# Patient Record
Sex: MALE | Race: NATIVE HAWAIIAN OR OTHER PACIFIC ISLANDER | HISPANIC OR LATINO | URBAN - METROPOLITAN AREA
[De-identification: names, ages, dates, MRNs, and addresses within clinical notes are randomized per-mention and may not be internally consistent; named-entity substitution may affect disease eponyms.]

---

## 2017-07-13 ENCOUNTER — INPATIENT (INPATIENT)
Facility: HOSPITAL | Age: 67
LOS: 6 days | Discharge: HOME | DRG: 242 | End: 2017-07-20
Attending: HOSPITALIST | Admitting: STUDENT IN AN ORGANIZED HEALTH CARE EDUCATION/TRAINING PROGRAM
Payer: MEDICARE

## 2017-07-13 VITALS — SYSTOLIC BLOOD PRESSURE: 106 MMHG | RESPIRATION RATE: 16 BRPM | HEART RATE: 40 BPM | DIASTOLIC BLOOD PRESSURE: 68 MMHG

## 2017-07-13 DIAGNOSIS — N28.9 DISORDER OF KIDNEY AND URETER, UNSPECIFIED: ICD-10-CM

## 2017-07-13 DIAGNOSIS — I44.2 ATRIOVENTRICULAR BLOCK, COMPLETE: ICD-10-CM

## 2017-07-13 DIAGNOSIS — I10 ESSENTIAL (PRIMARY) HYPERTENSION: ICD-10-CM

## 2017-07-13 LAB
APTT BLD: 28.2 SEC — SIGNIFICANT CHANGE UP (ref 27.5–37.4)
BASOPHILS # BLD AUTO: 0 K/UL — SIGNIFICANT CHANGE UP (ref 0–0.2)
BASOPHILS NFR BLD AUTO: 0 % — SIGNIFICANT CHANGE UP (ref 0–2)
CK MB BLD-MCNC: 2.3 % — SIGNIFICANT CHANGE UP (ref 0–3.5)
CK MB CFR SERPL CALC: 2.3 NG/ML — SIGNIFICANT CHANGE UP (ref 0–6.7)
CK SERPL-CCNC: 102 U/L — SIGNIFICANT CHANGE UP (ref 30–200)
DIGOXIN SERPL-MCNC: <0.2 NG/ML — LOW (ref 0.8–2)
EOSINOPHIL # BLD AUTO: 0 K/UL — SIGNIFICANT CHANGE UP (ref 0–0.5)
EOSINOPHIL NFR BLD AUTO: 0.1 % — SIGNIFICANT CHANGE UP (ref 0–6)
GAS PNL BLDV: SIGNIFICANT CHANGE UP
HCT VFR BLD CALC: 35.1 % — LOW (ref 39–50)
HGB BLD-MCNC: 11.5 G/DL — LOW (ref 13–17)
INR BLD: 1.09 RATIO — SIGNIFICANT CHANGE UP (ref 0.88–1.16)
LYMPHOCYTES # BLD AUTO: 0.7 K/UL — LOW (ref 1–3.3)
LYMPHOCYTES # BLD AUTO: 8.3 % — LOW (ref 13–44)
MCHC RBC-ENTMCNC: 32.7 GM/DL — SIGNIFICANT CHANGE UP (ref 32–36)
MCHC RBC-ENTMCNC: 34.6 PG — HIGH (ref 27–34)
MCV RBC AUTO: 106 FL — HIGH (ref 80–100)
MONOCYTES # BLD AUTO: 0.6 K/UL — SIGNIFICANT CHANGE UP (ref 0–0.9)
MONOCYTES NFR BLD AUTO: 7.2 % — SIGNIFICANT CHANGE UP (ref 2–14)
NEUTROPHILS # BLD AUTO: 7.4 K/UL — SIGNIFICANT CHANGE UP (ref 1.8–7.4)
NEUTROPHILS NFR BLD AUTO: 84.4 % — HIGH (ref 43–77)
NT-PROBNP SERPL-SCNC: HIGH PG/ML (ref 0–300)
PLATELET # BLD AUTO: 175 K/UL — SIGNIFICANT CHANGE UP (ref 150–400)
PROTHROM AB SERPL-ACNC: 11.9 SEC — SIGNIFICANT CHANGE UP (ref 9.8–12.7)
RBC # BLD: 3.32 M/UL — LOW (ref 4.2–5.8)
RBC # FLD: 14.4 % — SIGNIFICANT CHANGE UP (ref 10.3–14.5)
TROPONIN T SERPL-MCNC: 0.01 NG/ML — SIGNIFICANT CHANGE UP (ref 0–0.06)
WBC # BLD: 8.8 K/UL — SIGNIFICANT CHANGE UP (ref 3.8–10.5)
WBC # FLD AUTO: 8.8 K/UL — SIGNIFICANT CHANGE UP (ref 3.8–10.5)

## 2017-07-13 PROCEDURE — 33210 INSERT ELECTRD/PM CATH SNGL: CPT | Mod: GC

## 2017-07-13 PROCEDURE — 99291 CRITICAL CARE FIRST HOUR: CPT | Mod: 25,GC

## 2017-07-13 PROCEDURE — 93010 ELECTROCARDIOGRAM REPORT: CPT | Mod: 59

## 2017-07-13 PROCEDURE — 93010 ELECTROCARDIOGRAM REPORT: CPT

## 2017-07-13 PROCEDURE — 71010: CPT | Mod: 26

## 2017-07-13 PROCEDURE — 76937 US GUIDE VASCULAR ACCESS: CPT | Mod: 26

## 2017-07-13 PROCEDURE — 71010: CPT | Mod: 26,77

## 2017-07-13 PROCEDURE — 99223 1ST HOSP IP/OBS HIGH 75: CPT | Mod: GC

## 2017-07-13 RX ORDER — EPINEPHRINE 0.3 MG/.3ML
0.08 INJECTION INTRAMUSCULAR; SUBCUTANEOUS
Qty: 4 | Refills: 0 | Status: DISCONTINUED | OUTPATIENT
Start: 2017-07-13 | End: 2017-07-13

## 2017-07-13 RX ORDER — HYDRALAZINE HCL 50 MG
5 TABLET ORAL ONCE
Qty: 0 | Refills: 0 | Status: COMPLETED | OUTPATIENT
Start: 2017-07-13 | End: 2017-07-13

## 2017-07-13 RX ORDER — SODIUM CHLORIDE 9 MG/ML
3 INJECTION INTRAMUSCULAR; INTRAVENOUS; SUBCUTANEOUS ONCE
Qty: 0 | Refills: 0 | Status: COMPLETED | OUTPATIENT
Start: 2017-07-13 | End: 2017-07-13

## 2017-07-13 RX ORDER — ATROPINE SULFATE 0.1 MG/ML
0.5 SYRINGE (ML) INJECTION ONCE
Qty: 0 | Refills: 0 | Status: DISCONTINUED | OUTPATIENT
Start: 2017-07-13 | End: 2017-07-14

## 2017-07-13 RX ORDER — CALCIUM GLUCONATE 100 MG/ML
1 VIAL (ML) INTRAVENOUS ONCE
Qty: 1 | Refills: 0 | Status: DISCONTINUED | OUTPATIENT
Start: 2017-07-13 | End: 2017-07-14

## 2017-07-13 RX ADMIN — Medication 5 MILLIGRAM(S): at 22:47

## 2017-07-13 RX ADMIN — SODIUM CHLORIDE 3 MILLILITER(S): 9 INJECTION INTRAMUSCULAR; INTRAVENOUS; SUBCUTANEOUS at 18:36

## 2017-07-13 NOTE — H&P ADULT - NSHPREVIEWOFSYSTEMS_GEN_ALL_CORE
CONSTITUTIONAL:  No weight loss, fever, chills  HEENT:  Eyes:  No visual loss, blurred vision, double vision or yellow sclerae. Ears, Nose, Throat:  No hearing loss, sneezing, congestion, runny nose or sore throat.  SKIN:  No rash or itching.  CARDIOVASCULAR:  +chest discomfort. No palpitations or edema.  RESPIRATORY:  No shortness of breath, cough or sputum.  GASTROINTESTINAL:  No anorexia, nausea, vomiting or diarrhea. No abdominal pain or blood.  GENITOURINARY:  Denies hematuria, dysuria.   NEUROLOGICAL:  No headache, dizziness, syncope, paralysis, ataxia, numbness or tingling in the extremities. No change in bowel or bladder control.  MUSCULOSKELETAL:  No muscle, back pain, joint pain or stiffness.  HEMATOLOGIC:  No anemia, bleeding or bruising.  PSYCHIATRIC:  No history of depression or anxiety.  ENDOCRINOLOGIC:  No reports of sweating, cold or heat intolerance.

## 2017-07-13 NOTE — ED PROCEDURE NOTE - CPROC ED TRANSVE PACE DETAIL1
The vein was accessed using an introducer. A pacing catheter was advanced.  The positive and negative electrodes were connected and demand pacing was initiated.  The rate and milliamp output were set./5 Fr pacer (8.5 Fr introducer)

## 2017-07-13 NOTE — ED ADULT NURSE NOTE - CHPI ED SYMPTOMS NEG
no back pain/no vomiting/no shortness of breath/no fever/no dizziness/no nausea/no diaphoresis/no cough/no chills

## 2017-07-13 NOTE — CONSULT NOTE ADULT - SUBJECTIVE AND OBJECTIVE BOX
Reason for consultation: ESRD and Hyperkalemia    HPI:  67 Y/O M via EMS from [facility] presenting with bradycardia, near syncope, achiness, not feeling well and sternal chest pain that started yesterday as per pt. Pt states he was not feeling well and went to his doctors. There at the office his MD noticed that he was bradycardic and was in complete heart block. Upon arrival his heartrate was 22 bpm. Pt placed on pacer pads and began pacing at 30mA and at 60 bpm. Pt also given 0.5mg of atropine as well as per MD Dominguez. Pt states he did not receive his dialysis today. Pt has a left arm fistual with + bruit and + thirll upon assessment. Pt states he takes aspirin. Pt denies chest pain at this moment. At 1845: MD Dominguez and MD Adams with cardiology at bedside. started transvenous pacing.    Nephrology consulted for administration of HD. Patient is on HD on TTS, last HD was on Tuesday, he has a functioning LUE AVF. HD is for 3 hours, missed HD today because he wasn't feeling well. His symptoms had an insidious onset with gradual progressive course, no alleviating or aggravating factors identified. Denied any fever, chills, abdominal pain.      PAST MEDICAL & SURGICAL HISTORY:  Renal insufficiency  HTN (hypertension)  No significant past surgical history      MEDICATIONS  (STANDING):  atropine Injectable 0.5 milliGRAM(s) IV Push Once  calcium gluconate IVPB 1 Gram(s) IV Intermittent Once  EPINEPHrine     Infusion 0.08 MICROgram(s)/kG/Min (20.003 mL/Hr) IV Continuous <Continuous>      Allergies    No Known Allergies    Intolerances        SOCIAL HISTORY:  Denies ETOh,Smoking,     FAMILY HISTORY:      REVIEW OF SYSTEMS:      As per HPI otherwise 10 review of systems is negative unless indicated above.    VITAL:  T(C): , Max: 36.9 (07-13-17 @ 20:28)  T(F): , Max: 98.4 (07-13-17 @ 20:28)  HR: 70 (07-13-17 @ 20:28)  BP: 187/91 (07-13-17 @ 20:28)  BP(mean): --  RR: 20 (07-13-17 @ 20:28)  SpO2: 95% (07-13-17 @ 20:28)  Wt(kg): --    I and O's:      Weight (kg): 66.678 (07-13 @ 20:14)    PHYSICAL EXAM:    Constitutional: NAD  HEENT: PERRLA, EOMI,  MMM  Neck: No LAD, No JVD  Respiratory: CTAB  Cardiovascular: S1 and S2  Gastrointestinal: BS+, soft, NT/ND  Extremities: No peripheral edema  Neurological: A/O x 3, no focal deficits  Psychiatric: Normal mood, normal affect  : No Block  Skin: No rashes  Access: LUE AVF + thrill + bruit    LABS:                        11.5   8.8   )-----------( 175      ( 13 Jul 2017 18:43 )             35.1     07-13    134<L>  |  95<L>  |  85<H>  ----------------------------<  131<H>  6.9<HH>   |  17<L>  |  8.80<H>    Ca    8.5      13 Jul 2017 18:42    TPro  7.9  /  Alb  4.5  /  TBili  0.3  /  DBili  x   /  AST  33  /  ALT  37  /  AlkPhos  123<H>  07-13              ASSESSMENT:    67 Y/O M via EMS from [facility] presenting with bradycardia, near syncope, achiness, not feeling well and sternal chest pain found to have hyperkalemia    - ESRD on HD TTS through a RUE AVF last HD  - Hypertension  - Volume status- euvolemic  - Hyperkalemia treated with 2 amps NaHCO3 and calcium gluconate IV  - Bradycardia on transvenous pacing- will go to CCU      PLAN:   - STAT HD3 hours  2 K bath, last hour 1 K bath UF 1.5-2 kg as able,     - Repeat BMP STAT prior to starting HD  - Iron studies  - Phosphorus  - Restart home medications- hold beta blockers  - Renal diet  - Renal dosing of meds to creatinine clearance of < 10 ml/min    D/W ED atending phmissyian

## 2017-07-13 NOTE — H&P ADULT - PROBLEM SELECTOR PLAN 1
-Likely 2/2 to electrolyte abnormalities in the setting of underlying tri-fascicular block. Patient currently converted back to NSR. Will keep TVP in place tonight. Recheck electrolytes after HD. May benefit from PPM, although pt will need risks/benefit assessment with EP in the AM as he is at increased risk for infection due to ESRD.    -Hold AVN blocking agents.

## 2017-07-13 NOTE — H&P ADULT - ASSESSMENT
66 M with HTN and ESRD on HD TTS a/w complete heart block in the setting of hyperkalemia and underlying conduction disease. Pt converted back to sinus rhythm.

## 2017-07-13 NOTE — H&P ADULT - HISTORY OF PRESENT ILLNESS
66 M with HTN and ESRD on HD TTS who presented from PCP office after he was found bradycardic with HR in the 20s. Pt reports that since yesterday, he has had decreased exercise tolerance becoming dizzy after walking 2-3 steps. He also endorsed substernal chest discomfort. Pain was non-radiating with no associated SOB, nausea, or vomiting. Currently pain free. In the ED, pt was found to be in complete heart block with ventricular rhythm in the 20s. He was temporized initially with pacer pads and atropine and now has RIJ TVP in place.     Vital Signs Last 24 Hrs  T(C): 36.8 (13 Jul 2017 21:30), Max: 36.9 (13 Jul 2017 20:28)  T(F): 98.2 (13 Jul 2017 21:30), Max: 98.4 (13 Jul 2017 20:28)  HR: 70 (13 Jul 2017 21:30) (23 - 70)  BP: 190/86 (13 Jul 2017 21:30) (106/68 - 190/86)  BP(mean): 91 (13 Jul 2017 21:15) (91 - 119)  RR: 16 (13 Jul 2017 21:30) (16 - 20)  SpO2: 100% (13 Jul 2017 21:30) (88% - 100%)

## 2017-07-13 NOTE — ED PROVIDER NOTE - ATTENDING CONTRIBUTION TO CARE
I have seen and evaluated this patient with the resident.   I agree with the findings  unless other wise stated.  I have made appropriate changes in documentations where needed, After my face to face bedside evaluation, I am further  noting:  Complete heart block, obtained labs for electrolyte abnormality / cardiac enzymes, EKG, CXR, started on epi drip, Transcutaneous and transvenous pacing, admit to CCU for PPM planned.

## 2017-07-13 NOTE — ED PROCEDURE NOTE - ATTENDING CONTRIBUTION TO CARE
***Regan Serrano MD*** Attending physician was available for the key components of the procedure, the patient tolerated well. There were no complications with the procedure.

## 2017-07-13 NOTE — ED PROVIDER NOTE - PHYSICAL EXAMINATION
PE: CONSTITUTIONAL: Well appearing, well nourished, in no apparent distress. ENMT: Airway patent, nasal mucosa clear, mouth with normal mucosa. HEAD: NCAT EYES: PERRL, EOMI bilaterally CARDIAC: bradycardia, no m/r/g, no pedal edema, intact 2+ pulses on all four extremities RESPIRATORY: CTA bilaterally, no adventitious sounds GI: Abdomen non-distended, non-tender MSK: Spine appears normal, range of motion is not limited, no muscle/joint tenderness NEURO: CNII-XII grossly intact, 5/5 strength, full sensation all extremities, gait intact SKIN: Skin tone normal in color, warm and dry. No evidence of rash. LUE AVF with palpable thrill.

## 2017-07-13 NOTE — ED PROVIDER NOTE - NS ED ROS FT
ROS: GENERAL: no fevers, no chills HEENT: no epistaxis, no eye pain, no ear pain, no throat pain CARDIAC: + chest pain, + shortness of breath PULM: no cough, + shortness of breath GI: no nausea, no vomiting, no diarrhea, no abdominal pain, no hematemesis, no bright red blood per rectum : no dysuria, no hematuria EXTREMITIES: no arm pain, no leg pain, no back pain SKIN: no purpura, no petechiae NEURO: +near syncope, no headache, no neck pain, no loss of strength/sensation HEME: no easy bruising, no easy bleeding

## 2017-07-13 NOTE — ED PROCEDURE NOTE - CPROC ED TIME OUT STATEMENT1
“Patient's name, , procedure and correct site were confirmed during the [place] Timeout.”
“Patient's name, , procedure and correct site were confirmed during the [place] Timeout.”

## 2017-07-13 NOTE — H&P ADULT - NSHPLABSRESULTS_GEN_ALL_CORE
Labs reviewed. Potassium of 6.9, Cr of 8.8, BUN of 85. Cardiac enzymes negative.    TVP in place in RV on CXR, no evidence of PTX.    EKG with evidence of complete heart block and then conversion back to sinus with RBBB and L anterior fascicular block.

## 2017-07-13 NOTE — H&P ADULT - NSHPPHYSICALEXAM_GEN_ALL_CORE
GENERAL APPEARANCE: Well developed, well nourished, alert and cooperative. NAD.   HEENT:  PERRL, EOMI. External auditory canals normal, hearing grossly intact.  NECK: Neck supple, RIJ TVP in place  CARDIAC: Normal S1 and S2. No S3, S4 or murmurs. Rhythm is regular.  LUNGS: Clear to auscultation and percussion without rales, rhonchi, wheezing or diminished breath sounds.  ABDOMEN: Soft, nondistended, nontender. No guarding or rebound.   MUSKULOSKELETAL: ROM intact spine and extremities. No joint erythema or tenderness.   EXTREMITIES: No significant deformity or joint abnormality. No edema. Peripheral pulses intact. LUE AVF  NEUROLOGICAL: CN II-XII intact. Strength and sensation symmetric and intact throughout.   SKIN: Skin normal color, texture and turgor with no lesions or eruptions.  PSYCHIATRIC: AOx3.Normal affect and behavior.

## 2017-07-13 NOTE — ED ADULT NURSE NOTE - OBJECTIVE STATEMENT
65 Y/O M via EMS from [facility] presenting with bradycardia, near syncope, achiness, not feeling well and sternal chest pain that started yesterday as per pt. Pt states he was not feeling well and went to his doctors. There at the office his MD noticed that he was bradycardic and was in complete heart block. Upon arrival his heartrate was 22 bpm. Pt placed on pacer pads and began pacing at 30mA and at 60 bpm. Pt also given 0.5mg of atropine as well as per MD Dominguez. Pt states he did not receive his dialysis today. Pt has a left arm fistual with + bruit and + thirll upon assessment. Pt states he takes aspirin. Pt denies chest pain at this moment. At 1845: MD Dominguez and MD Adams with cardiology at bedside. started transvenous pacing. Pt is aaox4. Gross neuro intact. Lungs clear throughout bilat. S1 and S2 heard.  Unable to obtain BP due to heartrate. CM: bradycardic. Abd soft, nontender, nondistended. + bs in all 4 quadrants. Denies urinary s&s. Skin w.d. Unable to assess skin integrity due to pt;s acuity. Safety and comfort measures maintained. 65 Y/O M via EMS from [facility] presenting with bradycardia, near syncope, achiness, not feeling well and sternal chest pain that started yesterday as per pt. Pt states he was not feeling well and went to his doctors. There at the office his MD noticed that he was bradycardic and was in complete heart block. Upon arrival his heartrate was 22 bpm. Pt placed on pacer pads and began pacing at 30mA and at 60 bpm. Pt also given 0.5mg of atropine as well as per MD Estrada. Pt states he did not receive his dialysis today. Pt has a left arm fistual with + bruit and + thirll upon assessment. Pt states he takes aspirin. Pt denies chest pain at this moment. At 1845: MD Estrada and MD Adams with cardiology at bedside. started transvenous pacing. Pt is aaox4. Gross neuro intact. Lungs clear throughout bilat. S1 and S2 heard.  Unable to obtain BP due to heartrate. CM: bradycardic.0.08mg of Epi drip started as per md estrada. Abd soft, nontender, nondistended. + bs in all 4 quadrants. Denies urinary s&s. Skin w.d. Unable to assess skin integrity due to pt;s acuity. Safety and comfort measures maintained.

## 2017-07-13 NOTE — ED PROVIDER NOTE - CRITICAL CARE PROVIDED
documentation/consult w/ pt's family directly relating to pts condition/telephone consultation with the patient's family/direct patient care (not related to procedure)/interpretation of diagnostic studies/consultation with other physicians/conducted a detailed discussion of DNR status/additional history taking

## 2017-07-13 NOTE — ED PROCEDURE NOTE - CPROC ED INFORMED CONSENT1
Benefits, risks, and possible complications of procedure explained to patient/caregiver who verbalized understanding and gave verbal consent.
This was an emergent procedure and consent was implied.

## 2017-07-13 NOTE — ED PROVIDER NOTE - OBJECTIVE STATEMENT
66y Male PMH HTN, renal insuffiency complaining of 66y Male PMH HTN, ESRD on HD TTS who presented from PCP office after he was found bradycardic with HR in the 30s, sent in for complete heart block. Reported that for the past few days, was having substernal chest discomfort with dyspnea on exertion. [place] has if he was having near syncope episodes as well, especially with exertion. No chest pain now at rest. Denies fevers, chills, nausea, vomiting, diarrhea, constipation.

## 2017-07-14 LAB
ANION GAP SERPL CALC-SCNC: 18 MMOL/L — HIGH (ref 5–17)
BLD GP AB SCN SERPL QL: NEGATIVE — SIGNIFICANT CHANGE UP
BUN SERPL-MCNC: 33 MG/DL — HIGH (ref 7–23)
CALCIUM SERPL-MCNC: 8.8 MG/DL — SIGNIFICANT CHANGE UP (ref 8.4–10.5)
CHLORIDE SERPL-SCNC: 97 MMOL/L — SIGNIFICANT CHANGE UP (ref 96–108)
CHOLEST SERPL-MCNC: 172 MG/DL — SIGNIFICANT CHANGE UP (ref 10–199)
CO2 SERPL-SCNC: 24 MMOL/L — SIGNIFICANT CHANGE UP (ref 22–31)
CREAT SERPL-MCNC: 4.66 MG/DL — HIGH (ref 0.5–1.3)
GLUCOSE SERPL-MCNC: 85 MG/DL — SIGNIFICANT CHANGE UP (ref 70–99)
HAV IGM SER-ACNC: SIGNIFICANT CHANGE UP
HBA1C BLD-MCNC: 4.6 % — SIGNIFICANT CHANGE UP (ref 4–5.6)
HBV CORE IGM SER-ACNC: SIGNIFICANT CHANGE UP
HBV SURFACE AB SER-ACNC: 864.4 MIU/ML — SIGNIFICANT CHANGE UP
HBV SURFACE AG SER-ACNC: SIGNIFICANT CHANGE UP
HCT VFR BLD CALC: 35 % — LOW (ref 39–50)
HCV AB S/CO SERPL IA: 0.06 S/CO — SIGNIFICANT CHANGE UP
HCV AB SERPL-IMP: SIGNIFICANT CHANGE UP
HDLC SERPL-MCNC: 45 MG/DL — SIGNIFICANT CHANGE UP (ref 40–125)
HGB BLD-MCNC: 11.3 G/DL — LOW (ref 13–17)
LIPID PNL WITH DIRECT LDL SERPL: 106 MG/DL — SIGNIFICANT CHANGE UP
MAGNESIUM SERPL-MCNC: 2.1 MG/DL — SIGNIFICANT CHANGE UP (ref 1.6–2.6)
MCHC RBC-ENTMCNC: 32.2 GM/DL — SIGNIFICANT CHANGE UP (ref 32–36)
MCHC RBC-ENTMCNC: 33.7 PG — SIGNIFICANT CHANGE UP (ref 27–34)
MCV RBC AUTO: 105 FL — HIGH (ref 80–100)
PHOSPHATE SERPL-MCNC: 4.5 MG/DL — SIGNIFICANT CHANGE UP (ref 2.5–4.5)
PLATELET # BLD AUTO: 138 K/UL — LOW (ref 150–400)
POTASSIUM SERPL-MCNC: 4 MMOL/L — SIGNIFICANT CHANGE UP (ref 3.5–5.3)
POTASSIUM SERPL-SCNC: 4 MMOL/L — SIGNIFICANT CHANGE UP (ref 3.5–5.3)
RBC # BLD: 3.34 M/UL — LOW (ref 4.2–5.8)
RBC # FLD: 14.6 % — HIGH (ref 10.3–14.5)
RH IG SCN BLD-IMP: POSITIVE — SIGNIFICANT CHANGE UP
SODIUM SERPL-SCNC: 139 MMOL/L — SIGNIFICANT CHANGE UP (ref 135–145)
TOTAL CHOLESTEROL/HDL RATIO MEASUREMENT: 3.8 RATIO — SIGNIFICANT CHANGE UP (ref 3.4–9.6)
TRIGL SERPL-MCNC: 106 MG/DL — SIGNIFICANT CHANGE UP (ref 10–149)
TSH SERPL-MCNC: 1.02 UIU/ML — SIGNIFICANT CHANGE UP (ref 0.27–4.2)
WBC # BLD: 10.3 K/UL — SIGNIFICANT CHANGE UP (ref 3.8–10.5)
WBC # FLD AUTO: 10.3 K/UL — SIGNIFICANT CHANGE UP (ref 3.8–10.5)

## 2017-07-14 PROCEDURE — 99233 SBSQ HOSP IP/OBS HIGH 50: CPT | Mod: GC

## 2017-07-14 PROCEDURE — 93306 TTE W/DOPPLER COMPLETE: CPT | Mod: 26

## 2017-07-14 PROCEDURE — 71010: CPT | Mod: 26

## 2017-07-14 RX ORDER — SODIUM CHLORIDE 9 MG/ML
1000 INJECTION INTRAMUSCULAR; INTRAVENOUS; SUBCUTANEOUS
Qty: 0 | Refills: 0 | Status: DISCONTINUED | OUTPATIENT
Start: 2017-07-14 | End: 2017-07-14

## 2017-07-14 RX ORDER — HYDRALAZINE HCL 50 MG
50 TABLET ORAL EVERY 8 HOURS
Qty: 0 | Refills: 0 | Status: DISCONTINUED | OUTPATIENT
Start: 2017-07-14 | End: 2017-07-15

## 2017-07-14 RX ORDER — HYDRALAZINE HCL 50 MG
25 TABLET ORAL THREE TIMES A DAY
Qty: 0 | Refills: 0 | Status: DISCONTINUED | OUTPATIENT
Start: 2017-07-14 | End: 2017-07-14

## 2017-07-14 RX ORDER — HEPARIN SODIUM 5000 [USP'U]/ML
5000 INJECTION INTRAVENOUS; SUBCUTANEOUS EVERY 8 HOURS
Qty: 0 | Refills: 0 | Status: DISCONTINUED | OUTPATIENT
Start: 2017-07-14 | End: 2017-07-19

## 2017-07-14 RX ORDER — ACETAMINOPHEN 500 MG
650 TABLET ORAL EVERY 6 HOURS
Qty: 0 | Refills: 0 | Status: DISCONTINUED | OUTPATIENT
Start: 2017-07-14 | End: 2017-07-20

## 2017-07-14 RX ADMIN — Medication 25 MILLIGRAM(S): at 05:40

## 2017-07-14 RX ADMIN — Medication 50 MILLIGRAM(S): at 21:08

## 2017-07-14 RX ADMIN — Medication 650 MILLIGRAM(S): at 04:05

## 2017-07-14 RX ADMIN — HEPARIN SODIUM 5000 UNIT(S): 5000 INJECTION INTRAVENOUS; SUBCUTANEOUS at 21:08

## 2017-07-14 RX ADMIN — SODIUM CHLORIDE 10 MILLILITER(S): 9 INJECTION INTRAMUSCULAR; INTRAVENOUS; SUBCUTANEOUS at 03:32

## 2017-07-14 RX ADMIN — Medication 25 MILLIGRAM(S): at 15:51

## 2017-07-14 RX ADMIN — Medication 650 MILLIGRAM(S): at 03:32

## 2017-07-14 NOTE — PROGRESS NOTE ADULT - SUBJECTIVE AND OBJECTIVE BOX
NEPHROLOGY-[facility] [phone]        Patient seen and examined         MEDICATIONS  (STANDING):  hydrALAZINE 25 milliGRAM(s) Oral three times a day  sodium chloride 0.9%. 1000 milliLiter(s) (10 mL/Hr) IV Continuous <Continuous>      VITAL:  T(C): , Max: 37.1 (07-14-17 @ 04:00)  T(F): , Max: 98.8 (07-14-17 @ 04:00)  HR: 62 (07-14-17 @ 08:00)  BP: 152/57 (07-14-17 @ 08:00)  BP(mean): 88 (07-14-17 @ 08:00)  RR: 20 (07-14-17 @ 08:00)  SpO2: 97% (07-14-17 @ 08:00)  Wt(kg): --    I and O's:    07-13 @ 07:01  -  07-14 @ 07:00  --------------------------------------------------------  IN: 330 mL / OUT: 2000 mL / NET: -1670 mL      Height (cm): 165.1 (07-13 @ 20:45)  Weight (kg): 65.9 (07-13 @ 20:45)  BMI (kg/m2): 24.2 (07-13 @ 20:45)  BSA (m2): 1.73 (07-13 @ 20:45)    PHYSICAL EXAM:    Constitutional: NAD  HEENT: PERRLA    Neck:  No JVD  Respiratory: CTAB/L  Cardiovascular: S1 and S2  Gastrointestinal: BS+, soft, NT/ND  Extremities: No peripheral edema  Neurological: A/O x 3, no focal deficits  Psychiatric: Normal mood, normal affect  : No Block  Skin: No rashes  Access: Not applicable    LABS:                        11.3   10.3  )-----------( 138      ( 14 Jul 2017 04:04 )             35.0     07-14    139  |  97  |  33<H>  ----------------------------<  85  4.0   |  24  |  4.66<H>    Ca    8.8      14 Jul 2017 04:04  Phos  4.5     07-14  Mg     2.1     07-14    TPro  7.9  /  Alb  4.5  /  TBili  0.3  /  DBili  x   /  AST  33  /  ALT  37  /  AlkPhos  123<H>  07-13          Urine Studies:          RADIOLOGY & ADDITIONAL STUDIES:

## 2017-07-14 NOTE — CONSULT NOTE ADULT - SUBJECTIVE AND OBJECTIVE BOX
Patient is a 66y old  Male who presents with a chief complaint of Chest pain, dizziness (2017 22:06)      HPI:  66 M with HTN and ESRD on HD who presented to PCP - Dr. Wilson office yesterday complaining of chest pain and lightheadedness. He was found to be bradycardic with HR in the 20s. EKG with CHB.  Pt reports that since Wednesday, he has had decreased exercise tolerance becoming dizzy after walking 2-3 steps. He also complained of mild substernal chest discomfort. Pain was non-radiating with no associated SOB, nausea, or vomiting. Currently pain free. In the ED, pt remained in complete heart block with ventricular rhythm in the 20s. He was temporized initially with pacer pads and atropine and now has RIJ TVP in place.   Now he feels well in RSR in the 60s.  He was on labatelol for HTN.  He admits to recently eating some avocado.  Pt missed last HD treatment.  Found to be markedly hyperkalemic with Potassium of 6.9, Cr of 8.8, BUN of 85 on ER labs which was appropriately treated and normalized    Vital Signs Last 24 Hrs  T(C): 36.8 (2017 21:30), Max: 36.9 (2017 20:28)  T(F): 98.2 (2017 21:30), Max: 98.4 (2017 20:28)  HR: 70 (2017 21:30) (23 - 70)  BP: 190/86 (2017 21:30) (106/68 - 190/86)  BP(mean): 91 (2017 21:15) (91 - 119)  RR: 16 (2017 21:30) (16 - 20)  SpO2: 100% (2017 21:30) (88% - 100%) (2017 22:06)      PAST MEDICAL & SURGICAL HISTORY:  Renal insufficiency  HTN (hypertension)  No significant past surgical history      PREVIOUS CARDIAC WORKUP:      Echo 10-10-16: Mild MR and Diastolic dysfunction, Moderate LVH.  Mild AS - VIRIDIANA 1.4cm2.  Normal Rv and LV systolic function  Myoview perfusion Stress Test 10-24-16: No ischemia.  LV is moderately dilated and hypertrophic with EF of 50%    ALLERGIES:    No Known Allergies       MEDICATIONS  (STANDING):  hydrALAZINE 25 milliGRAM(s) Oral three times a day  sodium chloride 0.9%. 1000 milliLiter(s) (10 mL/Hr) IV Continuous <Continuous>    MEDICATIONS  (PRN):  acetaminophen   Tablet. 650 milliGRAM(s) Oral every 6 hours PRN Moderate Pain (4 - 6)      FAMILY HISTORY:  No pertinent family history in first degree relatives      SOCIAL HISTORY:      ROS:     A comprehensive review of systems was performed and pertinent items are noted in the history above. A detailed ROS is as follows:    Constitutional	     No appetite changes, chills, fevers, malaise, sweats and weight gain / loss.  Eyes: 	                         No  icterus, redness and visual disturbance.  ENT, mouth and face:	     No  ear discharge, hearing loss, epistaxis, nasal congestion, oral sores, dental/gum infection, sore throat, hoarseness.  Neck:	                         No thyroid enlargement, neck pain, swollen glands and difficulty in swallowing  Respiratory:                       No cough, emphysema, hemoptysis, dyspnea pleuritic chest pain, phlegm, stridor and wheezing  Cardiovascular:                  Mild chest pain and dyspnea on exertion.  No palpitations, Has dizziness and lighheadedness, No syncope, orthopnea, or PND  Gastrointestinal:	      No abdominal pain, nausea, vomiting, change in bowel habits, GERD, dyspepsia, dysphagia, GI bleed.  Genitourinary:	      No genital lesions, discharge, bleeding, dysuria, frequency, hematuria and urinary incontinence.  Skin / Breast: 	      No breast lump, breast tenderness. No skin rash, redness, pruritis, swelling dryness, fissures.  Hematologic/lymphatic:   No bleeding disorder, clotting disorder, petechial rash, easy bruising and lymphadenopathy.  Musculoskeletal:	      No arthralgias, back pain, bone pain, muscle weakness, myalgias, neck pain and stiff joints  Vascular:	                          No leg pain, cramps, discoloration or edema. No varicose veins  Neurological:	      No gait problems, headaches, memory issues, paresthesia, seizures, speech issues, tremors, vertigo,  weakness  Behavioral/Psych: 	      No mood change, depression, anxiety, suicidal attempts.  Endocrine:	                          No blurry vision, polydipsia, polyphagia, polyuria, skin dryness and temperature intolerance.  Allergic/Immunologic:	      Negative for anaphylaxis, angioedema and urticaria.        Vital Signs Last 24 Hrs  T(C): 37.1 (2017 08:00), Max: 37.1 (2017 04:00)  T(F): 98.8 (2017 08:00), Max: 98.8 (2017 04:00)  HR: 58 (2017 10:00) (23 - 70)  BP: 136/54 (2017 10:00) (106/68 - 238/86)  BP(mean): 84 (2017 10:00) (82 - 126)  RR: 16 (2017 10:00) (13 - 36)  SpO2: 97% (2017 10:00) (88% - 100%)    I&O's Summary    2017 07:01  -  2017 07:00  --------------------------------------------------------  IN: 330 mL / OUT: 2000 mL / NET: -1670 mL        PHYSICAL EXAM:  Daily Height in cm: 165.1 (2017 20:45)    Daily Weight in k.4 (2017 03:00)  Drug Dosing Weight  Height (cm): 165.1 (2017 20:45)  Weight (kg): 65.9 (2017 20:45)  BMI (kg/m2): 24.2 (2017 20:45)  BSA (m2): 1.73 (2017 20:45)  General Appearance:    Comfortable, AAO X 3, in no distress.   HEENT:                       Atraumatic, EOMI, conjunctiva clear.   Neck:                          Supple, no adenopathy, no thyromegaly, no JVD, no carotid bruit  Back:                          Symmetric, no CV angle fullness or tenderness, no soft tissue tenderness.  Chest:                         Clear to auscultation, no wheezes, rales or rhonchi, symmetric air entry, no tachypnea, retractions or cyanosis  Heart:                          S1, S2 normal, no gallop, has murmur - 2/6 MYRON over the RUSB without radiation.  Abdomen:                    Soft, non-tender, bowel sounds active. No palpable masses.   Extremities:                 no cyanosis, no edema, no joint swelling. Peripheral pulses normal  Skin:                           Skin color, texture normal. No rashes   Neurologic:                  Alert and oriented X3, cranial nerves intact, sensory and motor normal.      TELEMETRY: RSR 50s - 60s    ECG in ER    Ventricular Rate 25 BPM    QRS Duration 172 ms     ms    QTc 479 ms    P Axis 64 degrees    R Axis -55 degrees    T Axis 144 degrees    Diagnosis Line CHB  Ventricular escape of 25      LABS:  CARDIAC MARKERS ( 2017 18:42 )  x     / 0.01 ng/mL / 102 U/L / x     / 2.3 ng/mL                            11.3   10.3  )-----------( 138      ( 2017 04:04 )             35.0     07-14    139  |  97  |  33<H>  ----------------------------<  85  4.0   |  24  |  4.66<H>    Ca    8.8      2017 04:04  Phos  4.5       Mg     2.1         TPro  7.9  /  Alb  4.5  /  TBili  0.3  /  DBili  x   /  AST  33  /  ALT  37  /  AlkPhos  123<H>      Lipid Panel  Chl 172  HDL 45    Trg 106       @ 18:42  Trop-I  --  CK      102  CK-MB   --    Pro BNP  >[id number]  @ 18:42  D Dimer  --  @ 18:42    PT/INR - ( 2017 18:43 )   PT: 11.9 sec;   INR: 1.09 ratio         PTT - ( 2017 18:43 )  PTT:28.2 sec          RADIOLOGY & ADDITIONAL STUDIES:    HEALTH ISSUES - PROBLEM Dx:  Essential hypertension: Essential hypertension  Renal insufficiency: Renal insufficiency  Severe hyperkalemia  Complete heart block: Complete heart block

## 2017-07-14 NOTE — PROGRESS NOTE ADULT - ASSESSMENT
65 Y/O M via EMS from [facility] office presenting with bradycardia, near syncope, achiness, not feeling well and sternal chest pain found to have hyperkalemia    - ESRD on HD TTS through a RUE AVF   - Volume status- euvolemic        PLAN:   - Next HD in am   - Restart home medications- hold beta blockers  - Renal diet  - Renal dosing of meds to creatinine clearance of < 10 ml/min  -EPS to determine the need for PPM(again was in the setting of hyperkalemia)

## 2017-07-14 NOTE — CHART NOTE - NSCHARTNOTEFT_GEN_A_CORE
66 M with HTN and ESRD on HD TTS a/w complete heart block in the setting of hyperkalemia and underlying conduction disease. Pt converted back to sinus rhythm overnight after HD and resolution of hyperkalemia. He has underlying trifascicular block. Interim, TVP was D/C'd.  Patient received from CCU for continued critical care monitoring.       07-14                   11.3   10.3  )-----------( 138      ( 14 Jul 2017 04:04 )             35.0     139  |  97  |  33<H>  ----------------------------<  85  4.0   |  24  |  4.66<H>  07-13    134<L>  |  95<L>  |  85<H>  ----------------------------<  131<H>  6.9<HH>   |  17<L>  |  8.80<H>    Ca    8.8      14 Jul 2017 04:04  Phos  4.5     07-14  Mg     2.1     07-14    TPro  7.9  /  Alb  4.5  /  TBili  0.3  /  DBili  x   /  AST  33  /  ALT  37  /  AlkPhos  123<H>  07-13        PHYSICAL EXAM:    General: WDWN ; NAD  HEENT: NC/AT; clear conjunctiva  Neck: supple, RIJ TVP in place  Respiratory: CTA B/L; no W/R/R  Cardiovascular: +S1/S2; RRR; no M/R/G      PLAN:  EP evaluation to consider ppm although pt will need risk/benefit assessment with EP given his ESRD on HD. Continue to hold AVN blocking agents. Started on Hydralazine 25mg TID for HTN management, uptitrate as needed.    Pardeep Rosario, AG-ACNP   [id number] CCU 2  ACCEPT NOTE    66 M with HTN and ESRD on HD TTS a/w complete heart block in the setting of hyperkalemia and underlying conduction disease. Pt converted back to sinus rhythm overnight after HD and resolution of hyperkalemia. He has underlying trifascicular block. Interim, TVP was D/C'd.  Patient received from CCU for continued critical care monitoring.       07-14                   11.3   10.3  )-----------( 138      ( 14 Jul 2017 04:04 )             35.0     139  |  97  |  33<H>  ----------------------------<  85  4.0   |  24  |  4.66<H>  07-13    134<L>  |  95<L>  |  85<H>  ----------------------------<  131<H>  6.9<HH>   |  17<L>  |  8.80<H>    Ca    8.8      14 Jul 2017 04:04  Phos  4.5     07-14  Mg     2.1     07-14    TPro  7.9  /  Alb  4.5  /  TBili  0.3  /  DBili  x   /  AST  33  /  ALT  37  /  AlkPhos  123<H>  07-13        PHYSICAL EXAM:    General: WDWN ; NAD  HEENT: PEERL  Neck: supple, RIJ 2x2 dressing, C/D/I  Respiratory: CTA B/L  Cardiovascular: +S1/S2; RRR; no M/R/G  VASCULAR: left AVF site with +bruit, + thrill      PLAN:  EP evaluation to consider ppm although pt will need risk/benefit assessment with EP given his ESRD on HD. Continue to hold AVN blocking agents. Started on Hydralazine 25mg TID for HTN management, uptitrate as needed.    Pardeep Rosario, AG-ACNP   [id number]

## 2017-07-14 NOTE — PROGRESS NOTE ADULT - ASSESSMENT
66 M with HTN and ESRD on HD TTS a/w complete heart block in the setting of hyperkalemia and underlying conduction disease. Pt converted back to sinus rhythm overnight.    #Neuro - No sedation, at baseline mental status    #Pulm - No increased O2 requirements    #Cardio - Converted to sinus rhythm overnight. Underlying trifascicular block, consider ppm although pt will need risk/benefit assessment with EP given his ESRD on HD. Continue to hold AVN blocking agents. Started on Hydralazine 25mg TID for HTN management, uptitrate as needed.    #GI - PO diet, NPO after midnight for potential ppm    #Renal - Hyperkalemia resolved with HD. HD per renal    #Heme - H/H stable. Start HSQ if no plans for ppm.    #ID - afebrile, no leukocytosis.

## 2017-07-14 NOTE — PROGRESS NOTE ADULT - SUBJECTIVE AND OBJECTIVE BOX
INTERVAL HPI/OVERNIGHT EVENTS:    SUBJECTIVE: Patient seen and examined at bedside. Denies any chest pain or SOB.     CONSTITUTIONAL:  No fever or chills  HEENT:  No headache  SKIN:  No rash or itching.  CARDIOVASCULAR:  No chest pain, chest pressure or chest discomfort. No palpitations or edema.  RESPIRATORY:  No shortness of breath, cough or sputum.  GASTROINTESTINAL:  No nausea or vomiting or abd pain  GENITOURINARY:  Denies hematuria, dysuria.   NEUROLOGICAL:  No headache, dizziness, syncope, paralysis, ataxia, numbness or tingling in the extremities.   MUSCULOSKELETAL:  No muscle, back pain, joint pain or stiffness.    OBJECTIVE:    VITAL SIGNS:  ICU Vital Signs Last 24 Hrs  T(C): 36.8 (13 Jul 2017 21:30), Max: 36.9 (13 Jul 2017 20:28)  T(F): 98.2 (13 Jul 2017 21:30), Max: 98.4 (13 Jul 2017 20:28)  HR: 62 (14 Jul 2017 05:00) (23 - 70)  BP: 159/67 (14 Jul 2017 05:00) (106/68 - 238/86)  BP(mean): 102 (14 Jul 2017 05:00) (86 - 126)  RR: 36 (14 Jul 2017 05:00) (13 - 36)  SpO2: 98% (14 Jul 2017 05:00) (88% - 100%)        07-13 @ 07:01  -  07-14 @ 05:24  --------------------------------------------------------  IN: 180 mL / OUT: 2000 mL / NET: -1820 mL      CAPILLARY BLOOD GLUCOSE    PHYSICAL EXAM:    General: WDWN ; NAD  HEENT: NC/AT; clear conjunctiva  Neck: supple, RIJ TVP in place  Respiratory: CTA B/L; no W/R/R  Cardiovascular: +S1/S2; RRR; no M/R/G  Gastrointestinal: soft, NT/ND;  Extremities:  no LE edema  Skin: normal color and turgor; no rash  Neurological: no focal deficits    MEDICATIONS:  MEDICATIONS  (STANDING):  hydrALAZINE 25 milliGRAM(s) Oral three times a day  sodium chloride 0.9%. 1000 milliLiter(s) (10 mL/Hr) IV Continuous <Continuous>    MEDICATIONS  (PRN):  acetaminophen   Tablet. 650 milliGRAM(s) Oral every 6 hours PRN Moderate Pain (4 - 6)      ALLERGIES:  Allergies    No Known Allergies    Intolerances        LABS:                        11.3   10.3  )-----------( 138      ( 14 Jul 2017 04:04 )             35.0     07-14    139  |  97  |  33<H>  ----------------------------<  85  4.0   |  24  |  4.66<H>    Ca    8.8      14 Jul 2017 04:04  Phos  4.5     07-14  Mg     2.1     07-14    TPro  7.9  /  Alb  4.5  /  TBili  0.3  /  DBili  x   /  AST  33  /  ALT  37  /  AlkPhos  123<H>  07-13    LIVER FUNCTIONS - ( 13 Jul 2017 18:42 )  Alb: 4.5 g/dL / Pro: 7.9 g/dL / ALK PHOS: 123 U/L / ALT: 37 U/L RC / AST: 33 U/L / GGT: x           PT/INR - ( 13 Jul 2017 18:43 )   PT: 11.9 sec;   INR: 1.09 ratio         PTT - ( 13 Jul 2017 18:43 )  PTT:28.2 sec    CARDIAC MARKERS ( 13 Jul 2017 18:42 )  x     / 0.01 ng/mL / 102 U/L / x     / 2.3 ng/mL        RADIOLOGY & ADDITIONAL TESTS: Reviewed.

## 2017-07-14 NOTE — CONSULT NOTE ADULT - ASSESSMENT
· Assessment		  66 M with HTN and ESRD on HD with complete heart block in the setting of hyperkalemia and underlying conduction disease. Pt converted back to sinus rhythm with appropriate treatment.  Recent eating of avocado and missed recent dialysis.  Moderately Hypertrophic and dilated LV with preserved EF without HF symptoms.  Mild AS and MR.  No pulm htn  HTN  ESRD on HD    Plan:  Continue to monitor heart rhythm.  No current need for PPM  Low Potassium diet  HD on regular basis per nephrology.

## 2017-07-14 NOTE — CONSULT NOTE ADULT - SUBJECTIVE AND OBJECTIVE BOX
Date of Admission: 7/13/2017    CHIEF COMPLAINT: Complete Heart Block    HISTORY OF PRESENT ILLNESS: Pt is a 65 y/o man with h/o ESRD on HD (T/Th/Sat), HTN who presented to the ED from his Cardiologist's office after he was found to be in CHB. Pt reports that for the past day or so, he had been experiencing dizziness and malaise. He missed his schedule HD session on the day of presentation due to these symptoms. In the ED, the patient was noted to still be in CHB with HR ~ 20 - 30, with stable BP. TVP was placed in ED. The patient was admitted to CCU, and after undergoing HD, CHB resolved.       Allergies    No Known Allergies    Intolerances    	    MEDICATIONS:  hydrALAZINE 25 milliGRAM(s) Oral three times a day                  PAST MEDICAL & SURGICAL HISTORY:  Renal insufficiency  HTN (hypertension)  No significant past surgical history      FAMILY HISTORY:  No pertinent family history in first degree relatives      SOCIAL HISTORY:  non-smoker        REVIEW OF SYSTEMS:  General: +fatigue/malaise  Skin: no rashes.  Ophthalmologic: no blurred vision, no loss of vision. 	  ENT: no sore throat, rhinorrhea, sinus congestion.  Respiratory: no SOB, cough or wheeze.  Gastrointestinal:  no N/V/D, no melena/hematemesis/hematochezia.  Genitourinary: no dysuria/hesitancy or hematuria.  Musculoskeletal: no myalgias or arthralgias.  Neurological: no changes in vision or hearing  Psychiatric: no unusual stress/anxiety.   Hematology/Lymphatics: no unusual bleeding, bruising and no lymphadenopathy.  Endocrine: no unusual thirst.   All others negative except as stated above and in HPI.    PHYSICAL EXAM:  T(C): 36.8 (07-13-17 @ 21:30), Max: 36.9 (07-13-17 @ 20:28)  HR: 58 (07-14-17 @ 02:00) (23 - 70)  BP: 164/56 (07-14-17 @ 02:00) (106/68 - 238/86)  RR: 33 (07-14-17 @ 02:00) (13 - 35)  SpO2: 100% (07-14-17 @ 02:00) (88% - 100%)  Wt(kg): --  I&O's Summary    13 Jul 2017 07:01 - 14 Jul 2017 02:22  --------------------------------------------------------  IN: 0 mL / OUT: 2000 mL / NET: -2000 mL        Appearance: Normal	  HEENT:   Normal oral mucosa, PERRL, EOMI	  Lymphatic: No lymphadenopathy  Cardiovascular: Normal S1 S2, No JVD, No murmurs, No edema  Respiratory: Lungs clear to auscultation	  Psychiatry: A & O x 3, Mood & affect appropriate  Gastrointestinal:  Soft, Non-tender, + BS	  Skin: No rashes, No ecchymoses, No cyanosis	  Neurologic: Non-focal  Extremities: Normal range of motion, No clubbing, cyanosis or edema  Vascular: Peripheral pulses palpable 2+ bilaterally        LABS:	 	    CBC Full  -  ( 13 Jul 2017 18:43 )  WBC Count : 8.8 K/uL  Hemoglobin : 11.5 g/dL  Hematocrit : 35.1 %  Platelet Count - Automated : 175 K/uL  Mean Cell Volume : 106.0 fl  Mean Cell Hemoglobin : 34.6 pg  Mean Cell Hemoglobin Concentration : 32.7 gm/dL  Auto Neutrophil # : 7.4 K/uL  Auto Lymphocyte # : 0.7 K/uL  Auto Monocyte # : 0.6 K/uL  Auto Eosinophil # : 0.0 K/uL  Auto Basophil # : 0.0 K/uL  Auto Neutrophil % : 84.4 %  Auto Lymphocyte % : 8.3 %  Auto Monocyte % : 7.2 %  Auto Eosinophil % : 0.1 %  Auto Basophil % : 0.0 %      07-13    134<L>  |  95<L>  |  85<H>  ----------------------------<  131<H>  6.9<HH>   |  17<L>  |  8.80<H>    Ca    8.5      13 Jul 2017 18:42    TPro  7.9  /  Alb  4.5  /  TBili  0.3  /  DBili  x   /  AST  33  /  ALT  37  /  AlkPhos  123<H>  07-13      proBNP: Serum Pro-Brain Natriuretic Peptide: >30223 pg/mL (07-13 @ 18:42)      TELEMETRY: 	    ECG:  	    PREVIOUS DIAGNOSTIC TESTING:      Echocardiogram:    < from: Transthoracic Echocardiogram (07.24.15 @ 07:32) >  Conclusions:  1. Mitral annular calcification. Mild-moderate mitral  regurgitation.  2. Calcified trileaflet aortic valve with decreased  opening. Peak transaortic valve gradient equals 21.2 mm Hg,  consistent with mild aortic stenosis.  3. Normal aortic root.  4. Mild left atrial enlargement.  5. Mild concentric left ventricular hypertrophy.  6. Mild global left ventricular dysfunction.  7. Grade II diastolic dysfunction  8. Normal right atrium.  9. Normal right ventricular size and function.  10. There is moderate tricuspid regurgitation.  11. Normal pericardium with no pericardial effusion.  	  ASSESSMENT/PLAN:  65 y/o man with h/o ESRD on HD (T/Th/Sat), HTN who presented to the ED from his Cardiologist's office after he was found to be in CHB, now resolved    1) Complete Heart Block - resolved, likely occurred in the setting of electrolyte disturbances with possible acidemia related to ESRD, particularly in the setting of missed HD, given that it resolved after undergoing emergent HD in the CCU  - will keep TVP in place for now  - would consider PPM placement given underlying conduction disease at baseline, though given ESRD on HD, risks and benefits of this strategy will need to be carefully considered Date of Admission: 7/13/2017    CHIEF COMPLAINT: Complete Heart Block    HISTORY OF PRESENT ILLNESS: Pt is a 67 y/o man with h/o ESRD on HD (T/Th/Sat), HTN who presented to the ED from his Cardiologist's office after he was found to be in CHB. Pt reports that for the past day or so, he had been experiencing dizziness and malaise. He missed his schedule HD session on the day of presentation due to these symptoms. In the ED, the patient was noted to still be in CHB with HR ~ 20 - 30, with stable BP. TVP was placed in ED. The patient was admitted to CCU, and after undergoing HD, CHB resolved.       Allergies    No Known Allergies    Intolerances    	    MEDICATIONS:  hydrALAZINE 25 milliGRAM(s) Oral three times a day                  PAST MEDICAL & SURGICAL HISTORY:  Renal insufficiency  HTN (hypertension)  No significant past surgical history      FAMILY HISTORY:  No pertinent family history in first degree relatives      SOCIAL HISTORY:  non-smoker        REVIEW OF SYSTEMS:  General: +fatigue/malaise  Skin: no rashes.  Ophthalmologic: no blurred vision, no loss of vision. 	  ENT: no sore throat, rhinorrhea, sinus congestion.  Respiratory: no SOB, cough or wheeze.  Gastrointestinal:  no N/V/D, no melena/hematemesis/hematochezia.  Genitourinary: no dysuria/hesitancy or hematuria.  Musculoskeletal: no myalgias or arthralgias.  Neurological: no changes in vision or hearing  Psychiatric: no unusual stress/anxiety.   Hematology/Lymphatics: no unusual bleeding, bruising and no lymphadenopathy.  Endocrine: no unusual thirst.   All others negative except as stated above and in HPI.    PHYSICAL EXAM:  T(C): 36.8 (07-13-17 @ 21:30), Max: 36.9 (07-13-17 @ 20:28)  HR: 58 (07-14-17 @ 02:00) (23 - 70)  BP: 164/56 (07-14-17 @ 02:00) (106/68 - 238/86)  RR: 33 (07-14-17 @ 02:00) (13 - 35)  SpO2: 100% (07-14-17 @ 02:00) (88% - 100%)  Wt(kg): --  I&O's Summary    13 Jul 2017 07:01 - 14 Jul 2017 02:22  --------------------------------------------------------  IN: 0 mL / OUT: 2000 mL / NET: -2000 mL        Appearance: Normal	  HEENT:   Normal oral mucosa, PERRL, EOMI	  Lymphatic: No lymphadenopathy  Cardiovascular: Normal S1 S2, No JVD, No murmurs, No edema  Respiratory: Lungs clear to auscultation	  Psychiatry: A & O x 3, Mood & affect appropriate  Gastrointestinal:  Soft, Non-tender, + BS	  Skin: No rashes, No ecchymoses, No cyanosis	  Neurologic: Non-focal  Extremities: Normal range of motion, No clubbing, cyanosis or edema  Vascular: Peripheral pulses palpable 2+ bilaterally        LABS:	 	    CBC Full  -  ( 13 Jul 2017 18:43 )  WBC Count : 8.8 K/uL  Hemoglobin : 11.5 g/dL  Hematocrit : 35.1 %  Platelet Count - Automated : 175 K/uL  Mean Cell Volume : 106.0 fl  Mean Cell Hemoglobin : 34.6 pg  Mean Cell Hemoglobin Concentration : 32.7 gm/dL  Auto Neutrophil # : 7.4 K/uL  Auto Lymphocyte # : 0.7 K/uL  Auto Monocyte # : 0.6 K/uL  Auto Eosinophil # : 0.0 K/uL  Auto Basophil # : 0.0 K/uL  Auto Neutrophil % : 84.4 %  Auto Lymphocyte % : 8.3 %  Auto Monocyte % : 7.2 %  Auto Eosinophil % : 0.1 %  Auto Basophil % : 0.0 %      07-13    134<L>  |  95<L>  |  85<H>  ----------------------------<  131<H>  6.9<HH>   |  17<L>  |  8.80<H>    Ca    8.5      13 Jul 2017 18:42    TPro  7.9  /  Alb  4.5  /  TBili  0.3  /  DBili  x   /  AST  33  /  ALT  37  /  AlkPhos  123<H>  07-13      proBNP: Serum Pro-Brain Natriuretic Peptide: >09800 pg/mL (07-13 @ 18:42)      ECG:     Sinus Rhythm with CHB -> Ventricularly Paced -> Sinus Rhythm with 1st Degree AVB and RBBB  	    PREVIOUS DIAGNOSTIC TESTING:      Echocardiogram:    < from: Transthoracic Echocardiogram (07.24.15 @ 07:32) >  Conclusions:  1. Mitral annular calcification. Mild-moderate mitral  regurgitation.  2. Calcified trileaflet aortic valve with decreased  opening. Peak transaortic valve gradient equals 21.2 mm Hg,  consistent with mild aortic stenosis.  3. Normal aortic root.  4. Mild left atrial enlargement.  5. Mild concentric left ventricular hypertrophy.  6. Mild global left ventricular dysfunction.  7. Grade II diastolic dysfunction  8. Normal right atrium.  9. Normal right ventricular size and function.  10. There is moderate tricuspid regurgitation.  11. Normal pericardium with no pericardial effusion.  	  ASSESSMENT/PLAN:  67 y/o man with h/o ESRD on HD (T/Th/Sat), HTN who presented to the ED from his Cardiologist's office after he was found to be in CHB, now resolved    1) Complete Heart Block - resolved, likely occurred in the setting of electrolyte disturbances with possible acidemia related to ESRD, particularly in the setting of missed HD, given that it resolved after undergoing emergent HD in the CCU  - will keep TVP in place for now  - would consider PPM placement given underlying conduction disease at baseline, though given ESRD on HD, risks and benefits of this strategy will need to be carefully considered

## 2017-07-15 DIAGNOSIS — I10 ESSENTIAL (PRIMARY) HYPERTENSION: ICD-10-CM

## 2017-07-15 DIAGNOSIS — Z29.9 ENCOUNTER FOR PROPHYLACTIC MEASURES, UNSPECIFIED: ICD-10-CM

## 2017-07-15 LAB
ANION GAP SERPL CALC-SCNC: 18 MMOL/L — HIGH (ref 5–17)
BUN SERPL-MCNC: 59 MG/DL — HIGH (ref 7–23)
CALCIUM SERPL-MCNC: 7.5 MG/DL — LOW (ref 8.4–10.5)
CHLORIDE SERPL-SCNC: 96 MMOL/L — SIGNIFICANT CHANGE UP (ref 96–108)
CO2 SERPL-SCNC: 23 MMOL/L — SIGNIFICANT CHANGE UP (ref 22–31)
CREAT SERPL-MCNC: 7.55 MG/DL — HIGH (ref 0.5–1.3)
GLUCOSE SERPL-MCNC: 104 MG/DL — HIGH (ref 70–99)
HCT VFR BLD CALC: 35.3 % — LOW (ref 39–50)
HGB BLD-MCNC: 11.8 G/DL — LOW (ref 13–17)
MAGNESIUM SERPL-MCNC: 2.2 MG/DL — SIGNIFICANT CHANGE UP (ref 1.6–2.6)
MCHC RBC-ENTMCNC: 33.3 GM/DL — SIGNIFICANT CHANGE UP (ref 32–36)
MCHC RBC-ENTMCNC: 35 PG — HIGH (ref 27–34)
MCV RBC AUTO: 105 FL — HIGH (ref 80–100)
PHOSPHATE SERPL-MCNC: 3.8 MG/DL — SIGNIFICANT CHANGE UP (ref 2.5–4.5)
PLATELET # BLD AUTO: 169 K/UL — SIGNIFICANT CHANGE UP (ref 150–400)
POTASSIUM SERPL-MCNC: 4.2 MMOL/L — SIGNIFICANT CHANGE UP (ref 3.5–5.3)
POTASSIUM SERPL-SCNC: 4.2 MMOL/L — SIGNIFICANT CHANGE UP (ref 3.5–5.3)
RBC # BLD: 3.36 M/UL — LOW (ref 4.2–5.8)
RBC # FLD: 14.5 % — SIGNIFICANT CHANGE UP (ref 10.3–14.5)
SODIUM SERPL-SCNC: 137 MMOL/L — SIGNIFICANT CHANGE UP (ref 135–145)
WBC # BLD: 8.1 K/UL — SIGNIFICANT CHANGE UP (ref 3.8–10.5)
WBC # FLD AUTO: 8.1 K/UL — SIGNIFICANT CHANGE UP (ref 3.8–10.5)

## 2017-07-15 PROCEDURE — 93010 ELECTROCARDIOGRAM REPORT: CPT

## 2017-07-15 PROCEDURE — 99233 SBSQ HOSP IP/OBS HIGH 50: CPT

## 2017-07-15 RX ORDER — HYDRALAZINE HCL 50 MG
100 TABLET ORAL EVERY 8 HOURS
Qty: 0 | Refills: 0 | Status: DISCONTINUED | OUTPATIENT
Start: 2017-07-15 | End: 2017-07-20

## 2017-07-15 RX ORDER — HYDRALAZINE HCL 50 MG
10 TABLET ORAL ONCE
Qty: 0 | Refills: 0 | Status: COMPLETED | OUTPATIENT
Start: 2017-07-15 | End: 2017-07-15

## 2017-07-15 RX ADMIN — HEPARIN SODIUM 5000 UNIT(S): 5000 INJECTION INTRAVENOUS; SUBCUTANEOUS at 22:30

## 2017-07-15 RX ADMIN — Medication 100 MILLIGRAM(S): at 14:30

## 2017-07-15 RX ADMIN — HEPARIN SODIUM 5000 UNIT(S): 5000 INJECTION INTRAVENOUS; SUBCUTANEOUS at 14:30

## 2017-07-15 RX ADMIN — Medication 100 MILLIGRAM(S): at 22:30

## 2017-07-15 RX ADMIN — Medication 100 MILLIGRAM(S): at 05:15

## 2017-07-15 RX ADMIN — Medication 10 MILLIGRAM(S): at 03:18

## 2017-07-15 RX ADMIN — HEPARIN SODIUM 5000 UNIT(S): 5000 INJECTION INTRAVENOUS; SUBCUTANEOUS at 05:15

## 2017-07-15 NOTE — PROGRESS NOTE ADULT - SUBJECTIVE AND OBJECTIVE BOX
Patient seen and examined in bed with no new complaints.  NAD noted.      REVIEW OF SYSTEMS:  As per HPI, otherwise 8 full 10 ROS were unremarkable    MEDICATIONS  (STANDING):  heparin  Injectable 5000 Unit(s) SubCutaneous every 8 hours  hydrALAZINE 100 milliGRAM(s) Oral every 8 hours      VITAL:  T(C): , Max: 37.1 (07-14-17 @ 19:00)  T(F): , Max: 98.7 (07-14-17 @ 19:00)  HR: 83 (07-15-17 @ 15:00)  BP: 141/62 (07-15-17 @ 15:00)  BP(mean): 86 (07-15-17 @ 15:00)  RR: 19 (07-15-17 @ 15:00)  SpO2: 98% (07-15-17 @ 10:00)  Wt(kg): --    I and O's:    07-14 @ 07:01  -  07-15 @ 07:00  --------------------------------------------------------  IN: 240 mL / OUT: 100 mL / NET: 140 mL    07-15 @ 07:01  -  07-15 @ 15:34  --------------------------------------------------------  IN: 0 mL / OUT: 100 mL / NET: -100 mL          PHYSICAL EXAM:    Constitutional: NAD  HEENT: PERRLA, EOMI,  MMM  Neck: No LAD, No JVD  Respiratory: CTAB  Cardiovascular: S1 and S2  Gastrointestinal: BS+, soft, NT/ND  Extremities: No peripheral edema  Neurological: A/O x 3, no focal deficits  Psychiatric: Normal mood, normal affect  : No Block  Skin: No rashes  Access: RUE AVF + thrill    LABS:                        11.8   8.1   )-----------( 169      ( 15 Jul 2017 05:39 )             35.3     07-15    137  |  96  |  59<H>  ----------------------------<  104<H>  4.2   |  23  |  7.55<H>    Ca    7.5<L>      15 Jul 2017 05:39  Phos  3.8     07-15  Mg     2.2     07-15    TPro  7.9  /  Alb  4.5  /  TBili  0.3  /  DBili  x   /  AST  33  /  ALT  37  /  AlkPhos  123<H>  07-13      Assessment and Plan:   · Assessment	  67 Y/O M via EMS from [facility] office presenting with bradycardia, near syncope, achiness, not feeling well and sternal chest pain found to have hyperkalemia    - ESRD on HD TTS  - Volume status- euvolemic  - HTN:  transient HTN noted; hydralazine increase    PLAN:   - Plan for HD today as ordered  - A/w Hydralazine 100mg PO q 8 hours  -EPS on board; likely in need of PPM next week  - Renal dosing of meds to creatinine clearance of < 10 ml/min

## 2017-07-15 NOTE — PROGRESS NOTE ADULT - ASSESSMENT
67 y/o man with h/o ESRD on HD (T/Th/Sat), HTN who presented to the ED from his Cardiologist's office after he was found to be in CHB, now resolved after undergoing emergent HD

## 2017-07-15 NOTE — CHART NOTE - NSCHARTNOTEFT_GEN_A_CORE
====================  NEW EVENTS:  ====================  Pt had no events, no ectopy on tele.    ====================  SUMMARY:  ====================  66 M with HTN and ESRD on HD with complete heart block in the setting of hyperkalemia and underlying conduction disease, converted back to sinus rhythm     ====================  VITALS:  ====================    ICU Vital Signs Last 24 Hrs  T(C): 37.1 (14 Jul 2017 19:00), Max: 37.1 (14 Jul 2017 04:00)  T(F): 98.7 (14 Jul 2017 19:00), Max: 98.8 (14 Jul 2017 04:00)  HR: 70 (15 Jul 2017 01:00) (54 - 72)  BP: 157/72 (15 Jul 2017 01:00) (130/57 - 178/68)  BP(mean): 96 (15 Jul 2017 01:00) (76 - 104)  ABP: --  ABP(mean): --  RR: 14 (15 Jul 2017 01:00) (13 - 36)  SpO2: 95% (15 Jul 2017 01:00) (95% - 100%)      I&O's Summary    13 Jul 2017 07:01  -  14 Jul 2017 07:00  --------------------------------------------------------  IN: 330 mL / OUT: 2000 mL / NET: -1670 mL    14 Jul 2017 07:01  -  15 Jul 2017 01:29  --------------------------------------------------------  IN: 0 mL / OUT: 800 mL / NET: -800 mL        Adult Advanced Hemodynamics Last 24 Hrs  CVP(mm Hg): --  CVP(cm H2O): --  CO: --  CI: --  PA: --  PA(mean): --  PCWP: --  SVR: --  SVRI: --  PVR: --  PVRI: --        ====================  LABS:  ====================                          11.3   10.3  )-----------( 138      ( 14 Jul 2017 04:04 )             35.0     07-14    139  |  97  |  33<H>  ----------------------------<  85  4.0   |  24  |  4.66<H>    Ca    8.8      14 Jul 2017 04:04  Phos  4.5     07-14  Mg     2.1     07-14    TPro  7.9  /  Alb  4.5  /  TBili  0.3  /  DBili  x   /  AST  33  /  ALT  37  /  AlkPhos  123<H>  07-13    PT/INR - ( 13 Jul 2017 18:43 )   PT: 11.9 sec;   INR: 1.09 ratio         PTT - ( 13 Jul 2017 18:43 )  PTT:28.2 sec        ====================  PLAN:  ====================  - Underlying trifacular block- EP to eval risk vs benefits of PPM  - ESRD on HD TTS  - Hydralazine 50 TID for HTN. Will uptitrate as needed

## 2017-07-15 NOTE — PROGRESS NOTE ADULT - SUBJECTIVE AND OBJECTIVE BOX
Admission date:  CHIEF COMPLAINT:  HPI:  66 M with HTN and ESRD on HD TTS who presented from PCP office after he was found bradycardic with HR in the 20s. Pt reports that since yesterday, he has had decreased exercise tolerance becoming dizzy after walking 2-3 steps. He also endorsed substernal chest discomfort. Pain was non-radiating with no associated SOB, nausea, or vomiting. Currently pain free. In the ED, pt was found to be in complete heart block with ventricular rhythm in the 20s. He was temporized initially with pacer pads and atropine and now has RIJ TVP in place.     Vital Signs Last 24 Hrs  T(C): 36.8 (2017 21:30), Max: 36.9 (2017 20:28)  T(F): 98.2 (2017 21:30), Max: 98.4 (2017 20:28)  HR: 70 (2017 21:30) (23 - 70)  BP: 190/86 (2017 21:30) (106/68 - 190/86)  BP(mean): 91 (2017 21:15) (91 - 119)  RR: 16 (2017 21:30) (16 - 20)  SpO2: 100% (2017 21:30) (88% - 100%) (2017 22:06)    INTERVAL HISTORY:    REVIEW OF SYSTEMS: Denies xxxxxx; all others negative    MEDICATIONS  (STANDING):  heparin  Injectable 5000 Unit(s) SubCutaneous every 8 hours  hydrALAZINE 100 milliGRAM(s) Oral every 8 hours    MEDICATIONS  (PRN):  acetaminophen   Tablet. 650 milliGRAM(s) Oral every 6 hours PRN Moderate Pain (4 - 6)      Objective:  ICU Vital Signs Last 24 Hrs  T(C): 36.5 (15 Jul 2017 05:00), Max: 37.1 (2017 08:00)  T(F): 97.7 (15 Jul 2017 05:00), Max: 98.8 (2017 08:00)  HR: 80 (15 Jul 2017 06:00) (54 - 80)  BP: 129/61 (15 Jul 2017 06:00) (129/61 - 170/72)  BP(mean): 79 (15 Jul 2017 06:00) (76 - 100)  RR: 21 (15 Jul 2017 06:00) (13 - 29)  SpO2: 97% (15 Jul 2017 06:00) (95% - 100%)      07-14 @ 07:01  -  15 @ 07:00  --------------------------------------------------------  IN: 240 mL / OUT: 100 mL / NET: 140 mL      Daily     Daily Weight in k.9 (15 Jul 2017 05:00)    PHYSICAL EXAM:      Constitutional:    HEENT:    Respiratory:    Cardiovascular:    Gastrointestinal:    Genitourinary:    Extremities:    Vascular:    Neurological:    Skin:    Lymph Nodes:    Musculoskeletal:    Psychiatric:          TELEMETRY:     EKG:     IMAGIN.8   8.1   )-----------( 169      ( 15 Jul 2017 05:39 )             35.3     07-15    137  |  96  |  59<H>  ----------------------------<  104<H>  4.2   |  23  |  7.55<H>    Ca    7.5<L>      15 Jul 2017 05:39  Phos  3.8     07-15  Mg     2.2     07-15    TPro  7.9  /  Alb  4.5  /  TBili  0.3  /  DBili  x   /  AST  33  /  ALT  37  /  AlkPhos  123<H>  07-13    LIVER FUNCTIONS - ( 2017 18:42 )  Alb: 4.5 g/dL / Pro: 7.9 g/dL / ALK PHOS: 123 U/L / ALT: 37 U/L RC / AST: 33 U/L / GGT: x           PT/INR - ( 2017 18:43 )   PT: 11.9 sec;   INR: 1.09 ratio         PTT - ( 2017 18:43 )  PTT:28.2 sec        HEALTH ISSUES - PROBLEM Dx:  Essential hypertension: Essential hypertension  Renal insufficiency: Renal insufficiency  Complete heart block: Complete heart block Admission date:  Hosp day #3  CHIEF COMPLAINT:  HPI:  66 M with HTN and ESRD on HD TTS who presented from PCP office after he was found bradycardic with HR in the 20s. Pt reports that since yesterday, he has had decreased exercise tolerance becoming dizzy after walking 2-3 steps. He also endorsed substernal chest discomfort. Pain was non-radiating with no associated SOB, nausea, or vomiting. Currently pain free. In the ED, pt was found to be in complete heart block with ventricular rhythm in the 20s. He was temporized initially with pacer pads and atropine and now has RIJ TVP in place.     Vital Signs Last 24 Hrs  T(C): 36.8 (2017 21:30), Max: 36.9 (2017 20:28)  T(F): 98.2 (2017 21:30), Max: 98.4 (2017 20:28)  HR: 70 (2017 21:30) (23 - 70)  BP: 190/86 (2017 21:30) (106/68 - 190/86)  BP(mean): 91 (2017 21:15) (91 - 119)  RR: 16 (2017 21:30) (16 - 20)  SpO2: 100% (2017 21:30) (88% - 100%) (2017 22:06)    INTERVAL HISTORY: no events overnight  Sitting up in chair - I feel good today    REVIEW OF SYSTEMS: Denies chest pain, palpitations, dizziness, SOB, NV; all others negative    MEDICATIONS  (STANDING):  heparin  Injectable 5000 Unit(s) SubCutaneous every 8 hours  hydrALAZINE 100 milliGRAM(s) Oral every 8 hours    MEDICATIONS  (PRN):  acetaminophen   Tablet. 650 milliGRAM(s) Oral every 6 hours PRN Moderate Pain (4 - 6)      Objective:  ICU Vital Signs Last 24 Hrs  T(C): 36.5 (15 Jul 2017 05:00), Max: 37.1 (2017 08:00)  T(F): 97.7 (15 Jul 2017 05:00), Max: 98.8 (2017 08:00)  HR: 80 (15 Jul 2017 06:00) (54 - 80)  BP: 129/61 (15 Jul 2017 06:00) (129/61 - 170/72)  BP(mean): 79 (15 Jul 2017 06:00) (76 - 100)  RR: 21 (15 Jul 2017 06:00) (13 - 29)  SpO2: 97% (15 Jul 2017 06:00) (95% - 100%)      07-14 @ 07:01  -  07-15 @ 07:00  --------------------------------------------------------  IN: 240 mL / OUT: 100 mL / NET: 140 mL      Daily     Daily Weight in k.9 (15 Jul 2017 05:00)      PHYSICAL EXAM:    Constitutional: well developed well nourished, NAD  HEENT: NC/AT, oral mucosa pink moist  Respiratory: regular unlabored, CTA bilat  Cardiovascular:   Gastrointestinal:    Genitourinary:    Extremities:    Vascular:    Neurological:    Skin:            TELEMETRY:     EKG:     IMAGIN.8   8.1   )-----------( 169      ( 15 Jul 2017 05:39 )             35.3     07-15    137  |  96  |  59<H>  ----------------------------<  104<H>  4.2   |  23  |  7.55<H>    Ca    7.5<L>      15 Jul 2017 05:39  Phos  3.8     07-15  Mg     2.2     -15    TPro  7.9  /  Alb  4.5  /  TBili  0.3  /  DBili  x   /  AST  33  /  ALT  37  /  AlkPhos  123<H>  0713    LIVER FUNCTIONS - ( 2017 18:42 )  Alb: 4.5 g/dL / Pro: 7.9 g/dL / ALK PHOS: 123 U/L / ALT: 37 U/L RC / AST: 33 U/L / GGT: x           PT/INR - ( 2017 18:43 )   PT: 11.9 sec;   INR: 1.09 ratio         PTT - ( 2017 18:43 )  PTT:28.2 sec        HEALTH ISSUES - PROBLEM Dx:  Essential hypertension: Essential hypertension  Renal insufficiency: Renal insufficiency  Complete heart block: Complete heart block Admission date:  Hosp day #3  CHIEF COMPLAINT:  HPI:  66 M with HTN and ESRD on HD TTS who presented from PCP office after he was found bradycardic with HR in the 20s. Pt reports that since yesterday, he has had decreased exercise tolerance becoming dizzy after walking 2-3 steps. He also endorsed substernal chest discomfort. Pain was non-radiating with no associated SOB, nausea, or vomiting. Currently pain free. In the ED, pt was found to be in complete heart block with ventricular rhythm in the 20s. He was temporized initially with pacer pads and atropine and now has RIJ TVP in place.     Vital Signs Last 24 Hrs  T(C): 36.8 (2017 21:30), Max: 36.9 (2017 20:28)  T(F): 98.2 (2017 21:30), Max: 98.4 (2017 20:28)  HR: 70 (2017 21:30) (23 - 70)  BP: 190/86 (2017 21:30) (106/68 - 190/86)  BP(mean): 91 (2017 21:15) (91 - 119)  RR: 16 (2017 21:30) (16 - 20)  SpO2: 100% (2017 21:30) (88% - 100%) (2017 22:06)    INTERVAL HISTORY: no events overnight  Sitting up in chair - I feel good today    REVIEW OF SYSTEMS: Denies chest pain, palpitations, dizziness, SOB, NV; all others negative    MEDICATIONS  (STANDING):  heparin  Injectable 5000 Unit(s) SubCutaneous every 8 hours  hydrALAZINE 100 milliGRAM(s) Oral every 8 hours    MEDICATIONS  (PRN):  acetaminophen   Tablet. 650 milliGRAM(s) Oral every 6 hours PRN Moderate Pain (4 - 6)      Objective:  ICU Vital Signs Last 24 Hrs  T(C): 36.5 (15 Jul 2017 05:00), Max: 37.1 (2017 08:00)  T(F): 97.7 (15 Jul 2017 05:00), Max: 98.8 (2017 08:00)  HR: 80 (15 Jul 2017 06:00) (54 - 80)  BP: 129/61 (15 Jul 2017 06:00) (129/61 - 170/72)  BP(mean): 79 (15 Jul 2017 06:00) (76 - 100)  RR: 21 (15 Jul 2017 06:00) (13 - 29)  SpO2: 97% (15 Jul 2017 06:00) (95% - 100%)      14 @ 07:01  -  0715 @ 07:00  --------------------------------------------------------  IN: 240 mL / OUT: 100 mL / NET: 140 mL      Daily     Daily Weight in k.9 (15 Jul 2017 05:00)      PHYSICAL EXAM:    Constitutional: well developed well nourished, NAD  HEENT: NC/AT, oral mucosa pink moist  Respiratory: regular unlabored, CTA bilat  Cardiovascular: S1 S2 +sys murmur, no edema  Gastrointestinal: soft ND/NT; + bowel sounds  Genitourinary:   Extremities: VOGEL equal strength  Vascular: warm peripherally  Neurological: A & O x3  Skin: warm dry, No rash, ecchymosis or cyanosis        TELEMETRY:  sinus rhythm with PAC & PVCs    EKG:     IMAGIN.8   8.1   )-----------( 169      ( 15 Jul 2017 05:39 )             35.3     07-15    137  |  96  |  59<H>  ----------------------------<  104<H>  4.2   |  23  |  7.55<H>    Ca    7.5<L>      15 Jul 2017 05:39  Phos  3.8     07-15  Mg     2.2     -15    TPro  7.9  /  Alb  4.5  /  TBili  0.3  /  DBili  x   /  AST  33  /  ALT  37  /  AlkPhos  123<H>  0713    LIVER FUNCTIONS - ( 2017 18:42 )  Alb: 4.5 g/dL / Pro: 7.9 g/dL / ALK PHOS: 123 U/L / ALT: 37 U/L RC / AST: 33 U/L / GGT: x           PT/INR - ( 2017 18:43 )   PT: 11.9 sec;   INR: 1.09 ratio         PTT - ( 2017 18:43 )  PTT:28.2 sec        HEALTH ISSUES - PROBLEM Dx:  Essential hypertension: Essential hypertension  Renal insufficiency: Renal insufficiency  Complete heart block: Complete heart block Admission date:  Hosp day #3  CHIEF COMPLAINT:  HPI:  66 M with HTN and ESRD on HD TTS who presented from PCP office after he was found bradycardic with HR in the 20s. Pt reports that since yesterday, he has had decreased exercise tolerance becoming dizzy after walking 2-3 steps. He also endorsed substernal chest discomfort. Pain was non-radiating with no associated SOB, nausea, or vomiting. Currently pain free. In the ED, pt was found to be in complete heart block with ventricular rhythm in the 20s. He was temporized initially with pacer pads and atropine and now has RIJ TVP in place.     Vital Signs Last 24 Hrs  T(C): 36.8 (2017 21:30), Max: 36.9 (2017 20:28)  T(F): 98.2 (2017 21:30), Max: 98.4 (2017 20:28)  HR: 70 (2017 21:30) (23 - 70)  BP: 190/86 (2017 21:30) (106/68 - 190/86)  BP(mean): 91 (2017 21:15) (91 - 119)  RR: 16 (2017 21:30) (16 - 20)  SpO2: 100% (2017 21:30) (88% - 100%) (2017 22:06)    INTERVAL HISTORY: no events overnight  Sitting up in chair - I feel good today    REVIEW OF SYSTEMS: Denies chest pain, palpitations, dizziness, SOB, NV; all others negative    MEDICATIONS  (STANDING):  heparin  Injectable 5000 Unit(s) SubCutaneous every 8 hours  hydrALAZINE 100 milliGRAM(s) Oral every 8 hours    MEDICATIONS  (PRN):  acetaminophen   Tablet. 650 milliGRAM(s) Oral every 6 hours PRN Moderate Pain (4 - 6)      Objective:  ICU Vital Signs Last 24 Hrs  T(C): 36.5 (15 Jul 2017 05:00), Max: 37.1 (2017 08:00)  T(F): 97.7 (15 Jul 2017 05:00), Max: 98.8 (2017 08:00)  HR: 80 (15 Jul 2017 06:00) (54 - 80)  BP: 129/61 (15 Jul 2017 06:00) (129/61 - 170/72)  BP(mean): 79 (15 Jul 2017 06:00) (76 - 100)  RR: 21 (15 Jul 2017 06:00) (13 - 29)  SpO2: 97% (15 Jul 2017 06:00) (95% - 100%)      07-14 @ 07:01  -  0715 @ 07:00  --------------------------------------------------------  IN: 240 mL / OUT: 100 mL / NET: 140 mL      Daily     Daily Weight in k.9 (15 Jul 2017 05:00)      PHYSICAL EXAM:    Constitutional: well developed well nourished, NAD  HEENT: NC/AT, oral mucosa pink moist  Respiratory: regular unlabored, CTA bilat  Cardiovascular: S1 S2 +sys murmur, no edema  Gastrointestinal: soft ND/NT; + bowel sounds  Genitourinary: voids on own & scheduled for HD today  Extremities: VOGEL equal strength  Vascular: warm peripherally  Neurological: A & O x3  Skin: warm dry, No rash, ecchymosis or cyanosis        TELEMETRY:  sinus rhythm with PAC & PVCs    EKG:     IMAGIN.8   8.1   )-----------( 169      ( 15 Jul 2017 05:39 )             35.3     07-15    137  |  96  |  59<H>  ----------------------------<  104<H>  4.2   |  23  |  7.55<H>    Ca    7.5<L>      15 Jul 2017 05:39  Phos  3.8     07-15  Mg     2.2     07-15    TPro  7.9  /  Alb  4.5  /  TBili  0.3  /  DBili  x   /  AST  33  /  ALT  37  /  AlkPhos  123<H>  0713    LIVER FUNCTIONS - ( 2017 18:42 )  Alb: 4.5 g/dL / Pro: 7.9 g/dL / ALK PHOS: 123 U/L / ALT: 37 U/L RC / AST: 33 U/L / GGT: x           PT/INR - ( 2017 18:43 )   PT: 11.9 sec;   INR: 1.09 ratio         PTT - ( 2017 18:43 )  PTT:28.2 sec        HEALTH ISSUES - PROBLEM Dx:  Essential hypertension: Essential hypertension  Renal insufficiency: Renal insufficiency  Complete heart block: Complete heart block

## 2017-07-16 ENCOUNTER — TRANSCRIPTION ENCOUNTER (OUTPATIENT)
Age: 67
End: 2017-07-16

## 2017-07-16 LAB
ANION GAP SERPL CALC-SCNC: 16 MMOL/L — SIGNIFICANT CHANGE UP (ref 5–17)
BUN SERPL-MCNC: 31 MG/DL — HIGH (ref 7–23)
CALCIUM SERPL-MCNC: 8.2 MG/DL — LOW (ref 8.4–10.5)
CHLORIDE SERPL-SCNC: 95 MMOL/L — LOW (ref 96–108)
CK MB CFR SERPL CALC: 1.7 NG/ML — SIGNIFICANT CHANGE UP (ref 0–6.7)
CK SERPL-CCNC: 92 U/L — SIGNIFICANT CHANGE UP (ref 30–200)
CO2 SERPL-SCNC: 27 MMOL/L — SIGNIFICANT CHANGE UP (ref 22–31)
CREAT SERPL-MCNC: 5.44 MG/DL — HIGH (ref 0.5–1.3)
GLUCOSE SERPL-MCNC: 106 MG/DL — HIGH (ref 70–99)
HCT VFR BLD CALC: 38.8 % — LOW (ref 39–50)
HGB BLD-MCNC: 12.8 G/DL — LOW (ref 13–17)
MAGNESIUM SERPL-MCNC: 2.2 MG/DL — SIGNIFICANT CHANGE UP (ref 1.6–2.6)
MCHC RBC-ENTMCNC: 32.9 GM/DL — SIGNIFICANT CHANGE UP (ref 32–36)
MCHC RBC-ENTMCNC: 34.8 PG — HIGH (ref 27–34)
MCV RBC AUTO: 106 FL — HIGH (ref 80–100)
PHOSPHATE SERPL-MCNC: 4.3 MG/DL — SIGNIFICANT CHANGE UP (ref 2.5–4.5)
PLATELET # BLD AUTO: 192 K/UL — SIGNIFICANT CHANGE UP (ref 150–400)
POTASSIUM SERPL-MCNC: 3.9 MMOL/L — SIGNIFICANT CHANGE UP (ref 3.5–5.3)
POTASSIUM SERPL-SCNC: 3.9 MMOL/L — SIGNIFICANT CHANGE UP (ref 3.5–5.3)
RBC # BLD: 3.67 M/UL — LOW (ref 4.2–5.8)
RBC # FLD: 14.3 % — SIGNIFICANT CHANGE UP (ref 10.3–14.5)
SODIUM SERPL-SCNC: 138 MMOL/L — SIGNIFICANT CHANGE UP (ref 135–145)
TROPONIN T SERPL-MCNC: 0.03 NG/ML — SIGNIFICANT CHANGE UP (ref 0–0.06)
WBC # BLD: 7.3 K/UL — SIGNIFICANT CHANGE UP (ref 3.8–10.5)
WBC # FLD AUTO: 7.3 K/UL — SIGNIFICANT CHANGE UP (ref 3.8–10.5)

## 2017-07-16 PROCEDURE — 93010 ELECTROCARDIOGRAM REPORT: CPT

## 2017-07-16 PROCEDURE — 99233 SBSQ HOSP IP/OBS HIGH 50: CPT

## 2017-07-16 RX ADMIN — Medication 100 MILLIGRAM(S): at 21:25

## 2017-07-16 RX ADMIN — Medication 30 MILLILITER(S): at 09:48

## 2017-07-16 RX ADMIN — Medication 650 MILLIGRAM(S): at 09:45

## 2017-07-16 RX ADMIN — Medication 650 MILLIGRAM(S): at 03:00

## 2017-07-16 RX ADMIN — HEPARIN SODIUM 5000 UNIT(S): 5000 INJECTION INTRAVENOUS; SUBCUTANEOUS at 21:25

## 2017-07-16 RX ADMIN — HEPARIN SODIUM 5000 UNIT(S): 5000 INJECTION INTRAVENOUS; SUBCUTANEOUS at 14:24

## 2017-07-16 RX ADMIN — Medication 650 MILLIGRAM(S): at 02:38

## 2017-07-16 RX ADMIN — Medication 100 MILLIGRAM(S): at 14:24

## 2017-07-16 RX ADMIN — Medication 650 MILLIGRAM(S): at 09:13

## 2017-07-16 RX ADMIN — HEPARIN SODIUM 5000 UNIT(S): 5000 INJECTION INTRAVENOUS; SUBCUTANEOUS at 05:11

## 2017-07-16 RX ADMIN — Medication 100 MILLIGRAM(S): at 06:26

## 2017-07-16 NOTE — CHART NOTE - NSCHARTNOTEFT_GEN_A_CORE
====================  CCU MIDNIGHT ROUNDS  ====================    MARYANN REYES  [id number]    ====================  SUMMARY: 68 y/o male with pmhx of HTN, ESRD presented with chest pain and CHB with HR in the 20's. The patient was given atropine and TVP  placed.  ====================        ====================  NEW EVENTS:No further episodes of CHB, his hyperkalemia was resolved after HD. HD had 1.9L taken out.  TVP removed during day. Patient started getting more bradycardic and HD was stopped 10 min earlier.  ====================        ====================  VITALS (Last 12 hrs):  ====================    T(C): 36.8 (07-15-17 @ 21:40), Max: 36.8 (07-15-17 @ 21:40)  HR: 86 (07-16-17 @ 01:00) (63 - 87)  BP: 139/68 (07-16-17 @ 01:00) (131/74 - 170/69)  BP(mean): 88 (07-16-17 @ 01:00) (67 - 97)  ABP: --  ABP(mean): --  RR: 11 (07-16-17 @ 01:00) (10 - 24)  SpO2: 97% (07-16-17 @ 01:00) (96% - 98%)  Wt(kg): --  CVP(mm Hg): --  CO: --  CI: --  PA: --  PA(mean): --  PA(direct): --  PCWP: --  SVR: --    TELEMETRY:        *BLOOD GAS/ARTERIAL/MIXED/VENOUS  *LACTATE    I&O's Summary    14 Jul 2017 07:01  -  15 Jul 2017 07:00  --------------------------------------------------------  IN: 240 mL / OUT: 100 mL / NET: 140 mL    15 Jul 2017 07:01  -  16 Jul 2017 01:41  --------------------------------------------------------  IN: 120 mL / OUT: 2000 mL / NET: -1880 mL        ====================  PLAN:  monitor K+, cr  PPM eval with Dr. Smith    ====================    HEALTH ISSUES - PROBLEM Dx:  Prophylactic measure: Prophylactic measure  HTN (hypertension): HTN (hypertension)  Essential hypertension: Essential hypertension  Renal insufficiency: Renal insufficiency  Complete heart block: Complete heart block        HEALTH ISSUES - R/O PROBLEM Dx:      GERSON Davies [id number]

## 2017-07-16 NOTE — DISCHARGE NOTE ADULT - HOSPITAL COURSE
67 yo M HTN, ESRD on HD TTS p/w CHB in the setting of hyperK in setting of eating an avocado, missing HD and BB use. TVP placed via RIJ in ED and patient transferred to CCU. Patient's BB and clonidine held, patient underwent emergent HD day of admission w/ resolution of hyperkalemia and CHB resolved. TVP removed 7/14. Patient started on hydralazine for BP control and uptitrated for better control.  Patient evaluated by EP 67 yo M HTN, ESRD on HD TTS p/w CHB in the setting of hyperK in setting of eating an avocado, missing HD and BB use. TVP placed via RIJ in ED and patient transferred to CCU. Patient's BB and clonidine held, patient underwent emergent HD day of admission w/ resolution of hyperkalemia and CHB resolved. TVP removed 7/14. Patient started on hydralazine for BP control and uptitrated for better control.  Patient evaluated by EP recommended PPM placement as patient high risk of complete heart block recurring given ESRD  PPM placed without incident on 7/19-pt tolerated well  Was able to be resumed on labetalol 200 bid in addition to hydralazine. Clonidine discontinued  Pt discharge in stable condition with EP follow up

## 2017-07-16 NOTE — DISCHARGE NOTE ADULT - CARE PROVIDER_API CALL
Farzad Wilson), Cardiology; Internal Medicine  [address] [place] [address] [address]  [place] [address]  Phone: [phone]  Fax: [phone] Farzad Wilson), Cardiology; Internal Medicine  [address] [facility] [address]  [place] [address]  Phone: [phone]  Fax: [phone]    Williams Ramirez), Internal Medicine; Nephrology  [address] [facility] [address]  [place] [address]  Phone: [phone]  Fax: [phone]

## 2017-07-16 NOTE — DISCHARGE NOTE ADULT - ADDITIONAL INSTRUCTIONS
f/u with cardiologist  f/u with renal for HD per usual schedule f/u with cardiologist  f/u with renal for HD per usual schedule    PPM wound check in EP clinic as scheduled on 7/31/17 at 3:15pm. follow-up with cardiologist  follow-up with renal for HD per usual schedule    PPM wound check in EP clinic as scheduled on 7/31/17 at 3:15pm. follow-up with EP clinic: you have appointment on 7/31: at 315pm  follow-up with cardiologist  follow-up with renal for HD per usual schedule    PPM wound check in EP clinic as scheduled on 7/31/17 at 3:15pm.

## 2017-07-16 NOTE — PROGRESS NOTE ADULT - SUBJECTIVE AND OBJECTIVE BOX
Patient is a 67y old  Male who presents with a chief complaint of Chest pain, dizziness (13 Jul 2017 22:06)    Event	  HPI:  66 M with HTN and ESRD on HD TTS who presented from PCP office after he was found bradycardic with HR in the 20s. Pt reports that since yesterday, he has had decreased exercise tolerance becoming dizzy after walking 2-3 steps. He also endorsed substernal chest discomfort. Pain was non-radiating with no associated SOB, nausea, or vomiting. Currently pain free. In the ED, pt was found to be in complete heart block with ventricular rhythm in the 20s. He was temporized initially with pacer pads and atropine and now has RIJ TVP in place.     Vital Signs Last 24 Hrs  T(C): 36.8 (13 Jul 2017 21:30), Max: 36.9 (13 Jul 2017 20:28)  T(F): 98.2 (13 Jul 2017 21:30), Max: 98.4 (13 Jul 2017 20:28)  HR: 70 (13 Jul 2017 21:30) (23 - 70)  BP: 190/86 (13 Jul 2017 21:30) (106/68 - 190/86)  BP(mean): 91 (13 Jul 2017 21:15) (91 - 119)  RR: 16 (13 Jul 2017 21:30) (16 - 20)  SpO2: 100% (13 Jul 2017 21:30) (88% - 100%) (13 Jul 2017 22:06)    MEDICATIONS  (STANDING):  heparin  Injectable 5000 Unit(s) SubCutaneous every 8 hours  hydrALAZINE 100 milliGRAM(s) Oral every 8 hours    MEDICATIONS  (PRN):  acetaminophen   Tablet. 650 milliGRAM(s) Oral every 6 hours PRN Moderate Pain (4 - 6)      REVIEW OF SYSTEMS:  Otherwise, 10 point ROS done and otherwise negative.    PHYSICAL EXAM:  Vital Signs Last 24 Hrs  T(C): 36.8 (16 Jul 2017 05:00), Max: 36.8 (15 Jul 2017 21:40)  T(F): 98.2 (16 Jul 2017 05:00), Max: 98.3 (15 Jul 2017 21:40)  HR: 82 (16 Jul 2017 06:00) (63 - 95)  BP: 159/75 (16 Jul 2017 06:00) (112/57 - 170/69)  BP(mean): 100 (16 Jul 2017 06:00) (67 - 100)  RR: 21 (16 Jul 2017 06:00) (10 - 34)  SpO2: 98% (16 Jul 2017 06:00) (95% - 99%)  I&O's Summary    15 Jul 2017 07:01  -  16 Jul 2017 07:00  --------------------------------------------------------  IN: 240 mL / OUT: 2000 mL / NET: -1760 mL        Appearance: Normal	  HEENT:   Normal oral mucosa, PERRL, EOMI	  Lymphatic: No lymphadenopathy  Cardiovascular: Normal S1 S2, No JVD, No murmurs, No edema  Respiratory: Lungs clear to auscultation	  Psychiatry: A & O x 3, Mood & affect appropriate  Gastrointestinal:  Soft, Non-tender, + BS	  Skin: No rashes, No ecchymoses, No cyanosis	  Neurologic: Non-focal  Extremities: Normal range of motion, No clubbing, cyanosis or edema  Vascular: Peripheral pulses palpable 2+ bilaterally    LABS:	 	                        12.8   7.3   )-----------( 192      ( 16 Jul 2017 05:42 )             38.8     Auto Eosinophil # x     / Auto Eosinophil % x     / Auto Neutrophil # x     / Auto Neutrophil % x     / BANDS % x                            11.8   8.1   )-----------( 169      ( 15 Jul 2017 05:39 )             35.3     Auto Eosinophil # x     / Auto Eosinophil % x     / Auto Neutrophil # x     / Auto Neutrophil % x     / BANDS % x        INR: 1.09 ratio (07-13 @ 18:43)    07-16    138  |  95<L>  |  31<H>  ----------------------------<  106<H>  3.9   |  27  |  5.44<H>  07-15    137  |  96  |  59<H>  ----------------------------<  104<H>  4.2   |  23  |  7.55<H>    Ca    8.2<L>      16 Jul 2017 05:42  Mg     2.2     07-16  Phos  4.3     07-16        proBNP: Serum Pro-Brain Natriuretic Peptide: >82717 pg/mL (07-13 @ 18:42)    Lipid Profile: 07-14 Chol 172  HDL 45 Trig 106  HgA1c: 4.6 % (07-14 @ 07:13)    TSH: Thyroid Stimulating Hormone, Serum: 1.02 uIU/mL (07-14 @ 07:13)      CARDIAC MARKERS:   13 Jul 2017 18:42 Troponin 0.01 ng/mL / Creatine Kinase 102 U/L /  CKMB 2.3 ng/mL / CPK Mass Assay % 2.3 %        TELEMETRY: 	    ECG:  	  RADIOLOGY:  OTHER: 	    PREVIOUS DIAGNOSTIC TESTING:    [ ] Echocardiogram:  [ ]  Catheterization:  [ ] Stress Test: Patient is a 67y old  Male who presents with a chief complaint of Chest pain, dizziness (13 Jul 2017 22:06)    Event	  HPI:  66 M with HTN and ESRD on HD TTS who presented from PCP office after he was found bradycardic with HR in the 20s. Pt reports that since yesterday, he has had decreased exercise tolerance becoming dizzy after walking 2-3 steps. He also endorsed substernal chest discomfort. Pain was non-radiating with no associated SOB, nausea, or vomiting. Currently pain free. In the ED, pt was found to be in complete heart block with ventricular rhythm in the 20s. He was temporized initially with pacer pads and atropine and now has RIJ TVP in place.     Vital Signs Last 24 Hrs  T(C): 36.8 (13 Jul 2017 21:30), Max: 36.9 (13 Jul 2017 20:28)  T(F): 98.2 (13 Jul 2017 21:30), Max: 98.4 (13 Jul 2017 20:28)  HR: 70 (13 Jul 2017 21:30) (23 - 70)  BP: 190/86 (13 Jul 2017 21:30) (106/68 - 190/86)  BP(mean): 91 (13 Jul 2017 21:15) (91 - 119)  RR: 16 (13 Jul 2017 21:30) (16 - 20)  SpO2: 100% (13 Jul 2017 21:30) (88% - 100%) (13 Jul 2017 22:06)    MEDICATIONS  (STANDING):  heparin  Injectable 5000 Unit(s) SubCutaneous every 8 hours  hydrALAZINE 100 milliGRAM(s) Oral every 8 hours    MEDICATIONS  (PRN):  acetaminophen   Tablet. 650 milliGRAM(s) Oral every 6 hours PRN Moderate Pain (4 - 6)      REVIEW OF SYSTEMS:  Otherwise, 10 point ROS done and otherwise negative.    PHYSICAL EXAM:  Vital Signs Last 24 Hrs  T(C): 36.8 (16 Jul 2017 05:00), Max: 36.8 (15 Jul 2017 21:40)  T(F): 98.2 (16 Jul 2017 05:00), Max: 98.3 (15 Jul 2017 21:40)  HR: 82 (16 Jul 2017 06:00) (63 - 95)  BP: 159/75 (16 Jul 2017 06:00) (112/57 - 170/69)  BP(mean): 100 (16 Jul 2017 06:00) (67 - 100)  RR: 21 (16 Jul 2017 06:00) (10 - 34)  SpO2: 98% (16 Jul 2017 06:00) (95% - 99%)  I&O's Summary    15 Jul 2017 07:01  -  16 Jul 2017 07:00  --------------------------------------------------------  IN: 240 mL / OUT: 2000 mL / NET: -1760 mL        Appearance: Normal	  HEENT:   Normal oral mucosa, PERRL, EOMI	  Lymphatic: No lymphadenopathy  Cardiovascular: Normal S1 S2, No JVD, No murmurs, No edema  Respiratory: Lungs clear to auscultation	  Psychiatry: A & O x 3, Mood & affect appropriate  Gastrointestinal:  Soft, Non-tender, + BS	  Skin: No rashes, No ecchymoses, No cyanosis	  Neurologic: Non-focal  Extremities: Normal range of motion, No clubbing, cyanosis or edema  Vascular: Peripheral pulses palpable 2+ bilaterally    LABS:	 	                        12.8   7.3   )-----------( 192      ( 16 Jul 2017 05:42 )             38.8     Auto Eosinophil # x     / Auto Eosinophil % x     / Auto Neutrophil # x     / Auto Neutrophil % x     / BANDS % x                            11.8   8.1   )-----------( 169      ( 15 Jul 2017 05:39 )             35.3     Auto Eosinophil # x     / Auto Eosinophil % x     / Auto Neutrophil # x     / Auto Neutrophil % x     / BANDS % x        INR: 1.09 ratio (07-13 @ 18:43)    07-16    138  |  95<L>  |  31<H>  ----------------------------<  106<H>  3.9   |  27  |  5.44<H>  07-15    137  |  96  |  59<H>  ----------------------------<  104<H>  4.2   |  23  |  7.55<H>    Ca    8.2<L>      16 Jul 2017 05:42  Mg     2.2     07-16  Phos  4.3     07-16        proBNP: Serum Pro-Brain Natriuretic Peptide: >75489 pg/mL (07-13 @ 18:42)    Lipid Profile: 07-14 Chol 172  HDL 45 Trig 106  HgA1c: 4.6 % (07-14 @ 07:13)    TSH: Thyroid Stimulating Hormone, Serum: 1.02 uIU/mL (07-14 @ 07:13)      CARDIAC MARKERS:   13 Jul 2017 18:42 Troponin 0.01 ng/mL / Creatine Kinase 102 U/L /  CKMB 2.3 ng/mL / CPK Mass Assay % 2.3 %        TELEMETRY: 	    ECG:  	  RADIOLOGY:  OTHER: 	    PREVIOUS DIAGNOSTIC TESTING:    [ ] Echocardiogram: 7/14: EF 70% moderate aortic stenosis. Minimal aortic regurgitation. Severe concentric LVH Normal LVSF No segmental wall motion abnormalities.Mild diastolic dysfunction (Stage I).Normal RVSF  [ ]  Catheterization:  [ ] Stress Test: Patient is a 67y old  Male who presents with a chief complaint of Chest pain, dizziness (13 Jul 2017 22:06)    Event	  HPI:  66 M with HTN and ESRD on HD TTS who presented from PCP office after he was found bradycardic with HR in the 20s. Pt reports that since yesterday, he has had decreased exercise tolerance becoming dizzy after walking 2-3 steps. He also endorsed substernal chest discomfort. Pain was non-radiating with no associated SOB, nausea, or vomiting. Currently pain free. In the ED, pt was found to be in complete heart block with ventricular rhythm in the 20s. He was temporized initially with pacer pads and atropine and now has RIJ TVP in place.     Vital Signs Last 24 Hrs  T(C): 36.8 (13 Jul 2017 21:30), Max: 36.9 (13 Jul 2017 20:28)  T(F): 98.2 (13 Jul 2017 21:30), Max: 98.4 (13 Jul 2017 20:28)  HR: 70 (13 Jul 2017 21:30) (23 - 70)  BP: 190/86 (13 Jul 2017 21:30) (106/68 - 190/86)  BP(mean): 91 (13 Jul 2017 21:15) (91 - 119)  RR: 16 (13 Jul 2017 21:30) (16 - 20)  SpO2: 100% (13 Jul 2017 21:30) (88% - 100%) (13 Jul 2017 22:06)    MEDICATIONS  (STANDING):  heparin  Injectable 5000 Unit(s) SubCutaneous every 8 hours  hydrALAZINE 100 milliGRAM(s) Oral every 8 hours    MEDICATIONS  (PRN):  acetaminophen   Tablet. 650 milliGRAM(s) Oral every 6 hours PRN Moderate Pain (4 - 6)      REVIEW OF SYSTEMS:  Otherwise, 10 point ROS done and otherwise negative.    PHYSICAL EXAM:  Vital Signs Last 24 Hrs  T(C): 36.8 (16 Jul 2017 05:00), Max: 36.8 (15 Jul 2017 21:40)  T(F): 98.2 (16 Jul 2017 05:00), Max: 98.3 (15 Jul 2017 21:40)  HR: 82 (16 Jul 2017 06:00) (63 - 95)  BP: 159/75 (16 Jul 2017 06:00) (112/57 - 170/69)  BP(mean): 100 (16 Jul 2017 06:00) (67 - 100)  RR: 21 (16 Jul 2017 06:00) (10 - 34)  SpO2: 98% (16 Jul 2017 06:00) (95% - 99%)  I&O's Summary    15 Jul 2017 07:01  -  16 Jul 2017 07:00  --------------------------------------------------------  IN: 240 mL / OUT: 2000 mL / NET: -1760 mL    Constitutional: well developed well nourished, NAD  HEENT: NC/AT, oral mucosa pink moist  Respiratory: regular unlabored, CTA bilat  Cardiovascular: S1,  S2,  +sys murmur, no edema  Gastrointestinal: soft ND/NT; + bowel sounds  Genitourinary: voids on own & scheduled for HD today  Extremities: VOGEL equal strength  Vascular: warm peripherally  Neurological: A & O x3  Skin: warm dry, No rash, ecchymosis or cyanosis      LABS:	 	                        12.8   7.3   )-----------( 192      ( 16 Jul 2017 05:42 )             38.8     Auto Eosinophil # x     / Auto Eosinophil % x     / Auto Neutrophil # x     / Auto Neutrophil % x     / BANDS % x                            11.8   8.1   )-----------( 169      ( 15 Jul 2017 05:39 )             35.3     Auto Eosinophil # x     / Auto Eosinophil % x     / Auto Neutrophil # x     / Auto Neutrophil % x     / BANDS % x        INR: 1.09 ratio (07-13 @ 18:43)    07-16    138  |  95<L>  |  31<H>  ----------------------------<  106<H>  3.9   |  27  |  5.44<H>  07-15    137  |  96  |  59<H>  ----------------------------<  104<H>  4.2   |  23  |  7.55<H>    Ca    8.2<L>      16 Jul 2017 05:42  Mg     2.2     07-16  Phos  4.3     07-16        proBNP: Serum Pro-Brain Natriuretic Peptide: >96895 pg/mL (07-13 @ 18:42)    Lipid Profile: 07-14 Chol 172  HDL 45 Trig 106  HgA1c: 4.6 % (07-14 @ 07:13)    TSH: Thyroid Stimulating Hormone, Serum: 1.02 uIU/mL (07-14 @ 07:13)      CARDIAC MARKERS:   13 Jul 2017 18:42 Troponin 0.01 ng/mL / Creatine Kinase 102 U/L /  CKMB 2.3 ng/mL / CPK Mass Assay % 2.3 %        TELEMETRY: 	    ECG:  	  RADIOLOGY:  OTHER: 	    PREVIOUS DIAGNOSTIC TESTING:    [ ] Echocardiogram: 7/14: EF 70% moderate aortic stenosis. Minimal aortic regurgitation. Severe concentric LVH Normal LVSF No segmental wall motion abnormalities.Mild diastolic dysfunction (Stage I).Normal RVSF  [ ]  Catheterization:  [ ] Stress Test:

## 2017-07-16 NOTE — CHART NOTE - NSCHARTNOTEFT_GEN_A_CORE
====================  chest pain and headache  ====================    MARYANN REYES  [id number]    ====================  SUMMARY: 68 y/o male with pmhx of HTN, ESRD presented with chest pain found to be in CHB with HR in the 20's. The patient was given atropine and TVP  placed, now discontinued.  ====================        ====================  NEW EVENTS: @ 0905 Patient c/o chest tightness  5/10 and headache 2/10; ECG repeated with mild TW abnormalities in V4 - V5, RBBB, NSR with PAC/abberant conduction; CE obtained and tylenol administered. Will f/u Jessica    ====================        ====================  ICU Vital Signs Last 24 Hrs  ====================  T(C): 36.8 (16 Jul 2017 05:00), Max: 36.8 (15 Jul 2017 21:40)  T(F): 98.2 (16 Jul 2017 05:00), Max: 98.3 (15 Jul 2017 21:40)  HR: 86 (16 Jul 2017 08:00) (63 - 95)  BP: 139/62 (16 Jul 2017 08:00) (112/57 - 170/69)  BP(mean): 84 (16 Jul 2017 08:00) (67 - 100)  ABP: --  ABP(mean): --  RR: 14 (16 Jul 2017 08:00) (10 - 29)  SpO2: 95% (16 Jul 2017 08:00) (95% - 98%)        TELEMETRY: mild TW abnormalities in V4 - V5, RBBB, NSR with PAC/abberant conduction;        *BLOOD GAS/ARTERIAL/MIXED/VENOUS  *LACTATE    I&O's Summary    14 Jul 2017 07:01  -  15 Jul 2017 07:00  --------------------------------------------------------  IN: 240 mL / OUT: 100 mL / NET: 140 mL    15 Jul 2017 07:01  -  16 Jul 2017 01:41  --------------------------------------------------------  IN: 120 mL / OUT: 2000 mL / NET: -1880 mL        ====================  PLAN:  -ECG repeated  -CE obtained and will f/u results  -Tylenol administered  ====================    HEALTH ISSUES - PROBLEM Dx:  Prophylactic measure: Prophylactic measure  HTN (hypertension): HTN (hypertension)  Essential hypertension: Essential hypertension  Renal insufficiency: Renal insufficiency  Complete heart block: Complete heart block

## 2017-07-16 NOTE — PROGRESS NOTE ADULT - ASSESSMENT
65 y/o man with h/o ESRD on HD (T/Th/Sat), HTN who presented to the ED from his Cardiologist's office after he was found to be in CHB, now resolved after undergoing emergent HD

## 2017-07-16 NOTE — DISCHARGE NOTE ADULT - PATIENT PORTAL LINK FT
“You can access the [facility] Patient Portal, offered by [facility], by registering with the following website: [email or web address]”

## 2017-07-16 NOTE — DISCHARGE NOTE ADULT - PLAN OF CARE
remain in normal rhythm Low salt diet  Activity as tolerated.  Take all medication as prescribed.  Follow up with your medical doctor for routine blood pressure monitoring at your next visit.  Notify your doctor if you have any of the following symptoms:   Dizziness, Lightheadedness, Blurry vision, Headache, Chest pain, Shortness of breath  Your medications changed for blood pressure - you were started on hydralazine, c/w hydralazine. avoid foods high in potassium Followup with your Kidney doctor Followup with your doctor for repeat cbc next week Low salt diet. Activity as tolerated. Take all medication as prescribed.  Follow up with your medical doctor for routine blood pressure monitoring at your next visit.  Notify your doctor if you have any of the following symptoms:   Dizziness, Lightheadedness, Blurry vision, Headache, Chest pain, Shortness of breath  Your medications changed for blood pressure - you were started on hydralazine, c/w hydralazine. Followup with your Kidney doctor  Last dialysis session was on 7/20. Your dialysis days will be on Tues/Thur/Sat avoid foods high in potassium  Had pacemaker placed on 7/19

## 2017-07-16 NOTE — CHART NOTE - NSCHARTNOTEFT_GEN_A_CORE
====================  CCU MIDNIGHT ROUNDS  ====================    MARYANN REYES  [id number]    ====================  SUMMARY: 66 y/o male with pmhx of HTN, ESRD presented with chest pain found to be in CHB with HR in the 20's. The patient was given atropine and TVP  placed.    ====================        ====================  NEW EVENTS: @ 0905 Patient c/o chest tightness  5/10 and headache 2/10; ECG repeated with mild TW abnormalities in V4 - V5, RBBB, NSR with PAC/abberant conduction; CE obtained and tylenol administered. Will f/u Jessica    ====================        ====================  ICU Vital Signs Last 24 Hrs  ====================  T(C): 36.8 (16 Jul 2017 05:00), Max: 36.8 (15 Jul 2017 21:40)  T(F): 98.2 (16 Jul 2017 05:00), Max: 98.3 (15 Jul 2017 21:40)  HR: 86 (16 Jul 2017 08:00) (63 - 95)  BP: 139/62 (16 Jul 2017 08:00) (112/57 - 170/69)  BP(mean): 84 (16 Jul 2017 08:00) (67 - 100)  ABP: --  ABP(mean): --  RR: 14 (16 Jul 2017 08:00) (10 - 29)  SpO2: 95% (16 Jul 2017 08:00) (95% - 98%)        TELEMETRY: mild TW abnormalities in V4 - V5, RBBB, NSR with PAC/abberant conduction;        *BLOOD GAS/ARTERIAL/MIXED/VENOUS  *LACTATE    I&O's Summary    14 Jul 2017 07:01  -  15 Jul 2017 07:00  --------------------------------------------------------  IN: 240 mL / OUT: 100 mL / NET: 140 mL    15 Jul 2017 07:01  -  16 Jul 2017 01:41  --------------------------------------------------------  IN: 120 mL / OUT: 2000 mL / NET: -1880 mL        ====================  PLAN:  -ECG repeated  -CE obtained and will f/u results  -Tylenol administered  ====================    HEALTH ISSUES - PROBLEM Dx:  Prophylactic measure: Prophylactic measure  HTN (hypertension): HTN (hypertension)  Essential hypertension: Essential hypertension  Renal insufficiency: Renal insufficiency  Complete heart block: Complete heart block

## 2017-07-16 NOTE — DISCHARGE NOTE ADULT - MEDICATION SUMMARY - MEDICATIONS TO TAKE
I will START or STAY ON the medications listed below when I get home from the hospital:    labetalol 200 mg oral tablet  -- 1 tab(s) by mouth 2 times a day  -- Indication: For blood pressure    hydrALAZINE 100 mg oral tablet  -- 1 tab(s) by mouth every 8 hours  -- Indication: For blood pressure

## 2017-07-16 NOTE — DISCHARGE NOTE ADULT - MEDICATION SUMMARY - MEDICATIONS TO STOP TAKING
I will STOP taking the medications listed below when I get home from the hospital:    cloNIDine 0.1 mg oral tablet  -- 1 tab(s) by mouth once a day (at bedtime)

## 2017-07-17 DIAGNOSIS — R63.8 OTHER SYMPTOMS AND SIGNS CONCERNING FOOD AND FLUID INTAKE: ICD-10-CM

## 2017-07-17 DIAGNOSIS — N18.6 END STAGE RENAL DISEASE: ICD-10-CM

## 2017-07-17 LAB
ANION GAP SERPL CALC-SCNC: 18 MMOL/L — HIGH (ref 5–17)
APTT BLD: 28.4 SEC — SIGNIFICANT CHANGE UP (ref 27.5–37.4)
BUN SERPL-MCNC: 47 MG/DL — HIGH (ref 7–23)
CALCIUM SERPL-MCNC: 8.2 MG/DL — LOW (ref 8.4–10.5)
CHLORIDE SERPL-SCNC: 95 MMOL/L — LOW (ref 96–108)
CO2 SERPL-SCNC: 24 MMOL/L — SIGNIFICANT CHANGE UP (ref 22–31)
CREAT SERPL-MCNC: 8.02 MG/DL — HIGH (ref 0.5–1.3)
GLUCOSE SERPL-MCNC: 112 MG/DL — HIGH (ref 70–99)
HCT VFR BLD CALC: 39.8 % — SIGNIFICANT CHANGE UP (ref 39–50)
HGB BLD-MCNC: 12.4 G/DL — LOW (ref 13–17)
INR BLD: 1.1 RATIO — SIGNIFICANT CHANGE UP (ref 0.88–1.16)
MAGNESIUM SERPL-MCNC: 2.4 MG/DL — SIGNIFICANT CHANGE UP (ref 1.6–2.6)
MCHC RBC-ENTMCNC: 31.1 GM/DL — LOW (ref 32–36)
MCHC RBC-ENTMCNC: 33.3 PG — SIGNIFICANT CHANGE UP (ref 27–34)
MCV RBC AUTO: 107 FL — HIGH (ref 80–100)
PHOSPHATE SERPL-MCNC: 4.9 MG/DL — HIGH (ref 2.5–4.5)
PLATELET # BLD AUTO: 206 K/UL — SIGNIFICANT CHANGE UP (ref 150–400)
POTASSIUM SERPL-MCNC: 4.2 MMOL/L — SIGNIFICANT CHANGE UP (ref 3.5–5.3)
POTASSIUM SERPL-SCNC: 4.2 MMOL/L — SIGNIFICANT CHANGE UP (ref 3.5–5.3)
PROTHROM AB SERPL-ACNC: 12 SEC — SIGNIFICANT CHANGE UP (ref 9.8–12.7)
RBC # BLD: 3.72 M/UL — LOW (ref 4.2–5.8)
RBC # FLD: 14.8 % — HIGH (ref 10.3–14.5)
SODIUM SERPL-SCNC: 137 MMOL/L — SIGNIFICANT CHANGE UP (ref 135–145)
WBC # BLD: 8.4 K/UL — SIGNIFICANT CHANGE UP (ref 3.8–10.5)
WBC # FLD AUTO: 8.4 K/UL — SIGNIFICANT CHANGE UP (ref 3.8–10.5)

## 2017-07-17 PROCEDURE — 93010 ELECTROCARDIOGRAM REPORT: CPT

## 2017-07-17 PROCEDURE — 99223 1ST HOSP IP/OBS HIGH 75: CPT

## 2017-07-17 RX ADMIN — Medication 650 MILLIGRAM(S): at 23:33

## 2017-07-17 RX ADMIN — Medication 100 MILLIGRAM(S): at 21:22

## 2017-07-17 RX ADMIN — HEPARIN SODIUM 5000 UNIT(S): 5000 INJECTION INTRAVENOUS; SUBCUTANEOUS at 05:30

## 2017-07-17 RX ADMIN — HEPARIN SODIUM 5000 UNIT(S): 5000 INJECTION INTRAVENOUS; SUBCUTANEOUS at 13:09

## 2017-07-17 RX ADMIN — HEPARIN SODIUM 5000 UNIT(S): 5000 INJECTION INTRAVENOUS; SUBCUTANEOUS at 21:23

## 2017-07-17 RX ADMIN — Medication 100 MILLIGRAM(S): at 05:30

## 2017-07-17 RX ADMIN — Medication 100 MILLIGRAM(S): at 13:09

## 2017-07-17 NOTE — PROGRESS NOTE ADULT - SUBJECTIVE AND OBJECTIVE BOX
Subjective: Pt. feels well today. Back in RSR after dialysis and hyperkalemia resolved.  Denies CP or SOB today. EP f/u regarding placement of PPM    MEDICATIONS  (STANDING):  heparin  Injectable 5000 Unit(s) SubCutaneous every 8 hours  hydrALAZINE 100 milliGRAM(s) Oral every 8 hours    MEDICATIONS  (PRN):  acetaminophen   Tablet. 650 milliGRAM(s) Oral every 6 hours PRN Moderate Pain (4 - 6)  aluminum hydroxide/magnesium hydroxide/simethicone Suspension 30 milliLiter(s) Oral every 6 hours PRN Dyspepsia      Vital Signs Last 24 Hrs  T(C): 36.8 (17 Jul 2017 08:00), Max: 36.8 (16 Jul 2017 11:00)  T(F): 98.2 (17 Jul 2017 08:00), Max: 98.3 (16 Jul 2017 11:00)  HR: 80 (17 Jul 2017 09:00) (79 - 97)  BP: 159/65 (17 Jul 2017 09:00) (127/62 - 167/85)  BP(mean): 90 (17 Jul 2017 09:00) (76 - 108)  RR: 26 (17 Jul 2017 09:00) (11 - 29)  SpO2: 98% (17 Jul 2017 09:00) (96% - 100%)    PHYSICAL EXAM:    Constitutional: WD,WN    Neck: no JVD noted    Respiratory: clear lungs    Cardiovascular: RRR, 2/6 systolic murmur at apex and base    Gastrointestinal: NT, ND, BS present, no masses    Extremities: no edema noted, L arm AV fistula noted    Neurological: grossly nonfocal    TELEMETRY: RSR, no further CHB seen    ECG: < from: 12 Lead ECG (07.16.17 @ 09:02) >  Ventricular Rate 80 BPM    Atrial Rate 80 BPM    P-R Interval 200 ms    QRS Duration 162 ms     ms    QTc 618 ms    P Axis 49 degrees    R Axis -97 degrees    T Axis 69 degrees    Diagnosis Line SINUS RHYTHM WITH PREMATURE ATRIAL COMPLEXES WITH ABERRANT CONDUCTION  RIGHT BUNDLE BRANCH BLOCK  T WAVE ABNORMALITY, CONSIDER LATERAL ISCHEMIA  ABNORMAL ECG    < end of copied text >        LABS:                        12.4   8.4   )-----------( 206      ( 17 Jul 2017 05:27 )             39.8     07-17    137  |  95<L>  |  47<H>  ----------------------------<  112<H>  4.2   |  24  |  8.02<H>    Ca    8.2<L>      17 Jul 2017 05:27  Phos  4.9     07-17  Mg     2.4     07-17      CARDIAC MARKERS ( 16 Jul 2017 09:43 )  x     / 0.03 ng/mL / 92 U/L / x     / 1.7 ng/mL      PT/INR - ( 17 Jul 2017 05:27 )   PT: 12.0 sec;   INR: 1.10 ratio         PTT - ( 17 Jul 2017 05:27 )  PTT:28.4 sec    I&O's Summary    16 Jul 2017 07:01  -  17 Jul 2017 07:00  --------------------------------------------------------  IN: 320 mL / OUT: 0 mL / NET: 320 mL      BNP  RADIOLOGY & ADDITIONAL STUDIES:    IMPRESSION:  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

## 2017-07-17 NOTE — PROGRESS NOTE ADULT - SUBJECTIVE AND OBJECTIVE BOX
Patient is a 67y old  Male who presents with a chief complaint of Chest pain, dizziness (16 Jul 2017 19:58)    Event	No acute events overnight  HPI:  66 M with HTN and ESRD on HD TTS who presented from PCP office after he was found bradycardic with HR in the 20s. Pt reports that since yesterday, he has had decreased exercise tolerance becoming dizzy after walking 2-3 steps. He also endorsed substernal chest discomfort. Pain was non-radiating with no associated SOB, nausea, or vomiting. Currently pain free. In the ED, pt was found to be in complete heart block with ventricular rhythm in the 20s. He was temporized initially with pacer pads and atropine and now has RIJ TVP in place.     Vital Signs Last 24 Hrs  T(C): 36.8 (13 Jul 2017 21:30), Max: 36.9 (13 Jul 2017 20:28)  T(F): 98.2 (13 Jul 2017 21:30), Max: 98.4 (13 Jul 2017 20:28)  HR: 70 (13 Jul 2017 21:30) (23 - 70)  BP: 190/86 (13 Jul 2017 21:30) (106/68 - 190/86)  BP(mean): 91 (13 Jul 2017 21:15) (91 - 119)  RR: 16 (13 Jul 2017 21:30) (16 - 20)  SpO2: 100% (13 Jul 2017 21:30) (88% - 100%) (13 Jul 2017 22:06)    MEDICATIONS  (STANDING):  heparin  Injectable 5000 Unit(s) SubCutaneous every 8 hours  hydrALAZINE 100 milliGRAM(s) Oral every 8 hours    MEDICATIONS  (PRN):  acetaminophen   Tablet. 650 milliGRAM(s) Oral every 6 hours PRN Moderate Pain (4 - 6)  aluminum hydroxide/magnesium hydroxide/simethicone Suspension 30 milliLiter(s) Oral every 6 hours PRN Dyspepsia      REVIEW OF SYSTEMS:  Otherwise, 10 point ROS done and otherwise negative.    PHYSICAL EXAM:  Vital Signs Last 24 Hrs  T(C): 36.6 (17 Jul 2017 01:00), Max: 36.8 (16 Jul 2017 11:00)  T(F): 97.9 (17 Jul 2017 01:00), Max: 98.3 (16 Jul 2017 11:00)  HR: 85 (17 Jul 2017 06:00) (79 - 97)  BP: 154/65 (17 Jul 2017 06:00) (130/83 - 167/85)  BP(mean): 90 (17 Jul 2017 06:00) (76 - 108)  RR: 13 (17 Jul 2017 06:00) (11 - 29)  SpO2: 99% (17 Jul 2017 06:00) (95% - 100%)  I&O's Summary    16 Jul 2017 07:01  -  17 Jul 2017 07:00  --------------------------------------------------------  IN: 320 mL / OUT: 0 mL / NET: 320 mL        Appearance: Normal	  HEENT:   Normal oral mucosa, PERRL, EOMI	  Lymphatic: No lymphadenopathy  Cardiovascular: Normal S1 S2, No JVD, No murmurs, No edema  Respiratory: Lungs clear to auscultation	  Psychiatry: A & O x 3, Mood & affect appropriate  Gastrointestinal:  Soft, Non-tender, + BS	  Skin: No rashes, No ecchymoses, No cyanosis	  Neurologic: Non-focal  Extremities: Normal range of motion, No clubbing, cyanosis or edema  Vascular: Peripheral pulses palpable 2+ bilaterally    LABS:	 	                        12.4   8.4   )-----------( 206      ( 17 Jul 2017 05:27 )             39.8     Auto Eosinophil # x     / Auto Eosinophil % x     / Auto Neutrophil # x     / Auto Neutrophil % x     / BANDS % x                            12.8   7.3   )-----------( 192      ( 16 Jul 2017 05:42 )             38.8     Auto Eosinophil # x     / Auto Eosinophil % x     / Auto Neutrophil # x     / Auto Neutrophil % x     / BANDS % x        INR: 1.10 ratio (07-17 @ 05:27)  INR: 1.09 ratio (07-13 @ 18:43)    07-17    137  |  95<L>  |  47<H>  ----------------------------<  112<H>  4.2   |  24  |  8.02<H>  07-16    138  |  95<L>  |  31<H>  ----------------------------<  106<H>  3.9   |  27  |  5.44<H>    Ca    8.2<L>      17 Jul 2017 05:27  Mg     2.4     07-17  Phos  4.9     07-17        proBNP: Serum Pro-Brain Natriuretic Peptide: >45322 pg/mL (07-13 @ 18:42)    Lipid Profile: 07-14 Chol 172  HDL 45 Trig 106  HgA1c: 4.6 % (07-14 @ 07:13)    TSH: Thyroid Stimulating Hormone, Serum: 1.02 uIU/mL (07-14 @ 07:13)      CARDIAC MARKERS:   16 Jul 2017 09:43 Troponin 0.03 ng/mL / Creatine Kinase 92 U/L /  CKMB 1.7 ng/mL / CPK Mass Assay % x       13 Jul 2017 18:42 Troponin 0.01 ng/mL / Creatine Kinase 102 U/L /  CKMB 2.3 ng/mL / CPK Mass Assay % 2.3 %        TELEMETRY: 	    ECG:  	  RADIOLOGY:  OTHER: 	    PREVIOUS DIAGNOSTIC TESTING:    [ ] Echocardiogram:  [ ]  Catheterization:  [ ] Stress Test:

## 2017-07-17 NOTE — PROGRESS NOTE ADULT - SUBJECTIVE AND OBJECTIVE BOX
NEPHROLOGY-[facility] [phone]        Patient seen and examined in bed.  He felt good.  No NV noted        MEDICATIONS  (STANDING):  heparin  Injectable 5000 Unit(s) SubCutaneous every 8 hours  hydrALAZINE 100 milliGRAM(s) Oral every 8 hours      VITAL:  T(C): , Max: 36.8 (07-16-17 @ 11:00)  T(F): , Max: 98.3 (07-16-17 @ 11:00)  HR: 83 (07-17-17 @ 08:00)  BP: 166/69 (07-17-17 @ 08:00)  BP(mean): 96 (07-17-17 @ 08:00)  RR: 16 (07-17-17 @ 08:00)  SpO2: 98% (07-17-17 @ 08:00)  Wt(kg): --    I and O's:    07-16 @ 07:01  -  07-17 @ 07:00  --------------------------------------------------------  IN: 320 mL / OUT: 0 mL / NET: 320 mL          PHYSICAL EXAM:    Constitutional: NAD  HEENT: PERRLA    Neck:  No JVD  Respiratory: CTAB/L  Cardiovascular: S1 and S2  Gastrointestinal: BS+, soft, NT/ND  Extremities: No peripheral edema  Neurological: A/O x 3, no focal deficits  Psychiatric: Normal mood, normal affect  : No Block  Skin: No rashes  Access: AVF   LABS:                        12.4   8.4   )-----------( 206      ( 17 Jul 2017 05:27 )             39.8     07-17    137  |  95<L>  |  47<H>  ----------------------------<  112<H>  4.2   |  24  |  8.02<H>    Ca    8.2<L>      17 Jul 2017 05:27  Phos  4.9     07-17  Mg     2.4     07-17            Urine Studies:          RADIOLOGY & ADDITIONAL STUDIES:

## 2017-07-17 NOTE — PROGRESS NOTE ADULT - PROBLEM SELECTOR PLAN 1
monitor rhythm, currently SR  hold all AV  ángel blockers  f/u EP recs  NPO for possible EP intervention

## 2017-07-17 NOTE — PROGRESS NOTE ADULT - ASSESSMENT
68 yo male with hx of ESRD, HTN presented with symptomatic bradycardia likely 2/2 complete heart block which may have been induced by hyperkalemia

## 2017-07-17 NOTE — PROGRESS NOTE ADULT - PROBLEM SELECTOR PLAN 1
- Currently in NSR, continue with telemetry  - Avoid AV ángel blockers  - NPO tonight for possible PPM tomorrow pending EP recs

## 2017-07-17 NOTE — CHART NOTE - NSCHARTNOTEFT_GEN_A_CORE
Admission date:    Transfer to:  ( x ) Medicine    (  ) Tele     (  ) MICU     (  ) Palliative     (  ) RCU     (  ) Neuro     (  ) _________    Accepting  Physician:    Signout given to:    HPI:  66 M with HTN and ESRD on HD TTS who presented from PCP office after he was found bradycardic with HR in the 20s. Pt reports that since yesterday, he has had decreased exercise tolerance becoming dizzy after walking 2-3 steps. He also endorsed substernal chest discomfort. Pain was non-radiating with no associated SOB, nausea, or vomiting. Currently pain free. In the ED, pt was found to be in complete heart block with ventricular rhythm in the 20s. He was temporized initially with pacer pads and atropine and now has RIJ TVP in place.     Vital Signs Last 24 Hrs  T(C): 36.8 (13 Jul 2017 21:30), Max: 36.9 (13 Jul 2017 20:28)  T(F): 98.2 (13 Jul 2017 21:30), Max: 98.4 (13 Jul 2017 20:28)  HR: 70 (13 Jul 2017 21:30) (23 - 70)  BP: 190/86 (13 Jul 2017 21:30) (106/68 - 190/86)  BP(mean): 91 (13 Jul 2017 21:15) (91 - 119)  RR: 16 (13 Jul 2017 21:30) (16 - 20)  SpO2: 100% (13 Jul 2017 21:30) (88% - 100%) (13 Jul 2017 22:06)        PICU Course:       Vital Signs Last 24 Hrs  T(C): 36.8 (17 Jul 2017 08:00), Max: 36.8 (17 Jul 2017 08:00)  T(F): 98.2 (17 Jul 2017 08:00), Max: 98.2 (17 Jul 2017 08:00)  HR: 85 (17 Jul 2017 12:00) (80 - 97)  BP: 169/72 (17 Jul 2017 12:00) (127/62 - 169/72)  BP(mean): 98 (17 Jul 2017 12:00) (76 - 108)  RR: 14 (17 Jul 2017 12:00) (11 - 29)  SpO2: 100% (17 Jul 2017 12:00) (98% - 100%)  I&O's Summary    16 Jul 2017 07:01  -  17 Jul 2017 07:00  --------------------------------------------------------  IN: 320 mL / OUT: 0 mL / NET: 320 mL      Blood test results  CARDIAC MARKERS ( 16 Jul 2017 09:43 )  x     / 0.03 ng/mL / 92 U/L / x     / 1.7 ng/mL                            12.4   8.4   )-----------( 206      ( 17 Jul 2017 05:27 )             39.8     07-17    137  |  95<L>  |  47<H>  ----------------------------<  112<H>  4.2   |  24  |  8.02<H>    Ca    8.2<L>      17 Jul 2017 05:27  Phos  4.9     07-17  Mg     2.4     07-17      PT/INR - ( 17 Jul 2017 05:27 )   PT: 12.0 sec;   INR: 1.10 ratio         PTT - ( 17 Jul 2017 05:27 )  PTT:28.4 sec    Allergies    No Known Allergies    Intolerances      MEDICATIONS  (STANDING):  heparin  Injectable 5000 Unit(s) SubCutaneous every 8 hours  hydrALAZINE 100 milliGRAM(s) Oral every 8 hours    MEDICATIONS  (PRN):  acetaminophen   Tablet. 650 milliGRAM(s) Oral every 6 hours PRN Moderate Pain (4 - 6)  aluminum hydroxide/magnesium hydroxide/simethicone Suspension 30 milliLiter(s) Oral every 6 hours PRN Dyspepsia    TELEMETRY: SR with intermittent missed beats    Assessment and Plan:   · Assessment	  67 y/o man with h/o ESRD on HD (T/Th/Sat), HTN who presented to the ED from his Cardiologist's office after he was found to be in CHB, now resolved after undergoing emergent HD    Problem/Plan - 1:  ·  Problem: Complete heart block.  Plan: monitor rhythm, currently SR  hold all AV  ángel blockers  f/u EP recs  EP to monitor on telemetry and decide if need further EPS vs PPM    Problem/Plan - 2:  ·  Problem: Renal insufficiency.  Plan: For HD today  monitor I & O  check BMP.     Problem/Plan - 3:  ·  Problem: HTN (hypertension).  Plan: monitor VS  c/w hydralazine increased to 100mg q 8.     Problem/Plan - 4:  ·  Problem: Prophylactic measure.  Plan: DVT ppx - c/w HSQ.         FOLLOW UP: EP Consult Admission date:    Transfer to:  ( x ) Medicine    (  x) Tele        Accepting  Physician: Getachew Hagan MD    Signout given to: Getachew Hagan MD    HPI:  66 M with HTN and ESRD on HD TTS who presented from PCP office after he was found bradycardic with HR in the 20s. Pt reports that since yesterday, he has had decreased exercise tolerance becoming dizzy after walking 2-3 steps. He also endorsed substernal chest discomfort. Pain was non-radiating with no associated SOB, nausea, or vomiting. Currently pain free. In the ED, pt was found to be in complete heart block with ventricular rhythm in the 20s. He was temporized initially with pacer pads and atropine and now has RI TVP in place.     Vital Signs Last 24 Hrs  T(C): 36.8 (13 Jul 2017 21:30), Max: 36.9 (13 Jul 2017 20:28)  T(F): 98.2 (13 Jul 2017 21:30), Max: 98.4 (13 Jul 2017 20:28)  HR: 70 (13 Jul 2017 21:30) (23 - 70)  BP: 190/86 (13 Jul 2017 21:30) (106/68 - 190/86)  BP(mean): 91 (13 Jul 2017 21:15) (91 - 119)  RR: 16 (13 Jul 2017 21:30) (16 - 20)  SpO2: 100% (13 Jul 2017 21:30) (88% - 100%) (13 Jul 2017 22:06)        PICU Course:       Vital Signs Last 24 Hrs  T(C): 36.8 (17 Jul 2017 08:00), Max: 36.8 (17 Jul 2017 08:00)  T(F): 98.2 (17 Jul 2017 08:00), Max: 98.2 (17 Jul 2017 08:00)  HR: 85 (17 Jul 2017 12:00) (80 - 97)  BP: 169/72 (17 Jul 2017 12:00) (127/62 - 169/72)  BP(mean): 98 (17 Jul 2017 12:00) (76 - 108)  RR: 14 (17 Jul 2017 12:00) (11 - 29)  SpO2: 100% (17 Jul 2017 12:00) (98% - 100%)  I&O's Summary    16 Jul 2017 07:01  -  17 Jul 2017 07:00  --------------------------------------------------------  IN: 320 mL / OUT: 0 mL / NET: 320 mL      Blood test results  CARDIAC MARKERS ( 16 Jul 2017 09:43 )  x     / 0.03 ng/mL / 92 U/L / x     / 1.7 ng/mL                            12.4   8.4   )-----------( 206      ( 17 Jul 2017 05:27 )             39.8     07-17    137  |  95<L>  |  47<H>  ----------------------------<  112<H>  4.2   |  24  |  8.02<H>    Ca    8.2<L>      17 Jul 2017 05:27  Phos  4.9     07-17  Mg     2.4     07-17      PT/INR - ( 17 Jul 2017 05:27 )   PT: 12.0 sec;   INR: 1.10 ratio         PTT - ( 17 Jul 2017 05:27 )  PTT:28.4 sec    Allergies    No Known Allergies    Intolerances      MEDICATIONS  (STANDING):  heparin  Injectable 5000 Unit(s) SubCutaneous every 8 hours  hydrALAZINE 100 milliGRAM(s) Oral every 8 hours    MEDICATIONS  (PRN):  acetaminophen   Tablet. 650 milliGRAM(s) Oral every 6 hours PRN Moderate Pain (4 - 6)  aluminum hydroxide/magnesium hydroxide/simethicone Suspension 30 milliLiter(s) Oral every 6 hours PRN Dyspepsia    TELEMETRY: SR with intermittent missed beats    Assessment and Plan:   · Assessment	  67 y/o man with h/o ESRD on HD (T/Th/Sat), HTN who presented to the ED from his Cardiologist's office after he was found to be in CHB, now resolved after undergoing emergent HD    Problem/Plan - 1:  ·  Problem: Complete heart block.  Plan: monitor rhythm, currently SR  hold all AV  ángel blockers  f/u EP recs  EP to monitor on telemetry and decide if need further EPS vs PPM    Problem/Plan - 2:  ·  Problem: Renal insufficiency.  Plan: For HD today  monitor I & O  check BMP.     Problem/Plan - 3:  ·  Problem: HTN (hypertension).  Plan: monitor VS  c/w hydralazine increased to 100mg q 8.     Problem/Plan - 4:  ·  Problem: Prophylactic measure.  Plan: DVT ppx - c/w HSQ.         FOLLOW UP: EP Consult

## 2017-07-17 NOTE — PROGRESS NOTE ADULT - ASSESSMENT
65 Y/O M via EMS from [facility] office presenting with bradycardia, near syncope, achiness, not feeling well and sternal chest pain found to have hyperkalemia    - ESRD on HD TTS  - Volume status- euvolemic  - HTN:  transient HTN noted; hydralazine increase    PLAN:   - Plan for HD for Tuesday  - A/w Hydralazine 100mg PO q 8 hours  -EPS on board;  Study today to assess need for PPM  - Renal dosing of meds to creatinine clearance of < 10 ml/min

## 2017-07-17 NOTE — PROGRESS NOTE ADULT - SUBJECTIVE AND OBJECTIVE BOX
Date of Admission:7/13/2017    24H hour events: No acute events overnight.     MEDICATIONS:  heparin  Injectable 5000 Unit(s) SubCutaneous every 8 hours  hydrALAZINE 100 milliGRAM(s) Oral every 8 hours        acetaminophen   Tablet. 650 milliGRAM(s) Oral every 6 hours PRN    aluminum hydroxide/magnesium hydroxide/simethicone Suspension 30 milliLiter(s) Oral every 6 hours PRN          REVIEW OF SYSTEMS:  General: no fatigue/malaise, weight loss/gain.  Skin: no rashes.  Ophthalmologic: no blurred vision, no loss of vision. 	  ENT: no sore throat, rhinorrhea, sinus congestion.  Respiratory: no SOB, cough or wheeze.  Gastrointestinal:  no N/V/D, no melena/hematemesis/hematochezia.  Genitourinary: no dysuria/hesitancy or hematuria.  Musculoskeletal: no myalgias or arthralgias.  Neurological: no changes in vision or hearing, no lightheadedness/dizziness, no syncope/near syncope	  Psychiatric: no unusual stress/anxiety.   Hematology/Lymphatics: no unusual bleeding, bruising and no lymphadenopathy.  Endocrine: no unusual thirst.   All others negative except as stated above and in HPI.    PHYSICAL EXAM:  T(C): 36.8 (07-17-17 @ 08:00), Max: 36.8 (07-17-17 @ 08:00)  HR: 85 (07-17-17 @ 12:00) (80 - 97)  BP: 169/72 (07-17-17 @ 12:00) (127/62 - 169/72)  RR: 14 (07-17-17 @ 12:00) (11 - 29)  SpO2: 100% (07-17-17 @ 12:00) (96% - 100%)  Wt(kg): --  I&O's Summary    16 Jul 2017 07:01  -  17 Jul 2017 07:00  --------------------------------------------------------  IN: 320 mL / OUT: 0 mL / NET: 320 mL        Appearance: Normal	  HEENT:   Normal oral mucosa, PERRL, EOMI	  Lymphatic: No lymphadenopathy  Cardiovascular: RRR  Respiratory: Lungs clear to auscultation	  Psychiatry: A & O x 3, Mood & affect appropriate  Gastrointestinal:  Soft, Non-tender, + BS	  Skin: No rashes, No ecchymoses, No cyanosis	  Neurologic: Non-focal  Extremities: Normal range of motion, No clubbing, cyanosis or edema  Vascular: Peripheral pulses palpable 2+ bilaterally        LABS:	 	    CBC Full  -  ( 17 Jul 2017 05:27 )  WBC Count : 8.4 K/uL  Hemoglobin : 12.4 g/dL  Hematocrit : 39.8 %  Platelet Count - Automated : 206 K/uL  Mean Cell Volume : 107.0 fl  Mean Cell Hemoglobin : 33.3 pg  Mean Cell Hemoglobin Concentration : 31.1 gm/dL  Auto Neutrophil # : x  Auto Lymphocyte # : x  Auto Monocyte # : x  Auto Eosinophil # : x  Auto Basophil # : x  Auto Neutrophil % : x  Auto Lymphocyte % : x  Auto Monocyte % : x  Auto Eosinophil % : x  Auto Basophil % : x    07-17    137  |  95<L>  |  47<H>  ----------------------------<  112<H>  4.2   |  24  |  8.02<H>  07-16    138  |  95<L>  |  31<H>  ----------------------------<  106<H>  3.9   |  27  |  5.44<H>    Ca    8.2<L>      17 Jul 2017 05:27  Ca    8.2<L>      16 Jul 2017 05:42  Phos  4.9     07-17  Phos  4.3     07-16  Mg     2.4     07-17  Mg     2.2     07-16        proBNP: Serum Pro-Brain Natriuretic Peptide: >03330 pg/mL (07-13-17 @ 18:42)    TELEMETRY: 	    	  ASSESSMENT/PLAN: 	    1) Complete Heart Block - resolved, likely due to electrolyte disturbances related to ESRD, as it resolved after undergoing HD  - will discuss PPM placement given risk of recurrence with EP attending Date of Admission:7/13/2017    24H hour events: No acute events overnight.     MEDICATIONS:  heparin  Injectable 5000 Unit(s) SubCutaneous every 8 hours  hydrALAZINE 100 milliGRAM(s) Oral every 8 hours        acetaminophen   Tablet. 650 milliGRAM(s) Oral every 6 hours PRN    aluminum hydroxide/magnesium hydroxide/simethicone Suspension 30 milliLiter(s) Oral every 6 hours PRN          REVIEW OF SYSTEMS:  General: no fatigue/malaise, weight loss/gain.  Skin: no rashes.  Ophthalmologic: no blurred vision, no loss of vision. 	  ENT: no sore throat, rhinorrhea, sinus congestion.  Respiratory: no SOB, cough or wheeze.  Gastrointestinal:  no N/V/D, no melena/hematemesis/hematochezia.  Genitourinary: no dysuria/hesitancy or hematuria.  Musculoskeletal: no myalgias or arthralgias.  Neurological: no changes in vision or hearing, no lightheadedness/dizziness, no syncope/near syncope	  Psychiatric: no unusual stress/anxiety.   Hematology/Lymphatics: no unusual bleeding, bruising and no lymphadenopathy.  Endocrine: no unusual thirst.   All others negative except as stated above and in HPI.    PHYSICAL EXAM:  T(C): 36.8 (07-17-17 @ 08:00), Max: 36.8 (07-17-17 @ 08:00)  HR: 85 (07-17-17 @ 12:00) (80 - 97)  BP: 169/72 (07-17-17 @ 12:00) (127/62 - 169/72)  RR: 14 (07-17-17 @ 12:00) (11 - 29)  SpO2: 100% (07-17-17 @ 12:00) (96% - 100%)  Wt(kg): --  I&O's Summary    16 Jul 2017 07:01  -  17 Jul 2017 07:00  --------------------------------------------------------  IN: 320 mL / OUT: 0 mL / NET: 320 mL        Appearance: Normal	  HEENT:   Normal oral mucosa, PERRL, EOMI	  Lymphatic: No lymphadenopathy  Cardiovascular: RRR  Respiratory: Lungs clear to auscultation	  Psychiatry: A & O x 3, Mood & affect appropriate  Gastrointestinal:  Soft, Non-tender, + BS	  Skin: No rashes, No ecchymoses, No cyanosis	  Neurologic: Non-focal  Extremities: Normal range of motion, No clubbing, cyanosis or edema  Vascular: Peripheral pulses palpable 2+ bilaterally        LABS:	 	    CBC Full  -  ( 17 Jul 2017 05:27 )  WBC Count : 8.4 K/uL  Hemoglobin : 12.4 g/dL  Hematocrit : 39.8 %  Platelet Count - Automated : 206 K/uL  Mean Cell Volume : 107.0 fl  Mean Cell Hemoglobin : 33.3 pg  Mean Cell Hemoglobin Concentration : 31.1 gm/dL  Auto Neutrophil # : x  Auto Lymphocyte # : x  Auto Monocyte # : x  Auto Eosinophil # : x  Auto Basophil # : x  Auto Neutrophil % : x  Auto Lymphocyte % : x  Auto Monocyte % : x  Auto Eosinophil % : x  Auto Basophil % : x    07-17    137  |  95<L>  |  47<H>  ----------------------------<  112<H>  4.2   |  24  |  8.02<H>  07-16    138  |  95<L>  |  31<H>  ----------------------------<  106<H>  3.9   |  27  |  5.44<H>    Ca    8.2<L>      17 Jul 2017 05:27  Ca    8.2<L>      16 Jul 2017 05:42  Phos  4.9     07-17  Phos  4.3     07-16  Mg     2.4     07-17  Mg     2.2     07-16        proBNP: Serum Pro-Brain Natriuretic Peptide: >88159 pg/mL (07-13-17 @ 18:42)    TELEMETRY: 	    	  ASSESSMENT/PLAN: 	    1) Complete Heart Block - resolved, likely due to electrolyte disturbances related to ESRD, as it resolved after undergoing HD  - will discuss PPM placement given risk of recurrence with EP attending, please make NPO at 12MN in case PPM is placed tomorrow

## 2017-07-17 NOTE — PROGRESS NOTE ADULT - SUBJECTIVE AND OBJECTIVE BOX
Patient is a 67y old  Male who presents with a chief complaint of Chest pain, dizziness (16 Jul 2017 19:58)      HPI: This is a 68 yo male with pmhx of ESRD on HD, HTN who presented initially with dizziness to his PMDs office.  He was found to be bradycardic to the 20s and sent to the ED for further eval.  Upon presentation, pt was found to be in complete heart block, and hyperkalemic.  A TVP was placed at the time, and the pt underwent emergent HD for his hyperkalemia since he had missed HD that day.  Pt was monitored in the CCU, and is now currently in NSR.  EP to decide whether PPM is warranted, as this may have been a reversible cause from his hyperkalemia that led to these symptoms.    Currently he reports feeling well, without any CP, SOB, dizziness, or palpitations    12 point review of systems otherwise negative    PAST MEDICAL & SURGICAL HISTORY:  Renal insufficiency  HTN (hypertension)  No significant past surgical history      FAMILY HISTORY:  No pertinent family history in first degree relatives    SOCIAL HISTORY:  Pt denies any history of smoking.  Denies any alcohol or drug use    MEDICATIONS  (STANDING):  heparin  Injectable 5000 Unit(s) SubCutaneous every 8 hours  hydrALAZINE 100 milliGRAM(s) Oral every 8 hours    MEDICATIONS  (PRN):  acetaminophen   Tablet. 650 milliGRAM(s) Oral every 6 hours PRN Moderate Pain (4 - 6)  aluminum hydroxide/magnesium hydroxide/simethicone Suspension 30 milliLiter(s) Oral every 6 hours PRN Dyspepsia      Vital Signs Last 24 Hrs  T(C): 36.9 (07-17-17 @ 12:00), Max: 36.9 (07-17-17 @ 12:00)  HR: 98 (07-17-17 @ 16:00) (78 - 98)  BP: 141/57 (07-17-17 @ 16:00) (127/62 - 172/83)  RR: 19 (07-17-17 @ 16:00) (11 - 29)  SpO2: 100% (07-17-17 @ 12:00) (98% - 100%)  CAPILLARY BLOOD GLUCOSE        I&O's Summary    16 Jul 2017 07:01  -  17 Jul 2017 07:00  --------------------------------------------------------  IN: 320 mL / OUT: 0 mL / NET: 320 mL    17 Jul 2017 07:01  -  17 Jul 2017 17:37  --------------------------------------------------------  IN: 300 mL / OUT: 0 mL / NET: 300 mL        PHYSICAL EXAM:  GENERAL: NAD, well-developed  HEAD:  Atraumatic, Normocephalic  EYES: EOMI, PERRL, conjunctiva and sclera clear  NECK: Supple, No JVD  CHEST/LUNG: Clear to auscultation bilaterally; No wheeze  HEART: Regular rate and rhythm; No murmurs, rubs, or gallops  ABDOMEN: Soft, Nontender, Nondistended; Bowel sounds present  EXTREMITIES:  2+ Peripheral Pulses, No clubbing, cyanosis, or edema.  LUE fistula in place with thrill  PSYCH: AAOx3  NEUROLOGY: non-focal  SKIN: No rashes or lesions    LABS:                        12.4   8.4   )-----------( 206      ( 17 Jul 2017 05:27 )             39.8     07-17    137  |  95<L>  |  47<H>  ----------------------------<  112<H>  4.2   |  24  |  8.02<H>    Ca    8.2<L>      17 Jul 2017 05:27  Phos  4.9     07-17  Mg     2.4     07-17      PT/INR - ( 17 Jul 2017 05:27 )   PT: 12.0 sec;   INR: 1.10 ratio         PTT - ( 17 Jul 2017 05:27 )  PTT:28.4 sec  CARDIAC MARKERS ( 16 Jul 2017 09:43 )  x     / 0.03 ng/mL / 92 U/L / x     / 1.7 ng/mL          RADIOLOGY & ADDITIONAL TESTS:    Imaging Personally Reviewed: EKG currently in NSR    Consultant(s) Notes Reviewed:  TRAY

## 2017-07-18 LAB
ANION GAP SERPL CALC-SCNC: 22 MMOL/L — HIGH (ref 5–17)
BUN SERPL-MCNC: 64 MG/DL — HIGH (ref 7–23)
CALCIUM SERPL-MCNC: 8.3 MG/DL — LOW (ref 8.4–10.5)
CHLORIDE SERPL-SCNC: 95 MMOL/L — LOW (ref 96–108)
CO2 SERPL-SCNC: 22 MMOL/L — SIGNIFICANT CHANGE UP (ref 22–31)
CREAT SERPL-MCNC: 10.04 MG/DL — HIGH (ref 0.5–1.3)
GLUCOSE SERPL-MCNC: 101 MG/DL — HIGH (ref 70–99)
HCT VFR BLD CALC: 34 % — LOW (ref 39–50)
HGB BLD-MCNC: 11.8 G/DL — LOW (ref 13–17)
MCHC RBC-ENTMCNC: 34.8 GM/DL — SIGNIFICANT CHANGE UP (ref 32–36)
MCHC RBC-ENTMCNC: 37 PG — HIGH (ref 27–34)
MCV RBC AUTO: 106 FL — HIGH (ref 80–100)
PLATELET # BLD AUTO: 161 K/UL — SIGNIFICANT CHANGE UP (ref 150–400)
POTASSIUM SERPL-MCNC: 4.6 MMOL/L — SIGNIFICANT CHANGE UP (ref 3.5–5.3)
POTASSIUM SERPL-SCNC: 4.6 MMOL/L — SIGNIFICANT CHANGE UP (ref 3.5–5.3)
RBC # BLD: 3.2 M/UL — LOW (ref 4.2–5.8)
RBC # FLD: 14.6 % — HIGH (ref 10.3–14.5)
SODIUM SERPL-SCNC: 139 MMOL/L — SIGNIFICANT CHANGE UP (ref 135–145)
WBC # BLD: 8.6 K/UL — SIGNIFICANT CHANGE UP (ref 3.8–10.5)
WBC # FLD AUTO: 8.6 K/UL — SIGNIFICANT CHANGE UP (ref 3.8–10.5)

## 2017-07-18 RX ORDER — AMLODIPINE BESYLATE 2.5 MG/1
5 TABLET ORAL DAILY
Qty: 0 | Refills: 0 | Status: DISCONTINUED | OUTPATIENT
Start: 2017-07-18 | End: 2017-07-20

## 2017-07-18 RX ADMIN — Medication 100 MILLIGRAM(S): at 13:49

## 2017-07-18 RX ADMIN — HEPARIN SODIUM 5000 UNIT(S): 5000 INJECTION INTRAVENOUS; SUBCUTANEOUS at 22:10

## 2017-07-18 RX ADMIN — Medication 650 MILLIGRAM(S): at 00:15

## 2017-07-18 RX ADMIN — Medication 100 MILLIGRAM(S): at 05:37

## 2017-07-18 RX ADMIN — HEPARIN SODIUM 5000 UNIT(S): 5000 INJECTION INTRAVENOUS; SUBCUTANEOUS at 13:49

## 2017-07-18 RX ADMIN — AMLODIPINE BESYLATE 5 MILLIGRAM(S): 2.5 TABLET ORAL at 17:57

## 2017-07-18 RX ADMIN — Medication 100 MILLIGRAM(S): at 22:10

## 2017-07-18 NOTE — PROGRESS NOTE ADULT - ASSESSMENT
66 yo male with hx of ESRD, HTN presented with symptomatic bradycardia likely 2/2 complete heart block which may have been induced by hyperkalemia

## 2017-07-18 NOTE — PROGRESS NOTE ADULT - SUBJECTIVE AND OBJECTIVE BOX
Date of Admission:7/13/2017    24H hour events: No acute events overnight.     MEDICATIONS:  heparin  Injectable 5000 Unit(s) SubCutaneous every 8 hours  hydrALAZINE 100 milliGRAM(s) Oral every 8 hours        acetaminophen   Tablet. 650 milliGRAM(s) Oral every 6 hours PRN    aluminum hydroxide/magnesium hydroxide/simethicone Suspension 30 milliLiter(s) Oral every 6 hours PRN          REVIEW OF SYSTEMS:  General: no fatigue/malaise, weight loss/gain.  Skin: no rashes.  Ophthalmologic: no blurred vision, no loss of vision. 	  ENT: no sore throat, rhinorrhea, sinus congestion.  Respiratory: no SOB, cough or wheeze.  Gastrointestinal:  no N/V/D, no melena/hematemesis/hematochezia.  Genitourinary: no dysuria/hesitancy or hematuria.  Musculoskeletal: no myalgias or arthralgias.  Neurological: no changes in vision or hearing, no lightheadedness/dizziness, no syncope/near syncope	  Psychiatric: no unusual stress/anxiety.   Hematology/Lymphatics: no unusual bleeding, bruising and no lymphadenopathy.  Endocrine: no unusual thirst.   All others negative except as stated above and in HPI.    PHYSICAL EXAM:  T(C): 36.8 (07-17-17 @ 08:00), Max: 36.8 (07-17-17 @ 08:00)  HR: 85 (07-17-17 @ 12:00) (80 - 97)  BP: 169/72 (07-17-17 @ 12:00) (127/62 - 169/72)  RR: 14 (07-17-17 @ 12:00) (11 - 29)  SpO2: 100% (07-17-17 @ 12:00) (96% - 100%)  Wt(kg): --  I&O's Summary    16 Jul 2017 07:01  -  17 Jul 2017 07:00  --------------------------------------------------------  IN: 320 mL / OUT: 0 mL / NET: 320 mL        Appearance: Normal	  HEENT:   Normal oral mucosa, PERRL, EOMI	  Lymphatic: No lymphadenopathy  Cardiovascular: RRR  Respiratory: Lungs clear to auscultation	  Psychiatry: A & O x 3, Mood & affect appropriate  Gastrointestinal:  Soft, Non-tender, + BS	  Skin: No rashes, No ecchymoses, No cyanosis	  Neurologic: Non-focal  Extremities: Normal range of motion, No clubbing, cyanosis or edema  Vascular: Peripheral pulses palpable 2+ bilaterally        LABS:	 	    CBC Full  -  ( 17 Jul 2017 05:27 )  WBC Count : 8.4 K/uL  Hemoglobin : 12.4 g/dL  Hematocrit : 39.8 %  Platelet Count - Automated : 206 K/uL  Mean Cell Volume : 107.0 fl  Mean Cell Hemoglobin : 33.3 pg  Mean Cell Hemoglobin Concentration : 31.1 gm/dL  Auto Neutrophil # : x  Auto Lymphocyte # : x  Auto Monocyte # : x  Auto Eosinophil # : x  Auto Basophil # : x  Auto Neutrophil % : x  Auto Lymphocyte % : x  Auto Monocyte % : x  Auto Eosinophil % : x  Auto Basophil % : x    07-17    137  |  95<L>  |  47<H>  ----------------------------<  112<H>  4.2   |  24  |  8.02<H>  07-16    138  |  95<L>  |  31<H>  ----------------------------<  106<H>  3.9   |  27  |  5.44<H>    Ca    8.2<L>      17 Jul 2017 05:27  Ca    8.2<L>      16 Jul 2017 05:42  Phos  4.9     07-17  Phos  4.3     07-16  Mg     2.4     07-17  Mg     2.2     07-16        proBNP: Serum Pro-Brain Natriuretic Peptide: >49209 pg/mL (07-13-17 @ 18:42)    TELEMETRY: 	    	  ASSESSMENT/PLAN: 	    1) Complete Heart Block - resolved, likely due to electrolyte disturbances related to ESRD, as it resolved after undergoing HD  - will discuss PPM placement given risk of recurrence with EP attending, please make NPO at 12MN in case PPM is placed tomorrow Date of Admission:7/13/2017    24H hour events: No acute events overnight.     MEDICATIONS:  heparin  Injectable 5000 Unit(s) SubCutaneous every 8 hours  hydrALAZINE 100 milliGRAM(s) Oral every 8 hours        acetaminophen   Tablet. 650 milliGRAM(s) Oral every 6 hours PRN    aluminum hydroxide/magnesium hydroxide/simethicone Suspension 30 milliLiter(s) Oral every 6 hours PRN          REVIEW OF SYSTEMS:  General: no fatigue/malaise, weight loss/gain.  Skin: no rashes.  Ophthalmologic: no blurred vision, no loss of vision. 	  ENT: no sore throat, rhinorrhea, sinus congestion.  Respiratory: no SOB, cough or wheeze.  Gastrointestinal:  no N/V/D, no melena/hematemesis/hematochezia.  Genitourinary: no dysuria/hesitancy or hematuria.  Musculoskeletal: no myalgias or arthralgias.  Neurological: no changes in vision or hearing, no lightheadedness/dizziness, no syncope/near syncope	  Psychiatric: no unusual stress/anxiety.   Hematology/Lymphatics: no unusual bleeding, bruising and no lymphadenopathy.  Endocrine: no unusual thirst.   All others negative except as stated above and in HPI.    PHYSICAL EXAM:  T(C): 36.8 (07-17-17 @ 08:00), Max: 36.8 (07-17-17 @ 08:00)  HR: 85 (07-17-17 @ 12:00) (80 - 97)  BP: 169/72 (07-17-17 @ 12:00) (127/62 - 169/72)  RR: 14 (07-17-17 @ 12:00) (11 - 29)  SpO2: 100% (07-17-17 @ 12:00) (96% - 100%)  Wt(kg): --  I&O's Summary    16 Jul 2017 07:01  -  17 Jul 2017 07:00  --------------------------------------------------------  IN: 320 mL / OUT: 0 mL / NET: 320 mL        Appearance: Normal	  HEENT:   Normal oral mucosa, PERRL, EOMI	  Lymphatic: No lymphadenopathy  Cardiovascular: RRR  Respiratory: Lungs clear to auscultation	  Psychiatry: A & O x 3, Mood & affect appropriate  Gastrointestinal:  Soft, Non-tender, + BS	  Skin: No rashes, No ecchymoses, No cyanosis	  Neurologic: Non-focal  Extremities: Normal range of motion, No clubbing, cyanosis or edema  Vascular: Peripheral pulses palpable 2+ bilaterally        LABS:	 	    CBC Full  -  ( 17 Jul 2017 05:27 )  WBC Count : 8.4 K/uL  Hemoglobin : 12.4 g/dL  Hematocrit : 39.8 %  Platelet Count - Automated : 206 K/uL  Mean Cell Volume : 107.0 fl  Mean Cell Hemoglobin : 33.3 pg  Mean Cell Hemoglobin Concentration : 31.1 gm/dL  Auto Neutrophil # : x  Auto Lymphocyte # : x  Auto Monocyte # : x  Auto Eosinophil # : x  Auto Basophil # : x  Auto Neutrophil % : x  Auto Lymphocyte % : x  Auto Monocyte % : x  Auto Eosinophil % : x  Auto Basophil % : x    07-17    137  |  95<L>  |  47<H>  ----------------------------<  112<H>  4.2   |  24  |  8.02<H>  07-16    138  |  95<L>  |  31<H>  ----------------------------<  106<H>  3.9   |  27  |  5.44<H>    Ca    8.2<L>      17 Jul 2017 05:27  Ca    8.2<L>      16 Jul 2017 05:42  Phos  4.9     07-17  Phos  4.3     07-16  Mg     2.4     07-17  Mg     2.2     07-16        proBNP: Serum Pro-Brain Natriuretic Peptide: >57945 pg/mL (07-13-17 @ 18:42)    	  ASSESSMENT/PLAN: 	    1) Complete Heart Block - resolved, likely due to electrolyte disturbances related to ESRD, as it resolved after undergoing HD  - given risk of reoccurrence, will plan for PPM implantation tomorrow  - NPO at MN  - hold all Heparin products

## 2017-07-18 NOTE — PROGRESS NOTE ADULT - PROBLEM SELECTOR PLAN 1
- Currently in NSR, continue with telemetry  - Avoid AV ángel blockers  - NPO tonight for possible PPM tomorrow pending EP recs (unable to go today)

## 2017-07-18 NOTE — PROGRESS NOTE ADULT - ASSESSMENT
65 Y/O M via EMS from [facility] presenting with bradycardia, near syncope, achiness, not feeling well and sternal chest pain found to have hyperkalemia    - ESRD on HD TTS  - Volume status- euvolemic  - HTN:  transient HTN noted; hydralazine increase    PLAN:   - Plan for HD for Today but after EPS procedure  - A/w Hydralazine 100mg PO q 8 hours;  Lopressor p the PPM placement  - Renal dosing of meds to creatinine clearance of < 10 ml/min

## 2017-07-18 NOTE — PROGRESS NOTE ADULT - SUBJECTIVE AND OBJECTIVE BOX
HPI:  66 yo male HTN, ESRD on HD, presented to office lightheaded found to be in  complete heart block. Patient hyperkalemic underwent urgent HD and CHB resolved.  Pt NPO for possible PPM today.  PAST MEDICAL & SURGICAL HISTORY:  Renal insufficiency  HTN (hypertension)  No significant past surgical history      Allergies    No Known Allergies          MEDICATIONS  (STANDING):  heparin  Injectable 5000 Unit(s) SubCutaneous every 8 hours  hydrALAZINE 100 milliGRAM(s) Oral every 8 hours    MEDICATIONS  (PRN):  acetaminophen   Tablet. 650 milliGRAM(s) Oral every 6 hours PRN Moderate Pain (4 - 6)  aluminum hydroxide/magnesium hydroxide/simethicone Suspension 30 milliLiter(s) Oral every 6 hours PRN Dyspepsia            Vital Signs Last 24 Hrs  T(C): 36.9 (18 Jul 2017 05:31), Max: 36.9 (17 Jul 2017 12:00)  T(F): 98.4 (18 Jul 2017 05:31), Max: 98.5 (17 Jul 2017 12:00)  HR: 70 (18 Jul 2017 05:36) (66 - 98)  BP: 176/78 (18 Jul 2017 05:36) (141/57 - 180/77)  BP(mean): 95 (17 Jul 2017 17:00) (81 - 104)  RR: 18 (18 Jul 2017 05:31) (14 - 26)  SpO2: 96% (18 Jul 2017 05:31) (96% - 100%)  Daily     Daily   I&O's Detail    17 Jul 2017 07:01  -  18 Jul 2017 07:00  --------------------------------------------------------  IN:    Oral Fluid: 540 mL  Total IN: 540 mL    OUT:  Total OUT: 0 mL    Total NET: 540 mL          Physical Exam  NAD    Neck without JVD  Lungs clear  Cor s1s2 2/6 jasmin rusb  Abd soft  Ext without edema  Pulses +2 DP  Neuro without focality    LABS  PT/INR - ( 17 Jul 2017 05:27 )   PT: 12.0 sec;   INR: 1.10 ratio         PTT - ( 17 Jul 2017 05:27 )  PTT:28.4 sec    CARDIAC MARKERS ( 16 Jul 2017 09:43 )  x     / 0.03 ng/mL / 92 U/L / x     / 1.7 ng/mL      CBC Full  -  ( 18 Jul 2017 06:21 )  WBC Count : 8.6 K/uL  Hemoglobin : 11.8 g/dL  Hematocrit : 34.0 %  Platelet Count - Automated : 161 K/uL  Mean Cell Volume : 106.0 fl  Mean Cell Hemoglobin : 37.0 pg  Mean Cell Hemoglobin Concentration : 34.8 gm/dL  Auto Neutrophil # : x  Auto Lymphocyte # : x  Auto Monocyte # : x  Auto Eosinophil # : x  Auto Basophil # : x  Auto Neutrophil % : x  Auto Lymphocyte % : x  Auto Monocyte % : x  Auto Eosinophil % : x  Auto Basophil % : x    07-18    139  |  95<L>  |  64<H>  ----------------------------<  101<H>  4.6   |  22  |  10.04<H>    Ca    8.3<L>      18 Jul 2017 06:21  Phos  4.9     07-17  Mg     2.4     07-17      CM RSR    Assessment and Plan:  66 yo male HTN, ESRD on HD, presented to office lightheaded found to be in  complete heart block. Patient hyperkalemic underwent urgent HD and CHB resolved.  Agree with current rx.  Pt is NPO for possible PPM today.  As patient with trifascicular block and may have hyperkalemia in future would opt for PPM at this time

## 2017-07-18 NOTE — PROGRESS NOTE ADULT - SUBJECTIVE AND OBJECTIVE BOX
Patient is a 67y old  Male who presents with a chief complaint of Chest pain, dizziness (16 Jul 2017 19:58)      SUBJECTIVE / OVERNIGHT EVENTS:    no overnight events. denies cp, sob. frustrated he is not going for PPM today.     MEDICATIONS  (STANDING):  heparin  Injectable 5000 Unit(s) SubCutaneous every 8 hours  hydrALAZINE 100 milliGRAM(s) Oral every 8 hours    MEDICATIONS  (PRN):  acetaminophen   Tablet. 650 milliGRAM(s) Oral every 6 hours PRN Moderate Pain (4 - 6)  aluminum hydroxide/magnesium hydroxide/simethicone Suspension 30 milliLiter(s) Oral every 6 hours PRN Dyspepsia      Vital Signs Last 24 Hrs  T(C): 36.8 (07-18-17 @ 13:58), Max: 36.9 (07-18-17 @ 05:31)  HR: 84 (07-18-17 @ 13:58) (66 - 98)  BP: 172/70 (07-18-17 @ 13:58) (141/57 - 180/77)  RR: 18 (07-18-17 @ 13:58) (18 - 23)  SpO2: 99% (07-18-17 @ 13:58) (96% - 99%)  CAPILLARY BLOOD GLUCOSE        I&O's Summary    17 Jul 2017 07:01  -  18 Jul 2017 07:00  --------------------------------------------------------  IN: 540 mL / OUT: 0 mL / NET: 540 mL    18 Jul 2017 07:01  -  18 Jul 2017 15:55  --------------------------------------------------------  IN: 240 mL / OUT: 200 mL / NET: 40 mL        PHYSICAL EXAM:  GENERAL: NAD, well-developed  HEAD:  Atraumatic, Normocephalic  EYES: EOMI, PERRL, conjunctiva and sclera clear  NECK: Supple, No JVD  CHEST/LUNG: Clear to auscultation bilaterally; No wheeze  HEART: Regular rate and rhythm; No murmurs, rubs, or gallops  ABDOMEN: Soft, Nontender, Nondistended; Bowel sounds present  EXTREMITIES:  2+ Peripheral Pulses, No clubbing, cyanosis, or edema.  LUE fistula in place with thrill  PSYCH: AAOx3  NEUROLOGY: non-focal  SKIN: No rashes or lesions    LABS:                        11.8   8.6   )-----------( 161      ( 18 Jul 2017 06:21 )             34.0     07-18    139  |  95<L>  |  64<H>  ----------------------------<  101<H>  4.6   |  22  |  10.04<H>    Ca    8.3<L>      18 Jul 2017 06:21  Phos  4.9     07-17  Mg     2.4     07-17      PT/INR - ( 17 Jul 2017 05:27 )   PT: 12.0 sec;   INR: 1.10 ratio         PTT - ( 17 Jul 2017 05:27 )  PTT:28.4 sec          RADIOLOGY & ADDITIONAL TESTS:    Imaging Personally Reviewed:    Consultant(s) Notes Reviewed:      Care Discussed with Consultants/Other Providers:

## 2017-07-18 NOTE — PROGRESS NOTE ADULT - SUBJECTIVE AND OBJECTIVE BOX
[facility] [phone]        Patient seen and examined in bed.  No EPS study was done yesterday        MEDICATIONS  (STANDING):  heparin  Injectable 5000 Unit(s) SubCutaneous every 8 hours  hydrALAZINE 100 milliGRAM(s) Oral every 8 hours      VITAL:  T(C): , Max: 36.9 (07-17-17 @ 12:00)  T(F): , Max: 98.5 (07-17-17 @ 12:00)  HR: 70 (07-18-17 @ 05:36)  BP: 176/78 (07-18-17 @ 05:36)  BP(mean): 95 (07-17-17 @ 17:00)  RR: 18 (07-18-17 @ 05:31)  SpO2: 96% (07-18-17 @ 05:31)  Wt(kg): --    I and O's:    07-17 @ 07:01  -  07-18 @ 07:00  --------------------------------------------------------  IN: 540 mL / OUT: 0 mL / NET: 540 mL          PHYSICAL EXAM:    Constitutional: NAD  HEENT: PERRLA    Neck:  No JVD  Respiratory: CTAB/L  Cardiovascular: S1 and S2  Gastrointestinal: BS+, soft, NT/ND  Extremities: No peripheral edema  Neurological: A/O x 3, no focal deficits  Psychiatric: Normal mood, normal affect  : No Block  Skin: No rashes  Access: Not applicable    LABS:                        11.8   8.6   )-----------( 161      ( 18 Jul 2017 06:21 )             34.0     07-18    139  |  95<L>  |  64<H>  ----------------------------<  101<H>  4.6   |  22  |  10.04<H>    Ca    8.3<L>      18 Jul 2017 06:21  Phos  4.9     07-17  Mg     2.4     07-17

## 2017-07-19 LAB
ANION GAP SERPL CALC-SCNC: 18 MMOL/L — HIGH (ref 5–17)
APTT BLD: 27.3 SEC — LOW (ref 27.5–37.4)
BUN SERPL-MCNC: 39 MG/DL — HIGH (ref 7–23)
CALCIUM SERPL-MCNC: 8.4 MG/DL — SIGNIFICANT CHANGE UP (ref 8.4–10.5)
CHLORIDE SERPL-SCNC: 94 MMOL/L — LOW (ref 96–108)
CO2 SERPL-SCNC: 26 MMOL/L — SIGNIFICANT CHANGE UP (ref 22–31)
CREAT SERPL-MCNC: 7.11 MG/DL — HIGH (ref 0.5–1.3)
GLUCOSE SERPL-MCNC: 95 MG/DL — SIGNIFICANT CHANGE UP (ref 70–99)
HCT VFR BLD CALC: 38.1 % — LOW (ref 39–50)
HGB BLD-MCNC: 12.5 G/DL — LOW (ref 13–17)
INR BLD: 1.13 RATIO — SIGNIFICANT CHANGE UP (ref 0.88–1.16)
MAGNESIUM SERPL-MCNC: 2.4 MG/DL — SIGNIFICANT CHANGE UP (ref 1.6–2.6)
MCHC RBC-ENTMCNC: 32.9 GM/DL — SIGNIFICANT CHANGE UP (ref 32–36)
MCHC RBC-ENTMCNC: 35.1 PG — HIGH (ref 27–34)
MCV RBC AUTO: 107 FL — HIGH (ref 80–100)
PHOSPHATE SERPL-MCNC: 5.3 MG/DL — HIGH (ref 2.5–4.5)
PLATELET # BLD AUTO: 206 K/UL — SIGNIFICANT CHANGE UP (ref 150–400)
POTASSIUM SERPL-MCNC: 4.3 MMOL/L — SIGNIFICANT CHANGE UP (ref 3.5–5.3)
POTASSIUM SERPL-SCNC: 4.3 MMOL/L — SIGNIFICANT CHANGE UP (ref 3.5–5.3)
PROTHROM AB SERPL-ACNC: 12.4 SEC — SIGNIFICANT CHANGE UP (ref 9.8–12.7)
RBC # BLD: 3.57 M/UL — LOW (ref 4.2–5.8)
RBC # FLD: 14.3 % — SIGNIFICANT CHANGE UP (ref 10.3–14.5)
SODIUM SERPL-SCNC: 138 MMOL/L — SIGNIFICANT CHANGE UP (ref 135–145)
WBC # BLD: 7.4 K/UL — SIGNIFICANT CHANGE UP (ref 3.8–10.5)
WBC # FLD AUTO: 7.4 K/UL — SIGNIFICANT CHANGE UP (ref 3.8–10.5)

## 2017-07-19 PROCEDURE — 33208 INSRT HEART PM ATRIAL & VENT: CPT | Mod: 58,KX

## 2017-07-19 PROCEDURE — 71010: CPT | Mod: 26

## 2017-07-19 RX ORDER — LABETALOL HCL 100 MG
200 TABLET ORAL
Qty: 0 | Refills: 0 | Status: DISCONTINUED | OUTPATIENT
Start: 2017-07-19 | End: 2017-07-20

## 2017-07-19 RX ORDER — CEFAZOLIN SODIUM 1 G
1000 VIAL (EA) INJECTION EVERY 8 HOURS
Qty: 0 | Refills: 0 | Status: DISCONTINUED | OUTPATIENT
Start: 2017-07-19 | End: 2017-07-19

## 2017-07-19 RX ORDER — CEFAZOLIN SODIUM 1 G
1000 VIAL (EA) INJECTION EVERY 24 HOURS
Qty: 0 | Refills: 0 | Status: COMPLETED | OUTPATIENT
Start: 2017-07-20 | End: 2017-07-20

## 2017-07-19 RX ADMIN — Medication 650 MILLIGRAM(S): at 02:57

## 2017-07-19 RX ADMIN — AMLODIPINE BESYLATE 5 MILLIGRAM(S): 2.5 TABLET ORAL at 06:18

## 2017-07-19 RX ADMIN — Medication 100 MILLIGRAM(S): at 06:18

## 2017-07-19 RX ADMIN — Medication 650 MILLIGRAM(S): at 03:30

## 2017-07-19 RX ADMIN — HEPARIN SODIUM 5000 UNIT(S): 5000 INJECTION INTRAVENOUS; SUBCUTANEOUS at 06:18

## 2017-07-19 RX ADMIN — Medication 100 MILLIGRAM(S): at 14:35

## 2017-07-19 RX ADMIN — Medication 100 MILLIGRAM(S): at 21:12

## 2017-07-19 RX ADMIN — Medication 200 MILLIGRAM(S): at 18:20

## 2017-07-19 NOTE — PROGRESS NOTE ADULT - ASSESSMENT
67 Y/O M via EMS from [facility] presenting with bradycardia, near syncope, achiness, not feeling well and sternal chest pain found to have hyperkalemia    - ESRD on HD TTS  - Volume status- euvolemic  - HTN:  transient HTN but improved today    PLAN:   - Plan for HD in AM  - A/w Hydralazine 100mg PO q 8 hours;    - Lopressor p the PPM placement  - Renal dosing of meds to creatinine clearance of < 10 ml/min

## 2017-07-19 NOTE — PROVIDER CONTACT NOTE (OTHER) - ACTION/TREATMENT ORDERED:
will continue to monitor patient
NP Camille aware. Administer 1400 meds as ordered. Redraw BP in one hour. Continue to monitor.

## 2017-07-19 NOTE — PROGRESS NOTE ADULT - SUBJECTIVE AND OBJECTIVE BOX
Patient is a 67y old  Male who presents with a chief complaint of Chest pain, dizziness (16 Jul 2017 19:58)      SUBJECTIVE / OVERNIGHT EVENTS:    no overnight events. no complaints. for PPM today.    MEDICATIONS  (STANDING):  hydrALAZINE 100 milliGRAM(s) Oral every 8 hours  amLODIPine   Tablet 5 milliGRAM(s) Oral daily  ceFAZolin   IVPB 1000 milliGRAM(s) IV Intermittent every 8 hours    MEDICATIONS  (PRN):  acetaminophen   Tablet. 650 milliGRAM(s) Oral every 6 hours PRN Moderate Pain (4 - 6)  aluminum hydroxide/magnesium hydroxide/simethicone Suspension 30 milliLiter(s) Oral every 6 hours PRN Dyspepsia      Vital Signs Last 24 Hrs  T(C): 37 (07-19-17 @ 05:35), Max: 37.1 (07-18-17 @ 17:10)  HR: 85 (07-19-17 @ 05:35) (70 - 85)  BP: 142/67 (07-19-17 @ 05:35) (142/67 - 188/74)  RR: 17 (07-19-17 @ 05:35) (17 - 20)  SpO2: 98% (07-19-17 @ 05:35) (98% - 99%)  CAPILLARY BLOOD GLUCOSE        I&O's Summary    18 Jul 2017 07:01  -  19 Jul 2017 07:00  --------------------------------------------------------  IN: 240 mL / OUT: 2200 mL / NET: -1960 mL        PHYSICAL EXAM:  GENERAL: NAD, well-developed  HEAD:  Atraumatic, Normocephalic  EYES: EOMI, PERRL, conjunctiva and sclera clear  NECK: Supple, No JVD  CHEST/LUNG: Clear to auscultation bilaterally; No wheeze  HEART: Regular rate and rhythm; No murmurs, rubs, or gallops  ABDOMEN: Soft, Nontender, Nondistended; Bowel sounds present  EXTREMITIES:  2+ Peripheral Pulses, No clubbing, cyanosis, or edema.  LUE fistula in place with thrill  PSYCH: AAOx3  NEUROLOGY: non-focal  SKIN: No rashes or lesions    LABS:                        12.5   7.4   )-----------( 206      ( 19 Jul 2017 07:02 )             38.1     07-19    138  |  94<L>  |  39<H>  ----------------------------<  95  4.3   |  26  |  7.11<H>    Ca    8.4      19 Jul 2017 06:15  Phos  5.3     07-19  Mg     2.4     07-19      PT/INR - ( 19 Jul 2017 06:15 )   PT: 12.4 sec;   INR: 1.13 ratio         PTT - ( 19 Jul 2017 06:15 )  PTT:27.3 sec          RADIOLOGY & ADDITIONAL TESTS:    Imaging Personally Reviewed:    Consultant(s) Notes Reviewed:  cards, renal     Care Discussed with Consultants/Other Providers:

## 2017-07-19 NOTE — PROVIDER CONTACT NOTE (OTHER) - ASSESSMENT
asymptomatic bp 156/66 HR 90..
Pt A&Ox3, HR 79, /76, 97.8 temp. Pt denies pain, sob, discomfort. PPM site is CDI w/ dermabond intact, no dressing.

## 2017-07-19 NOTE — PROGRESS NOTE ADULT - SUBJECTIVE AND OBJECTIVE BOX
[facility] [phone]        Patient seen and examined in bed.          MEDICATIONS  (STANDING):  heparin  Injectable 5000 Unit(s) SubCutaneous every 8 hours  hydrALAZINE 100 milliGRAM(s) Oral every 8 hours  amLODIPine   Tablet 5 milliGRAM(s) Oral daily      VITAL:  T(C): , Max: 37.1 (07-18-17 @ 17:10)  T(F): , Max: 98.7 (07-18-17 @ 17:10)  HR: 85 (07-19-17 @ 05:35)  BP: 142/67 (07-19-17 @ 05:35)  BP(mean): 96 (07-18-17 @ 20:30)  RR: 17 (07-19-17 @ 05:35)  SpO2: 98% (07-19-17 @ 05:35)  Wt(kg): --    I and O's:    07-18 @ 07:01  -  07-19 @ 07:00  --------------------------------------------------------  IN: 240 mL / OUT: 2200 mL / NET: -1960 mL          PHYSICAL EXAM:    Constitutional: NAD  HEENT: PERRLA    Neck:  No JVD  Respiratory: CTAB/L  Cardiovascular: S1 and S2  Gastrointestinal: BS+, soft, NT/ND  Extremities: No peripheral edema  Neurological: A/O x 3, no focal deficits  Psychiatric: Normal mood, normal affect  : No Block  Skin: No rashes  Access: AVF    LABS:                        12.5   7.4   )-----------( 206      ( 19 Jul 2017 07:02 )             38.1     07-19    138  |  94<L>  |  39<H>  ----------------------------<  95  4.3   |  26  |  7.11<H>    Ca    8.4      19 Jul 2017 06:15  Phos  5.3     07-19  Mg     2.4     07-19            Urine Studies:          RADIOLOGY & ADDITIONAL STUDIES:

## 2017-07-19 NOTE — PROGRESS NOTE ADULT - SUBJECTIVE AND OBJECTIVE BOX
Subjective: Pt feels well today. For PPM today    MEDICATIONS  (STANDING):  hydrALAZINE 100 milliGRAM(s) Oral every 8 hours  amLODIPine   Tablet 5 milliGRAM(s) Oral daily    MEDICATIONS  (PRN):  acetaminophen   Tablet. 650 milliGRAM(s) Oral every 6 hours PRN Moderate Pain (4 - 6)  aluminum hydroxide/magnesium hydroxide/simethicone Suspension 30 milliLiter(s) Oral every 6 hours PRN Dyspepsia      Vital Signs Last 24 Hrs  T(C): 37 (19 Jul 2017 05:35), Max: 37.1 (18 Jul 2017 17:10)  T(F): 98.6 (19 Jul 2017 05:35), Max: 98.7 (18 Jul 2017 17:10)  HR: 85 (19 Jul 2017 05:35) (70 - 85)  BP: 142/67 (19 Jul 2017 05:35) (142/67 - 188/74)  BP(mean): 96 (18 Jul 2017 20:30) (96 - 105)  RR: 17 (19 Jul 2017 05:35) (17 - 20)  SpO2: 98% (19 Jul 2017 05:35) (98% - 99%)    PHYSICAL EXAM:    Constitutional: WD, WN    Neck: No JVD noted    Respiratory: clear lungs    Cardiovascular: RRR, 1/6 MYRON    Gastrointestinal: BS present, NT, ND    Extremities: no edema noted    Neurological: nonfocal    TELEMETRY: RSR, no further HB seen    ECG: none      LABS:                        12.5   7.4   )-----------( 206      ( 19 Jul 2017 07:02 )             38.1     07-19    138  |  94<L>  |  39<H>  ----------------------------<  95  4.3   |  26  |  7.11<H>    Ca    8.4      19 Jul 2017 06:15  Phos  5.3     07-19  Mg     2.4     07-19          PT/INR - ( 19 Jul 2017 06:15 )   PT: 12.4 sec;   INR: 1.13 ratio         PTT - ( 19 Jul 2017 06:15 )  PTT:27.3 sec    I&O's Summary    18 Jul 2017 07:01  -  19 Jul 2017 07:00  --------------------------------------------------------  IN: 240 mL / OUT: 2200 mL / NET: -1960 mL      BNP  RADIOLOGY & ADDITIONAL STUDIES:    IMPRESSION:  CHB in pt. with trifascicular block and presenting with hyperkalemia  HTN  LVH  ESRD on dialysis    RECOMMENDATIONS:  PPM today  Dialysis per renal  Continue current meds- can restart Labetalol after PPM placed

## 2017-07-19 NOTE — PROVIDER CONTACT NOTE (OTHER) - RECOMMENDATIONS
no new orders made at this time
Notify NP/MD. Administer 1400 Hydralazine 100mg PO. Continue to monitor.

## 2017-07-20 VITALS — TEMPERATURE: 98 F

## 2017-07-20 DIAGNOSIS — D72.829 ELEVATED WHITE BLOOD CELL COUNT, UNSPECIFIED: ICD-10-CM

## 2017-07-20 LAB
ANION GAP SERPL CALC-SCNC: 20 MMOL/L — HIGH (ref 5–17)
APTT BLD: 29 SEC — SIGNIFICANT CHANGE UP (ref 27.5–37.4)
BASOPHILS # BLD AUTO: 0 K/UL — SIGNIFICANT CHANGE UP (ref 0–0.2)
BASOPHILS # BLD AUTO: 0.3 K/UL — HIGH (ref 0–0.2)
BASOPHILS NFR BLD AUTO: 0 % — SIGNIFICANT CHANGE UP (ref 0–2)
BASOPHILS NFR BLD AUTO: 1.6 % — SIGNIFICANT CHANGE UP (ref 0–2)
BUN SERPL-MCNC: 67 MG/DL — HIGH (ref 7–23)
CALCIUM SERPL-MCNC: 8.2 MG/DL — LOW (ref 8.4–10.5)
CHLORIDE SERPL-SCNC: 91 MMOL/L — LOW (ref 96–108)
CO2 SERPL-SCNC: 24 MMOL/L — SIGNIFICANT CHANGE UP (ref 22–31)
CREAT SERPL-MCNC: 9.41 MG/DL — HIGH (ref 0.5–1.3)
EOSINOPHIL # BLD AUTO: 0 K/UL — SIGNIFICANT CHANGE UP (ref 0–0.5)
EOSINOPHIL # BLD AUTO: 0 K/UL — SIGNIFICANT CHANGE UP (ref 0–0.5)
EOSINOPHIL NFR BLD AUTO: 0 % — SIGNIFICANT CHANGE UP (ref 0–6)
EOSINOPHIL NFR BLD AUTO: 0.1 % — SIGNIFICANT CHANGE UP (ref 0–6)
GLUCOSE SERPL-MCNC: 107 MG/DL — HIGH (ref 70–99)
HCT VFR BLD CALC: 34 % — LOW (ref 39–50)
HCT VFR BLD CALC: 35.2 % — LOW (ref 39–50)
HGB BLD-MCNC: 11.4 G/DL — LOW (ref 13–17)
HGB BLD-MCNC: 11.8 G/DL — LOW (ref 13–17)
INR BLD: 1.11 RATIO — SIGNIFICANT CHANGE UP (ref 0.88–1.16)
LYMPHOCYTES # BLD AUTO: 0.4 K/UL — LOW (ref 1–3.3)
LYMPHOCYTES # BLD AUTO: 0.8 K/UL — LOW (ref 1–3.3)
LYMPHOCYTES # BLD AUTO: 2.1 % — LOW (ref 13–44)
LYMPHOCYTES # BLD AUTO: 3.9 % — LOW (ref 13–44)
MAGNESIUM SERPL-MCNC: 2.6 MG/DL — SIGNIFICANT CHANGE UP (ref 1.6–2.6)
MCHC RBC-ENTMCNC: 33.5 GM/DL — SIGNIFICANT CHANGE UP (ref 32–36)
MCHC RBC-ENTMCNC: 33.6 GM/DL — SIGNIFICANT CHANGE UP (ref 32–36)
MCHC RBC-ENTMCNC: 35.7 PG — HIGH (ref 27–34)
MCHC RBC-ENTMCNC: 35.7 PG — HIGH (ref 27–34)
MCV RBC AUTO: 106 FL — HIGH (ref 80–100)
MCV RBC AUTO: 107 FL — HIGH (ref 80–100)
MONOCYTES # BLD AUTO: 1 K/UL — HIGH (ref 0–0.9)
MONOCYTES # BLD AUTO: 1.2 K/UL — HIGH (ref 0–0.9)
MONOCYTES NFR BLD AUTO: 4.9 % — SIGNIFICANT CHANGE UP (ref 2–14)
MONOCYTES NFR BLD AUTO: 6.7 % — SIGNIFICANT CHANGE UP (ref 2–14)
NEUTROPHILS # BLD AUTO: 15.9 K/UL — HIGH (ref 1.8–7.4)
NEUTROPHILS # BLD AUTO: 19 K/UL — HIGH (ref 1.8–7.4)
NEUTROPHILS NFR BLD AUTO: 89.6 % — HIGH (ref 43–77)
NEUTROPHILS NFR BLD AUTO: 91 % — HIGH (ref 43–77)
PHOSPHATE SERPL-MCNC: 5.8 MG/DL — HIGH (ref 2.5–4.5)
PLATELET # BLD AUTO: 154 K/UL — SIGNIFICANT CHANGE UP (ref 150–400)
PLATELET # BLD AUTO: 182 K/UL — SIGNIFICANT CHANGE UP (ref 150–400)
POTASSIUM SERPL-MCNC: 5 MMOL/L — SIGNIFICANT CHANGE UP (ref 3.5–5.3)
POTASSIUM SERPL-SCNC: 5 MMOL/L — SIGNIFICANT CHANGE UP (ref 3.5–5.3)
PROTHROM AB SERPL-ACNC: 12 SEC — SIGNIFICANT CHANGE UP (ref 9.8–12.7)
RBC # BLD: 3.2 M/UL — LOW (ref 4.2–5.8)
RBC # BLD: 3.3 M/UL — LOW (ref 4.2–5.8)
RBC # FLD: 14.1 % — SIGNIFICANT CHANGE UP (ref 10.3–14.5)
RBC # FLD: 14.1 % — SIGNIFICANT CHANGE UP (ref 10.3–14.5)
SODIUM SERPL-SCNC: 135 MMOL/L — SIGNIFICANT CHANGE UP (ref 135–145)
WBC # BLD: 17.7 K/UL — HIGH (ref 3.8–10.5)
WBC # BLD: 20.9 K/UL — HIGH (ref 3.8–10.5)
WBC # FLD AUTO: 17.7 K/UL — HIGH (ref 3.8–10.5)
WBC # FLD AUTO: 20.9 K/UL — HIGH (ref 3.8–10.5)

## 2017-07-20 PROCEDURE — 82330 ASSAY OF CALCIUM: CPT

## 2017-07-20 PROCEDURE — 93005 ELECTROCARDIOGRAM TRACING: CPT | Mod: XU

## 2017-07-20 PROCEDURE — 99291 CRITICAL CARE FIRST HOUR: CPT | Mod: 25

## 2017-07-20 PROCEDURE — 33210 INSERT ELECTRD/PM CATH SNGL: CPT

## 2017-07-20 PROCEDURE — 86900 BLOOD TYPING SEROLOGIC ABO: CPT

## 2017-07-20 PROCEDURE — 93306 TTE W/DOPPLER COMPLETE: CPT

## 2017-07-20 PROCEDURE — 80074 ACUTE HEPATITIS PANEL: CPT

## 2017-07-20 PROCEDURE — 85730 THROMBOPLASTIN TIME PARTIAL: CPT

## 2017-07-20 PROCEDURE — 84132 ASSAY OF SERUM POTASSIUM: CPT

## 2017-07-20 PROCEDURE — 85610 PROTHROMBIN TIME: CPT

## 2017-07-20 PROCEDURE — 86706 HEP B SURFACE ANTIBODY: CPT

## 2017-07-20 PROCEDURE — 80061 LIPID PANEL: CPT

## 2017-07-20 PROCEDURE — 84100 ASSAY OF PHOSPHORUS: CPT

## 2017-07-20 PROCEDURE — 83880 ASSAY OF NATRIURETIC PEPTIDE: CPT

## 2017-07-20 PROCEDURE — 83036 HEMOGLOBIN GLYCOSYLATED A1C: CPT

## 2017-07-20 PROCEDURE — 82553 CREATINE MB FRACTION: CPT

## 2017-07-20 PROCEDURE — 82435 ASSAY OF BLOOD CHLORIDE: CPT

## 2017-07-20 PROCEDURE — 86901 BLOOD TYPING SEROLOGIC RH(D): CPT

## 2017-07-20 PROCEDURE — 76937 US GUIDE VASCULAR ACCESS: CPT

## 2017-07-20 PROCEDURE — 85027 COMPLETE CBC AUTOMATED: CPT

## 2017-07-20 PROCEDURE — C1894: CPT

## 2017-07-20 PROCEDURE — 84295 ASSAY OF SERUM SODIUM: CPT

## 2017-07-20 PROCEDURE — C1785: CPT

## 2017-07-20 PROCEDURE — 80048 BASIC METABOLIC PNL TOTAL CA: CPT

## 2017-07-20 PROCEDURE — 82565 ASSAY OF CREATININE: CPT

## 2017-07-20 PROCEDURE — 80053 COMPREHEN METABOLIC PANEL: CPT

## 2017-07-20 PROCEDURE — 83605 ASSAY OF LACTIC ACID: CPT

## 2017-07-20 PROCEDURE — 82550 ASSAY OF CK (CPK): CPT

## 2017-07-20 PROCEDURE — 71045 X-RAY EXAM CHEST 1 VIEW: CPT

## 2017-07-20 PROCEDURE — 82947 ASSAY GLUCOSE BLOOD QUANT: CPT

## 2017-07-20 PROCEDURE — 84443 ASSAY THYROID STIM HORMONE: CPT

## 2017-07-20 PROCEDURE — 99261: CPT

## 2017-07-20 PROCEDURE — 84484 ASSAY OF TROPONIN QUANT: CPT

## 2017-07-20 PROCEDURE — 85014 HEMATOCRIT: CPT

## 2017-07-20 PROCEDURE — C1898: CPT

## 2017-07-20 PROCEDURE — 86850 RBC ANTIBODY SCREEN: CPT

## 2017-07-20 PROCEDURE — 82803 BLOOD GASES ANY COMBINATION: CPT

## 2017-07-20 PROCEDURE — 80162 ASSAY OF DIGOXIN TOTAL: CPT

## 2017-07-20 PROCEDURE — C1892: CPT

## 2017-07-20 PROCEDURE — 83735 ASSAY OF MAGNESIUM: CPT

## 2017-07-20 PROCEDURE — 33208 INSRT HEART PM ATRIAL & VENT: CPT | Mod: 58,KX

## 2017-07-20 RX ORDER — HYDRALAZINE HCL 50 MG
1 TABLET ORAL
Qty: 0 | Refills: 0 | COMMUNITY
Start: 2017-07-20

## 2017-07-20 RX ORDER — HYDRALAZINE HCL 50 MG
1 TABLET ORAL
Qty: 42 | Refills: 0
Start: 2017-07-20 | End: 2017-08-03

## 2017-07-20 RX ORDER — LABETALOL HCL 100 MG
1 TABLET ORAL
Qty: 0 | Refills: 0 | DISCHARGE
Start: 2017-07-20

## 2017-07-20 RX ADMIN — Medication 100 MILLIGRAM(S): at 05:43

## 2017-07-20 RX ADMIN — Medication 200 MILLIGRAM(S): at 05:43

## 2017-07-20 RX ADMIN — Medication 100 MILLIGRAM(S): at 14:48

## 2017-07-20 RX ADMIN — AMLODIPINE BESYLATE 5 MILLIGRAM(S): 2.5 TABLET ORAL at 05:43

## 2017-07-20 NOTE — PROGRESS NOTE ADULT - PROVIDER SPECIALTY LIST ADULT
CCU
Cardiology
Electrophysiology
Hospitalist
Nephrology
Cardiology

## 2017-07-20 NOTE — PROGRESS NOTE ADULT - SUBJECTIVE AND OBJECTIVE BOX
Patient is a 67y old  Male who presents with a chief complaint of Chest pain, dizziness (16 Jul 2017 19:58)      SUBJECTIVE / OVERNIGHT EVENTS:    s/p PPM yesterday. Feeling well with no complaints.   denies fever,chills, cough, cp, sob, diarrhea, dysuria, abdominal pain.    MEDICATIONS  (STANDING):  hydrALAZINE 100 milliGRAM(s) Oral every 8 hours  amLODIPine   Tablet 5 milliGRAM(s) Oral daily  ceFAZolin   IVPB 1000 milliGRAM(s) IV Intermittent every 24 hours  labetalol 200 milliGRAM(s) Oral two times a day    MEDICATIONS  (PRN):  acetaminophen   Tablet. 650 milliGRAM(s) Oral every 6 hours PRN Moderate Pain (4 - 6)  aluminum hydroxide/magnesium hydroxide/simethicone Suspension 30 milliLiter(s) Oral every 6 hours PRN Dyspepsia      Vital Signs Last 24 Hrs  T(C): 36.9 (07-20-17 @ 05:34), Max: 36.9 (07-20-17 @ 05:34)  HR: 66 (07-20-17 @ 09:05) (66 - 110)  BP: 117/63 (07-20-17 @ 09:05) (117/63 - 173/76)  RR: 18 (07-20-17 @ 05:34) (17 - 18)  SpO2: 96% (07-20-17 @ 05:34) (96% - 100%)  CAPILLARY BLOOD GLUCOSE        I&O's Summary      PHYSICAL EXAM:  GENERAL: NAD, well-developed  HEAD:  Atraumatic, Normocephalic  EYES: EOMI, PERRLA, conjunctiva and sclera clear  NECK: Supple, No JVD  CHEST/LUNG: Clear to auscultation bilaterally; No wheeze, PPM L. chest  HEART: Regular rate and rhythm; No murmurs, rubs, or gallops  ABDOMEN: Soft, Nontender, Nondistended; Bowel sounds present  EXTREMITIES:  2+ Peripheral Pulses, No clubbing, cyanosis, or edema  PSYCH: AAOx3  NEUROLOGY: non-focal  SKIN: No rashes or lesions    LABS:                        11.8   20.9  )-----------( 182      ( 20 Jul 2017 06:16 )             35.2     07-20    135  |  91<L>  |  67<H>  ----------------------------<  107<H>  5.0   |  24  |  9.41<H>    Ca    8.2<L>      20 Jul 2017 06:16  Phos  5.8     07-20  Mg     2.6     07-20      PT/INR - ( 20 Jul 2017 06:16 )   PT: 12.0 sec;   INR: 1.11 ratio         PTT - ( 20 Jul 2017 06:16 )  PTT:29.0 sec          RADIOLOGY & ADDITIONAL TESTS:    Imaging Personally Reviewed:    Consultant(s) Notes Reviewed:  TRAY cards    Care Discussed with Consultants/Other Providers: TRAY campos

## 2017-07-20 NOTE — PROGRESS NOTE ADULT - PROBLEM SELECTOR PLAN 1
-s/p dual chamber PPM ([facility]) on 7/19/17.   -f/u PPM wound check in EP clinic as scheduled on 7/31/17 at 3:15pm.  -Post PPM teaching enforced w/ pt, booklet given to pt.  -WBC 20.9, pt afebrile, denies fever/chills, will repeat CBC STAT.  -Clear from EP perspective for discharge planning ONLY IF REPEAT CBC WNL.  -Plan d/w medicine [place].      Charity Valdez, ANP  [phone] -s/p dual chamber PPM ([facility]) on 7/19/17.   -f/u PPM wound check in EP clinic as scheduled on 7/31/17 at 3:15pm.  -Post PPM teaching enforced w/ pt, booklet given to pt.  -WBC 20.9, pt afebrile, denies fever/chills, will repeat CBC STAT.  -Clear from EP perspective for discharge planning ONLY IF repeat CBC trends down.  -Plan d/w medicine [place].      Charity Valdez, ANP  [phone]

## 2017-07-20 NOTE — PROGRESS NOTE ADULT - PROBLEM SELECTOR PLAN 2
- Plan with HD as per renal
- Plan with HD as per renal  - Pt appears euvolemic, BMP without electrolyte at this time, no urgent need for HD at this time
- Plan with HD as per renal  - Pt appears euvolemic, BMP without electrolyte at this time, no urgent need for HD at this time- HD as scheduled by renal
For HD today  monitor I & O  check BMP
For HD today  monitor I & O  check BMP
s/p PPM   cleared from EP perspective pending repeat CBC to eval leukocytosis
For HD today  monitor I & O  check BMP

## 2017-07-20 NOTE — PROGRESS NOTE ADULT - SUBJECTIVE AND OBJECTIVE BOX
HPI:  66 yo male HTN, ESRD on HD, presented to office lightheaded found to be in  complete heart block. Patient hyperkalemic underwent urgent HD and CHB resolved.  Pt is s/p PPM insertion yesterday.  PAST MEDICAL & SURGICAL HISTORY:  Renal insufficiency  HTN (hypertension)  No significant past surgical history      Allergies    No Known Allergies          MEDICATIONS  (STANDING):  hydrALAZINE 100 milliGRAM(s) Oral every 8 hours  amLODIPine   Tablet 5 milliGRAM(s) Oral daily  ceFAZolin   IVPB 1000 milliGRAM(s) IV Intermittent every 24 hours  labetalol 200 milliGRAM(s) Oral two times a day    MEDICATIONS  (PRN):  acetaminophen   Tablet. 650 milliGRAM(s) Oral every 6 hours PRN Moderate Pain (4 - 6)  aluminum hydroxide/magnesium hydroxide/simethicone Suspension 30 milliLiter(s) Oral every 6 hours PRN Dyspepsia            Vital Signs Last 24 Hrs  T(C): 36.9 (20 Jul 2017 05:34), Max: 36.9 (20 Jul 2017 05:34)  T(F): 98.4 (20 Jul 2017 05:34), Max: 98.4 (20 Jul 2017 05:34)  HR: 77 (20 Jul 2017 05:34) (68 - 110)  BP: 136/66 (20 Jul 2017 05:34) (126/71 - 173/76)  BP(mean): --  RR: 18 (20 Jul 2017 05:34) (17 - 18)  SpO2: 96% (20 Jul 2017 05:34) (96% - 100%)  Daily     Daily   I&O's Detail      Physical Exam  Pat without complaint  NAD    Neck without JVD  Lungs clear  Chest wound clean  Cor s1s2 2/6 jasmin rusb  Abd soft  Ext without edema  Pulses +2 DP  Neuro without focal deficit    LABS  PT/INR - ( 20 Jul 2017 06:16 )   PT: 12.0 sec;   INR: 1.11 ratio         PTT - ( 20 Jul 2017 06:16 )  PTT:29.0 sec        CBC Full  -  ( 20 Jul 2017 06:16 )  WBC Count : 20.9 K/uL  Hemoglobin : 11.8 g/dL  Hematocrit : 35.2 %  Platelet Count - Automated : 182 K/uL  Mean Cell Volume : 107.0 fl  Mean Cell Hemoglobin : 35.7 pg  Mean Cell Hemoglobin Concentration : 33.5 gm/dL  Auto Neutrophil # : x  Auto Lymphocyte # : x  Auto Monocyte # : x  Auto Eosinophil # : x  Auto Basophil # : x  Auto Neutrophil % : x  Auto Lymphocyte % : x  Auto Monocyte % : x  Auto Eosinophil % : x  Auto Basophil % : x    07-20    135  |  91<L>  |  67<H>  ----------------------------<  107<H>  5.0   |  24  |  9.41<H>    Ca    8.2<L>      20 Jul 2017 06:16  Phos  5.8     07-20  Mg     2.6     07-20      cm sr intermittent pacing    CXR:  < from: Xray Chest 1 View AP- PORTABLE-Urgent (07.19.17 @ 14:48) >  PROCEDURE DATE:  07/19/2017            INTERPRETATION:  A single chest x-ray was obtained on July 19, 2017.    Indication: Status post pacemaker placement.    Impression:    The heart is enlarged. The lungs are clear. The pacer was placed and the   electrodes appear to be in good position. No pneumothorax.          Assessment and Plan:  66 yo male HTN, ESRD on HD, presented to office lightheaded found to be in  complete heart block. Patient hyperkalemic underwent urgent HD and CHB resolved.  Pt is s/p PPM insertion yesterday.  Agree with current rx

## 2017-07-20 NOTE — PROGRESS NOTE ADULT - SUBJECTIVE AND OBJECTIVE BOX
[facility] [phone]        Patient seen and examined in bed.  He had his PPM.  Uneventful night        MEDICATIONS  (STANDING):  hydrALAZINE 100 milliGRAM(s) Oral every 8 hours  amLODIPine   Tablet 5 milliGRAM(s) Oral daily  ceFAZolin   IVPB 1000 milliGRAM(s) IV Intermittent every 24 hours  labetalol 200 milliGRAM(s) Oral two times a day      VITAL:  T(C): , Max: 36.9 (07-20-17 @ 05:34)  T(F): , Max: 98.4 (07-20-17 @ 05:34)  HR: 66 (07-20-17 @ 09:05)  BP: 117/63 (07-20-17 @ 09:05)  BP(mean): --  RR: 18 (07-20-17 @ 05:34)  SpO2: 96% (07-20-17 @ 05:34)  Wt(kg): --    I and O's:        PHYSICAL EXAM:    Constitutional: NAD  HEENT: PERRLA    Neck:  No JVD  Respiratory: CTAB/L  Cardiovascular: S1 and S2  Gastrointestinal: BS+, soft, NT/ND  Extremities: No peripheral edema  Neurological: A/O x 3, no focal deficits  Psychiatric: Normal mood, normal affect  : No Block  Skin: No rashes  Access: Not applicable    LABS:                        11.8   20.9  )-----------( 182      ( 20 Jul 2017 06:16 )             35.2     07-20    135  |  91<L>  |  67<H>  ----------------------------<  107<H>  5.0   |  24  |  9.41<H>    Ca    8.2<L>      20 Jul 2017 06:16  Phos  5.8     07-20  Mg     2.6     07-20 [facility] [phone]        Patient seen and examined in bed.  He had his PPM.  Uneventful night  Pt was seen in bed on HD      MEDICATIONS  (STANDING):  hydrALAZINE 100 milliGRAM(s) Oral every 8 hours  amLODIPine   Tablet 5 milliGRAM(s) Oral daily  ceFAZolin   IVPB 1000 milliGRAM(s) IV Intermittent every 24 hours  labetalol 200 milliGRAM(s) Oral two times a day      VITAL:  T(C): , Max: 36.9 (07-20-17 @ 05:34)  T(F): , Max: 98.4 (07-20-17 @ 05:34)  HR: 66 (07-20-17 @ 09:05)  BP: 117/63 (07-20-17 @ 09:05)  BP(mean): --  RR: 18 (07-20-17 @ 05:34)  SpO2: 96% (07-20-17 @ 05:34)  Wt(kg): --    I and O's:        PHYSICAL EXAM:    Constitutional: NAD  HEENT: PERRLA    Neck:  No JVD  Respiratory: CTAB/L  Cardiovascular: S1 and S2  Gastrointestinal: BS+, soft, NT/ND  Extremities: No peripheral edema  Neurological: A/O x 3, no focal deficits  Psychiatric: Normal mood, normal affect  : No Block  Skin: No rashes  Access: Not applicable    LABS:                        11.8   20.9  )-----------( 182      ( 20 Jul 2017 06:16 )             35.2     07-20    135  |  91<L>  |  67<H>  ----------------------------<  107<H>  5.0   |  24  |  9.41<H>    Ca    8.2<L>      20 Jul 2017 06:16  Phos  5.8     07-20  Mg     2.6     07-20

## 2017-07-20 NOTE — PROGRESS NOTE ADULT - PROBLEM SELECTOR PLAN 1
unclear etiology  per EP too early for device infection  site appears to be healing well-- pt with no infectious symptoms  will reppeat CBC stat. OK to be discharged if downtrending wbc count

## 2017-07-20 NOTE — PROGRESS NOTE ADULT - PROBLEM SELECTOR PLAN 4
DVT ppx - c/w HSQ
- C/w Hydralazine 100mg q8  -cont norvasc 5mg   -labetalol 200 q12h
- HSQ
- HSQ
DVT ppx - c/w HSQ
- HSQ
DVT ppx - c/w HSQ

## 2017-07-20 NOTE — PROGRESS NOTE ADULT - ASSESSMENT
65 Y/O M via EMS from [facility] presenting with bradycardia, near syncope, achiness, not feeling well and sternal chest pain found to have hyperkalemia    - ESRD on HD TTS  - Volume status- euvolemic  - HTN:  transient HTN but improved today    PLAN:   - Plan for HD today  - A/w Hydralazine 100mg PO q 8 hours;    - Labetolol started now that he is sp PPM placement  - Renal dosing of meds to creatinine clearance of < 10 ml/min

## 2017-07-20 NOTE — PROGRESS NOTE ADULT - PROBLEM SELECTOR PROBLEM 4
HTN (hypertension)
Prophylactic measure

## 2017-07-20 NOTE — PROGRESS NOTE ADULT - ASSESSMENT
68 yo male HTN, ESRD on HD, presented to office lightheaded found to be in  complete heart block, s/p TVP. Patient hyperkalemic underwent urgent HD and CHB resolved.  s/p dual chamber PPM ([facility]) on 7/19/17.

## 2017-07-20 NOTE — PROGRESS NOTE ADULT - PROBLEM SELECTOR PROBLEM 2
Complete heart block
ESRD (end stage renal disease) on dialysis
Renal insufficiency
Renal insufficiency
Essential hypertension
Renal insufficiency

## 2017-07-20 NOTE — PROGRESS NOTE ADULT - PROBLEM SELECTOR PROBLEM 1
Complete heart block
Leukocytosis
Complete heart block

## 2017-07-20 NOTE — PROGRESS NOTE ADULT - SUBJECTIVE AND OBJECTIVE BOX
INTERVAL HPI/OVERNIGHT EVENTS: no acute events overnight    MEDICATIONS  (STANDING):  hydrALAZINE 100 milliGRAM(s) Oral every 8 hours  amLODIPine   Tablet 5 milliGRAM(s) Oral daily  ceFAZolin   IVPB 1000 milliGRAM(s) IV Intermittent every 24 hours  labetalol 200 milliGRAM(s) Oral two times a day    MEDICATIONS  (PRN):  acetaminophen   Tablet. 650 milliGRAM(s) Oral every 6 hours PRN Moderate Pain (4 - 6)  aluminum hydroxide/magnesium hydroxide/simethicone Suspension 30 milliLiter(s) Oral every 6 hours PRN Dyspepsia      Allergies    No Known Allergies      ROS:  General: Pt denies recent weight loss/fever/chills    Neurological: denies numbness or  sensation loss    HEENT: denies visual changes, no hearing loss, denies sore throat    Cardiovascular: denies chest pain/palpitations/leg edema    Respiratory and Thorax: denies SOB/cough/wheezing    Gastrointestinal: denies abdominal pain/diarrhea/constipation/bloody stool    Genitourinary: denies urinary frequency/urgency/ dysuria    Musculoskeletal: denies joint pain or swelling, denies restricted motion    Skin: denies rashes/sores      Vital Signs Last 24 Hrs  T(C): 36.9 (20 Jul 2017 05:34), Max: 36.9 (20 Jul 2017 05:34)  T(F): 98.4 (20 Jul 2017 05:34), Max: 98.4 (20 Jul 2017 05:34)  HR: 66 (20 Jul 2017 09:05) (66 - 110)  BP: 117/63 (20 Jul 2017 09:05) (117/63 - 173/76)  RR: 18 (20 Jul 2017 05:34) (17 - 18)  SpO2: 96% (20 Jul 2017 05:34) (96% - 100%)    Physical Exam:    Constitutional: well developed, well nourished, no deformities and no acute distress    Neurological: Alert & Oriented x 3    HEENT: Neck supple, no JVD    Respiratory: CTA B/L, No wheezing/crackles/rhonchi    Cardiovascular: (+) S1 & S2, RRR, No m/r/g    Gastrointestinal: soft, NT, nondistended, (+) BS    Genitourinary: non distended bladder, voiding freely    Extremities: No pedal edema, No clubbing, No cyanosis, (+) LUE AV fistula     Skin: Right infraclavicular surgical incision site clean, dry, and dermadbond dressing intact, no hematoma.      LABS:                        11.8   20.9  )-----------( 182      ( 20 Jul 2017 06:16 )             35.2     07-20    135  |  91<L>  |  67<H>  ----------------------------<  107<H>  5.0   |  24  |  9.41<H>    Ca    8.2<L>      20 Jul 2017 06:16  Phos  5.8     07-20  Mg     2.6     07-20      PT/INR - ( 20 Jul 2017 06:16 )   PT: 12.0 sec;   INR: 1.11 ratio         PTT - ( 20 Jul 2017 06:16 )  PTT:29.0 sec      CXR: no pneumothorax, good lead placement    TELE: NSR 1st degree AVB, rate 70-80s.

## 2017-07-20 NOTE — PROGRESS NOTE ADULT - PROBLEM SELECTOR PROBLEM 5
Nutrition, metabolism, and development symptoms
Nutrition, metabolism, and development symptoms
Prophylactic measure
Nutrition, metabolism, and development symptoms

## 2017-07-20 NOTE — PROGRESS NOTE ADULT - ATTENDING COMMENTS
dc p hd
Patient is seen and examined with fellow, NP and the CCU house-staff. I agree with the history, physical and the assessment and plan.  awaiting EP for PPM decision  HD today  no AV ángel agents at this time
65 yo M with HTN and ESRD with complete heart block in setting of hyperkalemia now resolved. Patient complained of atypical chest discomfort today relieved with maalox and ginger ale. No EKG changes with negative cardiac enzymes. Continue telemetry monitoring, will need to follow-up final EP recommendations.
discharge pending PPM
dispo pending PPM placement  suspect discharge tomorrow.
pt medically stable for discharge pending improving leukocytosis  discharge time 45 mins
Patient is seen and examined with fellow, NP and the CCU house-staff. I agree with the history, physical and the assessment and plan.  CHB in setting of hyperkalemia requiring urgent HD  TVP has been removed after HD
66 yo M with complete heart block in setting of hyperkalemia with heart block resolved following correction of electrolyte abnormality. For hemodialysis today. Will discuss with EP regarding need for PPM. Continue telemetry monitoring.

## 2017-07-20 NOTE — PROGRESS NOTE ADULT - PROBLEM SELECTOR PROBLEM 3
ESRD (end stage renal disease) on dialysis
HTN (hypertension)
Renal insufficiency

## 2017-07-20 NOTE — PROGRESS NOTE ADULT - ASSESSMENT
66 yo male with hx of ESRD, HTN presented with symptomatic bradycardia likely 2/2 complete heart block which may have been induced by hyperkalemia, s/p PPM

## 2017-07-23 NOTE — ED PROVIDER NOTE - MEDICAL DECISION MAKING DETAILS
Complete heart block, obtained labs for electrolyte abnormality / cardiac enzymes, EKG, CXR, started on epi drip, Transcutaneous and transvenous pacing, admit to CCU for PPM planned. family

## 2017-07-31 ENCOUNTER — NON-APPOINTMENT (OUTPATIENT)
Age: 67
End: 2017-07-31

## 2017-07-31 ENCOUNTER — APPOINTMENT (OUTPATIENT)
Dept: ELECTROPHYSIOLOGY | Facility: CLINIC | Age: 67
End: 2017-07-31
Payer: MEDICARE

## 2017-07-31 VITALS
HEART RATE: 65 BPM | OXYGEN SATURATION: 96 % | WEIGHT: 147 LBS | SYSTOLIC BLOOD PRESSURE: 181 MMHG | BODY MASS INDEX: 25.1 KG/M2 | HEIGHT: 64 IN | DIASTOLIC BLOOD PRESSURE: 68 MMHG

## 2017-07-31 PROCEDURE — 99024 POSTOP FOLLOW-UP VISIT: CPT

## 2017-07-31 RX ORDER — HYDRALAZINE HYDROCHLORIDE 50 MG/1
TABLET ORAL
Refills: 0 | Status: ACTIVE | COMMUNITY

## 2017-11-06 ENCOUNTER — NON-APPOINTMENT (OUTPATIENT)
Age: 67
End: 2017-11-06

## 2017-11-06 ENCOUNTER — APPOINTMENT (OUTPATIENT)
Dept: ELECTROPHYSIOLOGY | Facility: CLINIC | Age: 67
End: 2017-11-06
Payer: MEDICARE

## 2017-11-06 VITALS
WEIGHT: 141.09 LBS | HEART RATE: 60 BPM | DIASTOLIC BLOOD PRESSURE: 81 MMHG | HEIGHT: 64 IN | SYSTOLIC BLOOD PRESSURE: 194 MMHG | OXYGEN SATURATION: 96 % | BODY MASS INDEX: 24.09 KG/M2

## 2017-11-06 DIAGNOSIS — Z86.79 PERSONAL HISTORY OF OTHER DISEASES OF THE CIRCULATORY SYSTEM: ICD-10-CM

## 2017-11-06 DIAGNOSIS — Z95.0 PRESENCE OF CARDIAC PACEMAKER: ICD-10-CM

## 2017-11-06 DIAGNOSIS — Z99.2 END STAGE RENAL DISEASE: ICD-10-CM

## 2017-11-06 DIAGNOSIS — Z99.2 DEPENDENCE ON RENAL DIALYSIS: ICD-10-CM

## 2017-11-06 DIAGNOSIS — N18.6 END STAGE RENAL DISEASE: ICD-10-CM

## 2017-11-06 PROCEDURE — 93280 PM DEVICE PROGR EVAL DUAL: CPT

## 2018-02-09 ENCOUNTER — APPOINTMENT (OUTPATIENT)
Dept: SURGERY | Facility: CLINIC | Age: 68
End: 2018-02-09
Payer: MEDICARE

## 2018-02-09 VITALS
SYSTOLIC BLOOD PRESSURE: 118 MMHG | WEIGHT: 135 LBS | TEMPERATURE: 98.4 F | BODY MASS INDEX: 22.49 KG/M2 | HEART RATE: 82 BPM | OXYGEN SATURATION: 98 % | RESPIRATION RATE: 16 BRPM | HEIGHT: 65 IN | DIASTOLIC BLOOD PRESSURE: 67 MMHG

## 2018-02-09 DIAGNOSIS — I34.0 NONRHEUMATIC MITRAL (VALVE) INSUFFICIENCY: ICD-10-CM

## 2018-02-09 DIAGNOSIS — I10 ESSENTIAL (PRIMARY) HYPERTENSION: ICD-10-CM

## 2018-02-09 DIAGNOSIS — Z83.3 FAMILY HISTORY OF DIABETES MELLITUS: ICD-10-CM

## 2018-02-09 DIAGNOSIS — Z85.46 PERSONAL HISTORY OF MALIGNANT NEOPLASM OF PROSTATE: ICD-10-CM

## 2018-02-09 DIAGNOSIS — D63.8 ANEMIA IN OTHER CHRONIC DISEASES CLASSIFIED ELSEWHERE: ICD-10-CM

## 2018-02-09 DIAGNOSIS — R19.8 OTHER SPECIFIED SYMPTOMS AND SIGNS INVOLVING THE DIGESTIVE SYSTEM AND ABDOMEN: ICD-10-CM

## 2018-02-09 DIAGNOSIS — K62.89 OTHER SPECIFIED DISEASES OF ANUS AND RECTUM: ICD-10-CM

## 2018-02-09 DIAGNOSIS — Z86.010 PERSONAL HISTORY OF COLONIC POLYPS: ICD-10-CM

## 2018-02-09 PROCEDURE — 46320 REMOVAL OF HEMORRHOID CLOT: CPT

## 2018-02-09 PROCEDURE — 99204 OFFICE O/P NEW MOD 45 MIN: CPT

## 2018-02-09 RX ORDER — VALGANCICLOVIR HYDROCHLORIDE 450 MG/1
450 TABLET ORAL
Qty: 30 | Refills: 0 | Status: ACTIVE | COMMUNITY
Start: 2017-11-27

## 2018-02-09 RX ORDER — VALSARTAN 80 MG/1
80 TABLET, COATED ORAL
Qty: 30 | Refills: 0 | Status: DISCONTINUED | COMMUNITY
Start: 2017-10-06

## 2018-02-09 RX ORDER — MYCOPHENOLATE MOFETIL 500 MG/1
500 TABLET ORAL
Refills: 0 | Status: ACTIVE | COMMUNITY

## 2018-02-09 RX ORDER — TACROLIMUS 1 MG/1
1 CAPSULE ORAL
Refills: 0 | Status: ACTIVE | COMMUNITY

## 2018-02-09 RX ORDER — NIFEDIPINE 30 MG/1
30 TABLET, FILM COATED, EXTENDED RELEASE ORAL
Qty: 30 | Refills: 0 | Status: DISCONTINUED | COMMUNITY
Start: 2017-11-27

## 2018-02-09 RX ORDER — NIFEDIPINE 60 MG/1
60 TABLET, FILM COATED, EXTENDED RELEASE ORAL
Qty: 180 | Refills: 0 | Status: ACTIVE | COMMUNITY
Start: 2017-12-14

## 2018-02-09 RX ORDER — FUROSEMIDE 40 MG/1
40 TABLET ORAL
Qty: 60 | Refills: 0 | Status: DISCONTINUED | COMMUNITY
Start: 2017-11-30 | End: 2018-02-09

## 2018-02-09 RX ORDER — SULFAMETHOXAZOLE AND TRIMETHOPRIM 800; 160 MG/1; MG/1
800-160 TABLET ORAL
Qty: 4 | Refills: 0 | Status: DISCONTINUED | COMMUNITY
Start: 2017-11-29

## 2018-02-09 RX ORDER — CLOTRIMAZOLE 10 MG/1
10 LOZENGE ORAL
Qty: 60 | Refills: 0 | Status: DISCONTINUED | COMMUNITY
Start: 2017-11-27 | End: 2018-02-09

## 2018-02-09 RX ORDER — LIDOCAINE AND PRILOCAINE 25; 25 MG/G; MG/G
2.5-2.5 CREAM TOPICAL
Qty: 30 | Refills: 0 | Status: DISCONTINUED | COMMUNITY
Start: 2017-02-28

## 2018-02-22 ENCOUNTER — APPOINTMENT (OUTPATIENT)
Dept: SURGERY | Facility: CLINIC | Age: 68
End: 2018-02-22
Payer: MEDICARE

## 2018-02-22 VITALS
SYSTOLIC BLOOD PRESSURE: 152 MMHG | HEART RATE: 70 BPM | RESPIRATION RATE: 15 BRPM | TEMPERATURE: 98.5 F | OXYGEN SATURATION: 100 % | DIASTOLIC BLOOD PRESSURE: 68 MMHG

## 2018-02-22 DIAGNOSIS — L29.0 PRURITUS ANI: ICD-10-CM

## 2018-02-22 PROCEDURE — 99213 OFFICE O/P EST LOW 20 MIN: CPT

## 2018-02-22 RX ORDER — SULFAMETHOXAZOLE AND TRIMETHOPRIM 400; 80 MG/1; MG/1
400-80 TABLET ORAL
Qty: 30 | Refills: 0 | Status: DISCONTINUED | COMMUNITY
Start: 2017-11-27 | End: 2018-02-22

## 2018-03-22 ENCOUNTER — APPOINTMENT (OUTPATIENT)
Dept: SURGERY | Facility: CLINIC | Age: 68
End: 2018-03-22
Payer: MEDICARE

## 2018-03-22 ENCOUNTER — APPOINTMENT (OUTPATIENT)
Dept: ELECTROPHYSIOLOGY | Facility: CLINIC | Age: 68
End: 2018-03-22

## 2018-03-22 VITALS
HEART RATE: 70 BPM | TEMPERATURE: 99 F | SYSTOLIC BLOOD PRESSURE: 155 MMHG | OXYGEN SATURATION: 100 % | RESPIRATION RATE: 15 BRPM | DIASTOLIC BLOOD PRESSURE: 69 MMHG

## 2018-03-22 DIAGNOSIS — K64.5 PERIANAL VENOUS THROMBOSIS: ICD-10-CM

## 2018-03-22 PROCEDURE — 46600 DIAGNOSTIC ANOSCOPY SPX: CPT

## 2018-03-22 PROCEDURE — 99213 OFFICE O/P EST LOW 20 MIN: CPT | Mod: 25

## 2018-03-26 ENCOUNTER — APPOINTMENT (OUTPATIENT)
Dept: ELECTROPHYSIOLOGY | Facility: CLINIC | Age: 68
End: 2018-03-26
Payer: MEDICARE

## 2018-03-26 VITALS
DIASTOLIC BLOOD PRESSURE: 65 MMHG | HEART RATE: 60 BPM | SYSTOLIC BLOOD PRESSURE: 125 MMHG | BODY MASS INDEX: 23.32 KG/M2 | OXYGEN SATURATION: 99 % | HEIGHT: 65 IN | WEIGHT: 140 LBS

## 2018-03-26 PROCEDURE — 93280 PM DEVICE PROGR EVAL DUAL: CPT

## 2018-07-27 ENCOUNTER — APPOINTMENT (OUTPATIENT)
Dept: ELECTROPHYSIOLOGY | Facility: CLINIC | Age: 68
End: 2018-07-27
Payer: MEDICARE

## 2018-07-27 VITALS
DIASTOLIC BLOOD PRESSURE: 53 MMHG | HEART RATE: 66 BPM | HEIGHT: 65 IN | SYSTOLIC BLOOD PRESSURE: 106 MMHG | BODY MASS INDEX: 23.82 KG/M2 | WEIGHT: 143 LBS | OXYGEN SATURATION: 99 %

## 2018-07-27 DIAGNOSIS — I44.2 ATRIOVENTRICULAR BLOCK, COMPLETE: ICD-10-CM

## 2018-07-27 PROCEDURE — 93280 PM DEVICE PROGR EVAL DUAL: CPT

## 2018-11-16 ENCOUNTER — APPOINTMENT (OUTPATIENT)
Dept: ELECTROPHYSIOLOGY | Facility: CLINIC | Age: 68
End: 2018-11-16
Payer: MEDICARE

## 2018-11-16 VITALS
OXYGEN SATURATION: 99 % | DIASTOLIC BLOOD PRESSURE: 72 MMHG | HEART RATE: 75 BPM | WEIGHT: 140 LBS | SYSTOLIC BLOOD PRESSURE: 162 MMHG | BODY MASS INDEX: 23.3 KG/M2

## 2018-11-16 PROCEDURE — 93280 PM DEVICE PROGR EVAL DUAL: CPT

## 2019-01-01 NOTE — PROGRESS NOTE ADULT - PROBLEM SELECTOR PLAN 3
- C/w Hydralazine 100mg q8  - If BP elevated can consider adding Amlodipine
- C/w Hydralazine 100mg q8  -add norvasc 5mg today
- C/w Hydralazine 100mg q8  -cont norvasc 5mg   -can resume labetalol post HD
- Plan with HD as per renal
monitor VS  c/w hydralazine increased to 100 q 8 today
monitor VS  c/w hydralazine increased to 100mg q 8
monitor VS  c/w hydralazine increased to 100 q 8 today
51

## 2019-02-15 ENCOUNTER — APPOINTMENT (OUTPATIENT)
Dept: ELECTROPHYSIOLOGY | Facility: CLINIC | Age: 69
End: 2019-02-15

## 2019-02-15 ENCOUNTER — APPOINTMENT (OUTPATIENT)
Dept: ELECTROPHYSIOLOGY | Facility: CLINIC | Age: 69
End: 2019-02-15
Payer: MEDICARE

## 2019-02-15 VITALS
BODY MASS INDEX: 23.32 KG/M2 | SYSTOLIC BLOOD PRESSURE: 134 MMHG | OXYGEN SATURATION: 94 % | DIASTOLIC BLOOD PRESSURE: 64 MMHG | HEART RATE: 78 BPM | WEIGHT: 140 LBS | HEIGHT: 65 IN

## 2019-02-15 PROCEDURE — 93280 PM DEVICE PROGR EVAL DUAL: CPT

## 2019-06-17 ENCOUNTER — APPOINTMENT (OUTPATIENT)
Dept: ELECTROPHYSIOLOGY | Facility: CLINIC | Age: 69
End: 2019-06-17
Payer: MEDICARE

## 2019-06-17 VITALS
DIASTOLIC BLOOD PRESSURE: 70 MMHG | BODY MASS INDEX: 23.32 KG/M2 | SYSTOLIC BLOOD PRESSURE: 137 MMHG | WEIGHT: 140 LBS | OXYGEN SATURATION: 100 % | HEIGHT: 65 IN | HEART RATE: 74 BPM

## 2019-06-17 PROCEDURE — 93280 PM DEVICE PROGR EVAL DUAL: CPT

## 2019-06-17 RX ORDER — LABETALOL HYDROCHLORIDE 200 MG/1
200 TABLET, FILM COATED ORAL
Refills: 0 | Status: DISCONTINUED | COMMUNITY
End: 2019-06-17

## 2019-06-21 RX ORDER — SILVER SULFADIAZINE 10 MG/G
1 CREAM TOPICAL
Qty: 1 | Refills: 3 | Status: ACTIVE | COMMUNITY
Start: 2018-02-09 | End: 1900-01-01

## 2019-06-22 ENCOUNTER — RX RENEWAL (OUTPATIENT)
Age: 69
End: 2019-06-22

## 2019-06-22 RX ORDER — SILVER SULFADIAZINE 10 G/1000G
1 CREAM TOPICAL
Qty: 400 | Refills: 0 | Status: ACTIVE | COMMUNITY
Start: 2019-06-22 | End: 1900-01-01

## 2019-09-16 ENCOUNTER — APPOINTMENT (OUTPATIENT)
Dept: ELECTROPHYSIOLOGY | Facility: CLINIC | Age: 69
End: 2019-09-16

## 2019-10-09 ENCOUNTER — APPOINTMENT (OUTPATIENT)
Dept: ELECTROPHYSIOLOGY | Facility: CLINIC | Age: 69
End: 2019-10-09
Payer: MEDICARE

## 2019-10-09 PROCEDURE — 93296 REM INTERROG EVL PM/IDS: CPT

## 2019-10-09 PROCEDURE — 93294 REM INTERROG EVL PM/LDLS PM: CPT

## 2019-12-19 ENCOUNTER — APPOINTMENT (OUTPATIENT)
Dept: ELECTROPHYSIOLOGY | Facility: CLINIC | Age: 69
End: 2019-12-19
Payer: MEDICARE

## 2019-12-19 VITALS
DIASTOLIC BLOOD PRESSURE: 83 MMHG | HEIGHT: 65 IN | HEART RATE: 80 BPM | SYSTOLIC BLOOD PRESSURE: 127 MMHG | OXYGEN SATURATION: 99 %

## 2019-12-19 PROCEDURE — 93280 PM DEVICE PROGR EVAL DUAL: CPT

## 2020-03-19 ENCOUNTER — APPOINTMENT (OUTPATIENT)
Dept: ELECTROPHYSIOLOGY | Facility: CLINIC | Age: 70
End: 2020-03-19
Payer: MEDICARE

## 2020-03-19 PROCEDURE — 93294 REM INTERROG EVL PM/LDLS PM: CPT

## 2020-03-19 PROCEDURE — 93296 REM INTERROG EVL PM/IDS: CPT

## 2020-03-20 RX ORDER — METOPROLOL SUCCINATE 25 MG/1
25 TABLET, EXTENDED RELEASE ORAL
Refills: 0 | Status: DISCONTINUED | COMMUNITY
End: 2020-03-20

## 2020-03-20 RX ORDER — CARVEDILOL 25 MG/1
25 TABLET, FILM COATED ORAL TWICE DAILY
Qty: 60 | Refills: 2 | Status: ACTIVE | COMMUNITY
Start: 2020-03-20 | End: 1900-01-01

## 2020-06-19 ENCOUNTER — APPOINTMENT (OUTPATIENT)
Dept: ELECTROPHYSIOLOGY | Facility: CLINIC | Age: 70
End: 2020-06-19

## 2020-06-20 ENCOUNTER — INPATIENT (INPATIENT)
Facility: HOSPITAL | Age: 70
LOS: 2 days | Discharge: HOME | End: 2020-06-23
Attending: INTERNAL MEDICINE | Admitting: INTERNAL MEDICINE
Payer: MEDICARE

## 2020-06-20 VITALS
HEART RATE: 87 BPM | RESPIRATION RATE: 16 BRPM | DIASTOLIC BLOOD PRESSURE: 72 MMHG | OXYGEN SATURATION: 99 % | TEMPERATURE: 101 F | SYSTOLIC BLOOD PRESSURE: 148 MMHG

## 2020-06-20 RX ORDER — CEFTRIAXONE 500 MG/1
1000 INJECTION, POWDER, FOR SOLUTION INTRAMUSCULAR; INTRAVENOUS ONCE
Refills: 0 | Status: DISCONTINUED | OUTPATIENT
Start: 2020-06-20 | End: 2020-06-21

## 2020-06-20 RX ORDER — ACETAMINOPHEN 500 MG
975 TABLET ORAL ONCE
Refills: 0 | Status: COMPLETED | OUTPATIENT
Start: 2020-06-20 | End: 2020-06-20

## 2020-06-20 NOTE — ED ADULT TRIAGE NOTE - CHIEF COMPLAINT QUOTE
pt primarily Rw[place]an speaking but able to understand english,  [id number]. pt c/o difficulty urinating  and fever since yesterday. pt states he has been dribbling with some dysuria. PMH htn, prostate CA 4 years ago, kidney transplant 2 years ago and left not used AV fistula. pt noted to febrile to 101 and hasn't taking medication for it. No complaints of chest pain, headache, nausea, dizziness, vomiting  SOB verbalized. Pt denies any abd pain or pressure.

## 2020-06-21 DIAGNOSIS — A41.9 SEPSIS, UNSPECIFIED ORGANISM: ICD-10-CM

## 2020-06-21 DIAGNOSIS — R94.31 ABNORMAL ELECTROCARDIOGRAM [ECG] [EKG]: ICD-10-CM

## 2020-06-21 DIAGNOSIS — E87.1 HYPO-OSMOLALITY AND HYPONATREMIA: ICD-10-CM

## 2020-06-21 DIAGNOSIS — Z94.0 KIDNEY TRANSPLANT STATUS: ICD-10-CM

## 2020-06-21 DIAGNOSIS — Z79.899 OTHER LONG TERM (CURRENT) DRUG THERAPY: ICD-10-CM

## 2020-06-21 DIAGNOSIS — Z94.0 KIDNEY TRANSPLANT STATUS: Chronic | ICD-10-CM

## 2020-06-21 DIAGNOSIS — Z29.9 ENCOUNTER FOR PROPHYLACTIC MEASURES, UNSPECIFIED: ICD-10-CM

## 2020-06-21 DIAGNOSIS — I10 ESSENTIAL (PRIMARY) HYPERTENSION: ICD-10-CM

## 2020-06-21 DIAGNOSIS — N30.00 ACUTE CYSTITIS WITHOUT HEMATURIA: ICD-10-CM

## 2020-06-21 LAB
ALBUMIN SERPL ELPH-MCNC: 4 G/DL — SIGNIFICANT CHANGE UP (ref 3.3–5)
ALP SERPL-CCNC: 72 U/L — SIGNIFICANT CHANGE UP (ref 40–120)
ALT FLD-CCNC: 9 U/L — SIGNIFICANT CHANGE UP (ref 4–41)
ANION GAP SERPL CALC-SCNC: 13 MMO/L — SIGNIFICANT CHANGE UP (ref 7–14)
ANION GAP SERPL CALC-SCNC: 13 MMO/L — SIGNIFICANT CHANGE UP (ref 7–14)
ANISOCYTOSIS BLD QL: SLIGHT — SIGNIFICANT CHANGE UP
APPEARANCE UR: SIGNIFICANT CHANGE UP
APTT BLD: 31.7 SEC — SIGNIFICANT CHANGE UP (ref 27.5–36.3)
AST SERPL-CCNC: 15 U/L — SIGNIFICANT CHANGE UP (ref 4–40)
BACTERIA # UR AUTO: HIGH
BASE EXCESS BLDV CALC-SCNC: -2 MMOL/L — SIGNIFICANT CHANGE UP
BASOPHILS # BLD AUTO: 0.04 K/UL — SIGNIFICANT CHANGE UP (ref 0–0.2)
BASOPHILS NFR BLD AUTO: 0.2 % — SIGNIFICANT CHANGE UP (ref 0–2)
BASOPHILS NFR SPEC: 0 % — SIGNIFICANT CHANGE UP (ref 0–2)
BILIRUB SERPL-MCNC: 1.1 MG/DL — SIGNIFICANT CHANGE UP (ref 0.2–1.2)
BILIRUB UR-MCNC: NEGATIVE — SIGNIFICANT CHANGE UP
BLASTS # FLD: 0 % — SIGNIFICANT CHANGE UP (ref 0–0)
BLOOD GAS VENOUS - CREATININE: 1.19 MG/DL — SIGNIFICANT CHANGE UP (ref 0.5–1.3)
BLOOD UR QL VISUAL: HIGH
BUN SERPL-MCNC: 19 MG/DL — SIGNIFICANT CHANGE UP (ref 7–23)
BUN SERPL-MCNC: 25 MG/DL — HIGH (ref 7–23)
CALCIUM SERPL-MCNC: 8.7 MG/DL — SIGNIFICANT CHANGE UP (ref 8.4–10.5)
CALCIUM SERPL-MCNC: 9 MG/DL — SIGNIFICANT CHANGE UP (ref 8.4–10.5)
CHLORIDE BLDV-SCNC: 99 MMOL/L — SIGNIFICANT CHANGE UP (ref 96–108)
CHLORIDE SERPL-SCNC: 94 MMOL/L — LOW (ref 98–107)
CHLORIDE SERPL-SCNC: 95 MMOL/L — LOW (ref 98–107)
CO2 SERPL-SCNC: 21 MMOL/L — LOW (ref 22–31)
CO2 SERPL-SCNC: 21 MMOL/L — LOW (ref 22–31)
COLOR SPEC: SIGNIFICANT CHANGE UP
CREAT SERPL-MCNC: 1.01 MG/DL — SIGNIFICANT CHANGE UP (ref 0.5–1.3)
CREAT SERPL-MCNC: 1.19 MG/DL — SIGNIFICANT CHANGE UP (ref 0.5–1.3)
EOSINOPHIL # BLD AUTO: 0.11 K/UL — SIGNIFICANT CHANGE UP (ref 0–0.5)
EOSINOPHIL NFR BLD AUTO: 0.5 % — SIGNIFICANT CHANGE UP (ref 0–6)
EOSINOPHIL NFR FLD: 0 % — SIGNIFICANT CHANGE UP (ref 0–6)
GAS PNL BLDV: 127 MMOL/L — LOW (ref 136–146)
GLUCOSE BLDV-MCNC: 196 MG/DL — HIGH (ref 70–99)
GLUCOSE SERPL-MCNC: 126 MG/DL — HIGH (ref 70–99)
GLUCOSE SERPL-MCNC: 132 MG/DL — HIGH (ref 70–99)
GLUCOSE UR-MCNC: NEGATIVE — SIGNIFICANT CHANGE UP
HCO3 BLDV-SCNC: 22 MMOL/L — SIGNIFICANT CHANGE UP (ref 20–27)
HCT VFR BLD CALC: 41.9 % — SIGNIFICANT CHANGE UP (ref 39–50)
HCT VFR BLD CALC: 44.2 % — SIGNIFICANT CHANGE UP (ref 39–50)
HCT VFR BLDV CALC: 42.9 % — SIGNIFICANT CHANGE UP (ref 39–51)
HGB BLD-MCNC: 13.8 G/DL — SIGNIFICANT CHANGE UP (ref 13–17)
HGB BLD-MCNC: 14.4 G/DL — SIGNIFICANT CHANGE UP (ref 13–17)
HGB BLDV-MCNC: 14 G/DL — SIGNIFICANT CHANGE UP (ref 13–17)
HYALINE CASTS # UR AUTO: HIGH
IMM GRANULOCYTES NFR BLD AUTO: 0.7 % — SIGNIFICANT CHANGE UP (ref 0–1.5)
INR BLD: 1.42 — HIGH (ref 0.88–1.17)
KETONES UR-MCNC: SIGNIFICANT CHANGE UP
LACTATE BLDV-MCNC: 3.8 MMOL/L — HIGH (ref 0.5–2)
LACTATE SERPL-SCNC: 1.4 MMOL/L — SIGNIFICANT CHANGE UP (ref 0.5–2)
LEUKOCYTE ESTERASE UR-ACNC: SIGNIFICANT CHANGE UP
LYMPHOCYTES # BLD AUTO: 1.1 K/UL — SIGNIFICANT CHANGE UP (ref 1–3.3)
LYMPHOCYTES # BLD AUTO: 5.4 % — LOW (ref 13–44)
LYMPHOCYTES NFR SPEC AUTO: 0.9 % — LOW (ref 13–44)
MAGNESIUM SERPL-MCNC: 1.5 MG/DL — LOW (ref 1.6–2.6)
MAGNESIUM SERPL-MCNC: 1.9 MG/DL — SIGNIFICANT CHANGE UP (ref 1.6–2.6)
MCHC RBC-ENTMCNC: 29.8 PG — SIGNIFICANT CHANGE UP (ref 27–34)
MCHC RBC-ENTMCNC: 30.3 PG — SIGNIFICANT CHANGE UP (ref 27–34)
MCHC RBC-ENTMCNC: 32.6 % — SIGNIFICANT CHANGE UP (ref 32–36)
MCHC RBC-ENTMCNC: 32.9 % — SIGNIFICANT CHANGE UP (ref 32–36)
MCV RBC AUTO: 91.5 FL — SIGNIFICANT CHANGE UP (ref 80–100)
MCV RBC AUTO: 91.9 FL — SIGNIFICANT CHANGE UP (ref 80–100)
METAMYELOCYTES # FLD: 0 % — SIGNIFICANT CHANGE UP (ref 0–1)
MONOCYTES # BLD AUTO: 1.6 K/UL — HIGH (ref 0–0.9)
MONOCYTES NFR BLD AUTO: 7.8 % — SIGNIFICANT CHANGE UP (ref 2–14)
MONOCYTES NFR BLD: 6.9 % — SIGNIFICANT CHANGE UP (ref 2–9)
MYELOCYTES NFR BLD: 0 % — SIGNIFICANT CHANGE UP (ref 0–0)
NEUTROPHIL AB SER-ACNC: 90.5 % — HIGH (ref 43–77)
NEUTROPHILS # BLD AUTO: 17.5 K/UL — HIGH (ref 1.8–7.4)
NEUTROPHILS NFR BLD AUTO: 85.4 % — HIGH (ref 43–77)
NEUTS BAND # BLD: 1.7 % — SIGNIFICANT CHANGE UP (ref 0–6)
NITRITE UR-MCNC: NEGATIVE — SIGNIFICANT CHANGE UP
NRBC # FLD: 0 K/UL — SIGNIFICANT CHANGE UP (ref 0–0)
NRBC # FLD: 0 K/UL — SIGNIFICANT CHANGE UP (ref 0–0)
OSMOLALITY SERPL: 280 MOSMO/KG — SIGNIFICANT CHANGE UP (ref 275–295)
OSMOLALITY UR: 380 MOSMO/KG — SIGNIFICANT CHANGE UP (ref 50–1200)
OTHER - HEMATOLOGY %: 0 — SIGNIFICANT CHANGE UP
PCO2 BLDV: 41 MMHG — SIGNIFICANT CHANGE UP (ref 41–51)
PH BLDV: 7.36 PH — SIGNIFICANT CHANGE UP (ref 7.32–7.43)
PH UR: 6.5 — SIGNIFICANT CHANGE UP (ref 5–8)
PHOSPHATE SERPL-MCNC: 2.9 MG/DL — SIGNIFICANT CHANGE UP (ref 2.5–4.5)
PLATELET # BLD AUTO: 135 K/UL — LOW (ref 150–400)
PLATELET # BLD AUTO: 150 K/UL — SIGNIFICANT CHANGE UP (ref 150–400)
PLATELET COUNT - ESTIMATE: NORMAL — SIGNIFICANT CHANGE UP
PMV BLD: 9.7 FL — SIGNIFICANT CHANGE UP (ref 7–13)
PMV BLD: 9.7 FL — SIGNIFICANT CHANGE UP (ref 7–13)
PO2 BLDV: 50 MMHG — HIGH (ref 35–40)
POLYCHROMASIA BLD QL SMEAR: SLIGHT — SIGNIFICANT CHANGE UP
POTASSIUM BLDV-SCNC: 3.7 MMOL/L — SIGNIFICANT CHANGE UP (ref 3.4–4.5)
POTASSIUM SERPL-MCNC: 4.2 MMOL/L — SIGNIFICANT CHANGE UP (ref 3.5–5.3)
POTASSIUM SERPL-MCNC: 4.2 MMOL/L — SIGNIFICANT CHANGE UP (ref 3.5–5.3)
POTASSIUM SERPL-SCNC: 4.2 MMOL/L — SIGNIFICANT CHANGE UP (ref 3.5–5.3)
POTASSIUM SERPL-SCNC: 4.2 MMOL/L — SIGNIFICANT CHANGE UP (ref 3.5–5.3)
PROMYELOCYTES # FLD: 0 % — SIGNIFICANT CHANGE UP (ref 0–0)
PROT SERPL-MCNC: 7.4 G/DL — SIGNIFICANT CHANGE UP (ref 6–8.3)
PROT UR-MCNC: 300 — HIGH
PROTHROM AB SERPL-ACNC: 16.4 SEC — HIGH (ref 9.8–13.1)
RBC # BLD: 4.56 M/UL — SIGNIFICANT CHANGE UP (ref 4.2–5.8)
RBC # BLD: 4.83 M/UL — SIGNIFICANT CHANGE UP (ref 4.2–5.8)
RBC # FLD: 12.8 % — SIGNIFICANT CHANGE UP (ref 10.3–14.5)
RBC # FLD: 12.8 % — SIGNIFICANT CHANGE UP (ref 10.3–14.5)
RBC CASTS # UR COMP ASSIST: HIGH (ref 0–?)
SAO2 % BLDV: 82.6 % — SIGNIFICANT CHANGE UP (ref 60–85)
SARS-COV-2 RNA SPEC QL NAA+PROBE: SIGNIFICANT CHANGE UP
SODIUM SERPL-SCNC: 128 MMOL/L — LOW (ref 135–145)
SODIUM SERPL-SCNC: 129 MMOL/L — LOW (ref 135–145)
SP GR SPEC: 1.02 — SIGNIFICANT CHANGE UP (ref 1–1.04)
SQUAMOUS # UR AUTO: SIGNIFICANT CHANGE UP
TACROLIMUS SERPL-MCNC: 11.1 NG/ML — SIGNIFICANT CHANGE UP
TSH SERPL-MCNC: 0.59 UIU/ML — SIGNIFICANT CHANGE UP (ref 0.27–4.2)
UROBILINOGEN FLD QL: NORMAL — SIGNIFICANT CHANGE UP
VARIANT LYMPHS # BLD: 0 % — SIGNIFICANT CHANGE UP
WBC # BLD: 16.73 K/UL — HIGH (ref 3.8–10.5)
WBC # BLD: 20.5 K/UL — HIGH (ref 3.8–10.5)
WBC # FLD AUTO: 16.73 K/UL — HIGH (ref 3.8–10.5)
WBC # FLD AUTO: 20.5 K/UL — HIGH (ref 3.8–10.5)
WBC UR QL: >50 — HIGH (ref 0–?)

## 2020-06-21 PROCEDURE — 71045 X-RAY EXAM CHEST 1 VIEW: CPT | Mod: 26

## 2020-06-21 PROCEDURE — 99233 SBSQ HOSP IP/OBS HIGH 50: CPT

## 2020-06-21 PROCEDURE — 99285 EMERGENCY DEPT VISIT HI MDM: CPT

## 2020-06-21 PROCEDURE — 99223 1ST HOSP IP/OBS HIGH 75: CPT

## 2020-06-21 RX ORDER — CEFEPIME 1 G/1
2000 INJECTION, POWDER, FOR SOLUTION INTRAMUSCULAR; INTRAVENOUS EVERY 8 HOURS
Refills: 0 | Status: DISCONTINUED | OUTPATIENT
Start: 2020-06-21 | End: 2020-06-21

## 2020-06-21 RX ORDER — CEFEPIME 1 G/1
2000 INJECTION, POWDER, FOR SOLUTION INTRAMUSCULAR; INTRAVENOUS EVERY 12 HOURS
Refills: 0 | Status: DISCONTINUED | OUTPATIENT
Start: 2020-06-21 | End: 2020-06-21

## 2020-06-21 RX ORDER — ENOXAPARIN SODIUM 100 MG/ML
40 INJECTION SUBCUTANEOUS DAILY
Refills: 0 | Status: DISCONTINUED | OUTPATIENT
Start: 2020-06-21 | End: 2020-06-23

## 2020-06-21 RX ORDER — CARVEDILOL PHOSPHATE 80 MG/1
12.5 CAPSULE, EXTENDED RELEASE ORAL EVERY 12 HOURS
Refills: 0 | Status: DISCONTINUED | OUTPATIENT
Start: 2020-06-21 | End: 2020-06-23

## 2020-06-21 RX ORDER — ERGOCALCIFEROL 1.25 MG/1
50000 CAPSULE ORAL
Refills: 0 | Status: DISCONTINUED | OUTPATIENT
Start: 2020-06-21 | End: 2020-06-23

## 2020-06-21 RX ORDER — ERGOCALCIFEROL 1.25 MG/1
50000 CAPSULE ORAL
Refills: 0 | Status: DISCONTINUED | OUTPATIENT
Start: 2020-06-21 | End: 2020-06-21

## 2020-06-21 RX ORDER — AMLODIPINE BESYLATE 2.5 MG/1
2.5 TABLET ORAL DAILY
Refills: 0 | Status: DISCONTINUED | OUTPATIENT
Start: 2020-06-21 | End: 2020-06-23

## 2020-06-21 RX ORDER — ACETAMINOPHEN 500 MG
650 TABLET ORAL EVERY 6 HOURS
Refills: 0 | Status: DISCONTINUED | OUTPATIENT
Start: 2020-06-21 | End: 2020-06-23

## 2020-06-21 RX ORDER — SODIUM CHLORIDE 9 MG/ML
1000 INJECTION INTRAMUSCULAR; INTRAVENOUS; SUBCUTANEOUS
Refills: 0 | Status: DISCONTINUED | OUTPATIENT
Start: 2020-06-21 | End: 2020-06-23

## 2020-06-21 RX ORDER — CEFEPIME 1 G/1
1000 INJECTION, POWDER, FOR SOLUTION INTRAMUSCULAR; INTRAVENOUS EVERY 12 HOURS
Refills: 0 | Status: DISCONTINUED | OUTPATIENT
Start: 2020-06-21 | End: 2020-06-21

## 2020-06-21 RX ORDER — TAMSULOSIN HYDROCHLORIDE 0.4 MG/1
0.4 CAPSULE ORAL AT BEDTIME
Refills: 0 | Status: DISCONTINUED | OUTPATIENT
Start: 2020-06-21 | End: 2020-06-23

## 2020-06-21 RX ORDER — MAGNESIUM SULFATE 500 MG/ML
2 VIAL (ML) INJECTION ONCE
Refills: 0 | Status: COMPLETED | OUTPATIENT
Start: 2020-06-21 | End: 2020-06-21

## 2020-06-21 RX ORDER — VANCOMYCIN HCL 1 G
1000 VIAL (EA) INTRAVENOUS ONCE
Refills: 0 | Status: DISCONTINUED | OUTPATIENT
Start: 2020-06-21 | End: 2020-06-21

## 2020-06-21 RX ORDER — CEFEPIME 1 G/1
2000 INJECTION, POWDER, FOR SOLUTION INTRAMUSCULAR; INTRAVENOUS ONCE
Refills: 0 | Status: COMPLETED | OUTPATIENT
Start: 2020-06-21 | End: 2020-06-21

## 2020-06-21 RX ORDER — TACROLIMUS 5 MG/1
0.5 CAPSULE ORAL
Refills: 0 | Status: DISCONTINUED | OUTPATIENT
Start: 2020-06-21 | End: 2020-06-23

## 2020-06-21 RX ORDER — CEFEPIME 1 G/1
1000 INJECTION, POWDER, FOR SOLUTION INTRAMUSCULAR; INTRAVENOUS EVERY 12 HOURS
Refills: 0 | Status: DISCONTINUED | OUTPATIENT
Start: 2020-06-21 | End: 2020-06-23

## 2020-06-21 RX ORDER — ACETAMINOPHEN 500 MG
650 TABLET ORAL EVERY 6 HOURS
Refills: 0 | Status: DISCONTINUED | OUTPATIENT
Start: 2020-06-21 | End: 2020-06-21

## 2020-06-21 RX ORDER — ONDANSETRON 8 MG/1
4 TABLET, FILM COATED ORAL EVERY 6 HOURS
Refills: 0 | Status: DISCONTINUED | OUTPATIENT
Start: 2020-06-21 | End: 2020-06-21

## 2020-06-21 RX ORDER — MYCOPHENOLATE MOFETIL 250 MG/1
500 CAPSULE ORAL
Refills: 0 | Status: DISCONTINUED | OUTPATIENT
Start: 2020-06-21 | End: 2020-06-23

## 2020-06-21 RX ADMIN — CEFEPIME 100 MILLIGRAM(S): 1 INJECTION, POWDER, FOR SOLUTION INTRAMUSCULAR; INTRAVENOUS at 00:33

## 2020-06-21 RX ADMIN — ENOXAPARIN SODIUM 40 MILLIGRAM(S): 100 INJECTION SUBCUTANEOUS at 13:07

## 2020-06-21 RX ADMIN — MYCOPHENOLATE MOFETIL 500 MILLIGRAM(S): 250 CAPSULE ORAL at 18:17

## 2020-06-21 RX ADMIN — TAMSULOSIN HYDROCHLORIDE 0.4 MILLIGRAM(S): 0.4 CAPSULE ORAL at 22:16

## 2020-06-21 RX ADMIN — CEFEPIME 100 MILLIGRAM(S): 1 INJECTION, POWDER, FOR SOLUTION INTRAMUSCULAR; INTRAVENOUS at 07:47

## 2020-06-21 RX ADMIN — Medication 50 GRAM(S): at 02:24

## 2020-06-21 RX ADMIN — SODIUM CHLORIDE 75 MILLILITER(S): 9 INJECTION INTRAMUSCULAR; INTRAVENOUS; SUBCUTANEOUS at 13:07

## 2020-06-21 RX ADMIN — AMLODIPINE BESYLATE 2.5 MILLIGRAM(S): 2.5 TABLET ORAL at 06:34

## 2020-06-21 RX ADMIN — Medication 650 MILLIGRAM(S): at 07:16

## 2020-06-21 RX ADMIN — CARVEDILOL PHOSPHATE 12.5 MILLIGRAM(S): 80 CAPSULE, EXTENDED RELEASE ORAL at 06:34

## 2020-06-21 RX ADMIN — Medication 975 MILLIGRAM(S): at 00:33

## 2020-06-21 RX ADMIN — TACROLIMUS 0.5 MILLIGRAM(S): 5 CAPSULE ORAL at 18:17

## 2020-06-21 RX ADMIN — CEFEPIME 100 MILLIGRAM(S): 1 INJECTION, POWDER, FOR SOLUTION INTRAMUSCULAR; INTRAVENOUS at 20:00

## 2020-06-21 NOTE — H&P ADULT - PROBLEM SELECTOR PLAN 4
-s/p renal transplant.  Uncertain immunosuppression regimen  -F/u med rec (email sent overnight) -Continue home amlodipine and carvedilol with hold parameters

## 2020-06-21 NOTE — H&P ADULT - PROBLEM SELECTOR PLAN 6
Improve score 1 for age.  Lovenox for ppx. -Uncertain current meds - [facility] confirmed some meds but not immunosuppression regimen.  -Emailed med rec pharmacist.  F/u formal med rec in the daytime. EKG with QTc 521  Avoid QT prolonging meds

## 2020-06-21 NOTE — CONSULT NOTE ADULT - ASSESSMENT
70 yo M with a history of HTN, prior ESRD s/p nephrectomy and transplant, prostate cancer s/p radiation who presents for fevers, dysuria  Fever, leukocytosis  Renal transplant  CXR clear, cardiac device, I reviewed personally  UA positive, UCX pending  BCX pending  Symptomatic with dysuria, fever  Overall,  1) UTI in setting renal transplant  - Cefepime 1g q 12, close monitor renal function  - F/U pending cultures  - DC planning depending on culture results  2) Leukocytosis  - Trend to normal  - Monitor for alternate signs infection  3) Fevers  - F/U pending BCXs    Donny Barone MD  Pager [phone]  After 5pm and on weekends call [phone]

## 2020-06-21 NOTE — H&P ADULT - HISTORY OF PRESENT ILLNESS
Zana Shepard is a 69 year old man with a history of HTN, prior ESRD s/p nephrectomy and transplant, prostate cancer s/p radiation who presents for fevers, dysuria.    He reports he has had UTIs in the past, last around 7-8 months ago.  About 2 days ago, he noticed burning with urination, hesitancy, and urinary frequency.  He endorsed mild subjective fevers and chills as well, but denies any chest pain, nausea, vomiting, diarrhea.  His symptoms remained largely stable without worsening.  Due to these symptoms, he presented to [facility] ED for evaluation.    In the ED, he was febrile to 101 with otherwise normal vital signs.  Diagnostics revealed a WBC of 20, Na 129.  He was given cefe, mag, and apap and admitted for further management.    On evaluation, he gave the above history.  He did not know his home medications but did report filling his 4 prescriptions at [facility].  Called a 24 hr [facility] at [phone] and was informed that his current prescriptions are amlodipine 2.5, ergocalciferol 50k qw, tamsulosin 0.4, and carvedilol 12.5 bid.

## 2020-06-21 NOTE — CONSULT NOTE ADULT - SUBJECTIVE AND OBJECTIVE BOX
HPI:  Zana Shepard is a 69 year old man with a history of HTN, prior ESRD s/p nephrectomy and transplant, prostate cancer s/p radiation who presents for fevers, dysuria.    He reports he has had UTIs in the past, last around 7-8 months ago.  About 2 days ago, he noticed burning with urination, hesitancy, and urinary frequency.  He endorsed mild subjective fevers and chills as well, but denies any chest pain, nausea, vomiting, diarrhea.  His symptoms remained largely stable without worsening.  Due to these symptoms, he presented to [facility] ED for evaluation.    Nephrology consulted for kidney txp management.    The patient has hx of ESRD and was on HD, still has a LUE AVF, now s/p DDKT in 2017 at [facility], He sees Dr. Estelle Childers in [place]      PAST MEDICAL & SURGICAL HISTORY:  Prostate cancer  Renal insufficiency  HTN (hypertension)  Renal transplant, status post      MEDICATIONS  (STANDING):  amLODIPine   Tablet 2.5 milliGRAM(s) Oral daily  carvedilol 12.5 milliGRAM(s) Oral every 12 hours  cefepime   IVPB 2000 milliGRAM(s) IV Intermittent every 8 hours  enoxaparin Injectable 40 milliGRAM(s) SubCutaneous daily  ergocalciferol 45812 Unit(s) Oral <User Schedule>  tamsulosin 0.4 milliGRAM(s) Oral at bedtime      Allergies    No Known Allergies    Intolerances        SOCIAL HISTORY:  Denies ETOh,Smoking,     FAMILY HISTORY:  FH: diabetes mellitus      REVIEW OF SYSTEMS:    as per HPI  All other review of systems is negative unless indicated above.    VITAL:  T(C): , Max: 38.3 (20 @ 23:06)  T(F): , Max: 101 (20 @ 23:06)  HR: 86 (20 @ 06:33)  BP: 144/63 (20 @ 06:33)  BP(mean): --  RR: 16 (20 @ 06:33)  SpO2: 100% (20 @ 06:33)  Wt(kg): --    I and O's:     @ 07:01  -   @ 07:00  --------------------------------------------------------  IN: 0 mL / OUT: 50 mL / NET: -50 mL     @ 07:01  -   @ 12:28  --------------------------------------------------------  IN: 50 mL / OUT: 300 mL / NET: -250 mL      Height (cm): 162.6 ( @ 03:41)  Weight (kg): 65.8 ( @ 03:41)  BMI (kg/m2): 24.9 ( @ 03:41)  BSA (m2): 1.71 ( @ 03:41)    PHYSICAL EXAM:    Constitutional: NAD  HEENT: PERRLA, EOMI,  MMM  Neck: No LAD, No JVD  Respiratory: CTAB  Cardiovascular: S1 and S2  Gastrointestinal: BS+, soft, NT/ND  Extremities: No peripheral edema  Neurological: A/O x 3, no focal deficits  Psychiatric: Normal mood, normal affect  : No Block  Access: DAVIDE AVF + thrill     LABS:                        13.8   16.73 )-----------( 135      ( 2020 08:45 )             41.9         128<L>  |  94<L>  |  25<H>  ----------------------------<  126<H>  4.2   |  21<L>  |  1.19    Ca    8.7      2020 08:45  Phos  2.9       Mg     1.9         TPro  7.4  /  Alb  4.0  /  TBili  1.1  /  DBili  x   /  AST  15  /  ALT  9   /  AlkPhos  72        Urine Studies:  Urinalysis Basic - ( 2020 02:50 )    Color: LIGHT ORANGE / Appearance: TURBID / S.016 / pH: 6.5  Gluc: NEGATIVE / Ketone: TRACE  / Bili: NEGATIVE / Urobili: NORMAL   Blood: MODERATE / Protein: 300 / Nitrite: NEGATIVE   Leuk Esterase: LARGE / RBC: 26-50 / WBC >50   Sq Epi: FEW / Non Sq Epi: x / Bacteria: MODERATE      Osmolality, Random Urine: 380 mosmo/kg ( @ 04:01)        RADIOLOGY & ADDITIONAL STUDIES:        ASSESSMENT:    Zana Shepard is a 69 year old man with a history of HTN, prior ESRD s/p nephrectomy and transplant, prostate cancer s/p radiation who presents with possible Urosepsis      - ESRD s/p DDKT- baseline s.cr ~ 1.0 mg/dl  	The patient does not know the immunosuppressant medications that he takes but he said that he takes his meds at home. His son in law will bring his meds  - Hypertension controlled  - Volume status acceptable  - Hyponatremia     PLAN:   - change Cefepime to 1 g IV BID  - NS at 75 cc/hr x 12 hours  - BMP in AM   - restart immunosuppressant medications once they become available  - Renal dosing of meds to creatinine clearance of 50 ml/min  - Avoid nephrotoxins as able  - Follow Blood and urine C&S  - ? DC in AM     Admit to Dr. Yoel Melgar    Thank you for the courtesy of the referral    Radha Braswell MD  [facility]  Cell: [phone]

## 2020-06-21 NOTE — ED PROVIDER NOTE - OBJECTIVE STATEMENT
[id number]  ID    70 yo M c PMH of HTN, ESRD (s/p R nephrectomy and transplant in 2017 at [facility]) p/w 1 day h/o urinary dribbling, hesitancy, dysuria, and fever. positive hx of sx in past due to UTIs. does not know home meds.

## 2020-06-21 NOTE — CONSULT NOTE ADULT - SUBJECTIVE AND OBJECTIVE BOX
"HPI: Zana Shepard is a 69 year old man with a history of HTN, prior ESRD s/p nephrectomy and transplant, prostate cancer s/p radiation who presents for fevers, dysuria.    He reports he has had UTIs in the past, last around 7-8 months ago.  About 2 days ago, he noticed burning with urination, hesitancy, and urinary frequency.  He endorsed mild subjective fevers and chills as well, but denies any chest pain, nausea, vomiting, diarrhea.  His symptoms remained largely stable without worsening.  Due to these symptoms, he presented to [facility] ED for evaluation.    In the ED, he was febrile to 101 with otherwise normal vital signs.  Diagnostics revealed a WBC of 20, Na 129.  He was given cefe, mag, and apap and admitted for further management.    On evaluation, he gave the above history.  He did not know his home medications but did report filling his 4 prescriptions at [facility].  Called a 24 hr [facility] at [phone] and was informed that his current prescriptions are amlodipine 2.5, ergocalciferol 50k qw, tamsulosin 0.4, and carvedilol 12.5 bid. (2020 02:47)"    Above reviewed. Saw/spoke to patient. Patient with history of ESRD prior HD, s/p transplant 2.5 years ago on immunosuppressives, presenting with fevers and dysuria. Patient states has had pain on urination over past 2-3 days. Last episode was 7-8 months ago. Does not get frequent UTIs. No pain at graft site. No recent change in immunosuppressives. No cough/sob. No other new complaints. ID called for further eval.    PAST MEDICAL & SURGICAL HISTORY:  Prostate cancer  Renal insufficiency  HTN (hypertension)  Renal transplant, status post    Allergies    No Known Allergies    ANTIMICROBIALS:  cefepime   IVPB 1000 every 12 hours    OTHER MEDS:  acetaminophen   Tablet .. 650 milliGRAM(s) Oral every 6 hours PRN  amLODIPine   Tablet 2.5 milliGRAM(s) Oral daily  carvedilol 12.5 milliGRAM(s) Oral every 12 hours  enoxaparin Injectable 40 milliGRAM(s) SubCutaneous daily  ergocalciferol 72454 Unit(s) Oral <User Schedule>  sodium chloride 0.9%. 1000 milliLiter(s) IV Continuous <Continuous>  tamsulosin 0.4 milliGRAM(s) Oral at bedtime    SOCIAL HISTORY: No tobacco, no alcohol, no illicit drugs    FAMILY HISTORY:  FH: diabetes mellitus    Drug Dosing Weight  Height (cm): 162.6 (2020 03:41)  Weight (kg): 65.8 (2020 03:41)  BMI (kg/m2): 24.9 (2020 03:41)  BSA (m2): 1.71 (2020 03:41)    PE:    Vital Signs Last 24 Hrs  T(C): 37.4 (2020 08:29), Max: 38.3 (2020 23:06)  T(F): 99.3 (2020 08:29), Max: 101 (2020 23:06)  HR: 86 (2020 06:33) (62 - 87)  BP: 144/63 (2020 06:33) (125/66 - 148/72)  RR: 16 (2020 06:33) (16 - 17)  SpO2: 100% (2020 06:33) (97% - 100%)    Gen: AOx3, NAD, non-toxic  CV: S1+S2 normal, tachycardic  Resp: Clear bilat, no resp distress, no crackles/wheezes  Abd: Soft, nontender, +BS  Ext: No LE edema, no wounds  : No pain to palpation of graft site  IV/Skin: No thrombophlebitis, no rash  Msk: No low back pain, no arthralgias, no joint swelling  Neuro: No sensory deficits, no motor deficits    LABS:                        13.8   16.73 )-----------( 135      ( 2020 08:45 )             41.9     06-21    128<L>  |  94<L>  |  25<H>  ----------------------------<  126<H>  4.2   |  21<L>  |  1.19    Ca    8.7      2020 08:45  Phos  2.9     06-  Mg     1.9     06-    TPro  7.4  /  Alb  4.0  /  TBili  1.1  /  DBili  x   /  AST  15  /  ALT  9   /  AlkPhos  72  -21    Urinalysis Basic - ( 2020 02:50 )    Color: LIGHT ORANGE / Appearance: TURBID / S.016 / pH: 6.5  Gluc: NEGATIVE / Ketone: TRACE  / Bili: NEGATIVE / Urobili: NORMAL   Blood: MODERATE / Protein: 300 / Nitrite: NEGATIVE   Leuk Esterase: LARGE / RBC: 26-50 / WBC >50   Sq Epi: FEW / Non Sq Epi: x / Bacteria: MODERATE    MICROBIOLOGY:    No new available    RADIOLOGY:     XR:    IMPRESSION: Right sided cardiac device with the leads projecting over the right atrium and right ventricle.    Cardiomegaly.    Lungs are clear. No pneumothorax.

## 2020-06-21 NOTE — ED PROVIDER NOTE - CARDIOVASCULAR [-], MLM
Operative Report


Operative Date


Jun 28, 2017.





Pre-Operative Diagnosis





Right Hip Avascular Necrosis





Post-Operative Diagnosis


right hip same





Procedure(s) Performed


Right hip total hip arthroplasty using Depuy implants





Surgeon


Dr. Mason





Assistant Surgeon(s)


ZINA Strong





Estimated Blood Loss


100 ml





Findings


See Dr. Mason's operative report





Fluids


1800cc





Specimens





A. Right Femoral Head





Drains


none





Anesthesia


spinal





Complication(s)


None





Disposition


Recovery Room / PACU





Description of Procedure


This 51-year-old white male presented the office of complaints of right hip 

pain.  X-ray and MRI were obtained confirming AVN of the right hip.  He elected 

to proceed with surgical intervention in hopes of alleviating his pain.





Operation: Patient was a  a spinal anesthetic and then taken to the 

operating room where he was given sedation.  He was prepped and draped in usual 

sterile fashion.  Please see Dr. Mason's operative report for specifics 

of the procedure.  I was present for the entire case from an initial patient 

positioning through final wound closure.  Assistance was provided in tissue 

retraction, hemostasis, trial implant placement, final implant placement, and 

final wound closure.  Patient was taken to the recovery room in satisfactory 

condition.


I attest to the content of the Intraoperative Record and any orders documented 

therein.  Any exceptions are noted below. no chest pain/no peripheral edema

## 2020-06-21 NOTE — H&P ADULT - PROBLEM SELECTOR PLAN 2
-Febrile to 101 and with WBC 20.5 with suspected urinary source of infection meeting criteria for sepsis  -Clinically appears well without tachycardia or hypotension to suggest hypovolemia  -F/u cultures, empiric cefepime to start  -Obtain CXR

## 2020-06-21 NOTE — H&P ADULT - ASSESSMENT
69 year old man with a history of HTN, prior ESRD s/p nephrectomy and transplant, prostate cancer s/p radiation who presents for fevers, dysuria.

## 2020-06-21 NOTE — ED PROVIDER NOTE - CLINICAL SUMMARY MEDICAL DECISION MAKING FREE TEXT BOX
68 yo M c PMH of HTN, ESRD (s/p R nephrectomy and transplant in 2017 at [facility]) p/w 1 day h/o urinary dribbling, hesitancy, dysuria, and fever. On exam, [id number] VS otherwise wnl, no remarkable exam findings. concern for uti/pyelo/transplant rejection. will obtain labs/ua/ucx/bcs. will give cefepime/vanc/tylenol. nephrology paged awaiting callback. will reassess.

## 2020-06-21 NOTE — H&P ADULT - PROBLEM SELECTOR PLAN 5
-Uncertain current meds - [facility] confirmed some meds but not immunosuppression regimen.  -Emailed med rec pharmacist.  F/u formal med rec in the daytime. -s/p renal transplant.  Uncertain immunosuppression regimen  -F/u med rec (email sent overnight)

## 2020-06-21 NOTE — H&P ADULT - NSHPREVIEWOFSYSTEMS_GEN_ALL_CORE
ROS:  Constitutional:  (+) fevers, chills,(-)  sweats, unintentional weight loss, fatigue, sick contacts, recent travel, trauma  Ears/Nose/Mouth/Throat: (+) none; (-) throat pain, rhinorrhea, dysphagia/odynophagia  CV: (+) none; (-) chest pain, palpitations, lower extremity edema, orthopnea, PND  Resp: (+) none; (-) shortness of breath, cough  GI: (+) none; (-) abdominal pain, nausea, vomiting, diarrhea, constipation, melana, hematochezia  : (+) dysuria, hesitancy, frequency (-)  hematuria  MSK: (+) none; (-) joint pain, joint swelling  Skin: (+) none; (-) rash, new yellowing/darkening of skin  Neuro: (+) none; (-) HA, vision changes, weakness, confusion, lightheadedness/dizziness  Endo: (+) none; (-) heat/cold intolerance, recent skin/hair/nail changes  Heme/Lymph: (+) none; (-) swollen lymph nodes, night sweats

## 2020-06-21 NOTE — ED ADULT NURSE NOTE - OBJECTIVE STATEMENT
Pt presents to RM 17 AO4 primarily Bulgarian speaking only but able to speak and understand english. Pt with PMH of prostate CA as well as Kidney transplant in 2017. Pt presents with urinary dribbling, hesitancy, dysuria, as well as fever. Pt denies any other complaints, denies any dizziness, lightheadedness, headache, chest pain sob or diff breathing. 18g IV placed to R wrist, labs drawn and sent and medicated as per EMR. Pt vitals as documented and medicated as per EMR. Appears in no obv distress at this time, will cont to monitor.

## 2020-06-21 NOTE — H&P ADULT - NSHPLABSRESULTS_GEN_ALL_CORE
Diagnostics reviewed and remarkable for:  WBC 20.50, INR 1.42, PT 16.4, Na 129, Cl 95, Bicarb 21, Mg 1.5 Diagnostics reviewed and remarkable for:  WBC 20.50, INR 1.42, PT 16.4, Na 129, Cl 95, Bicarb 21, Mg 1.5  EKG personally reviewed and v-paced w/ L bundle appearance.  Neg sgarbossa.  BCx, UA/UCx, COVID pending Diagnostics reviewed and remarkable for:  WBC 20.50, INR 1.42, PT 16.4, Na 129, Cl 95, Bicarb 21, Mg 1.5  EKG personally reviewed and v-paced w/ L bundle appearance.  Neg sgarbossa.  QTc 521  BCx, UA/UCx, COVID pending

## 2020-06-21 NOTE — H&P ADULT - PROBLEM SELECTOR PLAN 3
-Continue home amlodipine and carvedilol with hold parameters Hyponatremic to 129 on presentation  -Does not appear volume overloaded on exam  -Recheck with next set of labs and also check urine/serum osm and urine Na Hyponatremic to 129 on presentation  -Does not appear volume overloaded on exam  -Recheck with next set of labs and also check urine/serum osm and urine Na  -Monitor I&Os

## 2020-06-21 NOTE — PHARMACOTHERAPY INTERVENTION NOTE - COMMENTS
Medication history is complete. Medication list updated in Outpatient Medication Record (OMR). Medication history obtained from patient at bedside who had physical prescription vials. Please call spectra [phone] if you have any questions.

## 2020-06-21 NOTE — ED ADULT NURSE NOTE - CHIEF COMPLAINT QUOTE
pt primarily Luxembourger speaking but able to understand english,  [id number]. pt c/o difficulty urinating  and fever since yesterday. pt states he has been dribbling with some dysuria. PMH htn, prostate CA 4 years ago, kidney transplant 2 years ago and left not used AV fistula. pt noted to febrile to 101 and hasn't taking medication for it. No complaints of chest pain, headache, nausea, dizziness, vomiting  SOB verbalized. Pt denies any abd pain or pressure.

## 2020-06-21 NOTE — H&P ADULT - PROBLEM SELECTOR PLAN 7
Improve score 1 for age.  Lovenox for ppx. -Uncertain current meds - [facility] confirmed some meds but not immunosuppression regimen.  -Emailed med rec pharmacist.  F/u formal med rec in the daytime.

## 2020-06-21 NOTE — H&P ADULT - PROBLEM SELECTOR PLAN 1
-Symptoms of dysuria suggestive of urinary tract infection.  No CVA tenderness to suggest pyelo but does have fevers and elevated WBC  -UA/UCx pending  -Will continue cefepime empirically given s/p transplant and immunosuppression   -F/u cultures to narrow antibiotics

## 2020-06-21 NOTE — H&P ADULT - NSHPPHYSICALEXAM_GEN_ALL_CORE
Physical exam:  Vital signs reviewed as below:  T(C): 37.7 (06-21-20 @ 00:38), Max: 38.3 (06-20-20 @ 23:06)  HR: 73 (06-21-20 @ 00:38) (73 - 87)  BP: 147/59 (06-21-20 @ 00:38) (147/59 - 148/72)  RR: 17 (06-21-20 @ 00:38) (16 - 17)  SpO2: 98% (06-21-20 @ 00:38) (98% - 99%)  Constitutional-awake, alert, not in acute distress  Neuro-awake, alert, appropriately interactive, moving all extremities, face symmetric  Eyes-pupils equally round and reactive to light, non icteric, no discharge noted  Ears, nose, mouth, throat-no abnormal discharge, moist mucous membranes, oropharynx clear without noted exudate  CV-regular rate and rhythm, no rubs, murmurs, gallops appreciated, no lower extremity edema  Resp-clear to auscultation bilaterally, normal respiratory effort,   GI-abdomen soft and nontender, no rebound or guarding present  -no CVA tenderness, no suprapubic tenderness  MSK-normal range of motion of bilateral elbows and knees, no obvious deformities   Skin-warm, dry, no rash appreciated  Psych-calm, cooperative, normal affect, not attending to internal stimuli

## 2020-06-22 ENCOUNTER — APPOINTMENT (OUTPATIENT)
Dept: ELECTROPHYSIOLOGY | Facility: CLINIC | Age: 70
End: 2020-06-22

## 2020-06-22 LAB
ANION GAP SERPL CALC-SCNC: 13 MMO/L — SIGNIFICANT CHANGE UP (ref 7–14)
BUN SERPL-MCNC: 28 MG/DL — HIGH (ref 7–23)
CALCIUM SERPL-MCNC: 8.5 MG/DL — SIGNIFICANT CHANGE UP (ref 8.4–10.5)
CHLORIDE SERPL-SCNC: 95 MMOL/L — LOW (ref 98–107)
CO2 SERPL-SCNC: 19 MMOL/L — LOW (ref 22–31)
CREAT SERPL-MCNC: 1.16 MG/DL — SIGNIFICANT CHANGE UP (ref 0.5–1.3)
GLUCOSE SERPL-MCNC: 98 MG/DL — SIGNIFICANT CHANGE UP (ref 70–99)
HCT VFR BLD CALC: 39.8 % — SIGNIFICANT CHANGE UP (ref 39–50)
HCV AB S/CO SERPL IA: 0.1 S/CO — SIGNIFICANT CHANGE UP (ref 0–0.99)
HCV AB SERPL-IMP: SIGNIFICANT CHANGE UP
HGB BLD-MCNC: 12.9 G/DL — LOW (ref 13–17)
LACTATE SERPL-SCNC: 0.9 MMOL/L — SIGNIFICANT CHANGE UP (ref 0.5–2)
MAGNESIUM SERPL-MCNC: 1.9 MG/DL — SIGNIFICANT CHANGE UP (ref 1.6–2.6)
MCHC RBC-ENTMCNC: 30.1 PG — SIGNIFICANT CHANGE UP (ref 27–34)
MCHC RBC-ENTMCNC: 32.4 % — SIGNIFICANT CHANGE UP (ref 32–36)
MCV RBC AUTO: 93 FL — SIGNIFICANT CHANGE UP (ref 80–100)
NRBC # FLD: 0 K/UL — SIGNIFICANT CHANGE UP (ref 0–0)
PHOSPHATE SERPL-MCNC: 2.6 MG/DL — SIGNIFICANT CHANGE UP (ref 2.5–4.5)
PLATELET # BLD AUTO: 147 K/UL — LOW (ref 150–400)
PMV BLD: 10 FL — SIGNIFICANT CHANGE UP (ref 7–13)
POTASSIUM SERPL-MCNC: 4.2 MMOL/L — SIGNIFICANT CHANGE UP (ref 3.5–5.3)
POTASSIUM SERPL-SCNC: 4.2 MMOL/L — SIGNIFICANT CHANGE UP (ref 3.5–5.3)
RBC # BLD: 4.28 M/UL — SIGNIFICANT CHANGE UP (ref 4.2–5.8)
RBC # FLD: 12.7 % — SIGNIFICANT CHANGE UP (ref 10.3–14.5)
SODIUM SERPL-SCNC: 127 MMOL/L — LOW (ref 135–145)
WBC # BLD: 15.72 K/UL — HIGH (ref 3.8–10.5)
WBC # FLD AUTO: 15.72 K/UL — HIGH (ref 3.8–10.5)

## 2020-06-22 PROCEDURE — 99232 SBSQ HOSP IP/OBS MODERATE 35: CPT

## 2020-06-22 PROCEDURE — 93010 ELECTROCARDIOGRAM REPORT: CPT

## 2020-06-22 RX ADMIN — TAMSULOSIN HYDROCHLORIDE 0.4 MILLIGRAM(S): 0.4 CAPSULE ORAL at 21:39

## 2020-06-22 RX ADMIN — MYCOPHENOLATE MOFETIL 500 MILLIGRAM(S): 250 CAPSULE ORAL at 06:17

## 2020-06-22 RX ADMIN — ERGOCALCIFEROL 50000 UNIT(S): 1.25 CAPSULE ORAL at 08:02

## 2020-06-22 RX ADMIN — CEFEPIME 100 MILLIGRAM(S): 1 INJECTION, POWDER, FOR SOLUTION INTRAMUSCULAR; INTRAVENOUS at 08:02

## 2020-06-22 RX ADMIN — TACROLIMUS 0.5 MILLIGRAM(S): 5 CAPSULE ORAL at 17:15

## 2020-06-22 RX ADMIN — CEFEPIME 100 MILLIGRAM(S): 1 INJECTION, POWDER, FOR SOLUTION INTRAMUSCULAR; INTRAVENOUS at 21:39

## 2020-06-22 RX ADMIN — CARVEDILOL PHOSPHATE 12.5 MILLIGRAM(S): 80 CAPSULE, EXTENDED RELEASE ORAL at 17:15

## 2020-06-22 RX ADMIN — CARVEDILOL PHOSPHATE 12.5 MILLIGRAM(S): 80 CAPSULE, EXTENDED RELEASE ORAL at 06:17

## 2020-06-22 RX ADMIN — ENOXAPARIN SODIUM 40 MILLIGRAM(S): 100 INJECTION SUBCUTANEOUS at 12:36

## 2020-06-22 RX ADMIN — MYCOPHENOLATE MOFETIL 500 MILLIGRAM(S): 250 CAPSULE ORAL at 17:15

## 2020-06-22 RX ADMIN — TACROLIMUS 0.5 MILLIGRAM(S): 5 CAPSULE ORAL at 06:17

## 2020-06-22 RX ADMIN — AMLODIPINE BESYLATE 2.5 MILLIGRAM(S): 2.5 TABLET ORAL at 06:17

## 2020-06-22 NOTE — PROGRESS NOTE ADULT - SUBJECTIVE AND OBJECTIVE BOX
CC: F/U for UTI    Saw/spoke to patient. No fevers, no chills. No new complaints. Feels improved.    Allergies  No Known Allergies    ANTIMICROBIALS:  cefepime   IVPB 1000 every 12 hours    PE:    Vital Signs Last 24 Hrs  T(C): 36.9 (2020 14:01), Max: 37.4 (2020 22:18)  T(F): 98.4 (2020 14:01), Max: 99.4 (2020 22:18)  HR: 65 (2020 14:01) (65 - 83)  BP: 113/57 (2020 14:01) (96/40 - 132/60)  BP(mean): 82 (2020 18:31) (82 - 82)  RR: 16 (2020 14:) (16 - 18)  SpO2: 99% (2020 14:) (97% - 99%)    Gen: AOx3, NAD, non-toxic  CV: S1+S2 normal, nontachycardic  Resp: Clear bilat, no resp distress, no crackles/wheezes  Abd: Soft, nontender, +BS  Ext: No LE edema, no wounds    LABS:                        12.9   15.72 )-----------( 147      ( 2020 06:29 )             39.8     06-22    127<L>  |  95<L>  |  28<H>  ----------------------------<  98  4.2   |  19<L>  |  1.16    Ca    8.5      2020 06:20  Phos  2.6     06-22  Mg     1.9     06-22    TPro  7.4  /  Alb  4.0  /  TBili  1.1  /  DBili  x   /  AST  15  /  ALT  9   /  AlkPhos  72  06-21    Urinalysis Basic - ( 2020 02:50 )    Color: LIGHT ORANGE / Appearance: TURBID / S.016 / pH: 6.5  Gluc: NEGATIVE / Ketone: TRACE  / Bili: NEGATIVE / Urobili: NORMAL   Blood: MODERATE / Protein: 300 / Nitrite: NEGATIVE   Leuk Esterase: LARGE / RBC: 26-50 / WBC >50   Sq Epi: FEW / Non Sq Epi: x / Bacteria: MODERATE    MICROBIOLOGY:    .Urine Clean Catch (Midstream)  20   >100,000 CFU/ml Gram Negative Rods    .Blood Blood-Peripheral  20   No growth to date.    .Blood Blood-Venous  20   No growth to date.    (otherwise reviewed)    RADIOLOGY:     XR:    IMPRESSION: Right sided cardiac device with the leads projecting over the right atrium and right ventricle.    Cardiomegaly.    Lungs are clear. No pneumothorax.

## 2020-06-22 NOTE — PROGRESS NOTE ADULT - ASSESSMENT
69 year old man with a history of HTN, prior ESRD s/p nephrectomy and transplant, prostate cancer s/p radiation who presents for fevers, dysuria.     Problem/Plan - 1:  ·  Problem: Acute cystitis without hematuria.  Plan: - Clinically better. Symptoms of dysuria suggestive of urinary tract infection.  No CVA tenderness to suggest pyelo but does have fevers and elevated WBC  -UA/UCx pending  -Will continue cefepime empirically given s/p transplant and immunosuppression   -F/u cultures to narrow antibiotics.      Problem/Plan - 2:  ·  Problem: Sepsis without acute organ dysfunction, due to unspecified organism.  Plan: -Resolving.   -Febrile to 101 and with WBC 20.5 with suspected urinary source of infection meeting criteria for sepsis  -Clinically appears well without tachycardia or hypotension to suggest hypovolemia  -F/u cultures, empiric cefepime to start  -Obtain CXR.      Problem/Plan - 3:  ·  Problem: Hyponatremia.  Plan: Hyponatremic to 129 on presentation  -Does not appear volume overloaded on exam  -Recheck with next set of labs and also check urine/serum osm and urine Na  -Monitor I&Os.      Problem/Plan - 4:  ·  Problem: Essential hypertension.  Plan: -Continue home amlodipine and carvedilol with hold parameters.      Problem/Plan - 5:  ·  Problem: Renal transplant recipient.  Plan: -s/p renal transplant.  Uncertain immunosuppression regimen  -F/u med rec (email sent overnight).      Problem/Plan - 6:  Problem: Prolonged QT interval.Plan: EKG with QTc 521  Avoid QT prolonging meds.     Problem/Plan - 7:  ·  Problem: Medication management. Plan: -Uncertain current meds - [facility] confirmed some meds but not immunosuppression regimen.  -Emailed med rec pharmacist.  F/u formal med rec in the daytime.     Problem/Plan - 8:  ·  Problem: Need for prophylactic measure.  Plan: Improve score 1 for age.  Lovenox for ppx.

## 2020-06-22 NOTE — PROGRESS NOTE ADULT - SUBJECTIVE AND OBJECTIVE BOX
INTERVAL HPI/OVERNIGHT EVENTS: I feel fine.   Vital Signs Last 24 Hrs  T(C): 37.1 (2020 17:13), Max: 37.4 (2020 22:18)  T(F): 98.8 (2020 17:13), Max: 99.4 (2020 22:18)  HR: 66 (2020 17:13) (65 - 83)  BP: 153/64 (2020 17:13) (96/40 - 153/64)  BP(mean): 82 (2020 18:31) (82 - 82)  RR: 16 (2020 17:13) (16 - 18)  SpO2: 100% (2020 17:13) (97% - 100%)  I&O's Summary    2020 07:01  -  2020 07:00  --------------------------------------------------------  IN: 1300 mL / OUT: 500 mL / NET: 800 mL      MEDICATIONS  (STANDING):  amLODIPine   Tablet 2.5 milliGRAM(s) Oral daily  carvedilol 12.5 milliGRAM(s) Oral every 12 hours  cefepime   IVPB 1000 milliGRAM(s) IV Intermittent every 12 hours  enoxaparin Injectable 40 milliGRAM(s) SubCutaneous daily  ergocalciferol 27895 Unit(s) Oral <User Schedule>  mycophenolate mofetil 500 milliGRAM(s) Oral two times a day  sodium chloride 0.9%. 1000 milliLiter(s) (75 mL/Hr) IV Continuous <Continuous>  tacrolimus 0.5 milliGRAM(s) Oral two times a day  tamsulosin 0.4 milliGRAM(s) Oral at bedtime    MEDICATIONS  (PRN):  acetaminophen   Tablet .. 650 milliGRAM(s) Oral every 6 hours PRN Temp greater or equal to 38C (100.4F), Mild Pain (1 - 3), Moderate Pain (4 - 6), Severe Pain (7 - 10)    LABS:                        12.9   15.72 )-----------( 147      ( 2020 06:29 )             39.8     06-22    127<L>  |  95<L>  |  28<H>  ----------------------------<  98  4.2   |  19<L>  |  1.16    Ca    8.5      2020 06:20  Phos  2.6       Mg     1.9         TPro  7.4  /  Alb  4.0  /  TBili  1.1  /  DBili  x   /  AST  15  /  ALT  9   /  AlkPhos  72  06-21    PT/INR - ( 2020 00:26 )   PT: 16.4 SEC;   INR: 1.42          PTT - ( 2020 00:26 )  PTT:31.7 SEC  Urinalysis Basic - ( 2020 02:50 )    Color: LIGHT ORANGE / Appearance: TURBID / S.016 / pH: 6.5  Gluc: NEGATIVE / Ketone: TRACE  / Bili: NEGATIVE / Urobili: NORMAL   Blood: MODERATE / Protein: 300 / Nitrite: NEGATIVE   Leuk Esterase: LARGE / RBC: 26-50 / WBC >50   Sq Epi: FEW / Non Sq Epi: x / Bacteria: MODERATE      CAPILLARY BLOOD GLUCOSE            Urinalysis Basic - ( 2020 02:50 )    Color: LIGHT ORANGE / Appearance: TURBID / S.016 / pH: 6.5  Gluc: NEGATIVE / Ketone: TRACE  / Bili: NEGATIVE / Urobili: NORMAL   Blood: MODERATE / Protein: 300 / Nitrite: NEGATIVE   Leuk Esterase: LARGE / RBC: 26-50 / WBC >50   Sq Epi: FEW / Non Sq Epi: x / Bacteria: MODERATE      REVIEW OF SYSTEMS:  CONSTITUTIONAL: No fever, weight loss, or fatigue  EYES: No eye pain, visual disturbances, or discharge  ENMT:  No difficulty hearing, tinnitus, vertigo; No sinus or throat pain  NECK: No pain or stiffness  RESPIRATORY: No cough, wheezing, chills or hemoptysis; No shortness of breath  CARDIOVASCULAR: No chest pain, palpitations, dizziness, or leg swelling  GASTROINTESTINAL: No abdominal or epigastric pain. No nausea, vomiting, or hematemesis; No diarrhea or constipation. No melena or hematochezia.  GENITOURINARY: No dysuria, frequency, hematuria, or incontinence  NEUROLOGICAL: No headaches, memory loss, loss of strength, numbness, or tremors    RADIOLOGY & ADDITIONAL TESTS:    Consultant(s) Notes Reviewed:  [x ] YES  [ ] NO    PHYSICAL EXAM:  GENERAL: NAD, well-groomed, well-developed,not in any distress ,  HEAD:  Atraumatic, Normocephalic  EYES: EOMI, PERRLA, conjunctiva and sclera clear  ENMT: No tonsillar erythema, exudates, or enlargement; Moist mucous membranes, Good dentition, No lesions  NECK: Supple, No JVD, Normal thyroid  NERVOUS SYSTEM:  Alert & Oriented X3, No focal deficit   CHEST/LUNG: Good air entry bilateral with no  rales, rhonchi, wheezing, or rubs  HEART: Regular rate and rhythm; No murmurs, rubs, or gallops  ABDOMEN: Soft, Nontender, Nondistended; Bowel sounds present  EXTREMITIES:  2+ Peripheral Pulses, No clubbing, cyanosis, or edema  SKIN: No rashes or lesions    Care Discussed with Consultants/Other Providers [ x] YES  [ ] NO

## 2020-06-22 NOTE — PROGRESS NOTE ADULT - ASSESSMENT
Zana Shepard is a 69 year old man with a history of HTN, prior ESRD s/p nephrectomy and transplant, prostate cancer s/p radiation who presents with possible Urosepsis      - ESRD s/p DDKT- baseline s.cr ~ 1.0 mg/dl	   - Hypertension controlled  - Volume status acceptable  - Hyponatremia     PLAN:   - change Cefepime to 1 g IV BID  - NS at 75 cc/hr x 12 hours  - Renal dosing of meds to creatinine clearance of 50 ml/min  - Avoid nephrotoxins as able  - Follow Blood and urine C&S        [facility]  Cell: [phone] Zana Shepard is a 69 year old man with a history of HTN, prior ESRD s/p nephrectomy and transplant, prostate cancer s/p radiation who presents with possible Urosepsis/UTI       - ESRD s/p DDKT- baseline s.cr ~ 1.0 mg/dl	   - Hypertension controlled  - Volume status acceptable  - Hyponatremia     PLAN:   - change Cefepime to 1 g IV BID.  If blood cx remain negative then 7 days of Rx but still awaiting urine cx   - Off ivf now   - Renal dosing of meds to creatinine clearance of 50 ml/min  - Avoid nephrotoxins as able  - Follow  urine C&S        [facility]  Cell: [phone]

## 2020-06-22 NOTE — PROGRESS NOTE ADULT - SUBJECTIVE AND OBJECTIVE BOX
NEPHROLOGY-[facility] [phone]        Patient seen and examined in bed.  He was in good spirits         MEDICATIONS  (STANDING):  amLODIPine   Tablet 2.5 milliGRAM(s) Oral daily  carvedilol 12.5 milliGRAM(s) Oral every 12 hours  cefepime   IVPB 1000 milliGRAM(s) IV Intermittent every 12 hours  enoxaparin Injectable 40 milliGRAM(s) SubCutaneous daily  ergocalciferol 13599 Unit(s) Oral <User Schedule>  mycophenolate mofetil 500 milliGRAM(s) Oral two times a day  sodium chloride 0.9%. 1000 milliLiter(s) (75 mL/Hr) IV Continuous <Continuous>  tacrolimus 0.5 milliGRAM(s) Oral two times a day  tamsulosin 0.4 milliGRAM(s) Oral at bedtime      VITAL:  T(C): , Max: 37.4 (20 @ 08:29)  T(F): , Max: 99.4 (20 @ 22:18)  HR: 71 (20 @ 06:14)  BP: 132/60 (20 @ 06:14)  BP(mean): 82 (20 @ 18:31)  RR: 16 (20 @ 06:14)  SpO2: 97% (20 @ 06:14)  Wt(kg): --    I and O's:     @ 07:01  -   @ 07:00  --------------------------------------------------------  IN: 1300 mL / OUT: 500 mL / NET: 800 mL          PHYSICAL EXAM:    Constitutional: NAD  Neck:  No JVD  Respiratory: CTAB/L  Cardiovascular: S1 and S2  Gastrointestinal: BS+, soft, NT/ND  Extremities: No peripheral edema  Neurological: A/O x 3, no focal deficits  Psychiatric: Normal mood, normal affect  : No Block  Skin: No rashes  Access: Not applicable    LABS:                        12.9   15.72 )-----------( 147      ( 2020 06:29 )             39.8         127<L>  |  95<L>  |  28<H>  ----------------------------<  98  4.2   |  19<L>  |  1.16    Ca    8.5      2020 06:20  Phos  2.6       Mg     1.9         TPro  7.4  /  Alb  4.0  /  TBili  1.1  /  DBili  x   /  AST  15  /  ALT  9   /  AlkPhos  72            Urine Studies:  Urinalysis Basic - ( 2020 02:50 )    Color: LIGHT ORANGE / Appearance: TURBID / S.016 / pH: 6.5  Gluc: NEGATIVE / Ketone: TRACE  / Bili: NEGATIVE / Urobili: NORMAL   Blood: MODERATE / Protein: 300 / Nitrite: NEGATIVE   Leuk Esterase: LARGE / RBC: 26-50 / WBC >50   Sq Epi: FEW / Non Sq Epi: x / Bacteria: MODERATE      Osmolality, Random Urine: 380 mosmo/kg ( @ 04:01)        RADIOLOGY & ADDITIONAL STUDIES:

## 2020-06-22 NOTE — PROGRESS NOTE ADULT - ASSESSMENT
70 yo M with a history of HTN, prior ESRD s/p nephrectomy and transplant, prostate cancer s/p radiation who presents for fevers, dysuria  Fever, leukocytosis  Renal transplant  CXR clear, cardiac device, I reviewed personally  UA positive, UCX pending  BCX pending  Symptomatic with dysuria, fever; improving? But still leukocytosis  Overall,  1) UTI in setting renal transplant  - Cefepime 1g q 12, close monitor renal function  - F/U pending cultures  - DC planning depending on culture results  2) Leukocytosis  - Trend to normal  - Monitor for alternate signs infection  3) Fevers  - F/U pending BCXs    Donny Barone MD  Pager [phone]  After 5pm and on weekends call [phone]

## 2020-06-23 ENCOUNTER — TRANSCRIPTION ENCOUNTER (OUTPATIENT)
Age: 70
End: 2020-06-23

## 2020-06-23 VITALS
DIASTOLIC BLOOD PRESSURE: 71 MMHG | RESPIRATION RATE: 16 BRPM | OXYGEN SATURATION: 100 % | TEMPERATURE: 99 F | HEART RATE: 70 BPM | SYSTOLIC BLOOD PRESSURE: 160 MMHG

## 2020-06-23 LAB
-  AMIKACIN: SIGNIFICANT CHANGE UP
-  AMPICILLIN/SULBACTAM: SIGNIFICANT CHANGE UP
-  AMPICILLIN: SIGNIFICANT CHANGE UP
-  AZTREONAM: SIGNIFICANT CHANGE UP
-  CEFAZOLIN: SIGNIFICANT CHANGE UP
-  CEFEPIME: SIGNIFICANT CHANGE UP
-  CEFOXITIN: SIGNIFICANT CHANGE UP
-  CEFTRIAXONE: SIGNIFICANT CHANGE UP
-  CIPROFLOXACIN: SIGNIFICANT CHANGE UP
-  ERTAPENEM: SIGNIFICANT CHANGE UP
-  GENTAMICIN: SIGNIFICANT CHANGE UP
-  IMIPENEM: SIGNIFICANT CHANGE UP
-  LEVOFLOXACIN: SIGNIFICANT CHANGE UP
-  MEROPENEM: SIGNIFICANT CHANGE UP
-  NITROFURANTOIN: SIGNIFICANT CHANGE UP
-  PIPERACILLIN/TAZOBACTAM: SIGNIFICANT CHANGE UP
-  TIGECYCLINE: SIGNIFICANT CHANGE UP
-  TOBRAMYCIN: SIGNIFICANT CHANGE UP
-  TRIMETHOPRIM/SULFAMETHOXAZOLE: SIGNIFICANT CHANGE UP
ANION GAP SERPL CALC-SCNC: 14 MMO/L — SIGNIFICANT CHANGE UP (ref 7–14)
BUN SERPL-MCNC: 27 MG/DL — HIGH (ref 7–23)
CALCIUM SERPL-MCNC: 8.6 MG/DL — SIGNIFICANT CHANGE UP (ref 8.4–10.5)
CHLORIDE SERPL-SCNC: 96 MMOL/L — LOW (ref 98–107)
CO2 SERPL-SCNC: 20 MMOL/L — LOW (ref 22–31)
CREAT SERPL-MCNC: 0.84 MG/DL — SIGNIFICANT CHANGE UP (ref 0.5–1.3)
CULTURE RESULTS: SIGNIFICANT CHANGE UP
GLUCOSE SERPL-MCNC: 108 MG/DL — HIGH (ref 70–99)
HCT VFR BLD CALC: 37.8 % — LOW (ref 39–50)
HGB BLD-MCNC: 12.6 G/DL — LOW (ref 13–17)
MAGNESIUM SERPL-MCNC: 2 MG/DL — SIGNIFICANT CHANGE UP (ref 1.6–2.6)
MCHC RBC-ENTMCNC: 31 PG — SIGNIFICANT CHANGE UP (ref 27–34)
MCHC RBC-ENTMCNC: 33.3 % — SIGNIFICANT CHANGE UP (ref 32–36)
MCV RBC AUTO: 92.9 FL — SIGNIFICANT CHANGE UP (ref 80–100)
METHOD TYPE: SIGNIFICANT CHANGE UP
ORGANISM # SPEC MICROSCOPIC CNT: SIGNIFICANT CHANGE UP
ORGANISM # SPEC MICROSCOPIC CNT: SIGNIFICANT CHANGE UP
PHOSPHATE SERPL-MCNC: 2.6 MG/DL — SIGNIFICANT CHANGE UP (ref 2.5–4.5)
PLATELET # BLD AUTO: 139 K/UL — LOW (ref 150–400)
PMV BLD: 9.9 FL — SIGNIFICANT CHANGE UP (ref 7–13)
POTASSIUM SERPL-MCNC: 3.8 MMOL/L — SIGNIFICANT CHANGE UP (ref 3.5–5.3)
POTASSIUM SERPL-SCNC: 3.8 MMOL/L — SIGNIFICANT CHANGE UP (ref 3.5–5.3)
RBC # BLD: 4.07 M/UL — LOW (ref 4.2–5.8)
RBC # FLD: 12.7 % — SIGNIFICANT CHANGE UP (ref 10.3–14.5)
SODIUM SERPL-SCNC: 130 MMOL/L — LOW (ref 135–145)
SPECIMEN SOURCE: SIGNIFICANT CHANGE UP
WBC # BLD: 8.58 K/UL — SIGNIFICANT CHANGE UP (ref 3.8–10.5)
WBC # FLD AUTO: 8.58 K/UL — SIGNIFICANT CHANGE UP (ref 3.8–10.5)

## 2020-06-23 PROCEDURE — 76770 US EXAM ABDO BACK WALL COMP: CPT | Mod: 26

## 2020-06-23 PROCEDURE — 99232 SBSQ HOSP IP/OBS MODERATE 35: CPT

## 2020-06-23 RX ORDER — CIPROFLOXACIN LACTATE 400MG/40ML
500 VIAL (ML) INTRAVENOUS EVERY 12 HOURS
Refills: 0 | Status: DISCONTINUED | OUTPATIENT
Start: 2020-06-23 | End: 2020-06-23

## 2020-06-23 RX ORDER — CIPROFLOXACIN LACTATE 400MG/40ML
1 VIAL (ML) INTRAVENOUS
Qty: 14 | Refills: 0
Start: 2020-06-23 | End: 2020-06-29

## 2020-06-23 RX ADMIN — MYCOPHENOLATE MOFETIL 500 MILLIGRAM(S): 250 CAPSULE ORAL at 05:26

## 2020-06-23 RX ADMIN — MYCOPHENOLATE MOFETIL 500 MILLIGRAM(S): 250 CAPSULE ORAL at 17:25

## 2020-06-23 RX ADMIN — CEFEPIME 100 MILLIGRAM(S): 1 INJECTION, POWDER, FOR SOLUTION INTRAMUSCULAR; INTRAVENOUS at 08:09

## 2020-06-23 RX ADMIN — TACROLIMUS 0.5 MILLIGRAM(S): 5 CAPSULE ORAL at 17:26

## 2020-06-23 RX ADMIN — CARVEDILOL PHOSPHATE 12.5 MILLIGRAM(S): 80 CAPSULE, EXTENDED RELEASE ORAL at 17:25

## 2020-06-23 RX ADMIN — ENOXAPARIN SODIUM 40 MILLIGRAM(S): 100 INJECTION SUBCUTANEOUS at 12:17

## 2020-06-23 RX ADMIN — TACROLIMUS 0.5 MILLIGRAM(S): 5 CAPSULE ORAL at 05:26

## 2020-06-23 RX ADMIN — CARVEDILOL PHOSPHATE 12.5 MILLIGRAM(S): 80 CAPSULE, EXTENDED RELEASE ORAL at 05:26

## 2020-06-23 RX ADMIN — AMLODIPINE BESYLATE 2.5 MILLIGRAM(S): 2.5 TABLET ORAL at 05:26

## 2020-06-23 RX ADMIN — Medication 500 MILLIGRAM(S): at 17:25

## 2020-06-23 NOTE — DISCHARGE NOTE PROVIDER - HOSPITAL COURSE
69 year old man with a history of HTN, prior ESRD s/p nephrectomy and transplant, prostate cancer s/p radiation who presents for fevers, dysuria.         Problem/Plan - 1:    ·  Problem: Acute cystitis without hematuria.  Plan: - Clinically better. Symptoms of dysuria suggestive of urinary tract infection.  No CVA tenderness to suggest pyelo but does have fevers and elevated WBC    -UA/UCx pending    -Will continue cefepime empirically given s/p transplant and immunosuppression     -F/u cultures to narrow antibiotics.          Problem/Plan - 2:    ·  Problem: Sepsis without acute organ dysfunction, due to unspecified organism.  Plan: -Resolving.     -Febrile to 101 and with WBC 20.5 with suspected urinary source of infection meeting criteria for sepsis    -Clinically appears well without tachycardia or hypotension to suggest hypovolemia    -F/u cultures, empiric cefepime to start    -Obtain CXR.          Problem/Plan - 3:    ·  Problem: Hyponatremia.  Plan: Hyponatremic to 129 on presentation    -Does not appear volume overloaded on exam    -Recheck with next set of labs and also check urine/serum osm and urine Na    -Monitor I&Os.          Problem/Plan - 4:    ·  Problem: Essential hypertension.  Plan: -Continue home amlodipine and carvedilol with hold parameters.          Problem/Plan - 5:    ·  Problem: Renal transplant recipient.  Plan: -s/p renal transplant.  Uncertain immunosuppression regimen    -F/u med rec (email sent overnight).          Problem/Plan - 6:    Problem: Prolonged QT interval.Plan: EKG with QTc 521    Avoid QT prolonging meds.         Problem/Plan - 7:    ·  Problem: Medication management. Plan: -Uncertain current meds - [facility] confirmed some meds but not immunosuppression regimen.    -Emailed med rec pharmacist.  F/u formal med rec in the daytime. 69 year old man with a history of HTN, prior ESRD s/p nephrectomy and transplant, prostate cancer s/p radiation who presents for fevers, dysuria.        # Acute cystitis without hematuria    -Secondary to E coli ESBL    -Clinically improved.     -Symptoms of dysuria suggestive of urinary tract infection. No CVA tenderness to suggest pyelo but does have fevers and elevated WBC    -Will continue cefepime empirically given s/p transplant and immunosuppression     -UTI in setting renal transplant    -Cipro 500mg q 12 for 7 day course (range for transplant pyelo is typically long course, but would give short course considering rapid improvement)        # Sepsis without acute organ dysfunction, due to unspecified organism.     -Resolved. Secondary to E Coli ESBL.     -Febrile to 101 and with WBC 20.5 with suspected urinary source of infection meeting criteria for sepsis    -Clinically appears well without tachycardia or hypotension to suggest hypovolemia        # Hyponatremia    -Does not appear volume overloaded on exam    -Improved         # Essential hypertension    -Continue home amlodipine and carvedilol         # Renal transplant recipient    -Right Renal Transplant     -Uncertain immunosuppression regimen        # Prolonged QT interval.Plan: EKG with QTc 521    -Avoid QT prolonging medication         # Hydronephrosis    -No intervention        6/23: Case discussed with Dr. Melgar. Patient medically stable and optimized for discharge. Medications reviewed and sent to agreed upon pharmacy.

## 2020-06-23 NOTE — PROGRESS NOTE ADULT - ASSESSMENT
Zana Shepard is a 69 year old man with a history of HTN, prior ESRD s/p nephrectomy and transplant, prostate cancer s/p radiation who presents with  UTI (>[id number] GNR)      - ESRD s/p DDKT- baseline s.cr ~ 1.0 mg/dl	   - Hypertension controlled  - Volume status acceptable  - Hyponatremia       PLAN:   -  Cefepime to 1 g IV BID and awaiting urine sensitivities to switch to PO.    - Off ivf now   - Renal dosing of meds to creatinine clearance of 50 ml/min  - Avoid nephrotoxins as able  - Follow  urine C&S  -Renal sono ordered to [facility]  Cell: [phone]

## 2020-06-23 NOTE — DISCHARGE NOTE PROVIDER - NSDCCPCAREPLAN_GEN_ALL_CORE_FT
PRINCIPAL DISCHARGE DIAGNOSIS  Diagnosis: Fever  Assessment and Plan of Treatment: Secondary to E coli ESBL. Clinically improved. Cipro 500mg q 12 for 7 day course. Follow up with Primary Care Provider within 1-2 weeks      SECONDARY DISCHARGE DIAGNOSES  Diagnosis: Essential hypertension  Assessment and Plan of Treatment: Low sodium and fat diet, continue anti-hypertensive medications, and follow up with primary care physician.

## 2020-06-23 NOTE — DISCHARGE NOTE NURSING/CASE MANAGEMENT/SOCIAL WORK - PATIENT PORTAL LINK FT
You can access the FollowMyHealth Patient Portal offered by [facility] by registering at the following website: [email or web address]. By joining [facility]’s FollowMyHealth portal, you will also be able to view your health information using other applications (apps) compatible with our system.

## 2020-06-23 NOTE — DISCHARGE NOTE PROVIDER - CARE PROVIDER_API CALL
Primary Care Provider,   Follow up Primary Care Provider within 1-2 weeks  Phone: (   )    -  Fax: (   )    -  Follow Up Time:

## 2020-06-23 NOTE — PROGRESS NOTE ADULT - SUBJECTIVE AND OBJECTIVE BOX
INTERVAL HPI/OVERNIGHT EVENTS: NO  new concerns. I feel much better so want to go home.   Vital Signs Last 24 Hrs  T(C): 36.8 (23 Jun 2020 12:15), Max: 37.6 (22 Jun 2020 20:53)  T(F): 98.3 (23 Jun 2020 12:15), Max: 99.6 (22 Jun 2020 20:53)  HR: 67 (23 Jun 2020 12:15) (66 - 68)  BP: 138/70 (23 Jun 2020 12:15) (136/68 - 153/64)  BP(mean): --  RR: 16 (23 Jun 2020 12:15) (16 - 20)  SpO2: 100% (23 Jun 2020 12:15) (100% - 100%)  I&O's Summary    MEDICATIONS  (STANDING):  amLODIPine   Tablet 2.5 milliGRAM(s) Oral daily  carvedilol 12.5 milliGRAM(s) Oral every 12 hours  ciprofloxacin     Tablet 500 milliGRAM(s) Oral every 12 hours  enoxaparin Injectable 40 milliGRAM(s) SubCutaneous daily  ergocalciferol 12653 Unit(s) Oral <User Schedule>  mycophenolate mofetil 500 milliGRAM(s) Oral two times a day  sodium chloride 0.9%. 1000 milliLiter(s) (75 mL/Hr) IV Continuous <Continuous>  tacrolimus 0.5 milliGRAM(s) Oral two times a day  tamsulosin 0.4 milliGRAM(s) Oral at bedtime    MEDICATIONS  (PRN):  acetaminophen   Tablet .. 650 milliGRAM(s) Oral every 6 hours PRN Temp greater or equal to 38C (100.4F), Mild Pain (1 - 3), Moderate Pain (4 - 6), Severe Pain (7 - 10)    LABS:                        12.6   8.58  )-----------( 139      ( 23 Jun 2020 05:57 )             37.8     06-23    130<L>  |  96<L>  |  27<H>  ----------------------------<  108<H>  3.8   |  20<L>  |  0.84    Ca    8.6      23 Jun 2020 05:57  Phos  2.6     06-23  Mg     2.0     06-23          CAPILLARY BLOOD GLUCOSE              REVIEW OF SYSTEMS:  CONSTITUTIONAL: No fever, weight loss, or fatigue  EYES: No eye pain, visual disturbances, or discharge  ENMT:  No difficulty hearing, tinnitus, vertigo; No sinus or throat pain  NECK: No pain or stiffness  RESPIRATORY: No cough, wheezing, chills or hemoptysis; No shortness of breath  CARDIOVASCULAR: No chest pain, palpitations, dizziness, or leg swelling  GASTROINTESTINAL: No abdominal or epigastric pain. No nausea, vomiting, or hematemesis; No diarrhea or constipation. No melena or hematochezia.  GENITOURINARY: No dysuria, frequency, hematuria, or incontinence  NEUROLOGICAL: No headaches, memory loss, loss of strength, numbness, or tremors      Consultant(s) Notes Reviewed:  [x ] YES  [ ] NO    PHYSICAL EXAM:  GENERAL: NAD, well-groomed, well-developed,not in any distress ,  HEAD:  Atraumatic, Normocephalic  EYES: EOMI, PERRLA, conjunctiva and sclera clear  ENMT: No tonsillar erythema, exudates, or enlargement; Moist mucous membranes, Good dentition, No lesions  NECK: Supple, No JVD, Normal thyroid  NERVOUS SYSTEM:  Alert & Oriented X3, No focal deficit   CHEST/LUNG: Good air entry bilateral with no  rales, rhonchi, wheezing, or rubs  HEART: Regular rate and rhythm; No murmurs, rubs, or gallops  ABDOMEN: Soft, Nontender, Nondistended; Bowel sounds present  EXTREMITIES:  2+ Peripheral Pulses, No clubbing, cyanosis, or edema  SKIN: No rashes or lesions    Care Discussed with Consultants/Other Providers [ x] YES  [ ] NO

## 2020-06-23 NOTE — DISCHARGE NOTE PROVIDER - PROVIDER TOKENS
FREE:[LAST:[Primary Care Provider],PHONE:[(   )    -],FAX:[(   )    -],ADDRESS:[Follow up Primary Care Provider within 1-2 weeks]]

## 2020-06-23 NOTE — PROGRESS NOTE ADULT - ASSESSMENT
69 year old man with a history of HTN, prior ESRD s/p nephrectomy and transplant, prostate cancer s/p radiation who presents for fevers, dysuria.     Problem/Plan - 1:  ·  Problem: Acute cystitis without hematuria.  Plan: - Sec to E coli ESBL . Clinically better. Symptoms of dysuria suggestive of urinary tract infection.  No CVA tenderness to suggest pyelo but does have fevers and elevated WBC  -UA/UCx pending  -Will continue cefepime empirically given s/p transplant and immunosuppression   -F/u cultures to narrow antibiotics.      Problem/Plan - 2:  ·  Problem: Sepsis without acute organ dysfunction, due to unspecified organism.  Plan: -Resolved . Sec to E Coli ESBL.   -Febrile to 101 and with WBC 20.5 with suspected urinary source of infection meeting criteria for sepsis  -Clinically appears well without tachycardia or hypotension to suggest hypovolemia  -F/u cultures, empiric cefepime to start  -Obtain CXR.      Problem/Plan - 3:  ·  Problem: Hyponatremia.  Plan: Hyponatremic to 129 on presentation  -Does not appear volume overloaded on exam  -Recheck with next set of labs and also check urine/serum osm and urine Na  -Monitor I&Os.      Problem/Plan - 4:  ·  Problem: Essential hypertension.  Plan: -Continue home amlodipine and carvedilol with hold parameters.      Problem/Plan - 5:  ·  Problem: Renal transplant recipient.  Plan: -s/p renal transplant.  Uncertain immunosuppression regimen  -F/u med rec (email sent overnight).      Problem/Plan - 6:  Problem: Prolonged QT interval.Plan: EKG with QTc 521  Avoid QT prolonging meds.     Problem/Plan - 7:  ·  Problem: Hydronephrosis . Plan: -No intervention . D/W Renal attending.      Problem/Plan - 8:  ·  Problem: Need for prophylactic measure.  Plan: Improve score 1 for age.  Lovenox for ppx.

## 2020-06-23 NOTE — PROGRESS NOTE ADULT - SUBJECTIVE AND OBJECTIVE BOX
CC: F/U for UTI    Saw/spoke to patient. No fevers, no chills. No new complaints. Feels improved.    Allergies  No Known Allergies    ANTIMICROBIALS:  cefepime   IVPB 1000 every 12 hours    PE:    Vital Signs Last 24 Hrs  T(C): 36.8 (23 Jun 2020 12:15), Max: 37.6 (22 Jun 2020 20:53)  T(F): 98.3 (23 Jun 2020 12:15), Max: 99.6 (22 Jun 2020 20:53)  HR: 67 (23 Jun 2020 12:15) (65 - 68)  BP: 138/70 (23 Jun 2020 12:15) (113/57 - 153/64)    RR: 16 (23 Jun 2020 12:15) (16 - 20)  SpO2: 100% (23 Jun 2020 12:15) (99% - 100%)    Gen: AOx3, NAD, non-toxic  CV: S1+S2 normal, nontachycardic  Resp: Clear bilat, no resp distress, no crackles/wheezes  Abd: Soft, nontender, +BS  Ext: No LE edema, no wounds    LABS:                        12.6   8.58  )-----------( 139      ( 23 Jun 2020 05:57 )             37.8     06-23    130<L>  |  96<L>  |  27<H>  ----------------------------<  108<H>  3.8   |  20<L>  |  0.84    Ca    8.6      23 Jun 2020 05:57  Phos  2.6     06-23  Mg     2.0     06-23    MICROBIOLOGY:    .Blood Blood-Peripheral  06-21-20   No growth to date.    .Blood Blood-Venous  06-21-20   No growth to date.    .Urine Clean Catch (Midstream)  06-20-20   >100,000 CFU/ml Escherichia coli ESBL  --  Escherichia coli ESBL    (otherwise reviewed)    RADIOLOGY:    6/23 US:    FINDINGS:    Right lower quadrant renal transplant with mild to moderate hydronephrosis.  Atrophic native kidneys.    Urinary bladder: Within normal limits.    IMPRESSION:     Mild to moderate hydronephrosis right renal transplant

## 2020-06-23 NOTE — DISCHARGE NOTE PROVIDER - NSDCMRMEDTOKEN_GEN_ALL_CORE_FT
amLODIPine 2.5 mg oral tablet: 1 tab(s) orally once a day  carvedilol 12.5 mg oral tablet: 1 tab(s) orally 2 times a day  ergocalciferol 50,000 intl units (1.25 mg) oral capsule: 1 cap(s) orally once a week  mycophenolate mofetil 500 mg oral tablet: 1 tab(s) orally 2 times a day  tacrolimus 0.5 mg oral capsule: 1 cap(s) orally every 12 hours   tamsulosin 0.4 mg oral capsule: 1 cap(s) orally once a day amLODIPine 2.5 mg oral tablet: 1 tab(s) orally once a day  carvedilol 12.5 mg oral tablet: 1 tab(s) orally 2 times a day  ciprofloxacin 500 mg oral tablet: 1 tab(s) orally every 12 hours  ergocalciferol 50,000 intl units (1.25 mg) oral capsule: 1 cap(s) orally once a week  mycophenolate mofetil 500 mg oral tablet: 1 tab(s) orally 2 times a day  tacrolimus 0.5 mg oral capsule: 1 cap(s) orally every 12 hours   tamsulosin 0.4 mg oral capsule: 1 cap(s) orally once a day

## 2020-06-23 NOTE — PROGRESS NOTE ADULT - ASSESSMENT
68 yo M with a history of HTN, prior ESRD s/p nephrectomy and transplant, prostate cancer s/p radiation who presents for fevers, dysuria  Fever, leukocytosis  Renal transplant  CXR clear, cardiac device, I reviewed personally  UA positive, UCX ESBL E coli, R cefepime, S FQ  BCX NGTD  Clinically improved despite cefepime resistance  Overall,  1) UTI in setting renal transplant  - Cipro 500mg q 12 for 7 day course (range for transplant pyelo is typically long course, but would give short course considering rapid improvement)  - DC Cefepime  - F/U pending cultures  - Would note potential for FQ to interact with immunosuppressives  2) Leukocytosis  - Trend to normal  - Monitor for alternate signs infection  3) Fevers  - F/U pending BCXs    My colleagues will be covering this patient on 6/24/20, I will return on 6/25/20. Please call [phone] or on call fellow with any questions or change in status.     Donny Barone MD  Pager [phone]  After 5pm and on weekends call [phone]

## 2020-06-23 NOTE — PROGRESS NOTE ADULT - SUBJECTIVE AND OBJECTIVE BOX
NEPHROLOGY-NSN [phone]        Patient seen and examined in bed.  He was in good spirits         MEDICATIONS  (STANDING):  amLODIPine   Tablet 2.5 milliGRAM(s) Oral daily  carvedilol 12.5 milliGRAM(s) Oral every 12 hours  cefepime   IVPB 1000 milliGRAM(s) IV Intermittent every 12 hours  enoxaparin Injectable 40 milliGRAM(s) SubCutaneous daily  ergocalciferol 85800 Unit(s) Oral <User Schedule>  mycophenolate mofetil 500 milliGRAM(s) Oral two times a day  sodium chloride 0.9%. 1000 milliLiter(s) (75 mL/Hr) IV Continuous <Continuous>  tacrolimus 0.5 milliGRAM(s) Oral two times a day  tamsulosin 0.4 milliGRAM(s) Oral at bedtime      VITAL:  T(C): , Max: 37.6 (06-22-20 @ 20:53)  T(F): , Max: 99.6 (06-22-20 @ 20:53)  HR: 68 (06-23-20 @ 05:24)  BP: 136/68 (06-23-20 @ 05:24)  BP(mean): --  RR: 16 (06-23-20 @ 05:24)  SpO2: 100% (06-23-20 @ 05:24)  Wt(kg): --    I and O's:        PHYSICAL EXAM:    Constitutional: NAD  Neck:  No JVD  Respiratory: CTAB/L  Cardiovascular: S1 and S2  Gastrointestinal: BS+, soft, NT/ND  Extremities: No peripheral edema  Neurological: A/O x 3, no focal deficits  Psychiatric: Normal mood, normal affect  : No Block  Skin: No rashes  Access: Not applicable    LABS:                        12.6   8.58  )-----------( 139      ( 23 Jun 2020 05:57 )             37.8     06-23    130<L>  |  96<L>  |  27<H>  ----------------------------<  108<H>  3.8   |  20<L>  |  0.84    Ca    8.6      23 Jun 2020 05:57  Phos  2.6     06-23  Mg     2.0     06-23            Urine Studies:          RADIOLOGY & ADDITIONAL STUDIES:

## 2020-06-26 LAB
CULTURE RESULTS: SIGNIFICANT CHANGE UP
CULTURE RESULTS: SIGNIFICANT CHANGE UP
SPECIMEN SOURCE: SIGNIFICANT CHANGE UP
SPECIMEN SOURCE: SIGNIFICANT CHANGE UP

## 2020-06-29 ENCOUNTER — APPOINTMENT (OUTPATIENT)
Dept: ELECTROPHYSIOLOGY | Facility: CLINIC | Age: 70
End: 2020-06-29
Payer: MEDICARE

## 2020-06-29 PROCEDURE — 93296 REM INTERROG EVL PM/IDS: CPT

## 2020-06-29 PROCEDURE — 93294 REM INTERROG EVL PM/LDLS PM: CPT

## 2020-09-28 ENCOUNTER — APPOINTMENT (OUTPATIENT)
Dept: ELECTROPHYSIOLOGY | Facility: CLINIC | Age: 70
End: 2020-09-28
Payer: MEDICARE

## 2020-09-28 PROCEDURE — 93294 REM INTERROG EVL PM/LDLS PM: CPT

## 2020-09-28 PROCEDURE — 93296 REM INTERROG EVL PM/IDS: CPT

## 2020-09-30 PROBLEM — C61 MALIGNANT NEOPLASM OF PROSTATE: Chronic | Status: ACTIVE | Noted: 2020-06-21

## 2020-12-15 ENCOUNTER — NON-APPOINTMENT (OUTPATIENT)
Age: 70
End: 2020-12-15

## 2020-12-15 ENCOUNTER — APPOINTMENT (OUTPATIENT)
Dept: ELECTROPHYSIOLOGY | Facility: CLINIC | Age: 70
End: 2020-12-15
Payer: MEDICARE

## 2020-12-15 VITALS
HEIGHT: 65 IN | HEART RATE: 70 BPM | SYSTOLIC BLOOD PRESSURE: 142 MMHG | WEIGHT: 144 LBS | BODY MASS INDEX: 23.99 KG/M2 | DIASTOLIC BLOOD PRESSURE: 71 MMHG | OXYGEN SATURATION: 99 %

## 2020-12-15 PROCEDURE — 93280 PM DEVICE PROGR EVAL DUAL: CPT

## 2021-02-24 NOTE — PROGRESS NOTE ADULT - PROBLEM/PLAN-2
Transition Plan of Care  RUR 21%-High  Covid positive  Resides in assisted living at [facility] living. Left message for KEVIN Vázquez Najjar [phone] to discuss patient needed increased level of care and if they can provide that level. Rehab services recommend SNF level. Spoke with daughter/Jaun Reyes and she is resistant to mom going to a new facility. Waiting return call from KEVIN. Left updated message on joselyn voicemail. Mary Thakur, [employer]  Ext [phone]    Transition of Care Plan to SNF/Rehab    SNF/Rehab Transition:  [facility] [place] will [facility] by [facility] and expected to leave at [address]    Communication to Patient/Family:  Spoke with marilu Reyes and they are agreeable to the transition plan. Communication to SNF/Rehab:  Bedside RN, Leighann Perales has been notified to update the transition plan to the facility and call report (phone number [phone]  Discharge information has been updated on the AVS.     Discharge instructions to be sent with patient and CM will email copy to marilu Reyes. Patient has been identified as part of the  [employer].  For Care Coordination associated with that Bundle Program, please contact   Bundle information has been communication to     Nursing Please include all hard scripts for controlled substances, med rec and dc summary, and AVS in packet. Reviewed and confirmed with facility, DON Mellody Najjar, can manage the patient care needs for the following:     SNF/Rehab Transition:  Patient to follow-up with Home Health:  [facility], other ,none)  PCP/Specialist:    Reviewed and confirmed with facility, [facility] they can manage the patient care needs for the following:     Lynn Foreman with (X) only those applicable:    Medication:  []  Medications will be available at the facility  []  IV Antibiotics []  Controlled Substance - hard copy to be sent with patient   []  Weekly Labs   Documents:  [] Hard RX  [] MAR  [] Kardex  [] AVS  []Transfer Summary  []Discharge   Equipment:  []  CPAP/BiPAP  []  Wound Vacuum  []  [place] or Urinary Device  []  PICC/Central Line  []  Nebulizer  []  Ventilator   Treatment:  []Isolation (for MRSA, VRE, etc.)  []Surgical Drain Management  []Tracheostomy Care  []Dressing Change  []Dialysis with transportation and chair time   []PEG Care  []Oxygen  []Daily Weights for Heart Failure   Dietary:  []Any diet limitations  []Tube Feedings   []Total Parenteral Management (TPN)   Eligible for Medicaid Long Term Services and Supports  Yes:  [] Eligible for medical assistance or will become eligible within 180 days and UAI completed. [] Provider/Patient and/or support system has requested screening. [] UAI copy provided to patient or responsible party,   [] UAI unavailable at discharge will send once processed to SNF provider. [] UAI unavailable at discharged mailed to patient  No:   [] Private pay and is not financially eligible for Medicaid within the next 180 days. [] Reside out-of-state.   [] A residents of a state owned/operated facility that is licensed  by [place] [address] [employer] and [employer] or [facility]  [] Enrollment in [facility] hospice services  []  [address]  [] Patient /Family declines to have screening completed or provide financial information for screening     Financial Resources:  Medicaid    [] Initiated and application pending   [] Full coverage     Advanced Care Plan:  []Surrogate Decision Maker of Care  []POA  []Communicated Code Status , \"Full\")    Other     Financial Resources:  Medicaid    [] Initiated and application pending   [] Full coverage     Advanced Care Plan:  []Surrogate Decision Maker of Care  []POA  []Communicated Code Status  \"Full\")    Nursing to call report to [phone]  Medicare pt has received, reviewed, and signed 2nd IM letter informing them of their right to appeal the discharge. Signed copy has been placed on pt bedside chart. DISPLAY PLAN FREE TEXT

## 2021-03-16 ENCOUNTER — APPOINTMENT (OUTPATIENT)
Dept: ELECTROPHYSIOLOGY | Facility: CLINIC | Age: 71
End: 2021-03-16
Payer: MEDICARE

## 2021-03-16 ENCOUNTER — NON-APPOINTMENT (OUTPATIENT)
Age: 71
End: 2021-03-16

## 2021-03-16 PROCEDURE — 93294 REM INTERROG EVL PM/LDLS PM: CPT

## 2021-03-16 PROCEDURE — 93296 REM INTERROG EVL PM/IDS: CPT

## 2021-06-16 ENCOUNTER — NON-APPOINTMENT (OUTPATIENT)
Age: 71
End: 2021-06-16

## 2021-06-16 ENCOUNTER — APPOINTMENT (OUTPATIENT)
Dept: ELECTROPHYSIOLOGY | Facility: CLINIC | Age: 71
End: 2021-06-16
Payer: MEDICARE

## 2021-06-16 PROCEDURE — 93296 REM INTERROG EVL PM/IDS: CPT

## 2021-06-16 PROCEDURE — 93294 REM INTERROG EVL PM/LDLS PM: CPT

## 2021-09-15 ENCOUNTER — NON-APPOINTMENT (OUTPATIENT)
Age: 71
End: 2021-09-15

## 2021-09-15 ENCOUNTER — APPOINTMENT (OUTPATIENT)
Dept: ELECTROPHYSIOLOGY | Facility: CLINIC | Age: 71
End: 2021-09-15
Payer: MEDICARE

## 2021-09-15 PROCEDURE — 93296 REM INTERROG EVL PM/IDS: CPT

## 2021-09-15 PROCEDURE — 93294 REM INTERROG EVL PM/LDLS PM: CPT

## 2021-11-27 NOTE — ED ADULT NURSE NOTE - CAS EDP DISCH DISPOSITION ADMI
I concur with the Admission Order and I certify that services are provided in accordance with Section 42 CFR § 412.3
CCU

## 2021-12-06 ENCOUNTER — APPOINTMENT (OUTPATIENT)
Dept: ELECTROPHYSIOLOGY | Facility: CLINIC | Age: 71
End: 2021-12-06

## 2021-12-15 ENCOUNTER — APPOINTMENT (OUTPATIENT)
Dept: ELECTROPHYSIOLOGY | Facility: CLINIC | Age: 71
End: 2021-12-15

## 2023-06-14 ENCOUNTER — OUTPATIENT (OUTPATIENT)
Dept: OUTPATIENT SERVICES | Facility: HOSPITAL | Age: 73
LOS: 1 days | End: 2023-06-14
Payer: MEDICARE

## 2023-06-14 ENCOUNTER — APPOINTMENT (OUTPATIENT)
Dept: NUCLEAR MEDICINE | Facility: IMAGING CENTER | Age: 73
End: 2023-06-14
Payer: MEDICARE

## 2023-06-14 DIAGNOSIS — Z94.0 KIDNEY TRANSPLANT STATUS: Chronic | ICD-10-CM

## 2023-06-14 DIAGNOSIS — Z00.8 ENCOUNTER FOR OTHER GENERAL EXAMINATION: ICD-10-CM

## 2023-06-14 PROCEDURE — 78816 PET IMAGE W/CT FULL BODY: CPT | Mod: MH

## 2023-06-14 PROCEDURE — A9595: CPT

## 2023-06-14 PROCEDURE — 78816 PET IMAGE W/CT FULL BODY: CPT | Mod: 26,MH

## 2023-11-29 NOTE — ED ADULT NURSE NOTE - NSFALLRSKASSESSDT_ED_ALL_ED
21-Jun-2020 00:53

## 2023-12-23 ENCOUNTER — INPATIENT (INPATIENT)
Facility: HOSPITAL | Age: 73
LOS: 4 days | Discharge: HOME | DRG: 872 | End: 2023-12-28
Attending: STUDENT IN AN ORGANIZED HEALTH CARE EDUCATION/TRAINING PROGRAM | Admitting: STUDENT IN AN ORGANIZED HEALTH CARE EDUCATION/TRAINING PROGRAM
Payer: MEDICARE

## 2023-12-23 VITALS
DIASTOLIC BLOOD PRESSURE: 46 MMHG | HEART RATE: 73 BPM | SYSTOLIC BLOOD PRESSURE: 127 MMHG | OXYGEN SATURATION: 96 % | HEIGHT: 63 IN | WEIGHT: 143.96 LBS | TEMPERATURE: 98 F | RESPIRATION RATE: 20 BRPM

## 2023-12-23 DIAGNOSIS — Z94.0 KIDNEY TRANSPLANT STATUS: Chronic | ICD-10-CM

## 2023-12-23 LAB
ALBUMIN SERPL ELPH-MCNC: 3.5 G/DL — SIGNIFICANT CHANGE UP (ref 3.3–5)
ALBUMIN SERPL ELPH-MCNC: 3.5 G/DL — SIGNIFICANT CHANGE UP (ref 3.3–5)
ALP SERPL-CCNC: 63 U/L — SIGNIFICANT CHANGE UP (ref 40–120)
ALP SERPL-CCNC: 63 U/L — SIGNIFICANT CHANGE UP (ref 40–120)
ALT FLD-CCNC: 15 U/L — SIGNIFICANT CHANGE UP (ref 10–45)
ALT FLD-CCNC: 15 U/L — SIGNIFICANT CHANGE UP (ref 10–45)
ANION GAP SERPL CALC-SCNC: 10 MMOL/L — SIGNIFICANT CHANGE UP (ref 5–17)
ANION GAP SERPL CALC-SCNC: 10 MMOL/L — SIGNIFICANT CHANGE UP (ref 5–17)
APPEARANCE UR: ABNORMAL
APPEARANCE UR: ABNORMAL
AST SERPL-CCNC: 20 U/L — SIGNIFICANT CHANGE UP (ref 10–40)
AST SERPL-CCNC: 20 U/L — SIGNIFICANT CHANGE UP (ref 10–40)
BACTERIA # UR AUTO: ABNORMAL /HPF
BACTERIA # UR AUTO: ABNORMAL /HPF
BASOPHILS # BLD AUTO: 0.02 K/UL — SIGNIFICANT CHANGE UP (ref 0–0.2)
BASOPHILS # BLD AUTO: 0.02 K/UL — SIGNIFICANT CHANGE UP (ref 0–0.2)
BASOPHILS NFR BLD AUTO: 0.1 % — SIGNIFICANT CHANGE UP (ref 0–2)
BASOPHILS NFR BLD AUTO: 0.1 % — SIGNIFICANT CHANGE UP (ref 0–2)
BILIRUB SERPL-MCNC: 0.5 MG/DL — SIGNIFICANT CHANGE UP (ref 0.2–1.2)
BILIRUB SERPL-MCNC: 0.5 MG/DL — SIGNIFICANT CHANGE UP (ref 0.2–1.2)
BILIRUB UR-MCNC: NEGATIVE — SIGNIFICANT CHANGE UP
BILIRUB UR-MCNC: NEGATIVE — SIGNIFICANT CHANGE UP
BUN SERPL-MCNC: 77 MG/DL — HIGH (ref 7–23)
BUN SERPL-MCNC: 77 MG/DL — HIGH (ref 7–23)
CALCIUM SERPL-MCNC: 8.9 MG/DL — SIGNIFICANT CHANGE UP (ref 8.4–10.5)
CALCIUM SERPL-MCNC: 8.9 MG/DL — SIGNIFICANT CHANGE UP (ref 8.4–10.5)
CAST: 8 /LPF — HIGH (ref 0–4)
CAST: 8 /LPF — HIGH (ref 0–4)
CHLORIDE SERPL-SCNC: 92 MMOL/L — LOW (ref 96–108)
CHLORIDE SERPL-SCNC: 92 MMOL/L — LOW (ref 96–108)
CO2 SERPL-SCNC: 25 MMOL/L — SIGNIFICANT CHANGE UP (ref 22–31)
CO2 SERPL-SCNC: 25 MMOL/L — SIGNIFICANT CHANGE UP (ref 22–31)
COLOR SPEC: YELLOW — SIGNIFICANT CHANGE UP
COLOR SPEC: YELLOW — SIGNIFICANT CHANGE UP
CREAT SERPL-MCNC: 1.39 MG/DL — HIGH (ref 0.5–1.3)
CREAT SERPL-MCNC: 1.39 MG/DL — HIGH (ref 0.5–1.3)
DIFF PNL FLD: ABNORMAL
DIFF PNL FLD: ABNORMAL
EGFR: 53 ML/MIN/1.73M2 — LOW
EGFR: 53 ML/MIN/1.73M2 — LOW
EOSINOPHIL # BLD AUTO: 0 K/UL — SIGNIFICANT CHANGE UP (ref 0–0.5)
EOSINOPHIL # BLD AUTO: 0 K/UL — SIGNIFICANT CHANGE UP (ref 0–0.5)
EOSINOPHIL NFR BLD AUTO: 0 % — SIGNIFICANT CHANGE UP (ref 0–6)
EOSINOPHIL NFR BLD AUTO: 0 % — SIGNIFICANT CHANGE UP (ref 0–6)
GLUCOSE SERPL-MCNC: 129 MG/DL — HIGH (ref 70–99)
GLUCOSE SERPL-MCNC: 129 MG/DL — HIGH (ref 70–99)
GLUCOSE UR QL: NEGATIVE MG/DL — SIGNIFICANT CHANGE UP
GLUCOSE UR QL: NEGATIVE MG/DL — SIGNIFICANT CHANGE UP
HCT VFR BLD CALC: 34.5 % — LOW (ref 39–50)
HCT VFR BLD CALC: 34.5 % — LOW (ref 39–50)
HGB BLD-MCNC: 11.8 G/DL — LOW (ref 13–17)
HGB BLD-MCNC: 11.8 G/DL — LOW (ref 13–17)
IMM GRANULOCYTES NFR BLD AUTO: 0.7 % — SIGNIFICANT CHANGE UP (ref 0–0.9)
IMM GRANULOCYTES NFR BLD AUTO: 0.7 % — SIGNIFICANT CHANGE UP (ref 0–0.9)
KETONES UR-MCNC: NEGATIVE MG/DL — SIGNIFICANT CHANGE UP
KETONES UR-MCNC: NEGATIVE MG/DL — SIGNIFICANT CHANGE UP
LEUKOCYTE ESTERASE UR-ACNC: ABNORMAL
LEUKOCYTE ESTERASE UR-ACNC: ABNORMAL
LIDOCAIN IGE QN: 12 U/L — SIGNIFICANT CHANGE UP (ref 7–60)
LIDOCAIN IGE QN: 12 U/L — SIGNIFICANT CHANGE UP (ref 7–60)
LYMPHOCYTES # BLD AUTO: 0.64 K/UL — LOW (ref 1–3.3)
LYMPHOCYTES # BLD AUTO: 0.64 K/UL — LOW (ref 1–3.3)
LYMPHOCYTES # BLD AUTO: 4.7 % — LOW (ref 13–44)
LYMPHOCYTES # BLD AUTO: 4.7 % — LOW (ref 13–44)
MCHC RBC-ENTMCNC: 33.7 PG — SIGNIFICANT CHANGE UP (ref 27–34)
MCHC RBC-ENTMCNC: 33.7 PG — SIGNIFICANT CHANGE UP (ref 27–34)
MCHC RBC-ENTMCNC: 34.2 GM/DL — SIGNIFICANT CHANGE UP (ref 32–36)
MCHC RBC-ENTMCNC: 34.2 GM/DL — SIGNIFICANT CHANGE UP (ref 32–36)
MCV RBC AUTO: 98.6 FL — SIGNIFICANT CHANGE UP (ref 80–100)
MCV RBC AUTO: 98.6 FL — SIGNIFICANT CHANGE UP (ref 80–100)
MONOCYTES # BLD AUTO: 1.07 K/UL — HIGH (ref 0–0.9)
MONOCYTES # BLD AUTO: 1.07 K/UL — HIGH (ref 0–0.9)
MONOCYTES NFR BLD AUTO: 7.9 % — SIGNIFICANT CHANGE UP (ref 2–14)
MONOCYTES NFR BLD AUTO: 7.9 % — SIGNIFICANT CHANGE UP (ref 2–14)
NEUTROPHILS # BLD AUTO: 11.76 K/UL — HIGH (ref 1.8–7.4)
NEUTROPHILS # BLD AUTO: 11.76 K/UL — HIGH (ref 1.8–7.4)
NEUTROPHILS NFR BLD AUTO: 86.6 % — HIGH (ref 43–77)
NEUTROPHILS NFR BLD AUTO: 86.6 % — HIGH (ref 43–77)
NITRITE UR-MCNC: NEGATIVE — SIGNIFICANT CHANGE UP
NITRITE UR-MCNC: NEGATIVE — SIGNIFICANT CHANGE UP
NRBC # BLD: 0 /100 WBCS — SIGNIFICANT CHANGE UP (ref 0–0)
NRBC # BLD: 0 /100 WBCS — SIGNIFICANT CHANGE UP (ref 0–0)
PH UR: 6 — SIGNIFICANT CHANGE UP (ref 5–8)
PH UR: 6 — SIGNIFICANT CHANGE UP (ref 5–8)
PLATELET # BLD AUTO: 139 K/UL — LOW (ref 150–400)
PLATELET # BLD AUTO: 139 K/UL — LOW (ref 150–400)
POTASSIUM SERPL-MCNC: 4.5 MMOL/L — SIGNIFICANT CHANGE UP (ref 3.5–5.3)
POTASSIUM SERPL-MCNC: 4.5 MMOL/L — SIGNIFICANT CHANGE UP (ref 3.5–5.3)
POTASSIUM SERPL-SCNC: 4.5 MMOL/L — SIGNIFICANT CHANGE UP (ref 3.5–5.3)
POTASSIUM SERPL-SCNC: 4.5 MMOL/L — SIGNIFICANT CHANGE UP (ref 3.5–5.3)
PROT SERPL-MCNC: 7.1 G/DL — SIGNIFICANT CHANGE UP (ref 6–8.3)
PROT SERPL-MCNC: 7.1 G/DL — SIGNIFICANT CHANGE UP (ref 6–8.3)
PROT UR-MCNC: 100 MG/DL
PROT UR-MCNC: 100 MG/DL
RBC # BLD: 3.5 M/UL — LOW (ref 4.2–5.8)
RBC # BLD: 3.5 M/UL — LOW (ref 4.2–5.8)
RBC # FLD: 11.5 % — SIGNIFICANT CHANGE UP (ref 10.3–14.5)
RBC # FLD: 11.5 % — SIGNIFICANT CHANGE UP (ref 10.3–14.5)
RBC CASTS # UR COMP ASSIST: 3 /HPF — SIGNIFICANT CHANGE UP (ref 0–4)
RBC CASTS # UR COMP ASSIST: 3 /HPF — SIGNIFICANT CHANGE UP (ref 0–4)
REVIEW: SIGNIFICANT CHANGE UP
REVIEW: SIGNIFICANT CHANGE UP
SODIUM SERPL-SCNC: 127 MMOL/L — LOW (ref 135–145)
SODIUM SERPL-SCNC: 127 MMOL/L — LOW (ref 135–145)
SP GR SPEC: 1.02 — SIGNIFICANT CHANGE UP (ref 1–1.03)
SP GR SPEC: 1.02 — SIGNIFICANT CHANGE UP (ref 1–1.03)
SQUAMOUS # UR AUTO: 7 /HPF — HIGH (ref 0–5)
SQUAMOUS # UR AUTO: 7 /HPF — HIGH (ref 0–5)
UROBILINOGEN FLD QL: 0.2 MG/DL — SIGNIFICANT CHANGE UP (ref 0.2–1)
UROBILINOGEN FLD QL: 0.2 MG/DL — SIGNIFICANT CHANGE UP (ref 0.2–1)
WBC # BLD: 13.59 K/UL — HIGH (ref 3.8–10.5)
WBC # BLD: 13.59 K/UL — HIGH (ref 3.8–10.5)
WBC # FLD AUTO: 13.59 K/UL — HIGH (ref 3.8–10.5)
WBC # FLD AUTO: 13.59 K/UL — HIGH (ref 3.8–10.5)
WBC UR QL: >998 /HPF — HIGH (ref 0–5)
WBC UR QL: >998 /HPF — HIGH (ref 0–5)

## 2023-12-23 PROCEDURE — 99285 EMERGENCY DEPT VISIT HI MDM: CPT

## 2023-12-23 PROCEDURE — 74177 CT ABD & PELVIS W/CONTRAST: CPT | Mod: 26,MA

## 2023-12-23 RX ORDER — SODIUM CHLORIDE 9 MG/ML
1000 INJECTION INTRAMUSCULAR; INTRAVENOUS; SUBCUTANEOUS ONCE
Refills: 0 | Status: COMPLETED | OUTPATIENT
Start: 2023-12-23 | End: 2023-12-23

## 2023-12-23 RX ORDER — CEFEPIME 1 G/1
1000 INJECTION, POWDER, FOR SOLUTION INTRAMUSCULAR; INTRAVENOUS ONCE
Refills: 0 | Status: COMPLETED | OUTPATIENT
Start: 2023-12-23 | End: 2023-12-23

## 2023-12-23 RX ORDER — ACETAMINOPHEN 500 MG
1000 TABLET ORAL ONCE
Refills: 0 | Status: COMPLETED | OUTPATIENT
Start: 2023-12-23 | End: 2023-12-23

## 2023-12-23 RX ADMIN — Medication 1000 MILLIGRAM(S): at 18:28

## 2023-12-23 RX ADMIN — SODIUM CHLORIDE 1000 MILLILITER(S): 9 INJECTION INTRAMUSCULAR; INTRAVENOUS; SUBCUTANEOUS at 18:10

## 2023-12-23 RX ADMIN — CEFEPIME 100 MILLIGRAM(S): 1 INJECTION, POWDER, FOR SOLUTION INTRAMUSCULAR; INTRAVENOUS at 18:45

## 2023-12-23 RX ADMIN — Medication 400 MILLIGRAM(S): at 18:10

## 2023-12-23 RX ADMIN — SODIUM CHLORIDE 1000 MILLILITER(S): 9 INJECTION INTRAMUSCULAR; INTRAVENOUS; SUBCUTANEOUS at 19:01

## 2023-12-23 RX ADMIN — CEFEPIME 1000 MILLIGRAM(S): 1 INJECTION, POWDER, FOR SOLUTION INTRAMUSCULAR; INTRAVENOUS at 19:01

## 2023-12-23 NOTE — ED ADULT NURSE NOTE - OBJECTIVE STATEMENT
Patient  is  alert and  oriented x4.  Color is  good  and  skin warm to touch.  He  is  c/o  left flank pain and  increased urinary frequency.  He  denies  dysuria or  hematuria.

## 2023-12-23 NOTE — ED PROVIDER NOTE - CLINICAL SUMMARY MEDICAL DECISION MAKING FREE TEXT BOX
72 yo M PMHx of R kidney transplant on tacrolimus and mycophenolate presents with L flank pain and weakness - patient has a fever by rectally, concern for pyelonephritis vs kidney stone. Will eval transplanted kidney as well as physiologic kidneys for hydronephrosis, stranding with CT.

## 2023-12-23 NOTE — ED ADULT NURSE REASSESSMENT NOTE - NS ED NURSE REASSESS COMMENT FT1
report received from Tanya ROLDAN. Pt A&Ox4, MAEx4, speaking in complete sentences. VSS. Pt aware of need for urine sample.

## 2023-12-23 NOTE — ED ADULT NURSE NOTE - NSFALLRISKINTERV_ED_ALL_ED
Assistance OOB with selected safe patient handling equipment if applicable/Assistance with ambulation/Communicate fall risk and risk factors to all staff, patient, and family/Monitor gait and stability/Provide patient with walking aids/Provide visual cue: yellow wristband, yellow gown, etc/Reinforce activity limits and safety measures with patient and family/Toileting schedule using arm’s reach rule for commode and bathroom/Call bell, personal items and telephone in reach/Instruct patient to call for assistance before getting out of bed/chair/stretcher/Non-slip footwear applied when patient is off stretcher/[place] to call system/Physically safe environment - no spills, clutter or unnecessary equipment/Purposeful Proactive Rounding/Room/bathroom lighting operational, light cord in reach Assistance OOB with selected safe patient handling equipment if applicable/Assistance with ambulation/Communicate fall risk and risk factors to all staff, patient, and family/Monitor gait and stability/Provide patient with walking aids/Provide visual cue: yellow wristband, yellow gown, etc/Reinforce activity limits and safety measures with patient and family/Toileting schedule using arm’s reach rule for commode and bathroom/Call bell, personal items and telephone in reach/Instruct patient to call for assistance before getting out of bed/chair/stretcher/Non-slip footwear applied when patient is off stretcher/[place] to call system/Physically safe environment - no spills, clutter or unnecessary equipment/Purposeful Proactive Rounding/Room/bathroom lighting operational, light cord in reach

## 2023-12-23 NOTE — ED ADULT TRIAGE NOTE - CHIEF COMPLAINT QUOTE
discomfort with urinating x 4 days with associating L flank pain, denies any hematuria   hx of R kidney transplant 6 years ago

## 2023-12-23 NOTE — ED PROVIDER NOTE - PHYSICAL EXAMINATION
GENERAL: no acute distress, non-toxic appearing  HEAD: normocephalic, atraumatic  HEENT: normal conjunctiva, oral mucosa moist, neck supple  CARDIAC: regular rate and rhythm, normal S1 and S2,  no appreciable murmurs  PULM: clear to ascultation bilaterally, no crackles, rales, rhonchi, or wheezing  GI: abdomen nondistended, soft, nontender, no guarding or rebound tenderness  : +TTP over RLQ (site of transplant), no CVA tenderness, no suprapubic tenderness  NEURO: alert and oriented x 3, normal speech, moving all extremities   MSK: no visible deformities, no peripheral edema, calf tenderness/redness/swelling  SKIN: no visible rashes, dry, well-perfused  PSYCH: appropriate mood and affect

## 2023-12-23 NOTE — ED PROVIDER NOTE - PROGRESS NOTE DETAILS
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 Attempted consulting transplant nephro who stated they would not see patient as transplant was 1+ years ago and was not done here. Attempted consulting urology for hydro and [place], however they also would not see patient as they would not stent a transplanted kidney and there was no obvious obstruction and interval hydronephrosis was from reflux. Discussed case with hospitalist who has accepted the patient.     Norma Chris MD, PGY3

## 2023-12-23 NOTE — ED PROVIDER NOTE - OBJECTIVE STATEMENT
72 yo M PMHx of renal transplant in 2006 at [place] presents with L flank pain and dysuria, decreased appetite, increased weakness. Denies fevers, chills, nausea, vomiting. On tacrolimus and mycophenolate for transplant. 74 yo M PMHx of renal transplant in 2006 at [place] presents with L flank pain and dysuria, decreased appetite, increased weakness. Denies fevers, chills, nausea, vomiting. On tacrolimus and mycophenolate for transplant. 72 yo M PMHx of renal transplant 6 years ago at [place] presents with L flank pain and dysuria, decreased appetite, increased weakness. Denies fevers, chills, nausea, vomiting. On tacrolimus and mycophenolate for transplant. 72 yo M PMHx of renal transplant 6 years ago at [place] presents with L flank pain and dysuria, decreased appetite, increased weakness. Denies fevers, chills, nausea, vomiting. On tacrolimus and mycophenolate for transplant.

## 2023-12-23 NOTE — ED PROVIDER NOTE - ATTENDING CONTRIBUTION TO CARE
Attending MD RODOLFO Plaza I performed a history and physical exam of the patient and discussed their management with the resident. I reviewed the resident's note and agree with the documented findings and plan of care, except as noted. My medical decision making and observations are as follows:    73 M with PMH HTN, ESRD s/p right renal transplant presenting with left flank pain, dysuria, generalized weakness, decreased appetite.  No fever, chest pain, shortness of breath, cough, vomiting, diarrhea.  On exam, patient in no acute distress, mucous membranes moist.  Heart and lungs clear to auscultation, abdomen soft with tenderness over RLQ (location of right renal transplant) and no CVA tenderness.  No pedal edema.    MDM–73 M s/p renal transplant presenting with flank pain, generalized weakness, decreased appetite, flank pain, dysuria concerning for UTI/pyelo-.  Given tenderness over renal transplant site, will evaluate with CT.

## 2023-12-24 DIAGNOSIS — N40.0 BENIGN PROSTATIC HYPERPLASIA WITHOUT LOWER URINARY TRACT SYMPTOMS: ICD-10-CM

## 2023-12-24 DIAGNOSIS — A41.9 SEPSIS, UNSPECIFIED ORGANISM: ICD-10-CM

## 2023-12-24 DIAGNOSIS — I10 ESSENTIAL (PRIMARY) HYPERTENSION: ICD-10-CM

## 2023-12-24 DIAGNOSIS — Z94.0 KIDNEY TRANSPLANT STATUS: ICD-10-CM

## 2023-12-24 DIAGNOSIS — N12 TUBULO-INTERSTITIAL NEPHRITIS, NOT SPECIFIED AS ACUTE OR CHRONIC: ICD-10-CM

## 2023-12-24 DIAGNOSIS — N17.9 ACUTE KIDNEY FAILURE, UNSPECIFIED: ICD-10-CM

## 2023-12-24 DIAGNOSIS — Z29.9 ENCOUNTER FOR PROPHYLACTIC MEASURES, UNSPECIFIED: ICD-10-CM

## 2023-12-24 LAB
TACROLIMUS SERPL-MCNC: 13.3 NG/ML — SIGNIFICANT CHANGE UP
TACROLIMUS SERPL-MCNC: 13.3 NG/ML — SIGNIFICANT CHANGE UP

## 2023-12-24 PROCEDURE — 76770 US EXAM ABDO BACK WALL COMP: CPT | Mod: 26

## 2023-12-24 PROCEDURE — 99223 1ST HOSP IP/OBS HIGH 75: CPT

## 2023-12-24 RX ORDER — TACROLIMUS 5 MG/1
1 CAPSULE ORAL
Refills: 0 | Status: DISCONTINUED | OUTPATIENT
Start: 2023-12-24 | End: 2023-12-24

## 2023-12-24 RX ORDER — AMLODIPINE BESYLATE 2.5 MG/1
1 TABLET ORAL
Qty: 0 | Refills: 0 | DISCHARGE

## 2023-12-24 RX ORDER — SODIUM CHLORIDE 9 MG/ML
1000 INJECTION INTRAMUSCULAR; INTRAVENOUS; SUBCUTANEOUS
Refills: 0 | Status: DISCONTINUED | OUTPATIENT
Start: 2023-12-24 | End: 2023-12-25

## 2023-12-24 RX ORDER — TACROLIMUS 5 MG/1
1 CAPSULE ORAL
Refills: 0 | Status: DISCONTINUED | OUTPATIENT
Start: 2023-12-24 | End: 2023-12-25

## 2023-12-24 RX ORDER — ONDANSETRON 8 MG/1
4 TABLET, FILM COATED ORAL EVERY 8 HOURS
Refills: 0 | Status: DISCONTINUED | OUTPATIENT
Start: 2023-12-24 | End: 2023-12-28

## 2023-12-24 RX ORDER — ERGOCALCIFEROL 1.25 MG/1
1 CAPSULE ORAL
Qty: 0 | Refills: 0 | DISCHARGE

## 2023-12-24 RX ORDER — TACROLIMUS 5 MG/1
0.5 CAPSULE ORAL AT BEDTIME
Refills: 0 | Status: DISCONTINUED | OUTPATIENT
Start: 2023-12-24 | End: 2023-12-25

## 2023-12-24 RX ORDER — MEROPENEM 1 G/30ML
1000 INJECTION INTRAVENOUS EVERY 8 HOURS
Refills: 0 | Status: DISCONTINUED | OUTPATIENT
Start: 2023-12-24 | End: 2023-12-24

## 2023-12-24 RX ORDER — ACETAMINOPHEN 500 MG
650 TABLET ORAL EVERY 6 HOURS
Refills: 0 | Status: DISCONTINUED | OUTPATIENT
Start: 2023-12-24 | End: 2023-12-28

## 2023-12-24 RX ORDER — MEROPENEM 1 G/30ML
1000 INJECTION INTRAVENOUS EVERY 12 HOURS
Refills: 0 | Status: DISCONTINUED | OUTPATIENT
Start: 2023-12-24 | End: 2023-12-27

## 2023-12-24 RX ORDER — MEROPENEM 1 G/30ML
1000 INJECTION INTRAVENOUS ONCE
Refills: 0 | Status: COMPLETED | OUTPATIENT
Start: 2023-12-24 | End: 2023-12-24

## 2023-12-24 RX ORDER — TAMSULOSIN HYDROCHLORIDE 0.4 MG/1
0.4 CAPSULE ORAL AT BEDTIME
Refills: 0 | Status: DISCONTINUED | OUTPATIENT
Start: 2023-12-24 | End: 2023-12-28

## 2023-12-24 RX ORDER — CARVEDILOL PHOSPHATE 80 MG/1
12.5 CAPSULE, EXTENDED RELEASE ORAL
Refills: 0 | Status: DISCONTINUED | OUTPATIENT
Start: 2023-12-24 | End: 2023-12-28

## 2023-12-24 RX ORDER — LANOLIN ALCOHOL/MO/W.PET/CERES
3 CREAM (GRAM) TOPICAL AT BEDTIME
Refills: 0 | Status: DISCONTINUED | OUTPATIENT
Start: 2023-12-24 | End: 2023-12-28

## 2023-12-24 RX ADMIN — TACROLIMUS 0.5 MILLIGRAM(S): 5 CAPSULE ORAL at 22:38

## 2023-12-24 RX ADMIN — MEROPENEM 100 MILLIGRAM(S): 1 INJECTION INTRAVENOUS at 17:21

## 2023-12-24 RX ADMIN — SODIUM CHLORIDE 60 MILLILITER(S): 9 INJECTION INTRAMUSCULAR; INTRAVENOUS; SUBCUTANEOUS at 12:01

## 2023-12-24 RX ADMIN — TACROLIMUS 1 MILLIGRAM(S): 5 CAPSULE ORAL at 11:49

## 2023-12-24 RX ADMIN — MEROPENEM 100 MILLIGRAM(S): 1 INJECTION INTRAVENOUS at 00:47

## 2023-12-24 RX ADMIN — TAMSULOSIN HYDROCHLORIDE 0.4 MILLIGRAM(S): 0.4 CAPSULE ORAL at 22:38

## 2023-12-24 RX ADMIN — CARVEDILOL PHOSPHATE 12.5 MILLIGRAM(S): 80 CAPSULE, EXTENDED RELEASE ORAL at 17:21

## 2023-12-24 NOTE — CONSULT NOTE ADULT - SUBJECTIVE AND OBJECTIVE BOX
NEPHROLOGY - NSN    Patient seen and examined.    HPI:   74 yo M PMHx of renal transplant 6 years ago at [place] presents with L flank pain and dysuria, decreased appetite, increased weakness. Denies fevers, chills, nausea, vomiting. On tacrolimus and mycophenolate for transplant    PAST MEDICAL & SURGICAL HISTORY:  HTN (hypertension)      Renal insufficiency      Prostate cancer      Renal transplant, status post          MEDICATIONS  (STANDING):  meropenem  IVPB 1000 milliGRAM(s) IV Intermittent every 8 hours      Allergies    No Known Allergies    Intolerances        SOCIAL HISTORY:  Denies alcohol abuse, drug abuse or tobacco usage.     FAMILY HISTORY:  FH: diabetes mellitus        VITALS:  T(C): 36.8 (23 @ 08:38), Max: 38.1 (23 @ 18:27)  HR: 72 (23 @ 08:38) (71 - 89)  BP: 131/63 (23 @ 08:38) (103/62 - 153/70)  RR: 18 (23 @ 08:38) (17 - 20)  SpO2: 97% (23 @ 08:38) (96% - 98%)    REVIEW OF SYSTEMS:  Denies any nausea, vomiting, diarrhea, fever or chills. Denies chest pain, SOB, focal weakness, hematuria or dysuria. Good oral intake and denies fatigue or weakness. All other pertinent systems are reviewed and are negative.    PHYSICAL EXAM:  Constitutional: NAD  HEENT: EOMI  Neck:  No JVD, supple   Respiratory: CTA B/L  Cardiovascular: S1 and S2, RRR  Gastrointestinal: + BS, soft, NT, ND  Extremities: No peripheral edema, + peripheral pulses  Neurological: A/O x 3, CN2-12 intact  Psychiatric: Normal mood, normal affect  : No [place]  Skin: No rashes, C/D/I  Access: Not applicable    I and O's:     @ 07:01  -   @ 10:53  --------------------------------------------------------  IN: 0 mL / OUT: 50 mL / NET: -50 mL      Height (cm): 160 ( @ 15:28)  Weight (kg): 65.3 ( @ 15:28)  BMI (kg/m2): 25.5 ( @ 15:28)  BSA (m2): 1.68 ( @ 15:28)    LABS:                        11.8   13.59 )-----------( 139      ( 23 Dec 2023 17:55 )             34.5         127<L>  |  92<L>  |  77<H>  ----------------------------<  129<H>  4.5   |  25  |  1.39<H>    Ca    8.9      23 Dec 2023 17:55    TPro  7.1  /  Alb  3.5  /  TBili  0.5  /  DBili  x   /  AST  20  /  ALT  15  /  AlkPhos  63        URINE:  Urinalysis Basic - ( 23 Dec 2023 19:52 )    Color: Yellow / Appearance: Turbid / S.017 / pH: x  Gluc: x / Ketone: Negative mg/dL  / Bili: Negative / Urobili: 0.2 mg/dL   Blood: x / Protein: 100 mg/dL / Nitrite: Negative   Leuk Esterase: Large / RBC: 3 /HPF / WBC >998 /HPF   Sq Epi: x / Non Sq Epi: 7 /HPF / Bacteria: Many /HPF        RADIOLOGY & ADDITIONAL STUDIES:    < from: CT Abdomen and Pelvis w/ IV Cont (23 @ 20:45) >    ACC: [id number] EXAM:  CT ABDOMEN AND PELVIS IC   ORDERED BY: RENÉE WILLIS     PROCEDURE DATE:  2023          INTERPRETATION:  CLINICAL INFORMATION: Elevated WBC. Positive urinalysis.    COMPARISON: PET CT 2023    CONTRAST/COMPLICATIONS:  IV Contrast: Omnipaque 350  90 cc administered   10 cc discarded  Oral Contrast: NONE  Complications: None reported at time of study completion    PROCEDURE:  CT of the Abdomen and Pelvis was performed.  Sagittal and coronal reformats were performed.    FINDINGS:  LOWER CHEST: Pacemaker leads noted. Cardiomegaly Coronary artery   calcifications. Bibasilar atelectasis. Mild lower esophageal wall   thickening.    LIVER: Within normal limits.  BILE DUCTS: Normal caliber.  GALLBLADDER: Within normal limits.  SPLEEN: Within normal limits.  PANCREAS: Within normal limits.  ADRENALS: Within normal limits.  KIDNEYS/URETERS: Atrophic native kidneys. Right lower quadrant transplant   kidney with moderate hydronephrosis minimally increased from 2023.   Enhancement of the ureteral wall. There is a heterogeneous enhancement   throughout the renal parenchyma.    BLADDER: Bladder wall thickening with surrounding inflammatory changes..  REPRODUCTIVE ORGANS: Prostate is nonenlarged.    BOWEL: No bowel obstruction. Wall thickening at the cecum with trace   surrounding fat stranding, likely reactive. Appendix is normal.  PERITONEUM: Small free fluid in the pelvis, left paracolic gutter and   perisplenic region.  VESSELS: Atherosclerotic changes.  RETROPERITONEUM/LYMPH NODES: No lymphadenopathy.  ABDOMINAL WALL: Postsurgical changes. Mild fat stranding and edema of the   right lateral abdominal wall musculature, may be reactive.  BONES: Degenerative changes. Mild lumbar levoscoliosis.    IMPRESSION:  Bladder wall thickening with surrounding fat stranding concerning for   cystitis. Moderate hydronephrosis of the transplant kidney again seen   perhaps minimally increased compared with 2023, with areas of   heterogeneous enhancement, ureteral wall enhancement, concerning for   acute pyelonephritis. Adjacent reactive changes as above.            --- End of Report ---           LIVIA RAI MD; Resident Radiologist  This document has been electronically signed.   RADHA SANCHEZ MD; Attending Radiologist  This document has been electronically signed. Dec 23 2023  9:42PM    < end of copied text >   NEPHROLOGY - NSN    Patient seen and examined.    HPI:   74 yo M PMHx of renal transplant 6 years ago at [place] presents with L flank pain and dysuria, decreased appetite, increased weakness. Denies fevers, chills, nausea, vomiting. On tacrolimus and mycophenolate for transplant    PAST MEDICAL & SURGICAL HISTORY:  HTN (hypertension)      Renal insufficiency      Prostate cancer      Renal transplant, status post          MEDICATIONS  (STANDING):  meropenem  IVPB 1000 milliGRAM(s) IV Intermittent every 8 hours      Allergies    No Known Allergies    Intolerances        SOCIAL HISTORY:  Denies alcohol abuse, drug abuse or tobacco usage.     FAMILY HISTORY:  FH: diabetes mellitus        VITALS:  T(C): 36.8 (23 @ 08:38), Max: 38.1 (23 @ 18:27)  HR: 72 (23 @ 08:38) (71 - 89)  BP: 131/63 (23 @ 08:38) (103/62 - 153/70)  RR: 18 (23 @ 08:38) (17 - 20)  SpO2: 97% (23 @ 08:38) (96% - 98%)    REVIEW OF SYSTEMS:  Denies any nausea, vomiting, diarrhea, fever or chills. Denies chest pain, SOB, focal weakness, hematuria or dysuria. Good oral intake and denies fatigue or weakness. All other pertinent systems are reviewed and are negative.    PHYSICAL EXAM:  Constitutional: NAD  HEENT: EOMI  Neck:  No JVD, supple   Respiratory: CTA B/L  Cardiovascular: S1 and S2, RRR  Gastrointestinal: + BS, soft, NT, ND  Extremities: No peripheral edema, + peripheral pulses  Neurological: A/O x 3, CN2-12 intact  Psychiatric: Normal mood, normal affect  : No Block  Skin: No rashes, C/D/I  Access: Not applicable    I and O's:     @ 07:01  -   @ 10:53  --------------------------------------------------------  IN: 0 mL / OUT: 50 mL / NET: -50 mL      Height (cm): 160 ( @ 15:28)  Weight (kg): 65.3 ( @ 15:28)  BMI (kg/m2): 25.5 ( @ 15:28)  BSA (m2): 1.68 ( @ 15:28)    LABS:                        11.8   13.59 )-----------( 139      ( 23 Dec 2023 17:55 )             34.5         127<L>  |  92<L>  |  77<H>  ----------------------------<  129<H>  4.5   |  25  |  1.39<H>    Ca    8.9      23 Dec 2023 17:55    TPro  7.1  /  Alb  3.5  /  TBili  0.5  /  DBili  x   /  AST  20  /  ALT  15  /  AlkPhos  63        URINE:  Urinalysis Basic - ( 23 Dec 2023 19:52 )    Color: Yellow / Appearance: Turbid / S.017 / pH: x  Gluc: x / Ketone: Negative mg/dL  / Bili: Negative / Urobili: 0.2 mg/dL   Blood: x / Protein: 100 mg/dL / Nitrite: Negative   Leuk Esterase: Large / RBC: 3 /HPF / WBC >998 /HPF   Sq Epi: x / Non Sq Epi: 7 /HPF / Bacteria: Many /HPF        RADIOLOGY & ADDITIONAL STUDIES:    < from: CT Abdomen and Pelvis w/ IV Cont (23 @ 20:45) >    ACC: [id number] EXAM:  CT ABDOMEN AND PELVIS IC   ORDERED BY: RENÉE WILLIS     PROCEDURE DATE:  2023          INTERPRETATION:  CLINICAL INFORMATION: Elevated WBC. Positive urinalysis.    COMPARISON: PET CT 2023    CONTRAST/COMPLICATIONS:  IV Contrast: Omnipaque 350  90 cc administered   10 cc discarded  Oral Contrast: NONE  Complications: None reported at time of study completion    PROCEDURE:  CT of the Abdomen and Pelvis was performed.  Sagittal and coronal reformats were performed.    FINDINGS:  LOWER CHEST: Pacemaker leads noted. Cardiomegaly Coronary artery   calcifications. Bibasilar atelectasis. Mild lower esophageal wall   thickening.    LIVER: Within normal limits.  BILE DUCTS: Normal caliber.  GALLBLADDER: Within normal limits.  SPLEEN: Within normal limits.  PANCREAS: Within normal limits.  ADRENALS: Within normal limits.  KIDNEYS/URETERS: Atrophic native kidneys. Right lower quadrant transplant   kidney with moderate hydronephrosis minimally increased from 2023.   Enhancement of the ureteral wall. There is a heterogeneous enhancement   throughout the renal parenchyma.    BLADDER: Bladder wall thickening with surrounding inflammatory changes..  REPRODUCTIVE ORGANS: Prostate is nonenlarged.    BOWEL: No bowel obstruction. Wall thickening at the cecum with trace   surrounding fat stranding, likely reactive. Appendix is normal.  PERITONEUM: Small free fluid in the pelvis, left paracolic gutter and   perisplenic region.  VESSELS: Atherosclerotic changes.  RETROPERITONEUM/LYMPH NODES: No lymphadenopathy.  ABDOMINAL WALL: Postsurgical changes. Mild fat stranding and edema of the   right lateral abdominal wall musculature, may be reactive.  BONES: Degenerative changes. Mild lumbar levoscoliosis.    IMPRESSION:  Bladder wall thickening with surrounding fat stranding concerning for   cystitis. Moderate hydronephrosis of the transplant kidney again seen   perhaps minimally increased compared with 2023, with areas of   heterogeneous enhancement, ureteral wall enhancement, concerning for   acute pyelonephritis. Adjacent reactive changes as above.            --- End of Report ---           LIVIA RAI MD; Resident Radiologist  This document has been electronically signed.   RADHA SANCHEZ MD; Attending Radiologist  This document has been electronically signed. Dec 23 2023  9:42PM    < end of copied text >   NEPHROLOGY - NSN    Patient seen and examined.    HPI:   74 yo M PMHx of renal transplant 6 years ago at [place] presents with L flank pain and dysuria, decreased appetite, increased weakness. Denies fevers, chills, nausea, vomiting. On tacrolimus and mycophenolate for transplant    PAST MEDICAL & SURGICAL HISTORY:  HTN (hypertension)      Renal insufficiency      Prostate cancer      Renal transplant, status post          MEDICATIONS  (STANDING):  meropenem  IVPB 1000 milliGRAM(s) IV Intermittent every 8 hours      Allergies    No Known Allergies    Intolerances        SOCIAL HISTORY:  Denies alcohol abuse, drug abuse or tobacco usage.     FAMILY HISTORY:  FH: diabetes mellitus        VITALS:  T(C): 36.8 (23 @ 08:38), Max: 38.1 (23 @ 18:27)  HR: 72 (23 @ 08:38) (71 - 89)  BP: 131/63 (23 @ 08:38) (103/62 - 153/70)  RR: 18 (23 @ 08:38) (17 - 20)  SpO2: 97% (23 @ 08:38) (96% - 98%)    REVIEW OF SYSTEMS:  +  fever or chills. Denies chest pain, SOB, focal weakness, hematuria or dysuria. Good oral intake and +  fatigue or weakness. All other pertinent systems are reviewed and are negative.    PHYSICAL EXAM:  Constitutional: NAD  HEENT: EOMI  Neck:  No JVD, supple   Respiratory: CTA B/L  Cardiovascular: S1 and S2, RRR  Gastrointestinal: + BS, soft, NT, ND  Extremities: No peripheral edema, + peripheral pulses  Neurological: A/O x 3, CN2-12 intact  Psychiatric: Normal mood, normal affect  : No Block  Skin: No rashes, C/D/I  Access: Not applicable    I and O's:     @ 07:01  -   @ 10:53  --------------------------------------------------------  IN: 0 mL / OUT: 50 mL / NET: -50 mL      Height (cm): 160 ( @ 15:28)  Weight (kg): 65.3 ( @ 15:28)  BMI (kg/m2): 25.5 ( @ 15:28)  BSA (m2): 1.68 ( @ 15:28)    LABS:                        11.8   13.59 )-----------( 139      ( 23 Dec 2023 17:55 )             34.5         127<L>  |  92<L>  |  77<H>  ----------------------------<  129<H>  4.5   |  25  |  1.39<H>    Ca    8.9      23 Dec 2023 17:55    TPro  7.1  /  Alb  3.5  /  TBili  0.5  /  DBili  x   /  AST  20  /  ALT  15  /  AlkPhos  63        URINE:  Urinalysis Basic - ( 23 Dec 2023 19:52 )    Color: Yellow / Appearance: Turbid / S.017 / pH: x  Gluc: x / Ketone: Negative mg/dL  / Bili: Negative / Urobili: 0.2 mg/dL   Blood: x / Protein: 100 mg/dL / Nitrite: Negative   Leuk Esterase: Large / RBC: 3 /HPF / WBC >998 /HPF   Sq Epi: x / Non Sq Epi: 7 /HPF / Bacteria: Many /HPF        RADIOLOGY & ADDITIONAL STUDIES:    < from: CT Abdomen and Pelvis w/ IV Cont (23 @ 20:45) >    ACC: [id number] EXAM:  CT ABDOMEN AND PELVIS IC   ORDERED BY: RENÉE WILLIS     PROCEDURE DATE:  2023          INTERPRETATION:  CLINICAL INFORMATION: Elevated WBC. Positive urinalysis.    COMPARISON: PET CT 2023    CONTRAST/COMPLICATIONS:  IV Contrast: Omnipaque 350  90 cc administered   10 cc discarded  Oral Contrast: NONE  Complications: None reported at time of study completion    PROCEDURE:  CT of the Abdomen and Pelvis was performed.  Sagittal and coronal reformats were performed.    FINDINGS:  LOWER CHEST: Pacemaker leads noted. Cardiomegaly Coronary artery   calcifications. Bibasilar atelectasis. Mild lower esophageal wall   thickening.    LIVER: Within normal limits.  BILE DUCTS: Normal caliber.  GALLBLADDER: Within normal limits.  SPLEEN: Within normal limits.  PANCREAS: Within normal limits.  ADRENALS: Within normal limits.  KIDNEYS/URETERS: Atrophic native kidneys. Right lower quadrant transplant   kidney with moderate hydronephrosis minimally increased from 2023.   Enhancement of the ureteral wall. There is a heterogeneous enhancement   throughout the renal parenchyma.    BLADDER: Bladder wall thickening with surrounding inflammatory changes..  REPRODUCTIVE ORGANS: Prostate is nonenlarged.    BOWEL: No bowel obstruction. Wall thickening at the cecum with trace   surrounding fat stranding, likely reactive. Appendix is normal.  PERITONEUM: Small free fluid in the pelvis, left paracolic gutter and   perisplenic region.  VESSELS: Atherosclerotic changes.  RETROPERITONEUM/LYMPH NODES: No lymphadenopathy.  ABDOMINAL WALL: Postsurgical changes. Mild fat stranding and edema of the   right lateral abdominal wall musculature, may be reactive.  BONES: Degenerative changes. Mild lumbar levoscoliosis.    IMPRESSION:  Bladder wall thickening with surrounding fat stranding concerning for   cystitis. Moderate hydronephrosis of the transplant kidney again seen   perhaps minimally increased compared with 2023, with areas of   heterogeneous enhancement, ureteral wall enhancement, concerning for   acute pyelonephritis. Adjacent reactive changes as above.            --- End of Report ---           LIVIA RAI MD; Resident Radiologist  This document has been electronically signed.   RADHA SANCHEZ MD; Attending Radiologist  This document has been electronically signed. Dec 23 2023  9:42PM    < end of copied text >   NEPHROLOGY - NSN    Patient seen and examined.    HPI:   74 yo M PMHx of renal transplant 6 years ago at [place] presents with L flank pain and dysuria, decreased appetite, increased weakness. Denies fevers, chills, nausea, vomiting. On tacrolimus and mycophenolate for transplant    PAST MEDICAL & SURGICAL HISTORY:  HTN (hypertension)      Renal insufficiency      Prostate cancer      Renal transplant, status post          MEDICATIONS  (STANDING):  meropenem  IVPB 1000 milliGRAM(s) IV Intermittent every 8 hours      Allergies    No Known Allergies    Intolerances        SOCIAL HISTORY:  Denies alcohol abuse, drug abuse or tobacco usage.     FAMILY HISTORY:  FH: diabetes mellitus        VITALS:  T(C): 36.8 (23 @ 08:38), Max: 38.1 (23 @ 18:27)  HR: 72 (23 @ 08:38) (71 - 89)  BP: 131/63 (23 @ 08:38) (103/62 - 153/70)  RR: 18 (23 @ 08:38) (17 - 20)  SpO2: 97% (23 @ 08:38) (96% - 98%)    REVIEW OF SYSTEMS:  +  fever or chills. Denies chest pain, SOB, focal weakness, hematuria or dysuria. Good oral intake and +  fatigue or weakness. All other pertinent systems are reviewed and are negative.    PHYSICAL EXAM:  Constitutional: NAD  HEENT: EOMI  Neck:  No JVD, supple   Respiratory: CTA B/L  Cardiovascular: S1 and S2, RRR  Gastrointestinal: + BS, soft, NT, ND  Extremities: No peripheral edema, + peripheral pulses  Neurological: A/O x 3, CN2-12 intact  Psychiatric: Normal mood, normal affect  : No Block  Skin: No rashes, C/D/I  Access: Not applicable    I and O's:     @ 07:01  -   @ 10:53  --------------------------------------------------------  IN: 0 mL / OUT: 50 mL / NET: -50 mL      Height (cm): 160 ( @ 15:28)  Weight (kg): 65.3 ( @ 15:28)  BMI (kg/m2): 25.5 ( @ 15:28)  BSA (m2): 1.68 ( @ 15:28)    LABS:                        11.8   13.59 )-----------( 139      ( 23 Dec 2023 17:55 )             34.5         127<L>  |  92<L>  |  77<H>  ----------------------------<  129<H>  4.5   |  25  |  1.39<H>    Ca    8.9      23 Dec 2023 17:55    TPro  7.1  /  Alb  3.5  /  TBili  0.5  /  DBili  x   /  AST  20  /  ALT  15  /  AlkPhos  63        URINE:  Urinalysis Basic - ( 23 Dec 2023 19:52 )    Color: Yellow / Appearance: Turbid / S.017 / pH: x  Gluc: x / Ketone: Negative mg/dL  / Bili: Negative / Urobili: 0.2 mg/dL   Blood: x / Protein: 100 mg/dL / Nitrite: Negative   Leuk Esterase: Large / RBC: 3 /HPF / WBC >998 /HPF   Sq Epi: x / Non Sq Epi: 7 /HPF / Bacteria: Many /HPF        RADIOLOGY & ADDITIONAL STUDIES:    < from: CT Abdomen and Pelvis w/ IV Cont (23 @ 20:45) >    ACC: [id number] EXAM:  CT ABDOMEN AND PELVIS IC   ORDERED BY: RENÉE WILLIS     PROCEDURE DATE:  2023          INTERPRETATION:  CLINICAL INFORMATION: Elevated WBC. Positive urinalysis.    COMPARISON: PET CT 2023    CONTRAST/COMPLICATIONS:  IV Contrast: Omnipaque 350  90 cc administered   10 cc discarded  Oral Contrast: NONE  Complications: None reported at time of study completion    PROCEDURE:  CT of the Abdomen and Pelvis was performed.  Sagittal and coronal reformats were performed.    FINDINGS:  LOWER CHEST: Pacemaker leads noted. Cardiomegaly Coronary artery   calcifications. Bibasilar atelectasis. Mild lower esophageal wall   thickening.    LIVER: Within normal limits.  BILE DUCTS: Normal caliber.  GALLBLADDER: Within normal limits.  SPLEEN: Within normal limits.  PANCREAS: Within normal limits.  ADRENALS: Within normal limits.  KIDNEYS/URETERS: Atrophic native kidneys. Right lower quadrant transplant   kidney with moderate hydronephrosis minimally increased from 2023.   Enhancement of the ureteral wall. There is a heterogeneous enhancement   throughout the renal parenchyma.    BLADDER: Bladder wall thickening with surrounding inflammatory changes..  REPRODUCTIVE ORGANS: Prostate is nonenlarged.    BOWEL: No bowel obstruction. Wall thickening at the cecum with trace   surrounding fat stranding, likely reactive. Appendix is normal.  PERITONEUM: Small free fluid in the pelvis, left paracolic gutter and   perisplenic region.  VESSELS: Atherosclerotic changes.  RETROPERITONEUM/LYMPH NODES: No lymphadenopathy.  ABDOMINAL WALL: Postsurgical changes. Mild fat stranding and edema of the   right lateral abdominal wall musculature, may be reactive.  BONES: Degenerative changes. Mild lumbar levoscoliosis.    IMPRESSION:  Bladder wall thickening with surrounding fat stranding concerning for   cystitis. Moderate hydronephrosis of the transplant kidney again seen   perhaps minimally increased compared with 2023, with areas of   heterogeneous enhancement, ureteral wall enhancement, concerning for   acute pyelonephritis. Adjacent reactive changes as above.            --- End of Report ---           LIVIA RAI MD; Resident Radiologist  This document has been electronically signed.   RADHA SANCHEZ MD; Attending Radiologist  This document has been electronically signed. Dec 23 2023  9:42PM    < end of copied text >

## 2023-12-24 NOTE — H&P ADULT - NSHPPHYSICALEXAM_GEN_ALL_CORE
Vital Signs Last 24 Hrs  T(C): 36.8 (24 Dec 2023 08:38), Max: 38.1 (23 Dec 2023 18:27)  T(F): 98.2 (24 Dec 2023 08:38), Max: 100.5 (23 Dec 2023 18:27)  HR: 72 (24 Dec 2023 08:38) (71 - 89)  BP: 131/63 (24 Dec 2023 08:38) (103/62 - 153/70)  BP(mean): --  RR: 18 (24 Dec 2023 08:38) (17 - 20)  SpO2: 97% (24 Dec 2023 08:38) (96% - 98%)    Parameters below as of 24 Dec 2023 08:38  Patient On (Oxygen Delivery Method): room air      CAPILLARY BLOOD GLUCOSE        I&O's Summary    24 Dec 2023 07:01  -  24 Dec 2023 11:19  --------------------------------------------------------  IN: 0 mL / OUT: 50 mL / NET: -50 mL        PHYSICAL EXAM:  GENERAL: NAD, well-developed  HEAD:  Atraumatic, Normocephalic  EYES: EOMI, PERRLA, conjunctiva and sclera clear  MOUTH: no oral thrush  NECK: Supple, No JVD  CHEST/LUNG: Clear to auscultation bilaterally; No wheeze  HEART: Regular rate and rhythm; No murmurs, rubs, or gallops  ABDOMEN: Soft, Nontender, Nondistended; Bowel sounds present  EXTREMITIES:  2+ Peripheral Pulses, No clubbing, cyanosis, or edema  NEUROLOGY: AAOx3, non-focal  SKIN: No rashes or lesions

## 2023-12-24 NOTE — H&P ADULT - PROBLEM SELECTOR PLAN 1
dysuria with flank pain, positive UA and CTAP concerning for pyelonephritis of transplant kidney  - hx of ESBL E.coli in 2020  - start meropenem and fu UCx  - obtain renal US  - fu BCcx, UCx  - holding cellcept

## 2023-12-24 NOTE — H&P ADULT - PROBLEM SELECTOR PLAN 3
SCr 1.3 on admission, baseline 0.7 as per patient  - likely in setting of acute infection  - hold lisinopril for now  - hold cellcept  - check tacro, cellcept level  - appreciate nephrology recs: care discussed with Dr. Ramirez - recommends NS 60cc/hr as maintenance

## 2023-12-24 NOTE — H&P ADULT - HISTORY OF PRESENT ILLNESS
73M with hx of HTN, s/p renal transplant in 2017 at [place], presents with dysuria and L flank pain x 4 days. Pt states that he started noticing burning sensation with urination and now with L flank pain. Has been compliant with his transplant medications. Sees his nephrologist every 3 months and usual baseline SCr is 0.7. Denies fever/chills, chest pain, dyspnea, abdominal pain, nausea/vomiting, diarrhea.  73M with hx of HTN, s/p renal transplant in 2017 at [place], presents with dysuria and L flank pain x 4 days. Pt states that he started noticing burning sensation with urination and now with L flank pain. Has been compliant with his transplant medications. Sees his nephrologist every 3 months and usual baseline SCr is 0.7. Denies fever/chills, chest pain, dyspnea, abdominal pain, nausea/vomiting, diarrhea.

## 2023-12-24 NOTE — H&P ADULT - TIME BILLING
patient encounter, reviewing tests and imaging, independently obtaining a history, performing a physical examination, discussing the plan with the patient, ordering medications/tests, documenting clinical information, and coordinating care. This excludes any time spent on teaching.

## 2023-12-24 NOTE — PATIENT PROFILE ADULT - FALL HARM RISK - HARM RISK INTERVENTIONS
Assistance with ambulation/Assistance OOB with selected safe patient handling equipment/Communicate Risk of Fall with Harm to all staff/Discuss with provider need for PT consult/Monitor gait and stability/Provide patient with walking aids - walker, cane, crutches/Reinforce activity limits and safety measures with patient and family/Tailored Fall Risk Interventions/Visual Cue: Yellow wristband and red socks/Bed in lowest position, wheels locked, appropriate side rails in place/Call bell, personal items and telephone in reach/Instruct patient to call for assistance before getting out of bed or chair/Non-slip footwear when patient is out of bed/[place] to call system/Physically safe environment - no spills, clutter or unnecessary equipment/Purposeful Proactive Rounding/Room/bathroom lighting operational, light cord in reach Assistance with ambulation/Assistance OOB with selected safe patient handling equipment/Communicate Risk of Fall with Harm to all staff/Discuss with provider need for PT consult/Monitor gait and stability/Provide patient with walking aids - walker, cane, crutches/Reinforce activity limits and safety measures with patient and family/Tailored Fall Risk Interventions/Visual Cue: Yellow wristband and red socks/Bed in lowest position, wheels locked, appropriate side rails in place/Call bell, personal items and telephone in reach/Instruct patient to call for assistance before getting out of bed or chair/Non-slip footwear when patient is out of bed/[place] to call system/Physically safe environment - no spills, clutter or unnecessary equipment/Purposeful Proactive Rounding/Room/bathroom lighting operational, light cord in reach

## 2023-12-24 NOTE — H&P ADULT - PROBLEM SELECTOR PLAN 4
s/p renal transplant in 2017  - on cellcept and tacro at home  - holding cellcept while active infection  - resume tacro 1mg BID  - check the levels

## 2023-12-24 NOTE — H&P ADULT - NSICDXPASTMEDICALHX_GEN_ALL_CORE_FT
Hearing Aid Visit:    Name:  Blanche Bundy  :  1972  Age:  44 y o  Date of Evaluation: 2022    Blanche Bundy is being seen for a hearing aid visit     Patient is fit with Tonny Roth 2 Pro hearing aid(s) fit in 2016  Right serial number [id number]  Left serial number [id number]  Warranty date: OOW 19 (Loss/Damage and repair)  (Left aid repair only [place] til  OOW)    Patient reports the right hearing aid does not work  Action:  1  Needed a new wax guard as it was full   2  Changed both domes to 6 mm DB provided 1 pack     Recommendations:   1   RTO  10/20/2022 for 2 year audio and 5 month HA visit     Miranda Ramon , CCC-A, [place]# [id number], Hearing Aid Dispenser, [place]# [id number]  Clinical Audiologist PAST MEDICAL HISTORY:  HTN (hypertension)     Prostate cancer     Renal insufficiency

## 2023-12-24 NOTE — H&P ADULT - ASSESSMENT
73M with hx of HTN, s/p renal transplant in 2017 at [place], presents with dysuria and L flank pain x 4 days found to have sepsis 2/2 pyelonephritis 73M with hx of HTN, s/p renal transplant in 2017 at [place], presents with dysuria and L flank pain x 4 days found to have sepsis 2/2 pyelonephritis

## 2023-12-24 NOTE — CONSULT NOTE ADULT - ASSESSMENT
Zana Shepard is a 73 year old man with a history of HTN, prior ESRD s/p nephrectomy and transplant, prostate cancer s/p radiation who presents with  UTI (>[id number] GNR)      - ESRD s/p DDKT- baseline s.cr ~ 1.0 mg/dl;  Hydro was noted   - Pyelonephritis   - Hyponatremia       PLAN:   -  IV Meropenum started and this has a large salt load as well.    - SP 1 liter on NS in the ER   - Renal dosing of meds to creatinine clearance of 50 ml/min  - Follow  urine C&S and will need a total course of 2 weeks;           Sayed [facility]   [id number]      Zana Shepard is a 73 year old man with a history of HTN, prior ESRD s/p nephrectomy and transplant, prostate cancer s/p radiation who presents with  UTI (>[id number] GNR)      - ESRD s/p DDKT- baseline s.cr ~ 1.0 mg/dl;  Hydro was noted   - Pyelonephritis   - Hyponatremia       PLAN:   -  IV Meropenum started and this has a large salt load as well.    - SP 1 liter on NS in the ER   - Renal dosing of meds to creatinine clearance of 50 ml/min  - Follow  urine C&S and will need a total course of 2 weeks;           Sayed [facility]   [id number]      Zana Shepard is a 73 year old man with a history of HTN, prior ESRD s/p nephrectomy and transplant, prostate cancer s/p radiation who presents with  UTI (>[id number] GNR)      - ESRD s/p DDKT- baseline s.cr ~ 1.0 mg/dl;  Hydro was noted   - Pyelonephritis   - Hyponatremia       PLAN:   -  IV Meropenum started and this has a large salt load as well.    - SP 1 liter on NS in the ER and start maintenance NS at 60cc/hr    - Renal dosing of meds to creatinine clearance of 50 ml/min  - Follow  urine C&S and will need a total course of 2 weeks;           Sayed [facility]   [id number]      Zana Shepard is a 73 year old man with a history of HTN, prior ESRD s/p nephrectomy and transplant, prostate cancer s/p radiation who presents with  UTI (>[id number] GNR)      - ESRD s/p DDKT- baseline s.cr ~ 1.0 mg/dl;  Hydro was noted   - Pyelonephritis   - Hyponatremia       PLAN:   -  IV Meropenum started and this has a large salt load as well.    - SP 1 liter on NS in the ER and start maintenance NS at 60cc/hr    - Renal dosing of meds to creatinine clearance of 50 ml/min  - Follow  urine C&S and will need a total course of 2 weeks;           Sayed [facility]   [id number]      Zana Shepard is a 73 year old man with a history of HTN, prior ESRD s/p nephrectomy and transplant, prostate cancer s/p radiation who presents with  UTI (>296690 GNR)      - ESRD s/p DDKT- baseline s.cr ~ 1.0 mg/dl;  Hydro was noted   - Pyelonephritis   - Hyponatremia       PLAN:   -  IV Meropenum started and this has a large salt load as well.    - SP 1 liter on NS in the ER and start maintenance NS at 60cc/hr    - Renal dosing of meds to creatinine clearance of 50 ml/min  - Follow  urine C&S and will need a total course of 2 weeks;    -Cont prograf dose and hold Cellcept  -Can cont home bp meds including ace          Sayed [facility] [employer]   [id number]      Zana Shepard is a 73 year old man with a history of HTN, prior ESRD s/p nephrectomy and transplant, prostate cancer s/p radiation who presents with  UTI (>[id number] GNR)      - ESRD s/p DDKT- baseline s.cr ~ 1.0 mg/dl;  Hydro was noted   - Pyelonephritis   - Hyponatremia       PLAN:   -  IV Meropenum started and this has a large salt load as well.    - SP 1 liter on NS in the ER and start maintenance NS at 60cc/hr    - Renal dosing of meds to creatinine clearance of 50 ml/min  - Follow  urine C&S and will need a total course of 2 weeks;    -Cont prograf dose and hold Cellcept  -Can cont home bp meds including ace          Sayed [facility] [employer]   [id number]

## 2023-12-24 NOTE — H&P ADULT - NSHPLABSRESULTS_GEN_ALL_CORE
LABS:   personally reviewed                        11.8   13.59 )-----------( 139      ( 23 Dec 2023 17:55 )             34.5     12    127<L>  |  92<L>  |  77<H>  ----------------------------<  129<H>  4.5   |  25  |  1.39<H>    Ca    8.9      23 Dec 2023 17:55    TPro  7.1  /  Alb  3.5  /  TBili  0.5  /  DBili  x   /  AST  20  /  ALT  15  /  AlkPhos  63        Urinalysis Basic - ( 23 Dec 2023 19:52 )    Color: Yellow / Appearance: Turbid / S.017 / pH: x  Gluc: x / Ketone: Negative mg/dL  / Bili: Negative / Urobili: 0.2 mg/dL   Blood: x / Protein: 100 mg/dL / Nitrite: Negative   Leuk Esterase: Large / RBC: 3 /HPF / WBC >998 /HPF   Sq Epi: x / Non Sq Epi: 7 /HPF / Bacteria: Many /HPF    Culture - Urine (20 @ 10:19)   - Amikacin: S <=16  - Ampicillin: R >16 These ampicillin results predict results for amoxicillin  - Ampicillin/Sulbactam: R >16/8 Enterobacter, Citrobacter, and Serratia may develop resistance during prolonged therapy (3-4 days)  - Aztreonam: R >16  - Cefazolin: R >16 (MIC_CL_COM_ENTERIC_CEFAZU) For uncomplicated UTI with K. pneumoniae, E. coli, or P. mirablis: ERIC <=16 is sensitive and ERIC >=32 is resistant. This also predicts results for oral agents cefaclor, cefdinir, cefpodoxime, cefprozil, cefuroxime axetil, cephalexin and locarbef for uncomplicated UTI. Note that some isolates may be susceptible to these agents while testing resistant to cefazolin.  - Cefepime: R >16  - Cefoxitin: S <=8  - Ceftriaxone: R >32 Enterobacter, Citrobacter, and Serratia may develop resistance during prolonged therapy  - Ciprofloxacin: S <=1  - Ertapenem: S <=1  - Gentamicin: S <=4  - Imipenem: S <=1  - Levofloxacin: S <=2  - Meropenem: S <=1  - Nitrofurantoin: S <=32 Should not be used to treat pyelonephritis  - Piperacillin/Tazobactam: R <=16  - Tigecycline: S <=2  - Tobramycin: S <=4  - Trimethoprim/Sulfamethoxazole: R >  Specimen Source: .Urine Clean Catch (Midstream)  Culture Results:   >100,000 CFU/ml Escherichia coli ESBL  Organism Identification: Escherichia coli ESBL  Organism: Escherichia coli ESBL    CXR personally reviewed and interpreted:  < from: CT Abdomen and Pelvis w/ IV Cont (23 @ 20:45) >    Bladder wall thickening with surrounding fat stranding concerning for   cystitis. Moderate hydronephrosis of the transplant kidney again seen   perhaps minimally increased compared with 2023, with areas of   heterogeneous enhancement, ureteral wall enhancement, concerning for   acute pyelonephritis. Adjacent reactive changes as above.    < end of copied text >        ECG personally reviewed and interpreted:

## 2023-12-24 NOTE — H&P ADULT - NSHPREVIEWOFSYSTEMS_GEN_ALL_CORE
Review of Systems:   CONSTITUTIONAL: No fever, weight loss, or fatigue  EYES: No eye pain, visual disturbances, or discharge  ENMT:  No difficulty hearing, tinnitus, vertigo; No sinus or throat pain  NECK: No pain or stiffness  BREASTS: No pain, masses, or nipple discharge  RESPIRATORY: No cough, wheezing, chills or hemoptysis; No shortness of breath  CARDIOVASCULAR: No chest pain, palpitations, dizziness, or leg swelling  GASTROINTESTINAL: No abdominal or epigastric pain. No nausea, vomiting, or hematemesis; No diarrhea or constipation. No melena or hematochezia.  GENITOURINARY: (+) dysuria, flank pain  NEUROLOGICAL: No headaches, memory loss, loss of strength, numbness, or tremors  SKIN: No itching, burning, rashes, or lesions   LYMPH NODES: No enlarged glands  ENDOCRINE: No heat or cold intolerance; No hair loss  MUSCULOSKELETAL: No joint pain or swelling; No muscle, back, or extremity pain  PSYCHIATRIC: No depression, anxiety, mood swings, or difficulty sleeping  HEME/LYMPH: No easy bruising, or bleeding gums  ALLERY AND IMMUNOLOGIC: No hives or eczema

## 2023-12-25 LAB
ALBUMIN SERPL ELPH-MCNC: 2.8 G/DL — LOW (ref 3.3–5)
ALBUMIN SERPL ELPH-MCNC: 2.8 G/DL — LOW (ref 3.3–5)
ALP SERPL-CCNC: 65 U/L — SIGNIFICANT CHANGE UP (ref 40–120)
ALP SERPL-CCNC: 65 U/L — SIGNIFICANT CHANGE UP (ref 40–120)
ALT FLD-CCNC: 16 U/L — SIGNIFICANT CHANGE UP (ref 10–45)
ALT FLD-CCNC: 16 U/L — SIGNIFICANT CHANGE UP (ref 10–45)
ANION GAP SERPL CALC-SCNC: 14 MMOL/L — SIGNIFICANT CHANGE UP (ref 5–17)
ANION GAP SERPL CALC-SCNC: 14 MMOL/L — SIGNIFICANT CHANGE UP (ref 5–17)
AST SERPL-CCNC: 15 U/L — SIGNIFICANT CHANGE UP (ref 10–40)
AST SERPL-CCNC: 15 U/L — SIGNIFICANT CHANGE UP (ref 10–40)
BASOPHILS # BLD AUTO: 0.01 K/UL — SIGNIFICANT CHANGE UP (ref 0–0.2)
BASOPHILS # BLD AUTO: 0.01 K/UL — SIGNIFICANT CHANGE UP (ref 0–0.2)
BASOPHILS NFR BLD AUTO: 0.1 % — SIGNIFICANT CHANGE UP (ref 0–2)
BASOPHILS NFR BLD AUTO: 0.1 % — SIGNIFICANT CHANGE UP (ref 0–2)
BILIRUB SERPL-MCNC: 0.4 MG/DL — SIGNIFICANT CHANGE UP (ref 0.2–1.2)
BILIRUB SERPL-MCNC: 0.4 MG/DL — SIGNIFICANT CHANGE UP (ref 0.2–1.2)
BUN SERPL-MCNC: 67 MG/DL — HIGH (ref 7–23)
BUN SERPL-MCNC: 67 MG/DL — HIGH (ref 7–23)
CALCIUM SERPL-MCNC: 8.5 MG/DL — SIGNIFICANT CHANGE UP (ref 8.4–10.5)
CALCIUM SERPL-MCNC: 8.5 MG/DL — SIGNIFICANT CHANGE UP (ref 8.4–10.5)
CHLORIDE SERPL-SCNC: 100 MMOL/L — SIGNIFICANT CHANGE UP (ref 96–108)
CHLORIDE SERPL-SCNC: 100 MMOL/L — SIGNIFICANT CHANGE UP (ref 96–108)
CO2 SERPL-SCNC: 20 MMOL/L — LOW (ref 22–31)
CO2 SERPL-SCNC: 20 MMOL/L — LOW (ref 22–31)
CREAT SERPL-MCNC: 1.25 MG/DL — SIGNIFICANT CHANGE UP (ref 0.5–1.3)
CREAT SERPL-MCNC: 1.25 MG/DL — SIGNIFICANT CHANGE UP (ref 0.5–1.3)
EGFR: 60 ML/MIN/1.73M2 — SIGNIFICANT CHANGE UP
EGFR: 60 ML/MIN/1.73M2 — SIGNIFICANT CHANGE UP
EOSINOPHIL # BLD AUTO: 0.01 K/UL — SIGNIFICANT CHANGE UP (ref 0–0.5)
EOSINOPHIL # BLD AUTO: 0.01 K/UL — SIGNIFICANT CHANGE UP (ref 0–0.5)
EOSINOPHIL NFR BLD AUTO: 0.1 % — SIGNIFICANT CHANGE UP (ref 0–6)
EOSINOPHIL NFR BLD AUTO: 0.1 % — SIGNIFICANT CHANGE UP (ref 0–6)
GLUCOSE SERPL-MCNC: 103 MG/DL — HIGH (ref 70–99)
GLUCOSE SERPL-MCNC: 103 MG/DL — HIGH (ref 70–99)
HCT VFR BLD CALC: 31.9 % — LOW (ref 39–50)
HCT VFR BLD CALC: 31.9 % — LOW (ref 39–50)
HGB BLD-MCNC: 10.7 G/DL — LOW (ref 13–17)
HGB BLD-MCNC: 10.7 G/DL — LOW (ref 13–17)
IMM GRANULOCYTES NFR BLD AUTO: 0.5 % — SIGNIFICANT CHANGE UP (ref 0–0.9)
IMM GRANULOCYTES NFR BLD AUTO: 0.5 % — SIGNIFICANT CHANGE UP (ref 0–0.9)
LYMPHOCYTES # BLD AUTO: 0.73 K/UL — LOW (ref 1–3.3)
LYMPHOCYTES # BLD AUTO: 0.73 K/UL — LOW (ref 1–3.3)
LYMPHOCYTES # BLD AUTO: 7.7 % — LOW (ref 13–44)
LYMPHOCYTES # BLD AUTO: 7.7 % — LOW (ref 13–44)
MCHC RBC-ENTMCNC: 32.8 PG — SIGNIFICANT CHANGE UP (ref 27–34)
MCHC RBC-ENTMCNC: 32.8 PG — SIGNIFICANT CHANGE UP (ref 27–34)
MCHC RBC-ENTMCNC: 33.5 GM/DL — SIGNIFICANT CHANGE UP (ref 32–36)
MCHC RBC-ENTMCNC: 33.5 GM/DL — SIGNIFICANT CHANGE UP (ref 32–36)
MCV RBC AUTO: 97.9 FL — SIGNIFICANT CHANGE UP (ref 80–100)
MCV RBC AUTO: 97.9 FL — SIGNIFICANT CHANGE UP (ref 80–100)
MONOCYTES # BLD AUTO: 0.98 K/UL — HIGH (ref 0–0.9)
MONOCYTES # BLD AUTO: 0.98 K/UL — HIGH (ref 0–0.9)
MONOCYTES NFR BLD AUTO: 10.4 % — SIGNIFICANT CHANGE UP (ref 2–14)
MONOCYTES NFR BLD AUTO: 10.4 % — SIGNIFICANT CHANGE UP (ref 2–14)
NEUTROPHILS # BLD AUTO: 7.67 K/UL — HIGH (ref 1.8–7.4)
NEUTROPHILS # BLD AUTO: 7.67 K/UL — HIGH (ref 1.8–7.4)
NEUTROPHILS NFR BLD AUTO: 81.2 % — HIGH (ref 43–77)
NEUTROPHILS NFR BLD AUTO: 81.2 % — HIGH (ref 43–77)
NRBC # BLD: 0 /100 WBCS — SIGNIFICANT CHANGE UP (ref 0–0)
NRBC # BLD: 0 /100 WBCS — SIGNIFICANT CHANGE UP (ref 0–0)
PLATELET # BLD AUTO: 125 K/UL — LOW (ref 150–400)
PLATELET # BLD AUTO: 125 K/UL — LOW (ref 150–400)
POTASSIUM SERPL-MCNC: 3.8 MMOL/L — SIGNIFICANT CHANGE UP (ref 3.5–5.3)
POTASSIUM SERPL-MCNC: 3.8 MMOL/L — SIGNIFICANT CHANGE UP (ref 3.5–5.3)
POTASSIUM SERPL-SCNC: 3.8 MMOL/L — SIGNIFICANT CHANGE UP (ref 3.5–5.3)
POTASSIUM SERPL-SCNC: 3.8 MMOL/L — SIGNIFICANT CHANGE UP (ref 3.5–5.3)
PROT SERPL-MCNC: 6.1 G/DL — SIGNIFICANT CHANGE UP (ref 6–8.3)
PROT SERPL-MCNC: 6.1 G/DL — SIGNIFICANT CHANGE UP (ref 6–8.3)
RBC # BLD: 3.26 M/UL — LOW (ref 4.2–5.8)
RBC # BLD: 3.26 M/UL — LOW (ref 4.2–5.8)
RBC # FLD: 11.6 % — SIGNIFICANT CHANGE UP (ref 10.3–14.5)
RBC # FLD: 11.6 % — SIGNIFICANT CHANGE UP (ref 10.3–14.5)
SODIUM SERPL-SCNC: 134 MMOL/L — LOW (ref 135–145)
SODIUM SERPL-SCNC: 134 MMOL/L — LOW (ref 135–145)
TACROLIMUS SERPL-MCNC: 12.1 NG/ML — SIGNIFICANT CHANGE UP
TACROLIMUS SERPL-MCNC: 12.1 NG/ML — SIGNIFICANT CHANGE UP
WBC # BLD: 9.45 K/UL — SIGNIFICANT CHANGE UP (ref 3.8–10.5)
WBC # BLD: 9.45 K/UL — SIGNIFICANT CHANGE UP (ref 3.8–10.5)
WBC # FLD AUTO: 9.45 K/UL — SIGNIFICANT CHANGE UP (ref 3.8–10.5)
WBC # FLD AUTO: 9.45 K/UL — SIGNIFICANT CHANGE UP (ref 3.8–10.5)

## 2023-12-25 PROCEDURE — 99232 SBSQ HOSP IP/OBS MODERATE 35: CPT

## 2023-12-25 RX ORDER — TACROLIMUS 5 MG/1
0.5 CAPSULE ORAL
Refills: 0 | Status: DISCONTINUED | OUTPATIENT
Start: 2023-12-25 | End: 2023-12-28

## 2023-12-25 RX ADMIN — CARVEDILOL PHOSPHATE 12.5 MILLIGRAM(S): 80 CAPSULE, EXTENDED RELEASE ORAL at 17:25

## 2023-12-25 RX ADMIN — CARVEDILOL PHOSPHATE 12.5 MILLIGRAM(S): 80 CAPSULE, EXTENDED RELEASE ORAL at 05:37

## 2023-12-25 RX ADMIN — TACROLIMUS 1 MILLIGRAM(S): 5 CAPSULE ORAL at 07:48

## 2023-12-25 RX ADMIN — TACROLIMUS 0.5 MILLIGRAM(S): 5 CAPSULE ORAL at 17:24

## 2023-12-25 RX ADMIN — MEROPENEM 100 MILLIGRAM(S): 1 INJECTION INTRAVENOUS at 05:38

## 2023-12-25 RX ADMIN — MEROPENEM 100 MILLIGRAM(S): 1 INJECTION INTRAVENOUS at 17:24

## 2023-12-25 RX ADMIN — TAMSULOSIN HYDROCHLORIDE 0.4 MILLIGRAM(S): 0.4 CAPSULE ORAL at 21:15

## 2023-12-25 NOTE — PROGRESS NOTE ADULT - SUBJECTIVE AND OBJECTIVE BOX
NEPHROLOGY-[facility] [phone]        Patient seen and examined no new complaints.         MEDICATIONS  (STANDING):  carvedilol 12.5 milliGRAM(s) Oral two times a day  meropenem  IVPB 1000 milliGRAM(s) IV Intermittent every 12 hours  sodium chloride 0.9%. 1000 milliLiter(s) (60 mL/Hr) IV Continuous <Continuous>  tacrolimus 1 milliGRAM(s) Oral <User Schedule>  tacrolimus 0.5 milliGRAM(s) Oral at bedtime  tamsulosin 0.4 milliGRAM(s) Oral at bedtime      VITAL:  T(C): , Max: 37.1 (12-24-23 @ 16:45)  T(F): , Max: 98.7 (12-24-23 @ 16:45)  HR: 70 (12-25-23 @ 04:25)  BP: 165/71 (12-25-23 @ 04:25)  BP(mean): --  RR: 18 (12-25-23 @ 04:25)  SpO2: 99% (12-25-23 @ 04:25)  Wt(kg): --    I and O's:    12-24 @ 07:01  -  12-25 @ 07:00  --------------------------------------------------------  IN: 1130 mL / OUT: 250 mL / NET: 880 mL          PHYSICAL EXAM:    Constitutional: NAD  Neck:  No JVD  Respiratory: CTAB/L  Cardiovascular: S1 and S2  Gastrointestinal: BS+, soft, NT/ND  Extremities: No peripheral edema  Neurological: A/O x 3, no focal deficits  Psychiatric: Normal mood, normal affect  : No Block  Skin: No rashes  Access: ELY FORREST (+thrill)    LABS:                        10.7   9.45  )-----------( 125      ( 25 Dec 2023 07:13 )             31.9     12-25    134<L>  |  100  |  67<H>  ----------------------------<  103<H>  3.8   |  20<L>  |  1.25    Ca    8.5      25 Dec 2023 07:11    TPro  6.1  /  Alb  2.8<L>  /  TBili  0.4  /  DBili  x   /  AST  15  /  ALT  16  /  AlkPhos  65  12-25    Tacro 12.1      Urine Studies:  Urinalysis Basic - ( 25 Dec 2023 07:11 )    Color: x / Appearance: x / SG: x / pH: x  Gluc: 103 mg/dL / Ketone: x  / Bili: x / Urobili: x   Blood: x / Protein: x / Nitrite: x   Leuk Esterase: x / RBC: x / WBC x   Sq Epi: x / Non Sq Epi: x / Bacteria: x          Assessment and Recommendation:   · Assessment	    Zana Shepard is a 73 year old man with a history of HTN, prior ESRD s/p nephrectomy and transplant, prostate cancer s/p radiation who presents with  UTI (>[id number] GNR)      - ESRD s/p DDKT- baseline s.cr ~ 1.0 mg/dl;  Hydro was noted   - Pyelonephritis   - Hyponatremia - improving       PLAN:   -  IV Meropenum started and this has a large salt load as well.    - Stop IVF    - Renal dosing of meds to creatinine clearance of 50 ml/min  - Follow  urine C&S and will need a total course of 2 weeks;    -Reduce prograf to 0.5 mg bid and hold Cellcept  -Can cont home bp meds including ace      D/w Dr James Hassan [facility] NP  [facility]   [id number]              NEPHROLOGY-[facility] [phone]        Patient seen and examined no new complaints.         MEDICATIONS  (STANDING):  carvedilol 12.5 milliGRAM(s) Oral two times a day  meropenem  IVPB 1000 milliGRAM(s) IV Intermittent every 12 hours  sodium chloride 0.9%. 1000 milliLiter(s) (60 mL/Hr) IV Continuous <Continuous>  tacrolimus 1 milliGRAM(s) Oral <User Schedule>  tacrolimus 0.5 milliGRAM(s) Oral at bedtime  tamsulosin 0.4 milliGRAM(s) Oral at bedtime      VITAL:  T(C): , Max: 37.1 (12-24-23 @ 16:45)  T(F): , Max: 98.7 (12-24-23 @ 16:45)  HR: 70 (12-25-23 @ 04:25)  BP: 165/71 (12-25-23 @ 04:25)  BP(mean): --  RR: 18 (12-25-23 @ 04:25)  SpO2: 99% (12-25-23 @ 04:25)  Wt(kg): --    I and O's:    12-24 @ 07:01  -  12-25 @ 07:00  --------------------------------------------------------  IN: 1130 mL / OUT: 250 mL / NET: 880 mL          PHYSICAL EXAM:    Constitutional: NAD  Neck:  No JVD  Respiratory: CTAB/L  Cardiovascular: S1 and S2  Gastrointestinal: BS+, soft, NT/ND  Extremities: No peripheral edema  Neurological: A/O x 3, no focal deficits  Psychiatric: Normal mood, normal affect  : No Block  Skin: No rashes  Access: ELY FORREST (+thrill)    LABS:                        10.7   9.45  )-----------( 125      ( 25 Dec 2023 07:13 )             31.9     12-25    134<L>  |  100  |  67<H>  ----------------------------<  103<H>  3.8   |  20<L>  |  1.25    Ca    8.5      25 Dec 2023 07:11    TPro  6.1  /  Alb  2.8<L>  /  TBili  0.4  /  DBili  x   /  AST  15  /  ALT  16  /  AlkPhos  65  12-25    Tacro 12.1      Urine Studies:  Urinalysis Basic - ( 25 Dec 2023 07:11 )    Color: x / Appearance: x / SG: x / pH: x  Gluc: 103 mg/dL / Ketone: x  / Bili: x / Urobili: x   Blood: x / Protein: x / Nitrite: x   Leuk Esterase: x / RBC: x / WBC x   Sq Epi: x / Non Sq Epi: x / Bacteria: x          Assessment and Recommendation:   · Assessment	    Zana Shepard is a 73 year old man with a history of HTN, prior ESRD s/p nephrectomy and transplant, prostate cancer s/p radiation who presents with  UTI (>[id number] GNR)      - ESRD s/p DDKT- baseline s.cr ~ 1.0 mg/dl;  Hydro was noted   - Pyelonephritis   - Hyponatremia - improving       PLAN:   -  IV Meropenum started and this has a large salt load as well.    - Stop IVF    - Renal dosing of meds to creatinine clearance of 50 ml/min  - Follow  urine C&S and will need a total course of 2 weeks;    -Reduce prograf to 0.5 mg bid and hold Cellcept  -Can cont home bp meds including ace      D/w Dr James Hassan [facility] NP  [facility]   [id number]

## 2023-12-25 NOTE — PROGRESS NOTE ADULT - SUBJECTIVE AND OBJECTIVE BOX
Mandi Claros MD  Division of Hospital Medicine  Please contact via MS Teams (prefer message first)  Office: [phone]    Patient is a 73y old  Male who presents with a chief complaint of dysuria, flank pain (25 Dec 2023 09:51)      SUBJECTIVE / OVERNIGHT EVENTS:  no acute events overnight, vss, afebrile  pt feels much better today    ROS:  14 point ROS negative in detail except stated as above    MEDICATIONS  (STANDING):  carvedilol 12.5 milliGRAM(s) Oral two times a day  meropenem  IVPB 1000 milliGRAM(s) IV Intermittent every 12 hours  tacrolimus 0.5 milliGRAM(s) Oral two times a day  tamsulosin 0.4 milliGRAM(s) Oral at bedtime    MEDICATIONS  (PRN):  acetaminophen     Tablet .. 650 milliGRAM(s) Oral every 6 hours PRN Temp greater or equal to 38C (100.4F), Mild Pain (1 - 3)  aluminum hydroxide/magnesium hydroxide/simethicone Suspension 30 milliLiter(s) Oral every 4 hours PRN Dyspepsia  melatonin 3 milliGRAM(s) Oral at bedtime PRN Insomnia  ondansetron Injectable 4 milliGRAM(s) IV Push every 8 hours PRN Nausea and/or Vomiting      CAPILLARY BLOOD GLUCOSE        I&O's Summary    24 Dec 2023 07:01  -  25 Dec 2023 07:00  --------------------------------------------------------  IN: 1130 mL / OUT: 250 mL / NET: 880 mL    25 Dec 2023 07:01  -  25 Dec 2023 11:01  --------------------------------------------------------  IN: 250 mL / OUT: 0 mL / NET: 250 mL        PHYSICAL EXAM:  Vital Signs Last 24 Hrs  T(C): 36.7 (25 Dec 2023 10:16), Max: 37.1 (24 Dec 2023 16:45)  T(F): 98 (25 Dec 2023 10:16), Max: 98.7 (24 Dec 2023 16:45)  HR: 66 (25 Dec 2023 10:16) (60 - 77)  BP: 140/61 (25 Dec 2023 10:16) (138/62 - 165/71)  BP(mean): --  RR: 18 (25 Dec 2023 10:16) (18 - 18)  SpO2: 97% (25 Dec 2023 10:16) (97% - 100%)    Parameters below as of 25 Dec 2023 10:16  Patient On (Oxygen Delivery Method): room air      GENERAL: NAD, well-developed  HEAD:  Atraumatic, Normocephalic  EYES: EOMI, PERRLA, conjunctiva and sclera clear  NECK: Supple, No JVD  CHEST/LUNG: Clear to auscultation bilaterally; No wheeze  HEART: Regular rate and rhythm; No murmurs, rubs, or gallops  ABDOMEN: Soft, Nontender, Nondistended; Bowel sounds present  EXTREMITIES:  2+ Peripheral Pulses, No clubbing, cyanosis, or edema  NEUROLOGY: AAOx3; non-focal  SKIN: No rashes or lesions    LABS:                        10.7   9.45  )-----------( 125      ( 25 Dec 2023 07:13 )             31.9     12-25    134<L>  |  100  |  67<H>  ----------------------------<  103<H>  3.8   |  20<L>  |  1.25    Ca    8.5      25 Dec 2023 07:11    TPro  6.1  /  Alb  2.8<L>  /  TBili  0.4  /  DBili  x   /  AST  15  /  ALT  16  /  AlkPhos  65  12-25          Urinalysis Basic - ( 25 Dec 2023 07:11 )    Color: x / Appearance: x / SG: x / pH: x  Gluc: 103 mg/dL / Ketone: x  / Bili: x / Urobili: x   Blood: x / Protein: x / Nitrite: x   Leuk Esterase: x / RBC: x / WBC x   Sq Epi: x / Non Sq Epi: x / Bacteria: x        RADIOLOGY & ADDITIONAL TESTS:    Imaging Personally Reviewed:    Consultant(s) Notes Reviewed:  nephrology    Care Discussed with Consultants/Other Providers: Dr. Ramirez (nephro)   Mandi Claros MD  Division of Hospital Medicine  Please contact via MS Teams (prefer message first)  Office: [phone]    Patient is a 73y old  Male who presents with a chief complaint of dysuria, flank pain (25 Dec 2023 09:51)      SUBJECTIVE / OVERNIGHT EVENTS:  no acute events overnight, vss, afebrile  pt feels much better today    ROS:  14 point ROS negative in detail except stated as above    MEDICATIONS  (STANDING):  carvedilol 12.5 milliGRAM(s) Oral two times a day  meropenem  IVPB 1000 milliGRAM(s) IV Intermittent every 12 hours  tacrolimus 0.5 milliGRAM(s) Oral two times a day  tamsulosin 0.4 milliGRAM(s) Oral at bedtime    MEDICATIONS  (PRN):  acetaminophen     Tablet .. 650 milliGRAM(s) Oral every 6 hours PRN Temp greater or equal to 38C (100.4F), Mild Pain (1 - 3)  aluminum hydroxide/magnesium hydroxide/simethicone Suspension 30 milliLiter(s) Oral every 4 hours PRN Dyspepsia  melatonin 3 milliGRAM(s) Oral at bedtime PRN Insomnia  ondansetron Injectable 4 milliGRAM(s) IV Push every 8 hours PRN Nausea and/or Vomiting      CAPILLARY BLOOD GLUCOSE        I&O's Summary    24 Dec 2023 07:01  -  25 Dec 2023 07:00  --------------------------------------------------------  IN: 1130 mL / OUT: 250 mL / NET: 880 mL    25 Dec 2023 07:01  -  25 Dec 2023 11:01  --------------------------------------------------------  IN: 250 mL / OUT: 0 mL / NET: 250 mL        PHYSICAL EXAM:  Vital Signs Last 24 Hrs  T(C): 36.7 (25 Dec 2023 10:16), Max: 37.1 (24 Dec 2023 16:45)  T(F): 98 (25 Dec 2023 10:16), Max: 98.7 (24 Dec 2023 16:45)  HR: 66 (25 Dec 2023 10:16) (60 - 77)  BP: 140/61 (25 Dec 2023 10:16) (138/62 - 165/71)  BP(mean): --  RR: 18 (25 Dec 2023 10:16) (18 - 18)  SpO2: 97% (25 Dec 2023 10:16) (97% - 100%)    Parameters below as of 25 Dec 2023 10:16  Patient On (Oxygen Delivery Method): room air      GENERAL: NAD, well-developed  HEAD:  Atraumatic, Normocephalic  EYES: EOMI, PERRLA, conjunctiva and sclera clear  NECK: Supple, No JVD  CHEST/LUNG: Clear to auscultation bilaterally; No wheeze  HEART: Regular rate and rhythm; No murmurs, rubs, or gallops  ABDOMEN: Soft, Nontender, Nondistended; Bowel sounds present  EXTREMITIES:  2+ Peripheral Pulses, No clubbing, cyanosis, or edema  NEUROLOGY: AAOx3; non-focal  SKIN: No rashes or lesions    LABS:                        10.7   9.45  )-----------( 125      ( 25 Dec 2023 07:13 )             31.9     12-25    134<L>  |  100  |  67<H>  ----------------------------<  103<H>  3.8   |  20<L>  |  1.25    Ca    8.5      25 Dec 2023 07:11    TPro  6.1  /  Alb  2.8<L>  /  TBili  0.4  /  DBili  x   /  AST  15  /  ALT  16  /  AlkPhos  65  12-25          Urinalysis Basic - ( 25 Dec 2023 07:11 )    Color: x / Appearance: x / SG: x / pH: x  Gluc: 103 mg/dL / Ketone: x  / Bili: x / Urobili: x   Blood: x / Protein: x / Nitrite: x   Leuk Esterase: x / RBC: x / WBC x   Sq Epi: x / Non Sq Epi: x / Bacteria: x        RADIOLOGY & ADDITIONAL TESTS:    Imaging Personally Reviewed:    Consultant(s) Notes Reviewed:  nephrology    Care Discussed with Consultants/Other Providers: Dr. Ramirez (nephro)

## 2023-12-25 NOTE — PROGRESS NOTE ADULT - ASSESSMENT
73M with hx of HTN, s/p renal transplant in 2017 at [place], presents with dysuria and L flank pain x 4 days found to have sepsis 2/2 pyelonephritis 73M with hx of HTN, s/p renal transplant in 2017 at [place], presents with dysuria and L flank pain x 4 days found to have sepsis 2/2 pyelonephritis

## 2023-12-26 LAB
ANION GAP SERPL CALC-SCNC: 11 MMOL/L — SIGNIFICANT CHANGE UP (ref 5–17)
ANION GAP SERPL CALC-SCNC: 11 MMOL/L — SIGNIFICANT CHANGE UP (ref 5–17)
BUN SERPL-MCNC: 49 MG/DL — HIGH (ref 7–23)
BUN SERPL-MCNC: 49 MG/DL — HIGH (ref 7–23)
CALCIUM SERPL-MCNC: 8.5 MG/DL — SIGNIFICANT CHANGE UP (ref 8.4–10.5)
CALCIUM SERPL-MCNC: 8.5 MG/DL — SIGNIFICANT CHANGE UP (ref 8.4–10.5)
CHLORIDE SERPL-SCNC: 104 MMOL/L — SIGNIFICANT CHANGE UP (ref 96–108)
CHLORIDE SERPL-SCNC: 104 MMOL/L — SIGNIFICANT CHANGE UP (ref 96–108)
CO2 SERPL-SCNC: 21 MMOL/L — LOW (ref 22–31)
CO2 SERPL-SCNC: 21 MMOL/L — LOW (ref 22–31)
CREAT SERPL-MCNC: 0.88 MG/DL — SIGNIFICANT CHANGE UP (ref 0.5–1.3)
CREAT SERPL-MCNC: 0.88 MG/DL — SIGNIFICANT CHANGE UP (ref 0.5–1.3)
EGFR: 90 ML/MIN/1.73M2 — SIGNIFICANT CHANGE UP
EGFR: 90 ML/MIN/1.73M2 — SIGNIFICANT CHANGE UP
GLUCOSE SERPL-MCNC: 112 MG/DL — HIGH (ref 70–99)
GLUCOSE SERPL-MCNC: 112 MG/DL — HIGH (ref 70–99)
HCT VFR BLD CALC: 32.9 % — LOW (ref 39–50)
HCT VFR BLD CALC: 32.9 % — LOW (ref 39–50)
HGB BLD-MCNC: 10.6 G/DL — LOW (ref 13–17)
HGB BLD-MCNC: 10.6 G/DL — LOW (ref 13–17)
MCHC RBC-ENTMCNC: 31.8 PG — SIGNIFICANT CHANGE UP (ref 27–34)
MCHC RBC-ENTMCNC: 31.8 PG — SIGNIFICANT CHANGE UP (ref 27–34)
MCHC RBC-ENTMCNC: 32.2 GM/DL — SIGNIFICANT CHANGE UP (ref 32–36)
MCHC RBC-ENTMCNC: 32.2 GM/DL — SIGNIFICANT CHANGE UP (ref 32–36)
MCV RBC AUTO: 98.8 FL — SIGNIFICANT CHANGE UP (ref 80–100)
MCV RBC AUTO: 98.8 FL — SIGNIFICANT CHANGE UP (ref 80–100)
NRBC # BLD: 0 /100 WBCS — SIGNIFICANT CHANGE UP (ref 0–0)
NRBC # BLD: 0 /100 WBCS — SIGNIFICANT CHANGE UP (ref 0–0)
PLATELET # BLD AUTO: 151 K/UL — SIGNIFICANT CHANGE UP (ref 150–400)
PLATELET # BLD AUTO: 151 K/UL — SIGNIFICANT CHANGE UP (ref 150–400)
POTASSIUM SERPL-MCNC: 4 MMOL/L — SIGNIFICANT CHANGE UP (ref 3.5–5.3)
POTASSIUM SERPL-MCNC: 4 MMOL/L — SIGNIFICANT CHANGE UP (ref 3.5–5.3)
POTASSIUM SERPL-SCNC: 4 MMOL/L — SIGNIFICANT CHANGE UP (ref 3.5–5.3)
POTASSIUM SERPL-SCNC: 4 MMOL/L — SIGNIFICANT CHANGE UP (ref 3.5–5.3)
RBC # BLD: 3.33 M/UL — LOW (ref 4.2–5.8)
RBC # BLD: 3.33 M/UL — LOW (ref 4.2–5.8)
RBC # FLD: 11.6 % — SIGNIFICANT CHANGE UP (ref 10.3–14.5)
RBC # FLD: 11.6 % — SIGNIFICANT CHANGE UP (ref 10.3–14.5)
SODIUM SERPL-SCNC: 136 MMOL/L — SIGNIFICANT CHANGE UP (ref 135–145)
SODIUM SERPL-SCNC: 136 MMOL/L — SIGNIFICANT CHANGE UP (ref 135–145)
TACROLIMUS SERPL-MCNC: 16.3 NG/ML — SIGNIFICANT CHANGE UP
TACROLIMUS SERPL-MCNC: 16.3 NG/ML — SIGNIFICANT CHANGE UP
WBC # BLD: 7.09 K/UL — SIGNIFICANT CHANGE UP (ref 3.8–10.5)
WBC # BLD: 7.09 K/UL — SIGNIFICANT CHANGE UP (ref 3.8–10.5)
WBC # FLD AUTO: 7.09 K/UL — SIGNIFICANT CHANGE UP (ref 3.8–10.5)
WBC # FLD AUTO: 7.09 K/UL — SIGNIFICANT CHANGE UP (ref 3.8–10.5)

## 2023-12-26 PROCEDURE — 99233 SBSQ HOSP IP/OBS HIGH 50: CPT

## 2023-12-26 RX ADMIN — TACROLIMUS 0.5 MILLIGRAM(S): 5 CAPSULE ORAL at 05:05

## 2023-12-26 RX ADMIN — MEROPENEM 100 MILLIGRAM(S): 1 INJECTION INTRAVENOUS at 17:34

## 2023-12-26 RX ADMIN — TAMSULOSIN HYDROCHLORIDE 0.4 MILLIGRAM(S): 0.4 CAPSULE ORAL at 21:19

## 2023-12-26 RX ADMIN — CARVEDILOL PHOSPHATE 12.5 MILLIGRAM(S): 80 CAPSULE, EXTENDED RELEASE ORAL at 17:34

## 2023-12-26 RX ADMIN — TACROLIMUS 0.5 MILLIGRAM(S): 5 CAPSULE ORAL at 17:34

## 2023-12-26 RX ADMIN — MEROPENEM 100 MILLIGRAM(S): 1 INJECTION INTRAVENOUS at 05:05

## 2023-12-26 RX ADMIN — CARVEDILOL PHOSPHATE 12.5 MILLIGRAM(S): 80 CAPSULE, EXTENDED RELEASE ORAL at 05:05

## 2023-12-26 NOTE — PROGRESS NOTE ADULT - ASSESSMENT
73M with hx of HTN, s/p renal transplant in 2017 at [place], presents with dysuria and L flank pain x 4 days found to have sepsis 2/2 pyelonephritis 73M with hx of HTN, s/p renal transplant in 2017 at [place], presents with dysuria and L flank pain x 4 days found to have sepsis 2/2 pyelonephritis

## 2023-12-26 NOTE — PROGRESS NOTE ADULT - SUBJECTIVE AND OBJECTIVE BOX
NEPHROLOGY    Patient seen and examined sitting on bed, reports feeling well overall, no new complaints, in no acute distress.     MEDICATIONS  (STANDING):  carvedilol 12.5 milliGRAM(s) Oral two times a day  meropenem  IVPB 1000 milliGRAM(s) IV Intermittent every 12 hours  tacrolimus 0.5 milliGRAM(s) Oral two times a day  tamsulosin 0.4 milliGRAM(s) Oral at bedtime    VITALS:  T(C): , Max: 37.8 (12-25-23 @ 16:04)  T(F): , Max: 100 (12-25-23 @ 16:04)  HR: 66 (12-26-23 @ 08:39)  BP: 160/63 (12-26-23 @ 08:39)  RR: 18 (12-26-23 @ 08:39)  SpO2: 100% (12-26-23 @ 08:39)    I and O's:    12-25 @ 07:01  -  12-26 @ 07:00  --------------------------------------------------------  IN: 600 mL / OUT: 0 mL / NET: 600 mL    12-26 @ 07:01  -  12-26 @ 13:15  --------------------------------------------------------  IN: 0 mL / OUT: 300 mL / NET: -300 mL    PHYSICAL EXAM:  Constitutional: NAD  Neck:  No JVD  Respiratory: CTAB/L  Cardiovascular: S1 and S2  Gastrointestinal: BS+, soft, NT/ND  Extremities: No peripheral edema  Neurological: A/O x 3, no focal deficits  Psychiatric: Normal mood, normal affect  : No Block  Skin: No rashes  Access: ELY AVF (+thrill)    LABS:                        10.6   7.09  )-----------( 151      ( 26 Dec 2023 07:07 )             32.9     12-26    136  |  104  |  49<H>  ----------------------------<  112<H>  4.0   |  21<L>  |  0.88    Ca    8.5      26 Dec 2023 07:07    TPro  6.1  /  Alb  2.8<L>  /  TBili  0.4  /  DBili  x   /  AST  15  /  ALT  16  /  AlkPhos  65  12-25    ASSESSMENT/PLAN:   Zana Shepard is a 73 year old man with a history of HTN, prior ESRD s/p nephrectomy and transplant, prostate cancer s/p radiation who presents with  UTI (>[id number] GNR)    1 Renal - ESRD s/p DDKT- baseline s.cr ~ 1.0 mg/dl; Hydro was noted   on prograf 0.5 mg bid and hold Cellcept  Hyponatremia - improving   2 Pyelonephritis - on IV Meropenum started and this has a large salt load as well.     3 CV - can cont home bp meds including ace       Willow Marcelo, SARA  [facility]  [phone]            NEPHROLOGY    Patient seen and examined sitting on bed, reports feeling well overall, no new complaints, in no acute distress.     MEDICATIONS  (STANDING):  carvedilol 12.5 milliGRAM(s) Oral two times a day  meropenem  IVPB 1000 milliGRAM(s) IV Intermittent every 12 hours  tacrolimus 0.5 milliGRAM(s) Oral two times a day  tamsulosin 0.4 milliGRAM(s) Oral at bedtime    VITALS:  T(C): , Max: 37.8 (12-25-23 @ 16:04)  T(F): , Max: 100 (12-25-23 @ 16:04)  HR: 66 (12-26-23 @ 08:39)  BP: 160/63 (12-26-23 @ 08:39)  RR: 18 (12-26-23 @ 08:39)  SpO2: 100% (12-26-23 @ 08:39)    I and O's:    12-25 @ 07:01  -  12-26 @ 07:00  --------------------------------------------------------  IN: 600 mL / OUT: 0 mL / NET: 600 mL    12-26 @ 07:01  -  12-26 @ 13:15  --------------------------------------------------------  IN: 0 mL / OUT: 300 mL / NET: -300 mL    PHYSICAL EXAM:  Constitutional: NAD  Neck:  No JVD  Respiratory: CTAB/L  Cardiovascular: S1 and S2  Gastrointestinal: BS+, soft, NT/ND  Extremities: No peripheral edema  Neurological: A/O x 3, no focal deficits  Psychiatric: Normal mood, normal affect  : No Block  Skin: No rashes  Access: ELY AVF (+thrill)    LABS:                        10.6   7.09  )-----------( 151      ( 26 Dec 2023 07:07 )             32.9     12-26    136  |  104  |  49<H>  ----------------------------<  112<H>  4.0   |  21<L>  |  0.88    Ca    8.5      26 Dec 2023 07:07    TPro  6.1  /  Alb  2.8<L>  /  TBili  0.4  /  DBili  x   /  AST  15  /  ALT  16  /  AlkPhos  65  12-25    ASSESSMENT/PLAN:   Zana Shepard is a 73 year old man with a history of HTN, prior ESRD s/p nephrectomy and transplant, prostate cancer s/p radiation who presents with  UTI (>[id number] GNR)    1 Renal - ESRD s/p DDKT- baseline s.cr ~ 1.0 mg/dl; Hydro was noted   on prograf 0.5 mg bid and hold Cellcept  Hyponatremia - improving   2 Pyelonephritis - on IV Meropenum started and this has a large salt load as well.     3 CV - can cont home bp meds including ace       Willow Marcelo, SARA  [facility]  [phone]

## 2023-12-26 NOTE — PROGRESS NOTE ADULT - SUBJECTIVE AND OBJECTIVE BOX
[facility] Division of Hospital Medicine  Grace Galvez MD  Available via MS Teams  Pager: [phone]    SUBJECTIVE / OVERNIGHT EVENTS: Patient seen and examined at bedside. No acute overnight events. Pt denies dyspnea, chest pain fevers, chills, cough, HA, dizziness, syncope, chest palpitations, abd pain, n/v/d, urinary or bowel changes, active sites of bleeding or any other symptoms at this time. States he is doing better.     ADDITIONAL REVIEW OF SYSTEMS: n/a     MEDICATIONS  (STANDING):  carvedilol 12.5 milliGRAM(s) Oral two times a day  meropenem  IVPB 1000 milliGRAM(s) IV Intermittent every 12 hours  tacrolimus 0.5 milliGRAM(s) Oral two times a day  tamsulosin 0.4 milliGRAM(s) Oral at bedtime    MEDICATIONS  (PRN):  acetaminophen     Tablet .. 650 milliGRAM(s) Oral every 6 hours PRN Temp greater or equal to 38C (100.4F), Mild Pain (1 - 3)  aluminum hydroxide/magnesium hydroxide/simethicone Suspension 30 milliLiter(s) Oral every 4 hours PRN Dyspepsia  melatonin 3 milliGRAM(s) Oral at bedtime PRN Insomnia  ondansetron Injectable 4 milliGRAM(s) IV Push every 8 hours PRN Nausea and/or Vomiting      I&O's Summary    25 Dec 2023 07:01  -  26 Dec 2023 07:00  --------------------------------------------------------  IN: 600 mL / OUT: 0 mL / NET: 600 mL    26 Dec 2023 07:01  -  26 Dec 2023 16:25  --------------------------------------------------------  IN: 0 mL / OUT: 300 mL / NET: -300 mL        PHYSICAL EXAM:  Vital Signs Last 24 Hrs  T(C): 36.8 (26 Dec 2023 14:08), Max: 37.2 (25 Dec 2023 20:28)  T(F): 98.3 (26 Dec 2023 14:08), Max: 98.9 (25 Dec 2023 20:28)  HR: 73 (26 Dec 2023 14:08) (66 - 74)  BP: 124/60 (26 Dec 2023 14:08) (124/60 - 170/70)  BP(mean): --  RR: 18 (26 Dec 2023 14:08) (18 - 18)  SpO2: 99% (26 Dec 2023 14:08) (94% - 100%)    Parameters below as of 26 Dec 2023 14:08  Patient On (Oxygen Delivery Method): room air      CONSTITUTIONAL: NAD, well-developed, well-groomed  EYES: PERRLA; conjunctiva and sclera clear  ENMT: Moist oral mucosa, no pharyngeal injection or exudates; normal dentition  NECK: Supple, no palpable masses; no thyromegaly  RESPIRATORY: Normal respiratory effort; lungs are clear to auscultation bilaterally  CARDIOVASCULAR: Regular rate and rhythm, normal S1 and S2, no murmur/rub/gallop; No lower extremity edema; Peripheral pulses are 2+ bilaterally  ABDOMEN: Nontender to palpation, normoactive bowel sounds, no rebound/guarding; No hepatosplenomegaly  MUSCULOSKELETAL:   no clubbing or cyanosis of digits; no joint swelling or tenderness to palpation  PSYCH: A+O to person, place, and time; affect appropriate  NEUROLOGY: CN 2-12 are intact and symmetric; no gross sensory deficits   SKIN: No rashes; no palpable lesions  : no CVA tenderness     LABS:                        10.6   7.09  )-----------( 151      ( 26 Dec 2023 07:07 )             32.9     12-26    136  |  104  |  49<H>  ----------------------------<  112<H>  4.0   |  21<L>  |  0.88    Ca    8.5      26 Dec 2023 07:07    TPro  6.1  /  Alb  2.8<L>  /  TBili  0.4  /  DBili  x   /  AST  15  /  ALT  16  /  AlkPhos  65  12-25          Urinalysis Basic - ( 26 Dec 2023 07:07 )    Color: x / Appearance: x / SG: x / pH: x  Gluc: 112 mg/dL / Ketone: x  / Bili: x / Urobili: x   Blood: x / Protein: x / Nitrite: x   Leuk Esterase: x / RBC: x / WBC x   Sq Epi: x / Non Sq Epi: x / Bacteria: x        Culture - Urine (collected 23 Dec 2023 19:52)  Source: Clean Catch Clean Catch (Midstream)  Preliminary Report (25 Dec 2023 17:55):    >100,000 CFU/ml Gram Negative Rods    <10,000 CFU/ml Normal Urogenital guicho present          RADIOLOGY & ADDITIONAL TESTS:  New Results Reviewed Today:   New Imaging Personally Reviewed Today:  New Electrocardiogram Personally Reviewed Today:  Prior or Outpatient Records Reviewed Today:    COMMUNICATION:  Care Discussed with Consultants/Other Providers and Details of Discussion: Spoke to renal transplant team, will f/u with prograft level in am. Pending Uclx sensitivity.   Discussions with Patient/Family:  PCP Communication:     [facility] Division of Hospital Medicine  Grace Galvez MD  Available via MS Teams  Pager: [phone]    SUBJECTIVE / OVERNIGHT EVENTS: Patient seen and examined at bedside. No acute overnight events. Pt denies dyspnea, chest pain fevers, chills, cough, HA, dizziness, syncope, chest palpitations, abd pain, n/v/d, urinary or bowel changes, active sites of bleeding or any other symptoms at this time. States he is doing better.     ADDITIONAL REVIEW OF SYSTEMS: n/a     MEDICATIONS  (STANDING):  carvedilol 12.5 milliGRAM(s) Oral two times a day  meropenem  IVPB 1000 milliGRAM(s) IV Intermittent every 12 hours  tacrolimus 0.5 milliGRAM(s) Oral two times a day  tamsulosin 0.4 milliGRAM(s) Oral at bedtime    MEDICATIONS  (PRN):  acetaminophen     Tablet .. 650 milliGRAM(s) Oral every 6 hours PRN Temp greater or equal to 38C (100.4F), Mild Pain (1 - 3)  aluminum hydroxide/magnesium hydroxide/simethicone Suspension 30 milliLiter(s) Oral every 4 hours PRN Dyspepsia  melatonin 3 milliGRAM(s) Oral at bedtime PRN Insomnia  ondansetron Injectable 4 milliGRAM(s) IV Push every 8 hours PRN Nausea and/or Vomiting      I&O's Summary    25 Dec 2023 07:01  -  26 Dec 2023 07:00  --------------------------------------------------------  IN: 600 mL / OUT: 0 mL / NET: 600 mL    26 Dec 2023 07:01  -  26 Dec 2023 16:25  --------------------------------------------------------  IN: 0 mL / OUT: 300 mL / NET: -300 mL        PHYSICAL EXAM:  Vital Signs Last 24 Hrs  T(C): 36.8 (26 Dec 2023 14:08), Max: 37.2 (25 Dec 2023 20:28)  T(F): 98.3 (26 Dec 2023 14:08), Max: 98.9 (25 Dec 2023 20:28)  HR: 73 (26 Dec 2023 14:08) (66 - 74)  BP: 124/60 (26 Dec 2023 14:08) (124/60 - 170/70)  BP(mean): --  RR: 18 (26 Dec 2023 14:08) (18 - 18)  SpO2: 99% (26 Dec 2023 14:08) (94% - 100%)    Parameters below as of 26 Dec 2023 14:08  Patient On (Oxygen Delivery Method): room air      CONSTITUTIONAL: NAD, well-developed, well-groomed  EYES: PERRLA; conjunctiva and sclera clear  ENMT: Moist oral mucosa, no pharyngeal injection or exudates; normal dentition  NECK: Supple, no palpable masses; no thyromegaly  RESPIRATORY: Normal respiratory effort; lungs are clear to auscultation bilaterally  CARDIOVASCULAR: Regular rate and rhythm, normal S1 and S2, no murmur/rub/gallop; No lower extremity edema; Peripheral pulses are 2+ bilaterally  ABDOMEN: Nontender to palpation, normoactive bowel sounds, no rebound/guarding; No hepatosplenomegaly  MUSCULOSKELETAL:   no clubbing or cyanosis of digits; no joint swelling or tenderness to palpation  PSYCH: A+O to person, place, and time; affect appropriate  NEUROLOGY: CN 2-12 are intact and symmetric; no gross sensory deficits   SKIN: No rashes; no palpable lesions  : no CVA tenderness     LABS:                        10.6   7.09  )-----------( 151      ( 26 Dec 2023 07:07 )             32.9     12-26    136  |  104  |  49<H>  ----------------------------<  112<H>  4.0   |  21<L>  |  0.88    Ca    8.5      26 Dec 2023 07:07    TPro  6.1  /  Alb  2.8<L>  /  TBili  0.4  /  DBili  x   /  AST  15  /  ALT  16  /  AlkPhos  65  12-25          Urinalysis Basic - ( 26 Dec 2023 07:07 )    Color: x / Appearance: x / SG: x / pH: x  Gluc: 112 mg/dL / Ketone: x  / Bili: x / Urobili: x   Blood: x / Protein: x / Nitrite: x   Leuk Esterase: x / RBC: x / WBC x   Sq Epi: x / Non Sq Epi: x / Bacteria: x        Culture - Urine (collected 23 Dec 2023 19:52)  Source: Clean Catch Clean Catch (Midstream)  Preliminary Report (25 Dec 2023 17:55):    >100,000 CFU/ml Gram Negative Rods    <10,000 CFU/ml Normal Urogenital guicho present          RADIOLOGY & ADDITIONAL TESTS:  New Results Reviewed Today:   New Imaging Personally Reviewed Today:  New Electrocardiogram Personally Reviewed Today:  Prior or Outpatient Records Reviewed Today:    COMMUNICATION:  Care Discussed with Consultants/Other Providers and Details of Discussion: Spoke to renal transplant team, will f/u with prograft level in am. Pending Uclx sensitivity.   Discussions with Patient/Family:  PCP Communication:

## 2023-12-27 ENCOUNTER — TRANSCRIPTION ENCOUNTER (OUTPATIENT)
Age: 73
End: 2023-12-27

## 2023-12-27 LAB
-  AMOXICILLIN/CLAVULANIC ACID: SIGNIFICANT CHANGE UP
-  AMOXICILLIN/CLAVULANIC ACID: SIGNIFICANT CHANGE UP
-  AMPICILLIN/SULBACTAM: SIGNIFICANT CHANGE UP
-  AMPICILLIN/SULBACTAM: SIGNIFICANT CHANGE UP
-  AMPICILLIN: SIGNIFICANT CHANGE UP
-  AMPICILLIN: SIGNIFICANT CHANGE UP
-  AZTREONAM: SIGNIFICANT CHANGE UP
-  AZTREONAM: SIGNIFICANT CHANGE UP
-  CEFAZOLIN: SIGNIFICANT CHANGE UP
-  CEFAZOLIN: SIGNIFICANT CHANGE UP
-  CEFEPIME: SIGNIFICANT CHANGE UP
-  CEFEPIME: SIGNIFICANT CHANGE UP
-  CEFOXITIN: SIGNIFICANT CHANGE UP
-  CEFOXITIN: SIGNIFICANT CHANGE UP
-  CEFTRIAXONE: SIGNIFICANT CHANGE UP
-  CEFTRIAXONE: SIGNIFICANT CHANGE UP
-  CEFUROXIME: SIGNIFICANT CHANGE UP
-  CEFUROXIME: SIGNIFICANT CHANGE UP
-  CIPROFLOXACIN: SIGNIFICANT CHANGE UP
-  CIPROFLOXACIN: SIGNIFICANT CHANGE UP
-  ERTAPENEM: SIGNIFICANT CHANGE UP
-  ERTAPENEM: SIGNIFICANT CHANGE UP
-  GENTAMICIN: SIGNIFICANT CHANGE UP
-  GENTAMICIN: SIGNIFICANT CHANGE UP
-  IMIPENEM: SIGNIFICANT CHANGE UP
-  IMIPENEM: SIGNIFICANT CHANGE UP
-  LEVOFLOXACIN: SIGNIFICANT CHANGE UP
-  LEVOFLOXACIN: SIGNIFICANT CHANGE UP
-  MEROPENEM: SIGNIFICANT CHANGE UP
-  MEROPENEM: SIGNIFICANT CHANGE UP
-  NITROFURANTOIN: SIGNIFICANT CHANGE UP
-  NITROFURANTOIN: SIGNIFICANT CHANGE UP
-  PIPERACILLIN/TAZOBACTAM: SIGNIFICANT CHANGE UP
-  PIPERACILLIN/TAZOBACTAM: SIGNIFICANT CHANGE UP
-  TOBRAMYCIN: SIGNIFICANT CHANGE UP
-  TOBRAMYCIN: SIGNIFICANT CHANGE UP
-  TRIMETHOPRIM/SULFAMETHOXAZOLE: SIGNIFICANT CHANGE UP
-  TRIMETHOPRIM/SULFAMETHOXAZOLE: SIGNIFICANT CHANGE UP
ANION GAP SERPL CALC-SCNC: 9 MMOL/L — SIGNIFICANT CHANGE UP (ref 5–17)
ANION GAP SERPL CALC-SCNC: 9 MMOL/L — SIGNIFICANT CHANGE UP (ref 5–17)
BUN SERPL-MCNC: 34 MG/DL — HIGH (ref 7–23)
BUN SERPL-MCNC: 34 MG/DL — HIGH (ref 7–23)
CALCIUM SERPL-MCNC: 8.7 MG/DL — SIGNIFICANT CHANGE UP (ref 8.4–10.5)
CALCIUM SERPL-MCNC: 8.7 MG/DL — SIGNIFICANT CHANGE UP (ref 8.4–10.5)
CHLORIDE SERPL-SCNC: 107 MMOL/L — SIGNIFICANT CHANGE UP (ref 96–108)
CHLORIDE SERPL-SCNC: 107 MMOL/L — SIGNIFICANT CHANGE UP (ref 96–108)
CO2 SERPL-SCNC: 24 MMOL/L — SIGNIFICANT CHANGE UP (ref 22–31)
CO2 SERPL-SCNC: 24 MMOL/L — SIGNIFICANT CHANGE UP (ref 22–31)
CREAT SERPL-MCNC: 0.79 MG/DL — SIGNIFICANT CHANGE UP (ref 0.5–1.3)
CREAT SERPL-MCNC: 0.79 MG/DL — SIGNIFICANT CHANGE UP (ref 0.5–1.3)
CULTURE RESULTS: ABNORMAL
CULTURE RESULTS: ABNORMAL
EGFR: 93 ML/MIN/1.73M2 — SIGNIFICANT CHANGE UP
EGFR: 93 ML/MIN/1.73M2 — SIGNIFICANT CHANGE UP
GLUCOSE SERPL-MCNC: 106 MG/DL — HIGH (ref 70–99)
GLUCOSE SERPL-MCNC: 106 MG/DL — HIGH (ref 70–99)
HCT VFR BLD CALC: 35.1 % — LOW (ref 39–50)
HCT VFR BLD CALC: 35.1 % — LOW (ref 39–50)
HGB BLD-MCNC: 11.3 G/DL — LOW (ref 13–17)
HGB BLD-MCNC: 11.3 G/DL — LOW (ref 13–17)
MCHC RBC-ENTMCNC: 32.2 GM/DL — SIGNIFICANT CHANGE UP (ref 32–36)
MCHC RBC-ENTMCNC: 32.2 GM/DL — SIGNIFICANT CHANGE UP (ref 32–36)
MCHC RBC-ENTMCNC: 32.3 PG — SIGNIFICANT CHANGE UP (ref 27–34)
MCHC RBC-ENTMCNC: 32.3 PG — SIGNIFICANT CHANGE UP (ref 27–34)
MCV RBC AUTO: 100.3 FL — HIGH (ref 80–100)
MCV RBC AUTO: 100.3 FL — HIGH (ref 80–100)
METHOD TYPE: SIGNIFICANT CHANGE UP
METHOD TYPE: SIGNIFICANT CHANGE UP
MYCOPHENOLATE SERPL-MCNC: 0.9 UG/ML — LOW (ref 1–3.5)
MYCOPHENOLATE SERPL-MCNC: 0.9 UG/ML — LOW (ref 1–3.5)
MYCOPHENOLIC ACID GLUCURONIDE: 105 UG/ML — SIGNIFICANT CHANGE UP (ref 15–125)
MYCOPHENOLIC ACID GLUCURONIDE: 105 UG/ML — SIGNIFICANT CHANGE UP (ref 15–125)
NRBC # BLD: 0 /100 WBCS — SIGNIFICANT CHANGE UP (ref 0–0)
NRBC # BLD: 0 /100 WBCS — SIGNIFICANT CHANGE UP (ref 0–0)
ORGANISM # SPEC MICROSCOPIC CNT: ABNORMAL
PLATELET # BLD AUTO: 193 K/UL — SIGNIFICANT CHANGE UP (ref 150–400)
PLATELET # BLD AUTO: 193 K/UL — SIGNIFICANT CHANGE UP (ref 150–400)
POTASSIUM SERPL-MCNC: 4.7 MMOL/L — SIGNIFICANT CHANGE UP (ref 3.5–5.3)
POTASSIUM SERPL-MCNC: 4.7 MMOL/L — SIGNIFICANT CHANGE UP (ref 3.5–5.3)
POTASSIUM SERPL-SCNC: 4.7 MMOL/L — SIGNIFICANT CHANGE UP (ref 3.5–5.3)
POTASSIUM SERPL-SCNC: 4.7 MMOL/L — SIGNIFICANT CHANGE UP (ref 3.5–5.3)
RBC # BLD: 3.5 M/UL — LOW (ref 4.2–5.8)
RBC # BLD: 3.5 M/UL — LOW (ref 4.2–5.8)
RBC # FLD: 11.9 % — SIGNIFICANT CHANGE UP (ref 10.3–14.5)
RBC # FLD: 11.9 % — SIGNIFICANT CHANGE UP (ref 10.3–14.5)
SODIUM SERPL-SCNC: 140 MMOL/L — SIGNIFICANT CHANGE UP (ref 135–145)
SODIUM SERPL-SCNC: 140 MMOL/L — SIGNIFICANT CHANGE UP (ref 135–145)
SPECIMEN SOURCE: SIGNIFICANT CHANGE UP
SPECIMEN SOURCE: SIGNIFICANT CHANGE UP
TACROLIMUS SERPL-MCNC: 10.2 NG/ML — SIGNIFICANT CHANGE UP
TACROLIMUS SERPL-MCNC: 10.2 NG/ML — SIGNIFICANT CHANGE UP
WBC # BLD: 6.35 K/UL — SIGNIFICANT CHANGE UP (ref 3.8–10.5)
WBC # BLD: 6.35 K/UL — SIGNIFICANT CHANGE UP (ref 3.8–10.5)
WBC # FLD AUTO: 6.35 K/UL — SIGNIFICANT CHANGE UP (ref 3.8–10.5)
WBC # FLD AUTO: 6.35 K/UL — SIGNIFICANT CHANGE UP (ref 3.8–10.5)

## 2023-12-27 PROCEDURE — 99222 1ST HOSP IP/OBS MODERATE 55: CPT | Mod: GC

## 2023-12-27 PROCEDURE — 99233 SBSQ HOSP IP/OBS HIGH 50: CPT

## 2023-12-27 PROCEDURE — 93010 ELECTROCARDIOGRAM REPORT: CPT

## 2023-12-27 RX ORDER — CEFTRIAXONE 500 MG/1
1000 INJECTION, POWDER, FOR SOLUTION INTRAMUSCULAR; INTRAVENOUS EVERY 24 HOURS
Refills: 0 | Status: DISCONTINUED | OUTPATIENT
Start: 2023-12-27 | End: 2023-12-28

## 2023-12-27 RX ADMIN — TACROLIMUS 0.5 MILLIGRAM(S): 5 CAPSULE ORAL at 17:48

## 2023-12-27 RX ADMIN — CARVEDILOL PHOSPHATE 12.5 MILLIGRAM(S): 80 CAPSULE, EXTENDED RELEASE ORAL at 17:49

## 2023-12-27 RX ADMIN — MEROPENEM 100 MILLIGRAM(S): 1 INJECTION INTRAVENOUS at 05:17

## 2023-12-27 RX ADMIN — TACROLIMUS 0.5 MILLIGRAM(S): 5 CAPSULE ORAL at 05:17

## 2023-12-27 RX ADMIN — CARVEDILOL PHOSPHATE 12.5 MILLIGRAM(S): 80 CAPSULE, EXTENDED RELEASE ORAL at 05:17

## 2023-12-27 RX ADMIN — TAMSULOSIN HYDROCHLORIDE 0.4 MILLIGRAM(S): 0.4 CAPSULE ORAL at 21:10

## 2023-12-27 RX ADMIN — CEFTRIAXONE 100 MILLIGRAM(S): 500 INJECTION, POWDER, FOR SOLUTION INTRAMUSCULAR; INTRAVENOUS at 16:30

## 2023-12-27 NOTE — CONSULT NOTE ADULT - ASSESSMENT
WORK UP:      ANTIBIOTIC:      IMPRESSION:        SUGGESTIONS:        Above recommendations are Preliminary until Attending's Addendum which includes Final Recommendations      Flaco Lopez DO, PGY-5   ID fellow  [employer] Teams Preferred  After 5pm/weekends call [phone]       WORK UP:      ANTIBIOTIC:      IMPRESSION:        SUGGESTIONS:        Above recommendations are Preliminary until Attending's Addendum which includes Final Recommendations      Flaco Lopez DO, PGY-5   ID fellow  [employer] Teams Preferred  After 5pm/weekends call [phone]   73M with HTN, ESRD s/p renal transplant in 2017 at [place], presents with dysuria and L flank pain x 4 days, found to be febrile 100.5F, WBC 13, UA grossly positive, UCx with Pansensitive Kleb, CTAP with evidence of acute pyelo with mod-severe transplant hydronephrosis, Transplant ID consulted for assistance.    Blood cultures were drawn 12/23 but appears to have never been received by inhouse lab  Called microlab and stated that no blood cultures were received     ANTIBIOTIC:  Cefepime (12/23)  Meropenem (12/24 --->)    IMPRESSION:  #Acute Transplant Pyelo  #Fever, Leukocytosis (resolved)  #Moderate Transplant Hydronephrosis    SUGGESTIONS:  - switch to CTX 1G daily  - monitor temperature curve  - trend WBC  - obtain Blood culture x2, if negative, can discharge on Cefpodoxime 200mg BID to complete 14-day total (including IV antibiotic)      Above recommendations are Preliminary until Attending's Addendum which includes Final Recommendations      Flaco Lopez DO, PGY-5   ID fellow  [employer] Teams Preferred  After 5pm/weekends call [phone]   73M with HTN, ESRD s/p renal transplant in 2017 at [place], presents with dysuria and L flank pain x 4 days, found to be febrile 100.5F, WBC 13, UA grossly positive, UCx with Pansensitive Kleb, CTAP with evidence of acute pyelo with mod-severe transplant hydronephrosis, Transplant ID consulted for assistance.    Blood cultures were drawn 12/23 but appears to have never been received by inhouse lab  Called microlab and stated that no blood cultures were received     ANTIBIOTIC:  Cefepime (12/23)  Meropenem (12/24 --->)    IMPRESSION:  #Acute Transplant Pyelo  #Fever, Leukocytosis (resolved)  #Moderate Transplant Hydronephrosis    SUGGESTIONS:  - switch to CTX 1G daily  - monitor temperature curve  - trend WBC  - obtain Blood culture x2, if negative, can discharge on Cefpodoxime 200mg BID to complete 14-day total (including IV antibiotic)      Above recommendations are Preliminary until Attending's Addendum which includes Final Recommendations      Flaco Lopez DO, PGY-5   ID fellow  [employer] Preferred  After 5pm/weekends call [phone]   73M with HTN, ESRD s/p renal transplant in 2017 at [place], presents with dysuria and L flank pain x 4 days, found to be febrile 100.5F, WBC 13, UA grossly positive, UCx with Pansensitive Kleb, CTAP with evidence of acute pyelo with mod-severe transplant hydronephrosis, Transplant ID consulted for assistance.    Blood cultures were drawn 12/23 but appears to have never been received by inhouse lab  Called microlab and stated that no blood cultures were received     ANTIBIOTIC:  Cefepime (12/23)  Meropenem (12/24 --->)    IMPRESSION:  #Acute Transplant Pyelo  #Fever, Leukocytosis (resolved)  #Moderate Transplant Hydronephrosis    SUGGESTIONS:  - switch to CTX 1G daily  - monitor temperature curve  - trend WBC  - obtain Blood culture x2  - obtain ECG to assess QTC  - Transplant Nephrology evaluation regarding immunosuppression in setting of infection, see if can be decreased more    Case d/w attending and primary team        Flaco Lopez DO, PGY-5   ID fellow  [employer] Teams Preferred  After 5pm/weekends call [phone]   73M with HTN, ESRD s/p renal transplant in 2017 at [place], presents with dysuria and L flank pain x 4 days, found to be febrile 100.5F, WBC 13, UA grossly positive, UCx with Pansensitive Kleb, CTAP with evidence of acute pyelo with mod-severe transplant hydronephrosis, Transplant ID consulted for assistance.    Blood cultures were drawn 12/23 but appears to have never been received by inhouse lab  Called microlab and stated that no blood cultures were received     ANTIBIOTIC:  Cefepime (12/23)  Meropenem (12/24 --->)    IMPRESSION:  #Acute Transplant Pyelo  #Fever, Leukocytosis (resolved)  #Moderate Transplant Hydronephrosis    SUGGESTIONS:  - switch to CTX 1G daily  - monitor temperature curve  - trend WBC  - obtain Blood culture x2  - obtain ECG to assess QTC  - Transplant Nephrology evaluation regarding immunosuppression in setting of infection, see if can be decreased more    Case d/w attending and primary team        Flaco Lopez DO, PGY-5   ID fellow  [employer] Teams Preferred  After 5pm/weekends call [phone]   73M with HTN, ESRD s/p renal transplant in 2017 at [place], presents with dysuria and L flank pain x 4 days, found to be febrile 100.5F, WBC 13, UA grossly positive, UCx with Pansensitive Kleb, CTAP with evidence of acute pyelo with mod-severe transplant hydronephrosis, Transplant ID consulted for assistance.    Blood cultures were drawn 12/23 but appears to have never been received by inhouse lab  Called microlab and stated that no blood cultures were received     ANTIBIOTIC:  Cefepime (12/23)  Meropenem (12/24 --->)    IMPRESSION:  #Acute Transplant Pyelo  #Fever, Leukocytosis (resolved)  #Moderate Transplant Hydronephrosis    SUGGESTIONS:  - switch to CTX 2G daily  - monitor temperature curve  - trend WBC  - obtain Blood culture x2  - obtain ECG to assess QTC  - Transplant Nephrology evaluation regarding immunosuppression in setting of infection, see if can be decreased more    Case d/w attending and primary team        Flaco Lopez DO, PGY-5   ID fellow  [employer] Teams Preferred  After 5pm/weekends call [phone]   73M with HTN, ESRD s/p renal transplant in 2017 at [place], presents with dysuria and L flank pain x 4 days, found to be febrile 100.5F, WBC 13, UA grossly positive, UCx with Pansensitive Kleb, CTAP with evidence of acute pyelo with mod-severe transplant hydronephrosis, Transplant ID consulted for assistance.    Blood cultures were drawn 12/23 but appears to have never been received by inhouse lab  Called microlab and stated that no blood cultures were received     ANTIBIOTIC:  Cefepime (12/23)  Meropenem (12/24 --->)    IMPRESSION:  #Acute Transplant Pyelo  #Fever, Leukocytosis (resolved)  #Moderate Transplant Hydronephrosis    SUGGESTIONS:  - switch to CTX 2G daily  - monitor temperature curve  - trend WBC  - obtain Blood culture x2  - obtain ECG to assess QTC  - Transplant Nephrology evaluation regarding immunosuppression in setting of infection, see if can be decreased more    Case d/w attending and primary team        Flaco Lopez DO, PGY-5   ID fellow  [employer] Preferred  After 5pm/weekends call [phone]

## 2023-12-27 NOTE — PROGRESS NOTE ADULT - SUBJECTIVE AND OBJECTIVE BOX
[facility]PHROLOGY[facility] [phone]        Patient seen and examined in bed.  He was the same and felt well         MEDICATIONS  (STANDING):  carvedilol 12.5 milliGRAM(s) Oral two times a day  meropenem  IVPB 1000 milliGRAM(s) IV Intermittent every 12 hours  tacrolimus 0.5 milliGRAM(s) Oral two times a day  tamsulosin 0.4 milliGRAM(s) Oral at bedtime      VITAL:  T(C): , Max: 36.8 (12-26-23 @ 14:08)  T(F): , Max: 98.3 (12-26-23 @ 14:08)  HR: 70 (12-27-23 @ 09:28)  BP: 151/67 (12-27-23 @ 09:28)  BP(mean): --  RR: 18 (12-27-23 @ 09:28)  SpO2: 98% (12-27-23 @ 09:28)  Wt(kg): --    I and O's:    12-26 @ 07:01  -  12-27 @ 07:00  --------------------------------------------------------  IN: 340 mL / OUT: 1675 mL / NET: -1335 mL          PHYSICAL EXAM:    Constitutional: NAD  Neck:  No JVD  Respiratory: CTAB/L  Cardiovascular: S1 and S2  Gastrointestinal: BS+, soft, NT/ND  Extremities: No peripheral edema  Neurological: A/O x 3, no focal deficits  Psychiatric: Normal mood, normal affect  : No Block  Skin: No rashes  Access: Not applicable    LABS:                        11.3   6.35  )-----------( 193      ( 27 Dec 2023 07:23 )             35.1     12-27    140  |  107  |  34<H>  ----------------------------<  106<H>  4.7   |  24  |  0.79    Ca    8.7      27 Dec 2023 07:22            Urine Studies:  Urinalysis Basic - ( 27 Dec 2023 07:22 )    Color: x / Appearance: x / SG: x / pH: x  Gluc: 106 mg/dL / Ketone: x  / Bili: x / Urobili: x   Blood: x / Protein: x / Nitrite: x   Leuk Esterase: x / RBC: x / WBC x   Sq Epi: x / Non Sq Epi: x / Bacteria: x            RADIOLOGY & ADDITIONAL STUDIES:             [facility]ROLOGY[facility] [phone]        Patient seen and examined in bed.  He was the same and felt well         MEDICATIONS  (STANDING):  carvedilol 12.5 milliGRAM(s) Oral two times a day  meropenem  IVPB 1000 milliGRAM(s) IV Intermittent every 12 hours  tacrolimus 0.5 milliGRAM(s) Oral two times a day  tamsulosin 0.4 milliGRAM(s) Oral at bedtime      VITAL:  T(C): , Max: 36.8 (12-26-23 @ 14:08)  T(F): , Max: 98.3 (12-26-23 @ 14:08)  HR: 70 (12-27-23 @ 09:28)  BP: 151/67 (12-27-23 @ 09:28)  BP(mean): --  RR: 18 (12-27-23 @ 09:28)  SpO2: 98% (12-27-23 @ 09:28)  Wt(kg): --    I and O's:    12-26 @ 07:01  -  12-27 @ 07:00  --------------------------------------------------------  IN: 340 mL / OUT: 1675 mL / NET: -1335 mL          PHYSICAL EXAM:    Constitutional: NAD  Neck:  No JVD  Respiratory: CTAB/L  Cardiovascular: S1 and S2  Gastrointestinal: BS+, soft, NT/ND  Extremities: No peripheral edema  Neurological: A/O x 3, no focal deficits  Psychiatric: Normal mood, normal affect  : No Block  Skin: No rashes  Access: Not applicable    LABS:                        11.3   6.35  )-----------( 193      ( 27 Dec 2023 07:23 )             35.1     12-27    140  |  107  |  34<H>  ----------------------------<  106<H>  4.7   |  24  |  0.79    Ca    8.7      27 Dec 2023 07:22            Urine Studies:  Urinalysis Basic - ( 27 Dec 2023 07:22 )    Color: x / Appearance: x / SG: x / pH: x  Gluc: 106 mg/dL / Ketone: x  / Bili: x / Urobili: x   Blood: x / Protein: x / Nitrite: x   Leuk Esterase: x / RBC: x / WBC x   Sq Epi: x / Non Sq Epi: x / Bacteria: x            RADIOLOGY & ADDITIONAL STUDIES:

## 2023-12-27 NOTE — CONSULT NOTE ADULT - ATTENDING COMMENTS
Patient seeen and examined    Case discussed with dr Lopez  I agree with his history exam and plans as noted above    Patient admitted with transplant related UTI with history of fever/chills starting last Wednesday,  Blood cultures not available in the Micro lab to date  Urine culture with a sensitive E.coli  Can change Meropenem to Ceftriaxone 2gm a day  Would send blood cultures x2 sets  Would check ECG for Qtc interval  If wnl will plan to change to oral quinolone abx to complete therapy    Patient will need to follow up in the ID office in 2 weeks    Chris Jason MD  Can be called via Teams  After 5pm/weekends [phone] Patient seeen and examined    Case discussed with dr Lopez  I agree with his history exam and plans as noted above    Patient admitted with transplant related UTI with history of fever/chills starting last Wednesday,  Blood cultures not available in the Micro lab to date  Urine culture with a sensitive E.coli  Can change Meropenem to Ceftriaxone 2gm a day  Would send blood cultures x2 sets  Would check ECG for Qtc interval  If wnl will plan to change to oral quinolone abx to complete therapy    Patient will need to follow up in the ID office in 2 weeks    Chris Jason MD  Can be called via Teams  After 5pm/weekends [phone]

## 2023-12-27 NOTE — PHYSICAL THERAPY INITIAL EVALUATION ADULT - ADDITIONAL COMMENTS
Pt lives w/spouse and daughter in a private house, less than 5 steps to enter, bed/bath on 1st floor. Prior to admission, pt was independent in ADL and ambulates w/ a cane.

## 2023-12-27 NOTE — DISCHARGE NOTE PROVIDER - CARE PROVIDER_API CALL
Williams Ramirez  Nephrology  [address], [address]  [place] [address]  Phone: [phone]  Fax: [phone]  Follow Up Time: 2 weeks    Your PCP,   Phone: (   )    -  Fax: (   )    -  Follow Up Time: 1 week   Williams Ramirez  Nephrology  [address] [facility] [address], [address]  [place] [address]  Phone: [phone]  Fax: [phone]  Follow Up Time: 2 weeks    Your PCP,   Phone: (   )    -  Fax: (   )    -  Follow Up Time: 1 week   Williams Ramirez  Nephrology  [address] [facility], [address]  [place] [address]  Phone: [phone]  Fax: [phone]  Follow Up Time: 2 weeks    Your PCP,   Phone: (   )    -  Fax: (   )    -  Follow Up Time: 1 week    Nela Jason  Infectious Disease  [address] [place] [address]  Phone: [phone]  Fax: [phone]  Follow Up Time: 2 weeks   Williams Ramirez  Nephrology  [address] [facility], [address]  [place] [address]  Phone: [phone]  Fax: [phone]  Follow Up Time: 2 weeks    Your PCP,   Phone: (   )    -  Fax: (   )    -  Follow Up Time: 1 week    Nela Jason  Infectious Disease  [address] [place] [address]  Phone: [phone]  Fax: [phone]  Follow Up Time: 2 weeks

## 2023-12-27 NOTE — DISCHARGE NOTE PROVIDER - NPI NUMBER (FOR SYSADMIN USE ONLY) :
[[id number]],[[id number]] [[id number]],[[id number]] [[id number]],[[id number]],[[id number]] [[id number]],[UNKNOWN],[[id number]]

## 2023-12-27 NOTE — PROGRESS NOTE ADULT - PROBLEM SELECTOR PLAN 2
met sepsis criteria on admission; RESOLVED.   - abx regimen as above
met sepsis criteria on admission  - abx regimen as above  - fu UCx, BCx
met sepsis criteria on admission  - abx regimen as above  - fu UCx, BCx

## 2023-12-27 NOTE — DISCHARGE NOTE PROVIDER - NSDCMRMEDTOKEN_GEN_ALL_CORE_FT
carvedilol 12.5 mg oral tablet: 1 tab(s) orally 2 times a day  lisinopril 10 mg oral tablet: 1 tab(s) orally once a day  mycophenolate mofetil 500 mg oral tablet: 1 tab(s) orally 2 times a day  tacrolimus 0.5 mg oral capsule: 2 cap(s) orally every 12 hours  tamsulosin 0.4 mg oral capsule: 1 cap(s) orally once a day   carvedilol 12.5 mg oral tablet: 1 tab(s) orally 2 times a day  levoFLOXacin 500 mg oral tablet: 1 tab(s) orally once a day  lisinopril 10 mg oral tablet: 1 tab(s) orally once a day  mycophenolate mofetil 500 mg oral tablet: 1 tab(s) orally 2 times a day  tacrolimus 0.5 mg oral capsule: 2 cap(s) orally every 12 hours  tamsulosin 0.4 mg oral capsule: 1 cap(s) orally once a day

## 2023-12-27 NOTE — PROGRESS NOTE ADULT - SUBJECTIVE AND OBJECTIVE BOX
[facility] Division of Hospital Medicine  Grace Galvez MD  Available via MS Teams  Pager: [phone]    SUBJECTIVE / OVERNIGHT EVENTS: Patient seen and examined at bedside. No acute overnight events. Pt denies dyspnea, chest pain fevers, chills, cough, HA, dizziness, syncope, chest palpitations, abd pain, n/v/d, urinary or bowel changes, active sites of bleeding or any other symptoms at this time.    ADDITIONAL REVIEW OF SYSTEMS: n/a     MEDICATIONS  (STANDING):  carvedilol 12.5 milliGRAM(s) Oral two times a day  cefTRIAXone   IVPB 1000 milliGRAM(s) IV Intermittent every 24 hours  tacrolimus 0.5 milliGRAM(s) Oral two times a day  tamsulosin 0.4 milliGRAM(s) Oral at bedtime    MEDICATIONS  (PRN):  acetaminophen     Tablet .. 650 milliGRAM(s) Oral every 6 hours PRN Temp greater or equal to 38C (100.4F), Mild Pain (1 - 3)  aluminum hydroxide/magnesium hydroxide/simethicone Suspension 30 milliLiter(s) Oral every 4 hours PRN Dyspepsia  melatonin 3 milliGRAM(s) Oral at bedtime PRN Insomnia  ondansetron Injectable 4 milliGRAM(s) IV Push every 8 hours PRN Nausea and/or Vomiting      I&O's Summary    26 Dec 2023 07:01  -  27 Dec 2023 07:00  --------------------------------------------------------  IN: 340 mL / OUT: 1675 mL / NET: -1335 mL    27 Dec 2023 07:01  -  27 Dec 2023 16:29  --------------------------------------------------------  IN: 490 mL / OUT: 0 mL / NET: 490 mL        PHYSICAL EXAM:  Vital Signs Last 24 Hrs  T(C): 36.7 (27 Dec 2023 09:28), Max: 36.7 (27 Dec 2023 04:59)  T(F): 98 (27 Dec 2023 09:28), Max: 98.1 (27 Dec 2023 04:59)  HR: 70 (27 Dec 2023 09:28) (67 - 71)  BP: 151/67 (27 Dec 2023 09:28) (151/67 - 168/68)  BP(mean): --  RR: 18 (27 Dec 2023 09:28) (18 - 18)  SpO2: 98% (27 Dec 2023 09:28) (97% - 99%)    Parameters below as of 27 Dec 2023 09:28  Patient On (Oxygen Delivery Method): room air      CONSTITUTIONAL: NAD, well-developed, well-groomed  EYES: PERRLA; conjunctiva and sclera clear  ENMT: Moist oral mucosa, no pharyngeal injection or exudates; normal dentition  NECK: Supple, no palpable masses; no thyromegaly  RESPIRATORY: Normal respiratory effort; lungs are clear to auscultation bilaterally  CARDIOVASCULAR: Regular rate and rhythm, normal S1 and S2, no murmur/rub/gallop; No lower extremity edema; Peripheral pulses are 2+ bilaterally  ABDOMEN: Nontender to palpation, normoactive bowel sounds, no rebound/guarding; No hepatosplenomegaly  MUSCULOSKELETAL:   no clubbing or cyanosis of digits; no joint swelling or tenderness to palpation  PSYCH: A+O to person, place, and time; affect appropriate  NEUROLOGY: CN 2-12 are intact and symmetric; no gross sensory deficits   SKIN: No rashes; no palpable lesions    LABS:                        11.3   6.35  )-----------( 193      ( 27 Dec 2023 07:23 )             35.1     12-27    140  |  107  |  34<H>  ----------------------------<  106<H>  4.7   |  24  |  0.79    Ca    8.7      27 Dec 2023 07:22            Urinalysis Basic - ( 27 Dec 2023 07:22 )    Color: x / Appearance: x / SG: x / pH: x  Gluc: 106 mg/dL / Ketone: x  / Bili: x / Urobili: x   Blood: x / Protein: x / Nitrite: x   Leuk Esterase: x / RBC: x / WBC x   Sq Epi: x / Non Sq Epi: x / Bacteria: x            RADIOLOGY & ADDITIONAL TESTS:  New Results Reviewed Today:   New Imaging Personally Reviewed Today:  New Electrocardiogram Personally Reviewed Today:  Prior or Outpatient Records Reviewed Today:    COMMUNICATION:  Care Discussed with Consultants/Other Providers and Details of Discussion: Spoke to transplant ID, will need blood cultures, CFTX 1g QD.   Discussions with Patient/Family:  PCP Communication:     [facility] Division of Hospital Medicine  Grace Galvez MD  Available via MS Teams  Pager: [phone]    SUBJECTIVE / OVERNIGHT EVENTS: Patient seen and examined at bedside. No acute overnight events. Pt denies dyspnea, chest pain fevers, chills, cough, HA, dizziness, syncope, chest palpitations, abd pain, n/v/d, urinary or bowel changes, active sites of bleeding or any other symptoms at this time.    ADDITIONAL REVIEW OF SYSTEMS: n/a     MEDICATIONS  (STANDING):  carvedilol 12.5 milliGRAM(s) Oral two times a day  cefTRIAXone   IVPB 1000 milliGRAM(s) IV Intermittent every 24 hours  tacrolimus 0.5 milliGRAM(s) Oral two times a day  tamsulosin 0.4 milliGRAM(s) Oral at bedtime    MEDICATIONS  (PRN):  acetaminophen     Tablet .. 650 milliGRAM(s) Oral every 6 hours PRN Temp greater or equal to 38C (100.4F), Mild Pain (1 - 3)  aluminum hydroxide/magnesium hydroxide/simethicone Suspension 30 milliLiter(s) Oral every 4 hours PRN Dyspepsia  melatonin 3 milliGRAM(s) Oral at bedtime PRN Insomnia  ondansetron Injectable 4 milliGRAM(s) IV Push every 8 hours PRN Nausea and/or Vomiting      I&O's Summary    26 Dec 2023 07:01  -  27 Dec 2023 07:00  --------------------------------------------------------  IN: 340 mL / OUT: 1675 mL / NET: -1335 mL    27 Dec 2023 07:01  -  27 Dec 2023 16:29  --------------------------------------------------------  IN: 490 mL / OUT: 0 mL / NET: 490 mL        PHYSICAL EXAM:  Vital Signs Last 24 Hrs  T(C): 36.7 (27 Dec 2023 09:28), Max: 36.7 (27 Dec 2023 04:59)  T(F): 98 (27 Dec 2023 09:28), Max: 98.1 (27 Dec 2023 04:59)  HR: 70 (27 Dec 2023 09:28) (67 - 71)  BP: 151/67 (27 Dec 2023 09:28) (151/67 - 168/68)  BP(mean): --  RR: 18 (27 Dec 2023 09:28) (18 - 18)  SpO2: 98% (27 Dec 2023 09:28) (97% - 99%)    Parameters below as of 27 Dec 2023 09:28  Patient On (Oxygen Delivery Method): room air      CONSTITUTIONAL: NAD, well-developed, well-groomed  EYES: PERRLA; conjunctiva and sclera clear  ENMT: Moist oral mucosa, no pharyngeal injection or exudates; normal dentition  NECK: Supple, no palpable masses; no thyromegaly  RESPIRATORY: Normal respiratory effort; lungs are clear to auscultation bilaterally  CARDIOVASCULAR: Regular rate and rhythm, normal S1 and S2, no murmur/rub/gallop; No lower extremity edema; Peripheral pulses are 2+ bilaterally  ABDOMEN: Nontender to palpation, normoactive bowel sounds, no rebound/guarding; No hepatosplenomegaly  MUSCULOSKELETAL:   no clubbing or cyanosis of digits; no joint swelling or tenderness to palpation  PSYCH: A+O to person, place, and time; affect appropriate  NEUROLOGY: CN 2-12 are intact and symmetric; no gross sensory deficits   SKIN: No rashes; no palpable lesions    LABS:                        11.3   6.35  )-----------( 193      ( 27 Dec 2023 07:23 )             35.1     12-27    140  |  107  |  34<H>  ----------------------------<  106<H>  4.7   |  24  |  0.79    Ca    8.7      27 Dec 2023 07:22            Urinalysis Basic - ( 27 Dec 2023 07:22 )    Color: x / Appearance: x / SG: x / pH: x  Gluc: 106 mg/dL / Ketone: x  / Bili: x / Urobili: x   Blood: x / Protein: x / Nitrite: x   Leuk Esterase: x / RBC: x / WBC x   Sq Epi: x / Non Sq Epi: x / Bacteria: x            RADIOLOGY & ADDITIONAL TESTS:  New Results Reviewed Today:   New Imaging Personally Reviewed Today:  New Electrocardiogram Personally Reviewed Today:  Prior or Outpatient Records Reviewed Today:    COMMUNICATION:  Care Discussed with Consultants/Other Providers and Details of Discussion: Spoke to transplant ID, will need blood cultures, CFTX 1g QD.   Discussions with Patient/Family:  PCP Communication:

## 2023-12-27 NOTE — DISCHARGE NOTE PROVIDER - ATTENDING DISCHARGE PHYSICAL EXAMINATION:
Vital Signs Last 24 Hrs  T(C): 37.2 (28 Dec 2023 13:47), Max: 37.2 (28 Dec 2023 13:47)  T(F): 99 (28 Dec 2023 13:47), Max: 99 (28 Dec 2023 13:47)  HR: 80 (28 Dec 2023 13:47) (67 - 80)  BP: 134/71 (28 Dec 2023 13:47) (134/71 - 179/70)  BP(mean): --  RR: 18 (28 Dec 2023 13:47) (18 - 18)  SpO2: 95% (28 Dec 2023 13:47) (95% - 98%)    Parameters below as of 28 Dec 2023 13:47  Patient On (Oxygen Delivery Method): room air        CONSTITUTIONAL: NAD, well-developed, well-groomed  EYES: PERRLA; conjunctiva and sclera clear  ENMT: Moist oral mucosa, no pharyngeal injection or exudates; normal dentition  NECK: Supple, no palpable masses; no thyromegaly  RESPIRATORY: Normal respiratory effort; lungs are clear to auscultation bilaterally  CARDIOVASCULAR: Regular rate and rhythm, normal S1 and S2, no murmur/rub/gallop; No lower extremity edema; Peripheral pulses are 2+ bilaterally  ABDOMEN: Nontender to palpation, normoactive bowel sounds, no rebound/guarding; No hepatosplenomegaly  MUSCULOSKELETAL:  Normal gait; no clubbing or cyanosis of digits; no joint swelling or tenderness to palpation  PSYCH: A+O to person, place, and time; affect appropriate  NEUROLOGY: CN 2-12 are intact and symmetric; no gross sensory deficits   SKIN: No rashes; no palpable lesions

## 2023-12-27 NOTE — DISCHARGE NOTE PROVIDER - PROVIDER TOKENS
PROVIDER:[[id number]],FOLLOWUP:[2 weeks]],FREE:[LAST:[Your PCP],PHONE:[(   )    -],FAX:[(   )    -],FOLLOWUP:[1 week]] PROVIDER:[[id number]],FOLLOWUP:[2 weeks]],FREE:[LAST:[Your PCP],PHONE:[(   )    -],FAX:[(   )    -],FOLLOWUP:[1 week]] PROVIDER:[[id number]],FOLLOWUP:[2 weeks]],FREE:[LAST:[Your PCP],PHONE:[(   )    -],FAX:[(   )    -],FOLLOWUP:[1 week]],PROVIDER:[[id number]],FOLLOWUP:[2 weeks]] PROVIDER:[[id number]],FOLLOWUP:[2 weeks]],FREE:[LAST:[Your PCP],PHONE:[(   )    -],FAX:[(   )    -],FOLLOWUP:[1 week]],PROVIDER:[TOKEN:[[id number]],FOLLOWUP:[2 weeks]]

## 2023-12-27 NOTE — PROGRESS NOTE ADULT - PROBLEM SELECTOR PLAN 5
stable at this time  - resume coreg 12.5mg BID  - holding lisinopril in setting of LAKIA
stable at this time  - resume Coreg 12.5mg BID  - holding Lisinopril in setting of LAKIA, will aim to resume on DC
stable at this time  - resume coreg 12.5mg BID  - holding lisinopril in setting of LAKIA

## 2023-12-27 NOTE — CONSULT NOTE ADULT - SUBJECTIVE AND OBJECTIVE BOX
Patient is a 73y old  Male who presents with a chief complaint of dysuria, flank pain (27 Dec 2023 13:34)    HPI:  73M with HTN, ESRD s/p renal transplant in 2017 at [place], presents with dysuria and L flank pain x 4 days, found to be febrile 100.5F, WBC 13, UA grossly positive, UCx with Pansensitive Kleb, CTAP with evidence of acute pyelo with mod-severe transplant hydronephrosis, Transplant ID consulted for assistance.      prior hospital charts reviewed [  ]  primary team notes reviewed [  ]  other consultant notes reviewed [  ]    PAST MEDICAL & SURGICAL HISTORY:  HTN (hypertension)      Renal insufficiency      Prostate cancer      Renal transplant, status post          Allergies  No Known Allergies    ANTIMICROBIALS (past 90 days)  MEDICATIONS  (STANDING):  cefepime   IVPB   100 mL/Hr IV Intermittent (12-23-23 @ 18:45)    meropenem  IVPB   100 mL/Hr IV Intermittent (12-27-23 @ 05:17)   100 mL/Hr IV Intermittent (12-26-23 @ 17:34)   100 mL/Hr IV Intermittent (12-26-23 @ 05:05)   100 mL/Hr IV Intermittent (12-25-23 @ 17:24)   100 mL/Hr IV Intermittent (12-25-23 @ 05:38)   100 mL/Hr IV Intermittent (12-24-23 @ 17:21)    meropenem  IVPB   100 mL/Hr IV Intermittent (12-24-23 @ 00:47)        meropenem  IVPB 1000 every 12 hours    MEDICATIONS  (STANDING):  acetaminophen     Tablet .. 650 every 6 hours PRN  aluminum hydroxide/magnesium hydroxide/simethicone Suspension 30 every 4 hours PRN  carvedilol 12.5 two times a day  melatonin 3 at bedtime PRN  ondansetron Injectable 4 every 8 hours PRN  tacrolimus 0.5 two times a day  tamsulosin 0.4 at bedtime    SOCIAL HISTORY:       FAMILY HISTORY:  FH: diabetes mellitus      REVIEW OF SYSTEMS  [  ] ROS unobtainable because:    [  ] All other systems negative except as noted below:	    Constitutional:  [ ] fever [ ] chills  [ ] weight loss  [ ] weakness  Skin:  [ ] rash [ ] phlebitis	  Eyes: [ ] icterus [ ] pain  [ ] discharge	  ENMT: [ ] sore throat  [ ] thrush [ ] ulcers [ ] exudates  Respiratory: [ ] dyspnea [ ] hemoptysis [ ] cough [ ] sputum	  Cardiovascular:  [ ] chest pain [ ] palpitations [ ] edema	  Gastrointestinal:  [ ] nausea [ ] vomiting [ ] diarrhea [ ] constipation [ ] pain	  Genitourinary:  [ ] dysuria [ ] frequency [ ] hematuria [ ] discharge [ ] flank pain  [ ] incontinence  Musculoskeletal:  [ ] myalgias [ ] arthralgias [ ] arthritis  [ ] back pain  Neurological:  [ ] headache [ ] seizures  [ ] confusion/altered mental status  Psychiatric:  [ ] anxiety [ ] depression	  Hematology/Lymphatics:  [ ] lymphadenopathy  Endocrine:  [ ] adrenal [ ] thyroid  Allergic/Immunologic:	 [ ] transplant [ ] seasonal    Vital Signs Last 24 Hrs  T(F): 98 (12-27-23 @ 09:28), Max: 100.5 (12-23-23 @ 18:27)  Vital Signs Last 24 Hrs  HR: 70 (12-27-23 @ 09:28) (67 - 73)  BP: 151/67 (12-27-23 @ 09:28) (124/60 - 168/68)  RR: 18 (12-27-23 @ 09:28)  SpO2: 98% (12-27-23 @ 09:28) (97% - 99%)  Wt(kg): --    PHYSICAL EXAM:                              11.3   6.35  )-----------( 193      ( 27 Dec 2023 07:23 )             35.1   12-27    140  |  107  |  34<H>  ----------------------------<  106<H>  4.7   |  24  |  0.79    Ca    8.7      27 Dec 2023 07:22      Urinalysis Basic - ( 27 Dec 2023 07:22 )    Color: x / Appearance: x / SG: x / pH: x  Gluc: 106 mg/dL / Ketone: x  / Bili: x / Urobili: x   Blood: x / Protein: x / Nitrite: x   Leuk Esterase: x / RBC: x / WBC x   Sq Epi: x / Non Sq Epi: x / Bacteria: x    MICROBIOLOGY:  Culture - Urine (collected 23 Dec 2023 19:52)  Source: Clean Catch Clean Catch (Midstream)  Final Report (27 Dec 2023 12:00):    >100,000 CFU/ml Klebsiella pneumoniae    <10,000 CFU/ml Normal Urogenital guicho present  Organism: Klebsiella pneumoniae (27 Dec 2023 12:00)  Organism: Klebsiella pneumoniae (27 Dec 2023 12:00)      -  Levofloxacin: S <=0.5      -  Tobramycin: S <=2      -  Nitrofurantoin: S <=32 Should not be used to treat pyelonephritis      -  Aztreonam: S <=4      -  Gentamicin: S <=2      -  Cefazolin: S <=2 For uncomplicated UTI with K. pneumoniae, E. coli, or P. mirablis: ERIC <=16 is sensitive and ERIC >=32 is resistant. This also predicts results for oral agents cefaclor, cefdinir, cefpodoxime, cefprozil, cefuroxime axetil, cephalexin and locarbef for uncomplicated UTI. Note that some isolates may be susceptible to these agents while testing resistant to cefazolin.      -  Cefepime: S <=2      -  Piperacillin/Tazobactam: S <=8      -  Ciprofloxacin: S <=0.25      -  Imipenem: S <=1      -  Ceftriaxone: S <=1      -  Ampicillin: R 16 These ampicillin results predict results for amoxicillin      Method Type: ERIC      -  Meropenem: S <=1      -  Ampicillin/Sulbactam: S <=4/2      -  Cefoxitin: S <=8      -  Cefuroxime: S <=4      -  Amoxicillin/Clavulanic Acid: S <=8/4      -  Trimethoprim/Sulfamethoxazole: S <=0.5/9.5      -  Ertapenem: S <=0.5            RADIOLOGY:  imaging below personally reviewed and agree with findings  < from: US Kidney and Bladder (12.24.23 @ 20:36) >  IMPRESSION:  Moderate to severe renal transplant hydronephrosis, not significantly   changed compared to CT from 12/23/2023.    < end of copied text >  < from: CT Abdomen and Pelvis w/ IV Cont (12.23.23 @ 20:45) >  IMPRESSION:  Bladder wall thickening with surrounding fat stranding concerning for   cystitis. Moderate hydronephrosis of the transplant kidney again seen   perhaps minimally increased compared with 6/14/2023, with areas of   heterogeneous enhancement, ureteral wall enhancement, concerning for   acute pyelonephritis. Adjacent reactive changes as above.    < end of copied text >   Patient is a 73y old  Male who presents with a chief complaint of dysuria, flank pain (27 Dec 2023 13:34)    HPI:  73M with HTN, ESRD s/p renal transplant in 2017 at [place], presents with dysuria and L flank pain x 4 days, found to be febrile 100.5F, WBC 13, UA grossly positive, UCx with Pansensitive Kleb, CTAP with evidence of acute pyelo with mod-severe transplant hydronephrosis, Transplant ID consulted for assistance.      prior hospital charts reviewed [  ]  primary team notes reviewed [  ]  other consultant notes reviewed [  ]    PAST MEDICAL & SURGICAL HISTORY:  HTN (hypertension)      Renal insufficiency      Prostate cancer      Renal transplant, status post          Allergies  No Known Allergies    ANTIMICROBIALS (past 90 days)  MEDICATIONS  (STANDING):  cefepime   IVPB   100 mL/Hr IV Intermittent (12-23-23 @ 18:45)    meropenem  IVPB   100 mL/Hr IV Intermittent (12-27-23 @ 05:17)   100 mL/Hr IV Intermittent (12-26-23 @ 17:34)   100 mL/Hr IV Intermittent (12-26-23 @ 05:05)   100 mL/Hr IV Intermittent (12-25-23 @ 17:24)   100 mL/Hr IV Intermittent (12-25-23 @ 05:38)   100 mL/Hr IV Intermittent (12-24-23 @ 17:21)    meropenem  IVPB   100 mL/Hr IV Intermittent (12-24-23 @ 00:47)        meropenem  IVPB 1000 every 12 hours    MEDICATIONS  (STANDING):  acetaminophen     Tablet .. 650 every 6 hours PRN  aluminum hydroxide/magnesium hydroxide/simethicone Suspension 30 every 4 hours PRN  carvedilol 12.5 two times a day  melatonin 3 at bedtime PRN  ondansetron Injectable 4 every 8 hours PRN  tacrolimus 0.5 two times a day  tamsulosin 0.4 at bedtime    SOCIAL HISTORY:       FAMILY HISTORY:  FH: diabetes mellitus      REVIEW OF SYSTEMS  [  ] ROS unobtainable because:    [  ] All other systems negative except as noted below:	    Constitutional:  [ ] fever [ ] chills  [ ] weight loss  [ ] weakness  Skin:  [ ] rash [ ] phlebitis	  Eyes: [ ] icterus [ ] pain  [ ] discharge	  ENMT: [ ] sore throat  [ ] thrush [ ] ulcers [ ] exudates  Respiratory: [ ] dyspnea [ ] hemoptysis [ ] cough [ ] sputum	  Cardiovascular:  [ ] chest pain [ ] palpitations [ ] edema	  Gastrointestinal:  [ ] nausea [ ] vomiting [ ] diarrhea [ ] constipation [ ] pain	  Genitourinary:  [ ] dysuria [ ] frequency [ ] hematuria [ ] discharge [ ] flank pain  [ ] incontinence  Musculoskeletal:  [ ] myalgias [ ] arthralgias [ ] arthritis  [ ] back pain  Neurological:  [ ] headache [ ] seizures  [ ] confusion/altered mental status  Psychiatric:  [ ] anxiety [ ] depression	  Hematology/Lymphatics:  [ ] lymphadenopathy  Endocrine:  [ ] adrenal [ ] thyroid  Allergic/Immunologic:	 [ ] transplant [ ] seasonal    Vital Signs Last 24 Hrs  T(F): 98 (12-27-23 @ 09:28), Max: 100.5 (12-23-23 @ 18:27)  Vital Signs Last 24 Hrs  HR: 70 (12-27-23 @ 09:28) (67 - 73)  BP: 151/67 (12-27-23 @ 09:28) (124/60 - 168/68)  RR: 18 (12-27-23 @ 09:28)  SpO2: 98% (12-27-23 @ 09:28) (97% - 99%)  Wt(kg): --    PHYSICAL EXAM:                              11.3   6.35  )-----------( 193      ( 27 Dec 2023 07:23 )             35.1   12-27    140  |  107  |  34<H>  ----------------------------<  106<H>  4.7   |  24  |  0.79    Ca    8.7      27 Dec 2023 07:22      Urinalysis Basic - ( 27 Dec 2023 07:22 )    Color: x / Appearance: x / SG: x / pH: x  Gluc: 106 mg/dL / Ketone: x  / Bili: x / Urobili: x   Blood: x / Protein: x / Nitrite: x   Leuk Esterase: x / RBC: x / WBC x   Sq Epi: x / Non Sq Epi: x / Bacteria: x    MICROBIOLOGY:  Culture - Urine (collected 23 Dec 2023 19:52)  Source: Clean Catch Clean Catch (Midstream)  Final Report (27 Dec 2023 12:00):    >100,000 CFU/ml Klebsiella pneumoniae    <10,000 CFU/ml Normal Urogenital guicho present  Organism: Klebsiella pneumoniae (27 Dec 2023 12:00)  Organism: Klebsiella pneumoniae (27 Dec 2023 12:00)      -  Levofloxacin: S <=0.5      -  Tobramycin: S <=2      -  Nitrofurantoin: S <=32 Should not be used to treat pyelonephritis      -  Aztreonam: S <=4      -  Gentamicin: S <=2      -  Cefazolin: S <=2 For uncomplicated UTI with K. pneumoniae, E. coli, or P. mirablis: ERIC <=16 is sensitive and ERIC >=32 is resistant. This also predicts results for oral agents cefaclor, cefdinir, cefpodoxime, cefprozil, cefuroxime axetil, cephalexin and locarbef for uncomplicated UTI. Note that some isolates may be susceptible to these agents while testing resistant to cefazolin.      -  Cefepime: S <=2      -  Piperacillin/Tazobactam: S <=8      -  Ciprofloxacin: S <=0.25      -  Imipenem: S <=1      -  Ceftriaxone: S <=1      -  Ampicillin: R 16 These ampicillin results predict results for amoxicillin      Method Type: ERIC      -  Meropenem: S <=1      -  Ampicillin/Sulbactam: S <=4/2      -  Cefoxitin: S <=8      -  Cefuroxime: S <=4      -  Amoxicillin/Clavulanic Acid: S <=8/4      -  Trimethoprim/Sulfamethoxazole: S <=0.5/9.5      -  Ertapenem: S <=0.5            RADIOLOGY:  imaging below personally reviewed and agree with findings  < from: US Kidney and Bladder (12.24.23 @ 20:36) >  IMPRESSION:  Moderate to severe renal transplant hydronephrosis, not significantly   changed compared to CT from 12/23/2023.    < end of copied text >  < from: CT Abdomen and Pelvis w/ IV Cont (12.23.23 @ 20:45) >  IMPRESSION:  Bladder wall thickening with surrounding fat stranding concerning for   cystitis. Moderate hydronephrosis of the transplant kidney again seen   perhaps minimally increased compared with 6/14/2023, with areas of   heterogeneous enhancement, ureteral wall enhancement, concerning for   acute pyelonephritis. Adjacent reactive changes as above.    < end of copied text >   Patient is a 73y old  Male who presents with a chief complaint of dysuria, flank pain (27 Dec 2023 13:34)    HPI:  73M with HTN, ESRD s/p renal transplant in 2017 at [place], presents with dysuria and L flank pain x 4 days, found to be febrile 100.5F, WBC 13, UA grossly positive, UCx with Pansensitive Kleb, CTAP with evidence of acute pyelo with mod-severe transplant hydronephrosis, Transplant ID consulted for assistance.      prior hospital charts reviewed [ x ]  primary team notes reviewed [ x ]  other consultant notes reviewed [ x ]    PAST MEDICAL & SURGICAL HISTORY:  HTN (hypertension)      Renal insufficiency      Prostate cancer      Renal transplant, status post          Allergies  No Known Allergies    ANTIMICROBIALS (past 90 days)  MEDICATIONS  (STANDING):  cefepime   IVPB   100 mL/Hr IV Intermittent (12-23-23 @ 18:45)    meropenem  IVPB   100 mL/Hr IV Intermittent (12-27-23 @ 05:17)   100 mL/Hr IV Intermittent (12-26-23 @ 17:34)   100 mL/Hr IV Intermittent (12-26-23 @ 05:05)   100 mL/Hr IV Intermittent (12-25-23 @ 17:24)   100 mL/Hr IV Intermittent (12-25-23 @ 05:38)   100 mL/Hr IV Intermittent (12-24-23 @ 17:21)    meropenem  IVPB   100 mL/Hr IV Intermittent (12-24-23 @ 00:47)        meropenem  IVPB 1000 every 12 hours    MEDICATIONS  (STANDING):  acetaminophen     Tablet .. 650 every 6 hours PRN  aluminum hydroxide/magnesium hydroxide/simethicone Suspension 30 every 4 hours PRN  carvedilol 12.5 two times a day  melatonin 3 at bedtime PRN  ondansetron Injectable 4 every 8 hours PRN  tacrolimus 0.5 two times a day  tamsulosin 0.4 at bedtime    SOCIAL HISTORY:   Denies alcohol, tobacco, recreational drug use      FAMILY HISTORY:  FH: diabetes mellitus      REVIEW OF SYSTEMS  [  ] ROS unobtainable because:    [x  ] All other systems negative except as noted below:	    Constitutional:  [ ] fever [ ] chills  [ ] weight loss  [ ] weakness  Skin:  [ ] rash [ ] phlebitis	  Eyes: [ ] icterus [ ] pain  [ ] discharge	  ENMT: [ ] sore throat  [ ] thrush [ ] ulcers [ ] exudates  Respiratory: [ ] dyspnea [ ] hemoptysis [ ] cough [ ] sputum	  Cardiovascular:  [ ] chest pain [ ] palpitations [ ] edema	  Gastrointestinal:  [ ] nausea [ ] vomiting [ ] diarrhea [ ] constipation [ ] pain	  Genitourinary:  [ ] dysuria [ ] frequency [ ] hematuria [ ] discharge [ ] flank pain  [ ] incontinence  Musculoskeletal:  [ ] myalgias [ ] arthralgias [ ] arthritis  [ ] back pain  Neurological:  [ ] headache [ ] seizures  [ ] confusion/altered mental status  Psychiatric:  [ ] anxiety [ ] depression	  Hematology/Lymphatics:  [ ] lymphadenopathy  Endocrine:  [ ] adrenal [ ] thyroid  Allergic/Immunologic:	 [ ] transplant [ ] seasonal    Vital Signs Last 24 Hrs  T(F): 98 (12-27-23 @ 09:28), Max: 100.5 (12-23-23 @ 18:27)  Vital Signs Last 24 Hrs  HR: 70 (12-27-23 @ 09:28) (67 - 73)  BP: 151/67 (12-27-23 @ 09:28) (124/60 - 168/68)  RR: 18 (12-27-23 @ 09:28)  SpO2: 98% (12-27-23 @ 09:28) (97% - 99%)  Wt(kg): --    Physical Exam:  Constitutional:  well preserved, comfortable  Head/Eyes: no icterus  ENT:  supple, no cervical lymphadenopathy   LUNGS:  CTA  CVS:  regular rhythm, no murmur  Abd:  soft, non-tender; non-distended  Ext:  no edema  Vascular:  IV site no erythema tenderness or discharge  MSK:  joints without swelling  Neuro: AAO X 3, non- focal                                11.3   6.35  )-----------( 193      ( 27 Dec 2023 07:23 )             35.1   12-27    140  |  107  |  34<H>  ----------------------------<  106<H>  4.7   |  24  |  0.79    Ca    8.7      27 Dec 2023 07:22      Urinalysis Basic - ( 27 Dec 2023 07:22 )    Color: x / Appearance: x / SG: x / pH: x  Gluc: 106 mg/dL / Ketone: x  / Bili: x / Urobili: x   Blood: x / Protein: x / Nitrite: x   Leuk Esterase: x / RBC: x / WBC x   Sq Epi: x / Non Sq Epi: x / Bacteria: x    MICROBIOLOGY:  Culture - Urine (collected 23 Dec 2023 19:52)  Source: Clean Catch Clean Catch (Midstream)  Final Report (27 Dec 2023 12:00):    >100,000 CFU/ml Klebsiella pneumoniae    <10,000 CFU/ml Normal Urogenital guicho present  Organism: Klebsiella pneumoniae (27 Dec 2023 12:00)  Organism: Klebsiella pneumoniae (27 Dec 2023 12:00)      -  Levofloxacin: S <=0.5      -  Tobramycin: S <=2      -  Nitrofurantoin: S <=32 Should not be used to treat pyelonephritis      -  Aztreonam: S <=4      -  Gentamicin: S <=2      -  Cefazolin: S <=2 For uncomplicated UTI with K. pneumoniae, E. coli, or P. mirablis: ERIC <=16 is sensitive and ERIC >=32 is resistant. This also predicts results for oral agents cefaclor, cefdinir, cefpodoxime, cefprozil, cefuroxime axetil, cephalexin and locarbef for uncomplicated UTI. Note that some isolates may be susceptible to these agents while testing resistant to cefazolin.      -  Cefepime: S <=2      -  Piperacillin/Tazobactam: S <=8      -  Ciprofloxacin: S <=0.25      -  Imipenem: S <=1      -  Ceftriaxone: S <=1      -  Ampicillin: R 16 These ampicillin results predict results for amoxicillin      Method Type: ERIC      -  Meropenem: S <=1      -  Ampicillin/Sulbactam: S <=4/2      -  Cefoxitin: S <=8      -  Cefuroxime: S <=4      -  Amoxicillin/Clavulanic Acid: S <=8/4      -  Trimethoprim/Sulfamethoxazole: S <=0.5/9.5      -  Ertapenem: S <=0.5            RADIOLOGY:  imaging below personally reviewed and agree with findings  < from: US Kidney and Bladder (12.24.23 @ 20:36) >  IMPRESSION:  Moderate to severe renal transplant hydronephrosis, not significantly   changed compared to CT from 12/23/2023.    < end of copied text >  < from: CT Abdomen and Pelvis w/ IV Cont (12.23.23 @ 20:45) >  IMPRESSION:  Bladder wall thickening with surrounding fat stranding concerning for   cystitis. Moderate hydronephrosis of the transplant kidney again seen   perhaps minimally increased compared with 6/14/2023, with areas of   heterogeneous enhancement, ureteral wall enhancement, concerning for   acute pyelonephritis. Adjacent reactive changes as above.    < end of copied text >   Patient is a 73y old  Male who presents with a chief complaint of dysuria, flank pain (27 Dec 2023 13:34)    HPI:  73M with HTN, ESRD s/p renal transplant in 2017 at [place], presents with dysuria and L flank pain x 4 days, found to be febrile 100.5F, WBC 13, UA grossly positive, UCx with Pansensitive Kleb, CTAP with evidence of acute pyelo with mod-severe transplant hydronephrosis, Transplant ID consulted for assistance.      prior hospital charts reviewed [ x ]  primary team notes reviewed [ x ]  other consultant notes reviewed [ x ]    PAST MEDICAL & SURGICAL HISTORY:  HTN (hypertension)      Renal insufficiency      Prostate cancer      Renal transplant, status post          Allergies  No Known Allergies    ANTIMICROBIALS (past 90 days)  MEDICATIONS  (STANDING):  cefepime   IVPB   100 mL/Hr IV Intermittent (12-23-23 @ 18:45)    meropenem  IVPB   100 mL/Hr IV Intermittent (12-27-23 @ 05:17)   100 mL/Hr IV Intermittent (12-26-23 @ 17:34)   100 mL/Hr IV Intermittent (12-26-23 @ 05:05)   100 mL/Hr IV Intermittent (12-25-23 @ 17:24)   100 mL/Hr IV Intermittent (12-25-23 @ 05:38)   100 mL/Hr IV Intermittent (12-24-23 @ 17:21)    meropenem  IVPB   100 mL/Hr IV Intermittent (12-24-23 @ 00:47)        meropenem  IVPB 1000 every 12 hours    MEDICATIONS  (STANDING):  acetaminophen     Tablet .. 650 every 6 hours PRN  aluminum hydroxide/magnesium hydroxide/simethicone Suspension 30 every 4 hours PRN  carvedilol 12.5 two times a day  melatonin 3 at bedtime PRN  ondansetron Injectable 4 every 8 hours PRN  tacrolimus 0.5 two times a day  tamsulosin 0.4 at bedtime    SOCIAL HISTORY:   Denies alcohol, tobacco, recreational drug use      FAMILY HISTORY:  FH: diabetes mellitus      REVIEW OF SYSTEMS  [  ] ROS unobtainable because:    [x  ] All other systems negative except as noted below:	    Constitutional:  [ ] fever [ ] chills  [ ] weight loss  [ ] weakness  Skin:  [ ] rash [ ] phlebitis	  Eyes: [ ] icterus [ ] pain  [ ] discharge	  ENMT: [ ] sore throat  [ ] thrush [ ] ulcers [ ] exudates  Respiratory: [ ] dyspnea [ ] hemoptysis [ ] cough [ ] sputum	  Cardiovascular:  [ ] chest pain [ ] palpitations [ ] edema	  Gastrointestinal:  [ ] nausea [ ] vomiting [ ] diarrhea [ ] constipation [ ] pain	  Genitourinary:  [ ] dysuria [ ] frequency [ ] hematuria [ ] discharge [ ] flank pain  [ ] incontinence  Musculoskeletal:  [ ] myalgias [ ] arthralgias [ ] arthritis  [ ] back pain  Neurological:  [ ] headache [ ] seizures  [ ] confusion/altered mental status  Psychiatric:  [ ] anxiety [ ] depression	  Hematology/Lymphatics:  [ ] lymphadenopathy  Endocrine:  [ ] adrenal [ ] thyroid  Allergic/Immunologic:	 [ ] transplant [ ] seasonal    Vital Signs Last 24 Hrs  T(F): 98 (12-27-23 @ 09:28), Max: 100.5 (12-23-23 @ 18:27)  Vital Signs Last 24 Hrs  HR: 70 (12-27-23 @ 09:28) (67 - 73)  BP: 151/67 (12-27-23 @ 09:28) (124/60 - 168/68)  RR: 18 (12-27-23 @ 09:28)  SpO2: 98% (12-27-23 @ 09:28) (97% - 99%)  Wt(kg): --    Physical Exam:  Constitutional:  well preserved, comfortable  Head/Eyes: no icterus  ENT:  supple, no cervical lymphadenopathy   LUNGS:  CTA  CVS:  regular rhythm, no murmur  Abd:  soft, non-tender; non-distended  Ext:  no edema  Vascular:  IV site no erythema tenderness or discharge  MSK:  joints without swelling  Neuro: AAO X 3, non- focal                                11.3   6.35  )-----------( 193      ( 27 Dec 2023 07:23 )             35.1   12-27    140  |  107  |  34<H>  ----------------------------<  106<H>  4.7   |  24  |  0.79    Ca    8.7      27 Dec 2023 07:22      Urinalysis Basic - ( 27 Dec 2023 07:22 )    Color: x / Appearance: x / SG: x / pH: x  Gluc: 106 mg/dL / Ketone: x  / Bili: x / Urobili: x   Blood: x / Protein: x / Nitrite: x   Leuk Esterase: x / RBC: x / WBC x   Sq Epi: x / Non Sq Epi: x / Bacteria: x    MICROBIOLOGY:  Culture - Urine (collected 23 Dec 2023 19:52)  Source: Clean Catch Clean Catch (Midstream)  Final Report (27 Dec 2023 12:00):    >100,000 CFU/ml Klebsiella pneumoniae    <10,000 CFU/ml Normal Urogenital guicho present  Organism: Klebsiella pneumoniae (27 Dec 2023 12:00)  Organism: Klebsiella pneumoniae (27 Dec 2023 12:00)      -  Levofloxacin: S <=0.5      -  Tobramycin: S <=2      -  Nitrofurantoin: S <=32 Should not be used to treat pyelonephritis      -  Aztreonam: S <=4      -  Gentamicin: S <=2      -  Cefazolin: S <=2 For uncomplicated UTI with K. pneumoniae, E. coli, or P. mirablis: ERIC <=16 is sensitive and ERIC >=32 is resistant. This also predicts results for oral agents cefaclor, cefdinir, cefpodoxime, cefprozil, cefuroxime axetil, cephalexin and locarbef for uncomplicated UTI. Note that some isolates may be susceptible to these agents while testing resistant to cefazolin.      -  Cefepime: S <=2      -  Piperacillin/Tazobactam: S <=8      -  Ciprofloxacin: S <=0.25      -  Imipenem: S <=1      -  Ceftriaxone: S <=1      -  Ampicillin: R 16 These ampicillin results predict results for amoxicillin      Method Type: ERIC      -  Meropenem: S <=1      -  Ampicillin/Sulbactam: S <=4/2      -  Cefoxitin: S <=8      -  Cefuroxime: S <=4      -  Amoxicillin/Clavulanic Acid: S <=8/4      -  Trimethoprim/Sulfamethoxazole: S <=0.5/9.5      -  Ertapenem: S <=0.5            RADIOLOGY:  imaging below personally reviewed and agree with findings  < from: US Kidney and Bladder (12.24.23 @ 20:36) >  IMPRESSION:  Moderate to severe renal transplant hydronephrosis, not significantly   changed compared to CT from 12/23/2023.    < end of copied text >  < from: CT Abdomen and Pelvis w/ IV Cont (12.23.23 @ 20:45) >  IMPRESSION:  Bladder wall thickening with surrounding fat stranding concerning for   cystitis. Moderate hydronephrosis of the transplant kidney again seen   perhaps minimally increased compared with 6/14/2023, with areas of   heterogeneous enhancement, ureteral wall enhancement, concerning for   acute pyelonephritis. Adjacent reactive changes as above.    < end of copied text >

## 2023-12-27 NOTE — DISCHARGE NOTE PROVIDER - HOSPITAL COURSE
HPI:  73M with hx of HTN, s/p renal transplant in 2017 at [place], presents with dysuria and L flank pain x 4 days. Pt states that he started noticing burning sensation with urination and now with L flank pain. Has been compliant with his transplant medications. Sees his nephrologist every 3 months and usual baseline SCr is 0.7. Denies fever/chills, chest pain, dyspnea, abdominal pain, nausea/vomiting, diarrhea.  (24 Dec 2023 11:14)    Hospital Course: Pt was admitted with pyelonephritis, LAKIA and hyponatremia. Pt was started on IVF and IV abx. Transplant renal was called and followed the pt throughout admission. Cellcept was held i/s/o active infection. LAKIA and hyponatremia resolved quickly w/ IVF. Urine cultures revealed klebsiella sensitive to PO abx. PO abx regimen determined by transplant ID and pt stable for dc.       Important Medication Changes and Reason:  - abx added - pyelo    Active or Pending Issues Requiring Follow-up:  - Pyelo - f/u pcp   - Hx renal transplant - f/u renal transplant     Advanced Directives:   [x] Full code  [ ] DNR  [ ] Hospice    Discharge Diagnoses:  - Pyelo         HPI:  73M with hx of HTN, s/p renal transplant in 2017 at [place], presents with dysuria and L flank pain x 4 days. Pt states that he started noticing burning sensation with urination and now with L flank pain. Has been compliant with his transplant medications. Sees his nephrologist every 3 months and usual baseline SCr is 0.7. Denies fever/chills, chest pain, dyspnea, abdominal pain, nausea/vomiting, diarrhea.  (24 Dec 2023 11:14)    Hospital Course: Pt was admitted with pyelonephritis, LAKIA and hyponatremia. Pt was started on IVF and IV abx. Transplant renal was called and followed the pt throughout admission. Cellcept was held i/s/o active infection. LAKIA and hyponatremia resolved quickly w/ IVF. Urine cultures revealed klebsiella sensitive to PO abx. PO abx regimen determined by transplant ID and pt stable for dc.       Important Medication Changes and Reason:  - abx added - pyelo    Active or Pending Issues Requiring Follow-up:  - Pyelo - f/u pcp   - Hx renal transplant - f/u renal transplant     Advanced Directives:   [x] Full code  [ ] DNR  [ ] Hospice    Discharge Diagnoses:  - Pyelo         HPI:  73M with hx of HTN, s/p renal transplant in 2017 at [place], presents with dysuria and L flank pain x 4 days. Pt states that he started noticing burning sensation with urination and now with L flank pain. Has been compliant with his transplant medications. Sees his nephrologist every 3 months and usual baseline SCr is 0.7. Denies fever/chills, chest pain, dyspnea, abdominal pain, nausea/vomiting, diarrhea.  (24 Dec 2023 11:14)    Hospital Course: Pt was admitted with pyelonephritis, LAKIA and hyponatremia. Pt was started on IVF and IV abx. Transplant renal was called and followed the pt throughout admission. Cellcept was held i/s/o active infection. LAKIA and hyponatremia resolved quickly w/ IVF. Urine cultures revealed klebsiella sensitive to PO abx. PO abx regimen determined by transplant ID and pt stable for dc.       Important Medication Changes and Reason:  - Will take Levofloxacin 500mg PO QD until 1/6/24.     Active or Pending Issues Requiring Follow-up:  - Pyelonephritis - f/u PCP or infectious disease docotr   - Hx renal transplant - f/u renal transplant     Advanced Directives:   [x] Full code  [ ] DNR  [ ] Hospice    Discharge Diagnoses:  - Pyelonephritis          HPI:  73M with hx of HTN, s/p renal transplant in 2017 at [place], presents with dysuria and L flank pain x 4 days. Pt states that he started noticing burning sensation with urination and now with L flank pain. Has been compliant with his transplant medications. Sees his nephrologist every 3 months and usual baseline SCr is 0.7. Denies fever/chills, chest pain, dyspnea, abdominal pain, nausea/vomiting, diarrhea.  (24 Dec 2023 11:14)    Hospital Course: Pt was admitted with pyelonephritis, LAKIA and hyponatremia. Pt was started on IVF and IV abx. Transplant renal was called and followed the pt throughout admission. Cellcept was held i/s/o active infection. LAKIA and hyponatremia resolved quickly w/ IVF. Urine cultures revealed klebsiella sensitive to PO abx. PO abx regimen determined by transplant ID and pt stable for dc.       Important Medication Changes and Reason:  - Will take Levofloxacin 500mg PO QD until 1/6/24.     Active or Pending Issues Requiring Follow-up:  - Pyelonephritis - f/u PCP or infectious disease docotr   - Hx renal transplant - f/u renal transplant     Advanced Directives:   [x] Full code  [ ] DNR  [ ] Hospice    Discharge Diagnoses:  - Pyelonephritis

## 2023-12-27 NOTE — PROGRESS NOTE ADULT - PROBLEM SELECTOR PLAN 7
DVT ppx: HSQ  Dispo: no skilled PT needs
DVT ppx: HSQ  Dispo: no skilled PT needs
DVT ppx: HSQ  Dispo: no skilled PT needs, PT eval?

## 2023-12-27 NOTE — DISCHARGE NOTE PROVIDER - NSDCFUADDAPPT_GEN_ALL_CORE_FT
Please follow up with infectious disease, Dr. Jason, on 1/10/24 or 1/17/24.  APPTS ARE READY TO BE MADE: [x ] YES    Best Family or Patient Contact (if needed):    Additional Information about above appointments (if needed):    1: Please follow up with infectious disease, Dr. Jason, on 1/10/24 or 1/17/24.   2: Please follow up with Nephrology within 1-2 weeks of discharge   3:     Other comments or requests:    APPTS ARE READY TO BE MADE: [x ] YES    Best Family or Patient Contact (if needed):    Additional Information about above appointments (if needed):    1: Please follow up with infectious disease, Dr. Jason, on 1/10/24 or 1/17/24.   2: Please follow up with Nephrology within 1-2 weeks of discharge   3:     Other comments or requests:   Patient advised they prefer to schedule their own appointments but took down our contact information if further assistance is needed.

## 2023-12-27 NOTE — PHYSICAL THERAPY INITIAL EVALUATION ADULT - PERTINENT HX OF CURRENT PROBLEM, REHAB EVAL
73M with hx of HTN, s/p renal transplant in 2017 at [place], presents with dysuria and L flank pain x 4 days. Pt states that he started noticing burning sensation with urination and now with L flank pain. Has been compliant with his transplant medications. Sees his nephrologist every 3 months and usual baseline SCr is 0.7. Denies fever/chills, chest pain, dyspnea, abdominal pain, nausea/vomiting, diarrhea.     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 CT A/P: Bladder wall thickening with surrounding fat stranding concerning for cystitis. Moderate hydronephrosis of the transplant kidney again seen perhaps minimally increased compared with 6/14/2023, with areas of heterogeneous enhancement, ureteral wall enhancement, concerning for   acute pyelonephritis. 73M with hx of HTN, s/p renal transplant in 2017 at [place], presents with dysuria and L flank pain x 4 days. Pt states that he started noticing burning sensation with urination and now with L flank pain. Has been compliant with his transplant medications. Sees his nephrologist every 3 months and usual baseline SCr is 0.7. Denies fever/chills, chest pain, dyspnea, abdominal pain, nausea/vomiting, diarrhea.     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 CT A/P: Bladder wall thickening with surrounding fat stranding concerning for cystitis. Moderate hydronephrosis of the transplant kidney again seen perhaps minimally increased compared with 6/14/2023, with areas of heterogeneous enhancement, ureteral wall enhancement, concerning for   acute pyelonephritis.

## 2023-12-27 NOTE — DISCHARGE NOTE PROVIDER - NSDCCPCAREPLAN_GEN_ALL_CORE_FT
PRINCIPAL DISCHARGE DIAGNOSIS  Diagnosis: Pyelonephritis  Assessment and Plan of Treatment: Pyelonephritis  You have a  kidney infection called pyelonephritis. The infection can damage your kidneys and cause bacteria to enter your bloodstream.  Take all the medicine you were prescribed, even if you feel better. Not finishing the medicine can make the infection come back. It may also make a future infection harder to treat. Make sure to drink 8 to 12 glasses of fluid every day. Clear fluids, such as water, are best. To prevent future infection, always wipe the genital area from front to back and urinate frequently. Avoid holding urine in the bladder for a long time. Always urinate after sexual intercourse.  You should follow up with your PCP and nephrologist.

## 2023-12-27 NOTE — PROGRESS NOTE ADULT - ASSESSMENT
Zana Shepard is a 73 year old man with a history of HTN, prior ESRD s/p nephrectomy and transplant, prostate cancer s/p radiation who presents with  UTI (>[id number] GNR)    1 Renal - ESRD s/p DDKT- baseline s.cr ~ 1.0 mg/dl; Hydro was noted   on prograf 0.5 mg bid and hold Cellcept and once abx changed to PO will restart  Hyponatremia - improving   2 Pyelonephritis - on IV Meropenum and please call transplant ID.  Sensitivity is still not back on the organism and need to switch to PO for a total of 14 days   3 CV - can cont home bp meds including ace     Sayed [facility]  [phone]                Zana Shepard is a 73 year old man with a history of HTN, prior ESRD s/p nephrectomy and transplant, prostate cancer s/p radiation who presents with  UTI (>[id number] GNR)    1 Renal - ESRD s/p DDKT- baseline s.cr ~ 1.0 mg/dl; Hydro was noted   on prograf 0.5 mg bid and hold Cellcept and once abx changed to PO will restart  Hyponatremia - improving   2 Pyelonephritis - on IV Meropenum and please call transplant ID.  Sensitivity is still not back on the organism and need to switch to PO for a total of 14 days   3 CV - can cont home bp meds including ace     Sayed [facility]  [phone]

## 2023-12-27 NOTE — PROGRESS NOTE ADULT - PROBLEM SELECTOR PLAN 3
SCr 1.3 on admission, baseline 0.7 as per patient  - likely in setting of acute infection  - hold lisinopril for now  - hold cellcept  - check tacro, cellcept level  - appreciate nephrology recs: care discussed with Dr. Ramirez - recommends NS 60cc/hr as maintenance
SCr 1.3 on admission, baseline 0.7 as per patient  - likely in setting of acute infection  - hold lisinopril for now  - hold cellcept  - check tacro, cellcept level  - appreciate nephrology recs: care discussed with Dr. Ramirez - recommends NS 60cc/hr as maintenance
RESOLVED. SCr 1.3 on admission, baseline 0.7 as per patient  - likely in setting of acute infection  - hold lisinopril for now  - hold cellcept  - On prograf 0.5 mg bid and hold Cellcept and once abx changed to PO will restart

## 2023-12-27 NOTE — PROGRESS NOTE ADULT - ASSESSMENT
73M with hx of HTN, s/p renal transplant in 2017 at [place], presents with dysuria and L flank pain x 4 days found to have sepsis 2/2 pyelonephritis 73M with hx of HTN, s/p renal transplant in 2017 at [place], presents with dysuria and L flank pain x 4 days found to have sepsis 2/2 pyelonephritis

## 2023-12-27 NOTE — PROGRESS NOTE ADULT - PROBLEM SELECTOR PLAN 1
dysuria with flank pain, positive UA and CTAP concerning for pyelonephritis of transplant kidney  - hx of ESBL E.coli in 2020  - continue IV meropenem and fu, UCx, Bclx   - holding cellcept  - Spoke to renal transplant team, will f/u with prograft level in am. Pending Uclx sensitivity
dysuria with flank pain, positive UA and CTAP concerning for pyelonephritis of transplant kidney  - hx of ESBL E.coli in 2020  - s/p IV meropenem, BClx needs to be re-send, on CFTX 1g QD now  - holding cellcept  - Renal transplant following   - Transplant ID following
dysuria with flank pain, positive UA and CTAP concerning for pyelonephritis of transplant kidney  - hx of ESBL E.coli in 2020  - continue IV meropenem and fu UCx  - obtain renal US  - fu BCcx, UCx  - holding cellcept

## 2023-12-28 ENCOUNTER — TRANSCRIPTION ENCOUNTER (OUTPATIENT)
Age: 73
End: 2023-12-28

## 2023-12-28 VITALS
SYSTOLIC BLOOD PRESSURE: 134 MMHG | HEART RATE: 80 BPM | OXYGEN SATURATION: 95 % | TEMPERATURE: 99 F | DIASTOLIC BLOOD PRESSURE: 71 MMHG | RESPIRATION RATE: 18 BRPM

## 2023-12-28 LAB
TACROLIMUS SERPL-MCNC: 14.1 NG/ML — SIGNIFICANT CHANGE UP
TACROLIMUS SERPL-MCNC: 14.1 NG/ML — SIGNIFICANT CHANGE UP

## 2023-12-28 PROCEDURE — 87077 CULTURE AEROBIC IDENTIFY: CPT

## 2023-12-28 PROCEDURE — 87040 BLOOD CULTURE FOR BACTERIA: CPT

## 2023-12-28 PROCEDURE — 82803 BLOOD GASES ANY COMBINATION: CPT

## 2023-12-28 PROCEDURE — 99285 EMERGENCY DEPT VISIT HI MDM: CPT

## 2023-12-28 PROCEDURE — 84132 ASSAY OF SERUM POTASSIUM: CPT

## 2023-12-28 PROCEDURE — 99232 SBSQ HOSP IP/OBS MODERATE 35: CPT

## 2023-12-28 PROCEDURE — 97161 PT EVAL LOW COMPLEX 20 MIN: CPT

## 2023-12-28 PROCEDURE — 96375 TX/PRO/DX INJ NEW DRUG ADDON: CPT

## 2023-12-28 PROCEDURE — 83605 ASSAY OF LACTIC ACID: CPT

## 2023-12-28 PROCEDURE — 84295 ASSAY OF SERUM SODIUM: CPT

## 2023-12-28 PROCEDURE — 99239 HOSP IP/OBS DSCHRG MGMT >30: CPT

## 2023-12-28 PROCEDURE — 82330 ASSAY OF CALCIUM: CPT

## 2023-12-28 PROCEDURE — 85025 COMPLETE CBC W/AUTO DIFF WBC: CPT

## 2023-12-28 PROCEDURE — 80180 DRUG SCRN QUAN MYCOPHENOLATE: CPT

## 2023-12-28 PROCEDURE — 87086 URINE CULTURE/COLONY COUNT: CPT

## 2023-12-28 PROCEDURE — 96365 THER/PROPH/DIAG IV INF INIT: CPT

## 2023-12-28 PROCEDURE — 80053 COMPREHEN METABOLIC PANEL: CPT

## 2023-12-28 PROCEDURE — 85027 COMPLETE CBC AUTOMATED: CPT

## 2023-12-28 PROCEDURE — 87186 SC STD MICRODIL/AGAR DIL: CPT

## 2023-12-28 PROCEDURE — 80048 BASIC METABOLIC PNL TOTAL CA: CPT

## 2023-12-28 PROCEDURE — 82947 ASSAY GLUCOSE BLOOD QUANT: CPT

## 2023-12-28 PROCEDURE — 83690 ASSAY OF LIPASE: CPT

## 2023-12-28 PROCEDURE — 80197 ASSAY OF TACROLIMUS: CPT

## 2023-12-28 PROCEDURE — 74177 CT ABD & PELVIS W/CONTRAST: CPT | Mod: MA

## 2023-12-28 PROCEDURE — 93005 ELECTROCARDIOGRAM TRACING: CPT

## 2023-12-28 PROCEDURE — 36415 COLL VENOUS BLD VENIPUNCTURE: CPT

## 2023-12-28 PROCEDURE — 76770 US EXAM ABDO BACK WALL COMP: CPT

## 2023-12-28 PROCEDURE — 82435 ASSAY OF BLOOD CHLORIDE: CPT

## 2023-12-28 PROCEDURE — 85014 HEMATOCRIT: CPT

## 2023-12-28 PROCEDURE — 85018 HEMOGLOBIN: CPT

## 2023-12-28 PROCEDURE — 96367 TX/PROPH/DG ADDL SEQ IV INF: CPT

## 2023-12-28 PROCEDURE — 81001 URINALYSIS AUTO W/SCOPE: CPT

## 2023-12-28 RX ORDER — CEFTRIAXONE 500 MG/1
INJECTION, POWDER, FOR SOLUTION INTRAMUSCULAR; INTRAVENOUS
Refills: 0 | Status: DISCONTINUED | OUTPATIENT
Start: 2023-12-28 | End: 2023-12-28

## 2023-12-28 RX ORDER — LISINOPRIL 2.5 MG/1
10 TABLET ORAL DAILY
Refills: 0 | Status: DISCONTINUED | OUTPATIENT
Start: 2023-12-28 | End: 2023-12-28

## 2023-12-28 RX ORDER — LEVOFLOXACIN 5 MG/ML
1 INJECTION, SOLUTION INTRAVENOUS
Qty: 9 | Refills: 0
Start: 2023-12-28 | End: 2024-01-05

## 2023-12-28 RX ORDER — CEFTRIAXONE 500 MG/1
2000 INJECTION, POWDER, FOR SOLUTION INTRAMUSCULAR; INTRAVENOUS ONCE
Refills: 0 | Status: COMPLETED | OUTPATIENT
Start: 2023-12-28 | End: 2023-12-28

## 2023-12-28 RX ORDER — CEFTRIAXONE 500 MG/1
2000 INJECTION, POWDER, FOR SOLUTION INTRAMUSCULAR; INTRAVENOUS EVERY 24 HOURS
Refills: 0 | Status: DISCONTINUED | OUTPATIENT
Start: 2023-12-29 | End: 2023-12-28

## 2023-12-28 RX ADMIN — CARVEDILOL PHOSPHATE 12.5 MILLIGRAM(S): 80 CAPSULE, EXTENDED RELEASE ORAL at 05:02

## 2023-12-28 RX ADMIN — LISINOPRIL 10 MILLIGRAM(S): 2.5 TABLET ORAL at 10:23

## 2023-12-28 RX ADMIN — CEFTRIAXONE 2000 MILLIGRAM(S): 500 INJECTION, POWDER, FOR SOLUTION INTRAMUSCULAR; INTRAVENOUS at 12:14

## 2023-12-28 RX ADMIN — TACROLIMUS 0.5 MILLIGRAM(S): 5 CAPSULE ORAL at 05:02

## 2023-12-28 NOTE — PROGRESS NOTE ADULT - SUBJECTIVE AND OBJECTIVE BOX
Follow Up:      Interval History/ROS: No complaints. Afebrile overnight.       REVIEW OF SYSTEMS  [  ] ROS unobtainable because:    [ x ] All other systems negative except as noted below    Constitutional:  [ ] fever [ ] chills  [ ] weight loss  [ ]night sweat  [ ]poor appetite/PO intake [ ]fatigue   Skin:  [ ] rash [ ] phlebitis	  Eyes: [ ] icterus [ ] pain  [ ] discharge	  ENMT: [ ] sore throat  [ ] thrush [ ] ulcers [ ] exudates [ ]anosmia  Respiratory: [ ] dyspnea [ ] hemoptysis [ ] cough [ ] sputum	  Cardiovascular:  [ ] chest pain [ ] palpitations [ ] edema	  Gastrointestinal:  [ ] nausea [ ] vomiting [ ] diarrhea [ ] constipation [ ] pain	  Genitourinary:  [ ] dysuria [ ] frequency [ ] hematuria [ ] discharge [ ] flank pain  [ ] incontinence  Musculoskeletal:  [ ] myalgias [ ] arthralgias [ ] arthritis  [ ] back pain  Neurological:  [ ] headache [ ] weakness [ ] seizures  [ ] confusion/altered mental status    Allergies  No Known Allergies        ANTIMICROBIALS:    cefTRIAXone   IVPB          OTHER MEDS: MEDICATIONS  (STANDING):  acetaminophen     Tablet .. 650 every 6 hours PRN  aluminum hydroxide/magnesium hydroxide/simethicone Suspension 30 every 4 hours PRN  carvedilol 12.5 two times a day  lisinopril 10 daily  melatonin 3 at bedtime PRN  ondansetron Injectable 4 every 8 hours PRN  tacrolimus 0.5 two times a day  tamsulosin 0.4 at bedtime      Vital Signs Last 24 Hrs  T(F): 98.6 (12-28-23 @ 04:06), Max: 100.5 (12-23-23 @ 18:27)    Vital Signs Last 24 Hrs  HR: 67 (12-28-23 @ 04:06) (67 - 70)  BP: 179/70 (12-28-23 @ 04:06) (145/70 - 179/70)  RR: 18 (12-28-23 @ 04:06)  SpO2: 98% (12-28-23 @ 04:06) (95% - 98%)  Wt(kg): --    EXAM:    Physical Exam:  Constitutional:  well preserved, comfortable  Head/Eyes: no icterus  ENT:  supple  LUNGS:  CTA  CVS:  regular rhythm, no murmur  Abd:  soft, non-tender; non-distended  Ext:  no edema  Vascular:  IV site no erythema tenderness or discharge  MSK:  joints without swelling  Neuro: AAO X 3, non- focal    Labs:                        11.3   6.35  )-----------( 193      ( 27 Dec 2023 07:23 )             35.1     12-27    140  |  107  |  34<H>  ----------------------------<  106<H>  4.7   |  24  |  0.79    Ca    8.7      27 Dec 2023 07:22        WBC Trend:  WBC Count: 6.35 (12-27-23 @ 07:23)  WBC Count: 7.09 (12-26-23 @ 07:07)  WBC Count: 9.45 (12-25-23 @ 07:13)  WBC Count: 13.59 (12-23-23 @ 17:55)      Creatine Trend:  Creatinine: 0.79 (12-27)  Creatinine: 0.88 (12-26)  Creatinine: 1.25 (12-25)  Creatinine: 1.39 (12-23)      Liver Biochemical Testing Trend:  Alanine Aminotransferase (ALT/SGPT): 16 (12-25)  Alanine Aminotransferase (ALT/SGPT): 15 (12-23)  Aspartate Aminotransferase (AST/SGOT): 15 (12-25-23 @ 07:11)  Aspartate Aminotransferase (AST/SGOT): 20 (12-23-23 @ 17:55)  Bilirubin Total: 0.4 (12-25)  Bilirubin Total: 0.5 (12-23)      Trend LDH      Urinalysis Basic - ( 27 Dec 2023 07:22 )    Color: x / Appearance: x / SG: x / pH: x  Gluc: 106 mg/dL / Ketone: x  / Bili: x / Urobili: x   Blood: x / Protein: x / Nitrite: x   Leuk Esterase: x / RBC: x / WBC x   Sq Epi: x / Non Sq Epi: x / Bacteria: x        MICROBIOLOGY:        Culture - Urine (collected 23 Dec 2023 19:52)  Source: Clean Catch Clean Catch (Midstream)  Final Report:    >100,000 CFU/ml Klebsiella pneumoniae    <10,000 CFU/ml Normal Urogenital guicho present  Organism: Klebsiella pneumoniae  Organism: Klebsiella pneumoniae    Sensitivities:      Method Type: ERIC      -  Amoxicillin/Clavulanic Acid: S <=8/4      -  Ampicillin: R 16 These ampicillin results predict results for amoxicillin      -  Ampicillin/Sulbactam: S <=4/2      -  Aztreonam: S <=4      -  Cefazolin: S <=2 For uncomplicated UTI with K. pneumoniae, E. coli, or P. mirablis: ERIC <=16 is sensitive and ERIC >=32 is resistant. This also predicts results for oral agents cefaclor, cefdinir, cefpodoxime, cefprozil, cefuroxime axetil, cephalexin and locarbef for uncomplicated UTI. Note that some isolates may be susceptible to these agents while testing resistant to cefazolin.      -  Cefepime: S <=2      -  Cefoxitin: S <=8      -  Ceftriaxone: S <=1      -  Cefuroxime: S <=4      -  Ciprofloxacin: S <=0.25      -  Ertapenem: S <=0.5      -  Gentamicin: S <=2      -  Imipenem: S <=1      -  Levofloxacin: S <=0.5      -  Meropenem: S <=1      -  Nitrofurantoin: S <=32 Should not be used to treat pyelonephritis      -  Piperacillin/Tazobactam: S <=8      -  Tobramycin: S <=2      -  Trimethoprim/Sulfamethoxazole: S <=0.5/9.5

## 2023-12-28 NOTE — DISCHARGE NOTE NURSING/CASE MANAGEMENT/SOCIAL WORK - PATIENT PORTAL LINK FT
You can access the FollowMyHealth Patient Portal offered by [facility] by registering at the following website: [email or web address]. By joining [employer]’s FollowMyHealth portal, you will also be able to view your health information using other applications (apps) compatible with our system. You can access the FollowMyHealth Patient Portal offered by [facility] by registering at the following website: [email or web address]. By joining [employer]’s FollowMyHealth portal, you will also be able to view your health information using other applications (apps) compatible with our system.

## 2023-12-28 NOTE — PROGRESS NOTE ADULT - PROVIDER SPECIALTY LIST ADULT
Nephrology
Transplant ID
Internal Medicine
Nephrology
Hospitalist
Internal Medicine

## 2023-12-28 NOTE — PROGRESS NOTE ADULT - REASON FOR ADMISSION
dysuria, flank pain

## 2023-12-28 NOTE — DISCHARGE NOTE NURSING/CASE MANAGEMENT/SOCIAL WORK - NSDCPEFALRISK_GEN_ALL_CORE
For information on Fall & Injury Prevention, visit: [email or web address] OR  [email or web address] OR  https://www.cdc.gov/steadi/patient.html For information on Fall & Injury Prevention, visit: [email or web address] OR  [email or web address] OR  https://www.cdc.gov/steadi/patient.html

## 2023-12-28 NOTE — PROGRESS NOTE ADULT - ASSESSMENT
Zana Shepard is a 73 year old man with a history of HTN, prior ESRD s/p nephrectomy and transplant, prostate cancer s/p radiation who presents with  UTI (>[id number] GNR)    1 Renal - ESRD s/p DDKT- baseline s.cr ~ 1.0 mg/dl; Hydro was noted   on prograf 0.5 mg bid and hold Cellcept and once abx changed to PO will restart  2 Pyelonephritis - on IV Meropenum ; Switch to PO bactrim for a total of 14 days now???  3 CV - Restart Lisinopril 10mg po qd as the BP is elevated     Sayed [facility]  [phone]                Zana Shepard is a 73 year old man with a history of HTN, prior ESRD s/p nephrectomy and transplant, prostate cancer s/p radiation who presents with  UTI (>[id number] GNR)    1 Renal - ESRD s/p DDKT- baseline s.cr ~ 1.0 mg/dl; Hydro was noted   on prograf 0.5 mg bid and hold Cellcept and once abx changed to PO will restart  2 Pyelonephritis - on IV Meropenum ; Switch to PO bactrim for a total of 14 days now???  3 CV - Restart Lisinopril 10mg po qd as the BP is elevated     Sayed [facility]  [phone]

## 2023-12-28 NOTE — PROGRESS NOTE ADULT - SUBJECTIVE AND OBJECTIVE BOX
NEPHROLOGY-[facility] [phone]        Patient seen and examined in bed.  He felt great         MEDICATIONS  (STANDING):  carvedilol 12.5 milliGRAM(s) Oral two times a day  cefTRIAXone   IVPB 1000 milliGRAM(s) IV Intermittent every 24 hours  tacrolimus 0.5 milliGRAM(s) Oral two times a day  tamsulosin 0.4 milliGRAM(s) Oral at bedtime      VITAL:  T(C): , Max: 37 (12-28-23 @ 04:06)  T(F): , Max: 98.6 (12-28-23 @ 04:06)  HR: 67 (12-28-23 @ 04:06)  BP: 179/70 (12-28-23 @ 04:06)  BP(mean): --  RR: 18 (12-28-23 @ 04:06)  SpO2: 98% (12-28-23 @ 04:06)  Wt(kg): --    I and O's:    12-27 @ 07:01  -  12-28 @ 07:00  --------------------------------------------------------  IN: 730 mL / OUT: 1050 mL / NET: -320 mL          PHYSICAL EXAM:    Constitutional: NAD  Neck:  No JVD  Respiratory: CTAB/L  Cardiovascular: S1 and S2  Gastrointestinal: BS+, soft, NT/ND  Extremities: No peripheral edema  Neurological: A/O x 3, no focal deficits  Psychiatric: Normal mood, normal affect  : No Block  Skin: No rashes  Access: Not applicable    LABS:                        11.3   6.35  )-----------( 193      ( 27 Dec 2023 07:23 )             35.1     12-27    140  |  107  |  34<H>  ----------------------------<  106<H>  4.7   |  24  |  0.79    Ca    8.7      27 Dec 2023 07:22            Urine Studies:  Urinalysis Basic - ( 27 Dec 2023 07:22 )    Color: x / Appearance: x / SG: x / pH: x  Gluc: 106 mg/dL / Ketone: x  / Bili: x / Urobili: x   Blood: x / Protein: x / Nitrite: x   Leuk Esterase: x / RBC: x / WBC x   Sq Epi: x / Non Sq Epi: x / Bacteria: x            RADIOLOGY & ADDITIONAL STUDIES:             NEPHROLOGY[facility] [phone]        Patient seen and examined in bed.  He felt great         MEDICATIONS  (STANDING):  carvedilol 12.5 milliGRAM(s) Oral two times a day  cefTRIAXone   IVPB 1000 milliGRAM(s) IV Intermittent every 24 hours  tacrolimus 0.5 milliGRAM(s) Oral two times a day  tamsulosin 0.4 milliGRAM(s) Oral at bedtime      VITAL:  T(C): , Max: 37 (12-28-23 @ 04:06)  T(F): , Max: 98.6 (12-28-23 @ 04:06)  HR: 67 (12-28-23 @ 04:06)  BP: 179/70 (12-28-23 @ 04:06)  BP(mean): --  RR: 18 (12-28-23 @ 04:06)  SpO2: 98% (12-28-23 @ 04:06)  Wt(kg): --    I and O's:    12-27 @ 07:01  -  12-28 @ 07:00  --------------------------------------------------------  IN: 730 mL / OUT: 1050 mL / NET: -320 mL          PHYSICAL EXAM:    Constitutional: NAD  Neck:  No JVD  Respiratory: CTAB/L  Cardiovascular: S1 and S2  Gastrointestinal: BS+, soft, NT/ND  Extremities: No peripheral edema  Neurological: A/O x 3, no focal deficits  Psychiatric: Normal mood, normal affect  : No Block  Skin: No rashes  Access: Not applicable    LABS:                        11.3   6.35  )-----------( 193      ( 27 Dec 2023 07:23 )             35.1     12-27    140  |  107  |  34<H>  ----------------------------<  106<H>  4.7   |  24  |  0.79    Ca    8.7      27 Dec 2023 07:22            Urine Studies:  Urinalysis Basic - ( 27 Dec 2023 07:22 )    Color: x / Appearance: x / SG: x / pH: x  Gluc: 106 mg/dL / Ketone: x  / Bili: x / Urobili: x   Blood: x / Protein: x / Nitrite: x   Leuk Esterase: x / RBC: x / WBC x   Sq Epi: x / Non Sq Epi: x / Bacteria: x            RADIOLOGY & ADDITIONAL STUDIES:

## 2023-12-28 NOTE — PROGRESS NOTE ADULT - ASSESSMENT
73M with HTN, ESRD s/p renal transplant in 2017 at [place], presents with dysuria and L flank pain x 4 days, found to be febrile 100.5F, WBC 13, UA grossly positive, UCx with Pansensitive Kleb, CTAP with evidence of acute pyelo with mod-severe transplant hydronephrosis, Transplant ID consulted for assistance.    Blood cultures were drawn 12/23 but appears to have never been received by inhouse lab  Called microlab and stated that no blood cultures were received     ANTIBIOTIC:  Cefepime (12/23)  Meropenem (12/24 --->)    IMPRESSION:  #Acute Transplant Pyelo  #Fever, Leukocytosis (resolved)  #Moderate Transplant Hydronephrosis    SUGGESTIONS:  - switch to CTX 2G daily  - monitor temperature curve  - trend WBC  - follow up Blood culture x2  - obtain ECG to assess QTC  - if qTC within normal limits, can likely discharge on Levaquin 750mg daily to complete 14days (including IV)    Above recommendations are Preliminary until Attending's Addendum which includes Final Recommendations        Flaco Lopez DO, PGY-5   ID fellow  [employer] Preferred  After 5pm/weekends call [phone] 73M with HTN, ESRD s/p renal transplant in 2017 at [place], presents with dysuria and L flank pain x 4 days, found to be febrile 100.5F, WBC 13, UA grossly positive, UCx with Pansensitive Kleb, CTAP with evidence of acute pyelo with mod-severe transplant hydronephrosis, Transplant ID consulted for assistance.    Blood cultures were drawn 12/23 but appears to have never been received by inhouse lab  Called microlab and stated that no blood cultures were received     ANTIBIOTIC:  Cefepime (12/23)  Meropenem (12/24 --->)    IMPRESSION:  #Acute Transplant Pyelo  #Fever, Leukocytosis (resolved)  #Moderate Transplant Hydronephrosis    SUGGESTIONS:  - switch to CTX 2G daily  - monitor temperature curve  - trend WBC  - follow up Blood culture x2  - obtain ECG to assess QTC  - if qTC within normal limits, can likely discharge on Levaquin 750mg daily to complete 14days (including IV)    Above recommendations are Preliminary until Attending's Addendum which includes Final Recommendations        Flaco Lopez DO, PGY-5   ID fellow  [employer] Preferred  After 5pm/weekends call [phone] 73M with HTN, ESRD s/p renal transplant in 2017 at [place], presents with dysuria and L flank pain x 4 days, found to be febrile 100.5F, WBC 13, UA grossly positive, UCx with Pansensitive Kleb, CTAP with evidence of acute pyelo with mod-severe transplant hydronephrosis, Transplant ID consulted for assistance.    Blood cultures were drawn 12/23 but appears to have never been received by inhouse lab  Called microlab and stated that no blood cultures were received     ANTIBIOTIC:  Cefepime (12/23)  Meropenem (12/24 --->)    IMPRESSION:  #Acute Transplant Pyelo  #Fever, Leukocytosis (resolved)  #Moderate Transplant Hydronephrosis    SUGGESTIONS:  - switch to CTX 2G daily  - monitor temperature curve  - trend WBC  - follow up Blood culture x2  - obtain ECG to assess QTC  - if qTC within normal limits, can likely discharge on PO fluoroquinolone to complete 14days (including IV)    Above recommendations are Preliminary until Attending's Addendum which includes Final Recommendations        Flaco Lopez DO, PGY-5   ID fellow  [employer] Preferred  After 5pm/weekends call [phone] 73M with HTN, ESRD s/p renal transplant in 2017 at [place], presents with dysuria and L flank pain x 4 days, found to be febrile 100.5F, WBC 13, UA grossly positive, UCx with Pansensitive Kleb, CTAP with evidence of acute pyelo with mod-severe transplant hydronephrosis, Transplant ID consulted for assistance.    Blood cultures were drawn 12/23 but appears to have never been received by inhouse lab  Called microlab and stated that no blood cultures were received     ANTIBIOTIC:  Cefepime (12/23)  Meropenem (12/24 --->)    IMPRESSION:  #Acute Transplant Pyelo  #Fever, Leukocytosis (resolved)  #Moderate Transplant Hydronephrosis    SUGGESTIONS:  - switch to CTX 2G daily  - monitor temperature curve  - trend WBC  - follow up Blood culture x2  - obtain ECG to assess QTC  - if qTC within normal limits, can likely discharge on PO fluoroquinolone to complete 14days (including IV)    Above recommendations are Preliminary until Attending's Addendum which includes Final Recommendations        Flaco Lopez DO, PGY-5   ID fellow  [employer] Preferred  After 5pm/weekends call [phone] 73M with HTN, ESRD s/p renal transplant in 2017 at [place], presents with dysuria and L flank pain x 4 days, found to be febrile 100.5F, WBC 13, UA grossly positive, UCx with Pansensitive Kleb, CTAP with evidence of acute pyelo with mod-severe transplant hydronephrosis, Transplant ID consulted for assistance.    Blood cultures were drawn 12/23 but appears to have never been received by inhouse lab  Called microlab and stated that no blood cultures were received     ANTIBIOTIC:  Cefepime (12/23)  Meropenem (12/24 --->)    IMPRESSION:  #Acute Transplant Pyelo  #Fever, Leukocytosis (resolved)  #Moderate Transplant Hydronephrosis    SUGGESTIONS:  - switch to CTX 2G daily  - monitor temperature curve  - trend WBC  - follow up Blood culture x2  - can be discharge on PO Levaquin 500mg daily to complete 14-days (including IV) (enddate 1/6/24)  - please call [phone] to schedule outpatient follow up with Dr. Lpoez/Dr. Jason on Wednesday (1/10/2024 or 1/17/2024)      Case d/w attending and primary team      Flaco Lopez DO, PGY-5   ID fellow  [employer] Preferred  After 5pm/weekends call [phone] 73M with HTN, ESRD s/p renal transplant in 2017 at [place], presents with dysuria and L flank pain x 4 days, found to be febrile 100.5F, WBC 13, UA grossly positive, UCx with Pansensitive Kleb, CTAP with evidence of acute pyelo with mod-severe transplant hydronephrosis, Transplant ID consulted for assistance.    Blood cultures were drawn 12/23 but appears to have never been received by inhouse lab  Called microlab and stated that no blood cultures were received     ANTIBIOTIC:  Cefepime (12/23)  Meropenem (12/24 --->)    IMPRESSION:  #Acute Transplant Pyelo  #Fever, Leukocytosis (resolved)  #Moderate Transplant Hydronephrosis    SUGGESTIONS:  - switch to CTX 2G daily  - monitor temperature curve  - trend WBC  - follow up Blood culture x2  - can be discharge on PO Levaquin 500mg daily to complete 14-days (including IV) (enddate 1/6/24)  - please call [phone] to schedule outpatient follow up with Dr. Lopez/Dr. Jason on Wednesday (1/10/2024 or 1/17/2024)      Case d/w attending and primary team      Flaco Lopez DO, PGY-5   ID fellow  [employer] Preferred  After 5pm/weekends call [phone]

## 2024-01-01 LAB
CULTURE RESULTS: SIGNIFICANT CHANGE UP
SPECIMEN SOURCE: SIGNIFICANT CHANGE UP

## 2024-01-04 ENCOUNTER — NON-APPOINTMENT (OUTPATIENT)
Age: 74
End: 2024-01-04

## 2024-01-05 ENCOUNTER — NON-APPOINTMENT (OUTPATIENT)
Age: 74
End: 2024-01-05

## 2024-01-29 ENCOUNTER — INPATIENT (INPATIENT)
Facility: HOSPITAL | Age: 74
LOS: 2 days | Discharge: HOME | DRG: 690 | End: 2024-02-01
Attending: INTERNAL MEDICINE | Admitting: STUDENT IN AN ORGANIZED HEALTH CARE EDUCATION/TRAINING PROGRAM
Payer: MEDICARE

## 2024-01-29 VITALS
HEART RATE: 79 BPM | HEIGHT: 63 IN | TEMPERATURE: 99 F | DIASTOLIC BLOOD PRESSURE: 69 MMHG | OXYGEN SATURATION: 97 % | SYSTOLIC BLOOD PRESSURE: 131 MMHG | RESPIRATION RATE: 17 BRPM | WEIGHT: 143.96 LBS

## 2024-01-29 DIAGNOSIS — Z95.0 PRESENCE OF CARDIAC PACEMAKER: Chronic | ICD-10-CM

## 2024-01-29 DIAGNOSIS — N40.0 BENIGN PROSTATIC HYPERPLASIA WITHOUT LOWER URINARY TRACT SYMPTOMS: ICD-10-CM

## 2024-01-29 DIAGNOSIS — Z29.9 ENCOUNTER FOR PROPHYLACTIC MEASURES, UNSPECIFIED: ICD-10-CM

## 2024-01-29 DIAGNOSIS — Z94.0 KIDNEY TRANSPLANT STATUS: ICD-10-CM

## 2024-01-29 DIAGNOSIS — N30.00 ACUTE CYSTITIS WITHOUT HEMATURIA: ICD-10-CM

## 2024-01-29 DIAGNOSIS — Z94.0 KIDNEY TRANSPLANT STATUS: Chronic | ICD-10-CM

## 2024-01-29 DIAGNOSIS — I10 ESSENTIAL (PRIMARY) HYPERTENSION: ICD-10-CM

## 2024-01-29 DIAGNOSIS — N39.0 URINARY TRACT INFECTION, SITE NOT SPECIFIED: ICD-10-CM

## 2024-01-29 LAB
ALBUMIN SERPL ELPH-MCNC: 3.7 G/DL — SIGNIFICANT CHANGE UP (ref 3.3–5)
ALP SERPL-CCNC: 58 U/L — SIGNIFICANT CHANGE UP (ref 40–120)
ALT FLD-CCNC: 11 U/L — SIGNIFICANT CHANGE UP (ref 10–45)
ANION GAP SERPL CALC-SCNC: 11 MMOL/L — SIGNIFICANT CHANGE UP (ref 5–17)
APPEARANCE UR: ABNORMAL
APPEARANCE UR: ABNORMAL
AST SERPL-CCNC: 13 U/L — SIGNIFICANT CHANGE UP (ref 10–40)
BACTERIA # UR AUTO: ABNORMAL /HPF
BASE EXCESS BLDV CALC-SCNC: 2.6 MMOL/L — SIGNIFICANT CHANGE UP (ref -2–3)
BASOPHILS # BLD AUTO: 0 K/UL — SIGNIFICANT CHANGE UP (ref 0–0.2)
BASOPHILS NFR BLD AUTO: 0 % — SIGNIFICANT CHANGE UP (ref 0–2)
BILIRUB SERPL-MCNC: 0.7 MG/DL — SIGNIFICANT CHANGE UP (ref 0.2–1.2)
BILIRUB UR-MCNC: SIGNIFICANT CHANGE UP
BILIRUB UR-MCNC: SIGNIFICANT CHANGE UP
BUN SERPL-MCNC: 27 MG/DL — HIGH (ref 7–23)
CA-I SERPL-SCNC: 1.2 MMOL/L — SIGNIFICANT CHANGE UP (ref 1.15–1.33)
CALCIUM SERPL-MCNC: 8.9 MG/DL — SIGNIFICANT CHANGE UP (ref 8.4–10.5)
CAST: 6 /LPF — HIGH (ref 0–4)
CHLORIDE BLDV-SCNC: 97 MMOL/L — SIGNIFICANT CHANGE UP (ref 96–108)
CHLORIDE SERPL-SCNC: 98 MMOL/L — SIGNIFICANT CHANGE UP (ref 96–108)
CO2 BLDV-SCNC: 29 MMOL/L — HIGH (ref 22–26)
CO2 SERPL-SCNC: 25 MMOL/L — SIGNIFICANT CHANGE UP (ref 22–31)
COLOR SPEC: YELLOW — SIGNIFICANT CHANGE UP
COLOR SPEC: YELLOW — SIGNIFICANT CHANGE UP
CREAT SERPL-MCNC: 0.83 MG/DL — SIGNIFICANT CHANGE UP (ref 0.5–1.3)
DIFF PNL FLD: SIGNIFICANT CHANGE UP
DIFF PNL FLD: SIGNIFICANT CHANGE UP
EGFR: 91 ML/MIN/1.73M2 — SIGNIFICANT CHANGE UP
EOSINOPHIL # BLD AUTO: 0 K/UL — SIGNIFICANT CHANGE UP (ref 0–0.5)
EOSINOPHIL NFR BLD AUTO: 0 % — SIGNIFICANT CHANGE UP (ref 0–6)
GAS PNL BLDV: 130 MMOL/L — LOW (ref 136–145)
GAS PNL BLDV: SIGNIFICANT CHANGE UP
GAS PNL BLDV: SIGNIFICANT CHANGE UP
GLUCOSE BLDV-MCNC: 107 MG/DL — HIGH (ref 70–99)
GLUCOSE SERPL-MCNC: 110 MG/DL — HIGH (ref 70–99)
GLUCOSE UR QL: SIGNIFICANT CHANGE UP MG/DL
GLUCOSE UR QL: SIGNIFICANT CHANGE UP MG/DL
HCO3 BLDV-SCNC: 28 MMOL/L — SIGNIFICANT CHANGE UP (ref 22–29)
HCT VFR BLD CALC: 33.8 % — LOW (ref 39–50)
HCT VFR BLDA CALC: 35 % — LOW (ref 39–51)
HGB BLD CALC-MCNC: 11.7 G/DL — LOW (ref 12.6–17.4)
HGB BLD-MCNC: 11.1 G/DL — LOW (ref 13–17)
KETONES UR-MCNC: SIGNIFICANT CHANGE UP MG/DL
KETONES UR-MCNC: SIGNIFICANT CHANGE UP MG/DL
LACTATE BLDV-MCNC: 1.8 MMOL/L — SIGNIFICANT CHANGE UP (ref 0.5–2)
LEUKOCYTE ESTERASE UR-ACNC: SIGNIFICANT CHANGE UP
LEUKOCYTE ESTERASE UR-ACNC: SIGNIFICANT CHANGE UP
LYMPHOCYTES # BLD AUTO: 1.17 K/UL — SIGNIFICANT CHANGE UP (ref 1–3.3)
LYMPHOCYTES # BLD AUTO: 8.3 % — LOW (ref 13–44)
MANUAL SMEAR VERIFICATION: SIGNIFICANT CHANGE UP
MCHC RBC-ENTMCNC: 32.5 PG — SIGNIFICANT CHANGE UP (ref 27–34)
MCHC RBC-ENTMCNC: 32.8 GM/DL — SIGNIFICANT CHANGE UP (ref 32–36)
MCV RBC AUTO: 98.8 FL — SIGNIFICANT CHANGE UP (ref 80–100)
MONOCYTES # BLD AUTO: 1.29 K/UL — HIGH (ref 0–0.9)
MONOCYTES NFR BLD AUTO: 9.2 % — SIGNIFICANT CHANGE UP (ref 2–14)
NEUTROPHILS # BLD AUTO: 11.61 K/UL — HIGH (ref 1.8–7.4)
NEUTROPHILS NFR BLD AUTO: 77.5 % — HIGH (ref 43–77)
NEUTS BAND # BLD: 5 % — SIGNIFICANT CHANGE UP (ref 0–8)
NITRITE UR-MCNC: SIGNIFICANT CHANGE UP
NITRITE UR-MCNC: SIGNIFICANT CHANGE UP
PCO2 BLDV: 45 MMHG — SIGNIFICANT CHANGE UP (ref 42–55)
PH BLDV: 7.4 — SIGNIFICANT CHANGE UP (ref 7.32–7.43)
PH UR: SIGNIFICANT CHANGE UP (ref 5–8)
PH UR: SIGNIFICANT CHANGE UP (ref 5–8)
PLAT MORPH BLD: NORMAL — SIGNIFICANT CHANGE UP
PLATELET # BLD AUTO: 171 K/UL — SIGNIFICANT CHANGE UP (ref 150–400)
PO2 BLDV: 33 MMHG — SIGNIFICANT CHANGE UP (ref 25–45)
POTASSIUM BLDV-SCNC: 4.2 MMOL/L — SIGNIFICANT CHANGE UP (ref 3.5–5.1)
POTASSIUM SERPL-MCNC: 4.4 MMOL/L — SIGNIFICANT CHANGE UP (ref 3.5–5.3)
POTASSIUM SERPL-SCNC: 4.4 MMOL/L — SIGNIFICANT CHANGE UP (ref 3.5–5.3)
PROT SERPL-MCNC: 7.2 G/DL — SIGNIFICANT CHANGE UP (ref 6–8.3)
PROT UR-MCNC: SIGNIFICANT CHANGE UP MG/DL
PROT UR-MCNC: SIGNIFICANT CHANGE UP MG/DL
RBC # BLD: 3.42 M/UL — LOW (ref 4.2–5.8)
RBC # FLD: 12 % — SIGNIFICANT CHANGE UP (ref 10.3–14.5)
RBC BLD AUTO: NORMAL — SIGNIFICANT CHANGE UP
RBC CASTS # UR COMP ASSIST: 3 /HPF — SIGNIFICANT CHANGE UP (ref 0–4)
RBC CASTS # UR COMP ASSIST: SIGNIFICANT CHANGE UP /HPF (ref 0–4)
REVIEW: SIGNIFICANT CHANGE UP
SAO2 % BLDV: 54.7 % — LOW (ref 67–88)
SODIUM SERPL-SCNC: 134 MMOL/L — LOW (ref 135–145)
SP GR SPEC: SIGNIFICANT CHANGE UP (ref 1–1.03)
SP GR SPEC: SIGNIFICANT CHANGE UP (ref 1–1.03)
SQUAMOUS # UR AUTO: 5 /HPF — SIGNIFICANT CHANGE UP (ref 0–5)
UROBILINOGEN FLD QL: SIGNIFICANT CHANGE UP MG/DL (ref 0.2–1)
UROBILINOGEN FLD QL: SIGNIFICANT CHANGE UP MG/DL (ref 0.2–1)
WBC # BLD: 14.07 K/UL — HIGH (ref 3.8–10.5)
WBC # FLD AUTO: 14.07 K/UL — HIGH (ref 3.8–10.5)
WBC UR QL: >998 /HPF — HIGH (ref 0–5)
WBC UR QL: SIGNIFICANT CHANGE UP /HPF (ref 0–5)
YEAST-LIKE CELLS: PRESENT

## 2024-01-29 PROCEDURE — 99223 1ST HOSP IP/OBS HIGH 75: CPT

## 2024-01-29 PROCEDURE — 76776 US EXAM K TRANSPL W/DOPPLER: CPT | Mod: 26,RT

## 2024-01-29 PROCEDURE — 99285 EMERGENCY DEPT VISIT HI MDM: CPT | Mod: GC

## 2024-01-29 RX ORDER — ERTAPENEM SODIUM 1 G/1
1000 INJECTION, POWDER, LYOPHILIZED, FOR SOLUTION INTRAMUSCULAR; INTRAVENOUS EVERY 24 HOURS
Refills: 0 | Status: DISCONTINUED | OUTPATIENT
Start: 2024-01-29 | End: 2024-01-31

## 2024-01-29 RX ORDER — SODIUM CHLORIDE 9 MG/ML
1000 INJECTION INTRAMUSCULAR; INTRAVENOUS; SUBCUTANEOUS ONCE
Refills: 0 | Status: COMPLETED | OUTPATIENT
Start: 2024-01-29 | End: 2024-01-29

## 2024-01-29 RX ORDER — CEFTRIAXONE 500 MG/1
1000 INJECTION, POWDER, FOR SOLUTION INTRAMUSCULAR; INTRAVENOUS ONCE
Refills: 0 | Status: COMPLETED | OUTPATIENT
Start: 2024-01-29 | End: 2024-01-29

## 2024-01-29 RX ORDER — TAMSULOSIN HYDROCHLORIDE 0.4 MG/1
0.4 CAPSULE ORAL AT BEDTIME
Refills: 0 | Status: DISCONTINUED | OUTPATIENT
Start: 2024-01-29 | End: 2024-02-01

## 2024-01-29 RX ORDER — FLUCONAZOLE 150 MG/1
200 TABLET ORAL DAILY
Refills: 0 | Status: DISCONTINUED | OUTPATIENT
Start: 2024-01-29 | End: 2024-01-31

## 2024-01-29 RX ORDER — LISINOPRIL 2.5 MG/1
10 TABLET ORAL DAILY
Refills: 0 | Status: DISCONTINUED | OUTPATIENT
Start: 2024-01-29 | End: 2024-02-01

## 2024-01-29 RX ORDER — TACROLIMUS 5 MG/1
1 CAPSULE ORAL EVERY 12 HOURS
Refills: 0 | Status: DISCONTINUED | OUTPATIENT
Start: 2024-01-29 | End: 2024-01-30

## 2024-01-29 RX ORDER — CARVEDILOL PHOSPHATE 80 MG/1
12.5 CAPSULE, EXTENDED RELEASE ORAL EVERY 12 HOURS
Refills: 0 | Status: DISCONTINUED | OUTPATIENT
Start: 2024-01-29 | End: 2024-02-01

## 2024-01-29 RX ORDER — ACETAMINOPHEN 500 MG
650 TABLET ORAL ONCE
Refills: 0 | Status: COMPLETED | OUTPATIENT
Start: 2024-01-29 | End: 2024-01-29

## 2024-01-29 RX ORDER — FLUCONAZOLE 150 MG/1
100 TABLET ORAL EVERY 24 HOURS
Refills: 0 | Status: DISCONTINUED | OUTPATIENT
Start: 2024-01-29 | End: 2024-01-30

## 2024-01-29 RX ORDER — ENOXAPARIN SODIUM 100 MG/ML
40 INJECTION SUBCUTANEOUS EVERY 24 HOURS
Refills: 0 | Status: DISCONTINUED | OUTPATIENT
Start: 2024-01-29 | End: 2024-02-01

## 2024-01-29 RX ORDER — ACETAMINOPHEN 500 MG
650 TABLET ORAL EVERY 6 HOURS
Refills: 0 | Status: DISCONTINUED | OUTPATIENT
Start: 2024-01-29 | End: 2024-02-01

## 2024-01-29 RX ADMIN — Medication 650 MILLIGRAM(S): at 12:45

## 2024-01-29 RX ADMIN — CEFTRIAXONE 100 MILLIGRAM(S): 500 INJECTION, POWDER, FOR SOLUTION INTRAMUSCULAR; INTRAVENOUS at 19:48

## 2024-01-29 RX ADMIN — SODIUM CHLORIDE 1000 MILLILITER(S): 9 INJECTION INTRAMUSCULAR; INTRAVENOUS; SUBCUTANEOUS at 20:30

## 2024-01-29 RX ADMIN — Medication 650 MILLIGRAM(S): at 11:50

## 2024-01-29 RX ADMIN — SODIUM CHLORIDE 1000 MILLILITER(S): 9 INJECTION INTRAMUSCULAR; INTRAVENOUS; SUBCUTANEOUS at 18:00

## 2024-01-29 RX ADMIN — FLUCONAZOLE 50 MILLIGRAM(S): 150 TABLET ORAL at 20:27

## 2024-01-29 RX ADMIN — CEFTRIAXONE 1000 MILLIGRAM(S): 500 INJECTION, POWDER, FOR SOLUTION INTRAMUSCULAR; INTRAVENOUS at 20:30

## 2024-01-29 NOTE — H&P ADULT - ASSESSMENT
73 year-old male with past medical history hypertension, prostate cancer s/p radiation, BPH, end-stage renal disease status post renal transplant 2017, s/p cardiac pace maker presents with recurrent dysuria and suprapubic pain admitted with recurrent complicated urinary tract infection in immunocompromise host

## 2024-01-29 NOTE — ED ADULT NURSE NOTE - OBJECTIVE STATEMENT
73 y.o. male coming in from home via private car for urinary symptoms x 2 days. pt states that he has been having dysuria since Saturday associated with frequency and urgency, pt states that he had symptoms similar to this about 2 weeks ago, was placed on ABX by his PCP for a UTI. pt states that his symptoms got better then on Saturday his symptoms returned. PMH R kidney transplant x 2018, prostate Ca. A&Ox3, vss, pt noted to have AV fistula on his right arm (bruit and thrill noted), endorses dysuria/frequency/urgency/fevers/chills, denies CVA tenderness/hematuria/CP/SOB/weakness/dizziness, no other complaints at this time, bed in lowest position.

## 2024-01-29 NOTE — ED PROVIDER NOTE - OBJECTIVE STATEMENT
73 male past medical history hypertension, status post prostate cancer s/p radiation, BPH, end-stage renal disease status post renal transplant 6 years ago presenting with dysuria.  Brother at bedside for collateral.  Patient says that 2 weeks ago he was treated for urinary tract infection, he is unsure which antibiotic he took.  The dysuria resolved but then 2 days ago started again.  Also endorsing some suprapubic abdominal pain.  Denies fever, nausea, vomiting, hematuria, chest pain, shortness of breath.  Nephrologist is Dr. Jiang. Urologist is Dr. Cooney.

## 2024-01-29 NOTE — H&P ADULT - TIME BILLING
reviewing prior documentation, reviewing available recent outpatient records, independently obtaining a history and interpreting results of tests, performing a physical examination, reviewing tests/imaging, discussing the plan with the patient, counseling and educating the patient, ordering medications/tests, documenting clinical information in the electronic health record, and coordinating care.

## 2024-01-29 NOTE — ED ADULT NURSE NOTE - SEPSIS REFERENCE DATA FOR CRITERIA 1 WBC
Diabetes    Fatty liver disease, nonalcoholic    Hypercholesteremia    Hypertension    Neuropathy    Obesity    Renal stones  25 years ago Diabetes    Fatty liver disease, nonalcoholic    Hepatic encephalopathy    Hypercholesteremia    Hypertension    Neuropathy    Obesity    Renal stones  25 years ago Abnormal WBC: < 4,000 OR > 12,000

## 2024-01-29 NOTE — H&P ADULT - NSHPSOCIALHISTORY_GEN_ALL_CORE
, lives with wife, retired [email or web address] supervisor   ambulates with a cane  no smoking, social rare wine

## 2024-01-29 NOTE — H&P ADULT - PROBLEM SELECTOR PLAN 2
- will hold Cellcept during active infection  - will continue Tacro 1mg BID, will check level in am  - Renal consulted by ED provider

## 2024-01-29 NOTE — H&P ADULT - NSICDXPASTMEDICALHX_GEN_ALL_CORE_FT
PAST MEDICAL HISTORY:  HTN (hypertension)     Prostate cancer     Renal insufficiency      PAST MEDICAL HISTORY:  BPH (benign prostatic hyperplasia)     HTN (hypertension)     Prostate cancer     Renal insufficiency

## 2024-01-29 NOTE — H&P ADULT - NSICDXPASTSURGICALHX_GEN_ALL_CORE_FT
PAST SURGICAL HISTORY:  Renal transplant, status post      PAST SURGICAL HISTORY:  Renal transplant, status post     S/P placement of cardiac pacemaker

## 2024-01-29 NOTE — ED PROVIDER NOTE - PROGRESS NOTE DETAILS
Attending MD Riley: Call placed to Dr. Jiang and Dr. Cooney.  Will await call back. Attending MD Riley: Spoke with Dr. Jiang of Nephro, reports patient's baseline Cr is 0.6 (last checked 10/2023).  Discussed plan, will obtain US of allograft.  Will discuss once have results. Attending MD Riley: Spoke with Dr. Cooney.  Reports patient was seen in his office, reports received an injection for his prostate at that time.  Reports was given Rx for Cefuroxime for a week on 1/17/24.  Only CBC back at this time, will await completion of US, UA, remainder of labs. Nisha Tovar M.D. (Resident Physician): Discussed with lab and UA could not be run on 2 different machines because the urine was to viscous. Will give some IVF and repeat UA.

## 2024-01-29 NOTE — ED PROVIDER NOTE - CLINICAL SUMMARY MEDICAL DECISION MAKING FREE TEXT BOX
73 male past medical history hypertension, status post prostate cancer s/p radiation, BPH, end-stage renal disease status post renal transplant 6 years ago presenting with dysuria. DDx includes but not limited to: UTI, less concerned for pyelo. Plan: blood work, including renal function, UA, UC. Will reach out to Dr. Jiang regarding renal US. Will re-assess.

## 2024-01-29 NOTE — ED ADULT NURSE REASSESSMENT NOTE - NS ED NURSE REASSESS COMMENT FT1
pt given urinal and reminded of need for urine sample, pt states he doesn't have the urge to urinate, pending UA, pending dispo

## 2024-01-29 NOTE — H&P ADULT - ADDITIONAL PE
T(F): 101.5 (29 Jan 2024 19:59), Max: 101.5 (29 Jan 2024 19:59)  HR: 78 (29 Jan 2024 19:59) (75 - 79)  BP: 127/62 (29 Jan 2024 19:59) (120/52 - 131/69)  RR: 18 (29 Jan 2024 19:59) (16 - 18)  SpO2: 99% (29 Jan 2024 19:59) (97% - 99%) room air

## 2024-01-29 NOTE — ED PROVIDER NOTE - PHYSICAL EXAMINATION
PHYSICAL EXAM:  GENERAL: Sitting comfortable in bed, in no acute distress  HENMT: Atraumatic, moist mucous membranes  EYES: Clear bilaterally, PERRL, EOMs intact b/l  HEART: Regular rate and regular rhythm  RESPIRATORY: Clear to auscultation bilaterally,  ABDOMEN: Soft, mild suprapubic ttp, nondistended  EXTREMITIES: No lower extremity edema  NEURO: Alert, follows commands, moving all extremities symmetrically  SKIN: Skin normal color for race, warm, dry

## 2024-01-29 NOTE — ED PROVIDER NOTE - ATTENDING CONTRIBUTION TO CARE
Attending MD Riley: I personally have seen and examined this patient.  Resident note reviewed and agree on plan of care and except where noted.  See below for details.     Seen in [facility], accompanied by brother  Nephro Dr. Woody Jiang  Uro Dr. Donny Cooney    73M with PMH/PSH including HTN, ESRD previously on HD but now s/p renal transplant (at [facility]), prostate s/p cyberknife, presents to the ED with dysuria and suprapubic pain.  Reports that two weeks ago was having dysuria and was prescribed an antibiotic name unknown ([facility], [place]) with completion of course and resolution of symptoms.  Reports was Rx'ed abx by Dr. Cooney.  Reports that on Saturday 1/27/24 had return of dysuria and suprapubic pain.  Denies pain elsewhere in abdomen.  Denies hematuria.  Denies bloody or  black stools.  Denies nausea, vomiting, diarrhea.  Denies chest pain, shortness of breath.  Denies fevers, chills, URI symptoms.  Denies back pain.  Reports at baseline minimally ambulatory.    Exam:   General: NAD  HENT: head NCAT, airway patent  Eyes: anicteric, no conjunctival injection   Lungs: lungs CTAB with good inspiratory effort, no wheezing, no rhonchi, no rales  Cardiac: +S1S2, no obvious r/g, +murmur, LUE AVF area with bruit and thrill  GI: abdomen soft with +BS, mild suprapubic tendenress, no rebound, no guarding, ND  : no CVAT  MSK: ranging neck and extremities freely  Neuro: moving all extremities spontaneously, nonfocal  Psych: normal mood and affect     A/P: 73M with dysuria, suprapubic pain, recent suspected tx for UTI, ?recurrence, ?incomplete tx, given renal transplant, will obtain labs, UA/UrCx, will discuss with uro and nephro,     TO BE COMPLETED

## 2024-01-29 NOTE — H&P ADULT - PROBLEM SELECTOR PLAN 1
prior urine culture grew pan-sensitive Klebsiella, treated with Ceftriaxone/Levaquin in 12/2023, and Cefuroxime 1/14, now with recurrent infection  - s/p Ceftriaxone, Diflucan 100mg iv and 1L NS in ED  - will treat with Ertapenem to cover ESBL and Diflucan 200mg PO for now, pending culture, UA >996 WBC and yeast  - Ultrasound of transplant kidney with chronic mod-severe hydronephrosis, but no renal abscess  - Transplant ID consult to call in am

## 2024-01-29 NOTE — H&P ADULT - HISTORY OF PRESENT ILLNESS
73 male past medical history hypertension, status post prostate cancer s/p radiation, BPH, end-stage renal disease status post renal transplant 6 years ago presenting with dysuria.  Brother at bedside for collateral.  Patient says that 2 weeks ago he was treated for urinary tract infection, he is unsure which antibiotic he took.  The dysuria resolved but then 2 days ago started again.  Also endorsing some suprapubic abdominal pain.  Denies fever, nausea, vomiting, hematuria, chest pain, shortness of breath.  Nephrologist is Dr. Jiang. Urologist is Dr. Cooney. 73 year-old male with past medical history hypertension, prostate cancer s/p radiation, BPH, end-stage renal disease status post renal transplant 2017 at [place], recent hospitalization for pyelonephritis and LAKIA, now presents with dysuria and suprapubic pain for the past 2 days.  Patient had similar symptoms 2 weeks ago, and was treated for urinary tract infection with 7 days of Cefuroxime (1/17/24) with resolution of symptoms.  Dysuria recurred 3 days after completion of antibiotics and associated with cloudy urine and some suprapubic abdominal pain, but denies fever/chill, nausea, vomiting, hematuria, chest pain, shortness of breath. Currently, feeling much better.

## 2024-01-30 LAB
ANION GAP SERPL CALC-SCNC: 10 MMOL/L — SIGNIFICANT CHANGE UP (ref 5–17)
BASOPHILS # BLD AUTO: 0.02 K/UL — SIGNIFICANT CHANGE UP (ref 0–0.2)
BASOPHILS NFR BLD AUTO: 0.2 % — SIGNIFICANT CHANGE UP (ref 0–2)
BUN SERPL-MCNC: 26 MG/DL — HIGH (ref 7–23)
CALCIUM SERPL-MCNC: 8.4 MG/DL — SIGNIFICANT CHANGE UP (ref 8.4–10.5)
CHLORIDE SERPL-SCNC: 102 MMOL/L — SIGNIFICANT CHANGE UP (ref 96–108)
CO2 SERPL-SCNC: 23 MMOL/L — SIGNIFICANT CHANGE UP (ref 22–31)
CREAT SERPL-MCNC: 0.76 MG/DL — SIGNIFICANT CHANGE UP (ref 0.5–1.3)
EGFR: 94 ML/MIN/1.73M2 — SIGNIFICANT CHANGE UP
EOSINOPHIL # BLD AUTO: 0 K/UL — SIGNIFICANT CHANGE UP (ref 0–0.5)
EOSINOPHIL NFR BLD AUTO: 0 % — SIGNIFICANT CHANGE UP (ref 0–6)
GLUCOSE SERPL-MCNC: 116 MG/DL — HIGH (ref 70–99)
HCT VFR BLD CALC: 30.7 % — LOW (ref 39–50)
HGB BLD-MCNC: 10.1 G/DL — LOW (ref 13–17)
IMM GRANULOCYTES NFR BLD AUTO: 0.5 % — SIGNIFICANT CHANGE UP (ref 0–0.9)
LYMPHOCYTES # BLD AUTO: 0.92 K/UL — LOW (ref 1–3.3)
LYMPHOCYTES # BLD AUTO: 7.6 % — LOW (ref 13–44)
MCHC RBC-ENTMCNC: 32.6 PG — SIGNIFICANT CHANGE UP (ref 27–34)
MCHC RBC-ENTMCNC: 32.9 GM/DL — SIGNIFICANT CHANGE UP (ref 32–36)
MCV RBC AUTO: 99 FL — SIGNIFICANT CHANGE UP (ref 80–100)
MONOCYTES # BLD AUTO: 1.15 K/UL — HIGH (ref 0–0.9)
MONOCYTES NFR BLD AUTO: 9.5 % — SIGNIFICANT CHANGE UP (ref 2–14)
NEUTROPHILS # BLD AUTO: 9.98 K/UL — HIGH (ref 1.8–7.4)
NEUTROPHILS NFR BLD AUTO: 82.2 % — HIGH (ref 43–77)
NRBC # BLD: 0 /100 WBCS — SIGNIFICANT CHANGE UP (ref 0–0)
PLATELET # BLD AUTO: 160 K/UL — SIGNIFICANT CHANGE UP (ref 150–400)
POTASSIUM SERPL-MCNC: 4.2 MMOL/L — SIGNIFICANT CHANGE UP (ref 3.5–5.3)
POTASSIUM SERPL-SCNC: 4.2 MMOL/L — SIGNIFICANT CHANGE UP (ref 3.5–5.3)
RBC # BLD: 3.1 M/UL — LOW (ref 4.2–5.8)
RBC # FLD: 12 % — SIGNIFICANT CHANGE UP (ref 10.3–14.5)
SODIUM SERPL-SCNC: 135 MMOL/L — SIGNIFICANT CHANGE UP (ref 135–145)
TACROLIMUS SERPL-MCNC: 2.6 NG/ML — SIGNIFICANT CHANGE UP
WBC # BLD: 12.13 K/UL — HIGH (ref 3.8–10.5)
WBC # FLD AUTO: 12.13 K/UL — HIGH (ref 3.8–10.5)

## 2024-01-30 PROCEDURE — 99223 1ST HOSP IP/OBS HIGH 75: CPT | Mod: GC

## 2024-01-30 PROCEDURE — 99232 SBSQ HOSP IP/OBS MODERATE 35: CPT

## 2024-01-30 PROCEDURE — 99222 1ST HOSP IP/OBS MODERATE 55: CPT | Mod: GC

## 2024-01-30 RX ORDER — TACROLIMUS 5 MG/1
1 CAPSULE ORAL EVERY 12 HOURS
Refills: 0 | Status: DISCONTINUED | OUTPATIENT
Start: 2024-01-30 | End: 2024-01-31

## 2024-01-30 RX ADMIN — ENOXAPARIN SODIUM 40 MILLIGRAM(S): 100 INJECTION SUBCUTANEOUS at 18:28

## 2024-01-30 RX ADMIN — TACROLIMUS 1 MILLIGRAM(S): 5 CAPSULE ORAL at 18:23

## 2024-01-30 RX ADMIN — LISINOPRIL 10 MILLIGRAM(S): 2.5 TABLET ORAL at 05:08

## 2024-01-30 RX ADMIN — CARVEDILOL PHOSPHATE 12.5 MILLIGRAM(S): 80 CAPSULE, EXTENDED RELEASE ORAL at 05:08

## 2024-01-30 RX ADMIN — FLUCONAZOLE 200 MILLIGRAM(S): 150 TABLET ORAL at 18:23

## 2024-01-30 RX ADMIN — CARVEDILOL PHOSPHATE 12.5 MILLIGRAM(S): 80 CAPSULE, EXTENDED RELEASE ORAL at 18:23

## 2024-01-30 RX ADMIN — TAMSULOSIN HYDROCHLORIDE 0.4 MILLIGRAM(S): 0.4 CAPSULE ORAL at 21:53

## 2024-01-30 RX ADMIN — ERTAPENEM SODIUM 120 MILLIGRAM(S): 1 INJECTION, POWDER, LYOPHILIZED, FOR SOLUTION INTRAMUSCULAR; INTRAVENOUS at 14:28

## 2024-01-30 RX ADMIN — TACROLIMUS 1 MILLIGRAM(S): 5 CAPSULE ORAL at 05:08

## 2024-01-30 NOTE — CONSULT NOTE ADULT - SUBJECTIVE AND OBJECTIVE BOX
Patient is a 73y old  Male who presents with a chief complaint of pain with urination (30 Jan 2024 15:27)      HPI:  73 year-old male with past medical history hypertension, prostate cancer s/p radiation, BPH, end-stage renal disease status post renal transplant 2017 at [place], recent hospitalization for pyelonephritis and LAKIA, now presents with dysuria and suprapubic pain for the past 2 days.  Patient had similar symptoms 2 weeks ago, and was treated for urinary tract infection with 7 days of Cefuroxime (1/17/24) with resolution of symptoms.  Dysuria recurred 3 days after completion of antibiotics and associated with cloudy urine and some suprapubic abdominal pain, but denies fever/chill, nausea, vomiting, hematuria, chest pain, shortness of breath. Currently, feeling much better. (29 Jan 2024 21:13)    States that he is feeling better. was started on diflucan and ertapenem. He actually did have a recent hospitalization with pyelo and LAKIA that was treated with merrem prior as well.     His txp was in 2017 at [place]    Immunosuppresion at home  -tacrolimus 1mg q12h  -MMF 500mg q12h     MMF has been placed on hold.       PAST MEDICAL & SURGICAL HISTORY:  HTN (hypertension)      Renal insufficiency      Prostate cancer      BPH (benign prostatic hyperplasia)      Renal transplant, status post      S/P placement of cardiac pacemaker          MEDICATIONS  (STANDING):  carvedilol 12.5 milliGRAM(s) Oral every 12 hours  enoxaparin Injectable 40 milliGRAM(s) SubCutaneous every 24 hours  ertapenem  IVPB 1000 milliGRAM(s) IV Intermittent every 24 hours  fluconAZOLE   Tablet 200 milliGRAM(s) Oral daily  lisinopril 10 milliGRAM(s) Oral daily  tacrolimus 1 milliGRAM(s) Oral every 12 hours  tamsulosin 0.4 milliGRAM(s) Oral at bedtime      Allergies    No Known Allergies    Intolerances        SOCIAL HISTORY:  Denies ETOh,Smoking,     FAMILY HISTORY:  FH: diabetes mellitus        REVIEW OF SYSTEMS:  +suprapubic tenderness, dysuria, cloudy urine    VITAL:  T(C): , Max: 38.6 (01-29-24 @ 19:59)  T(F): , Max: 101.5 (01-29-24 @ 19:59)  HR: 74 (01-30-24 @ 11:00)  BP: 115/50 (01-30-24 @ 11:00)  BP(mean): --  RR: 16 (01-30-24 @ 11:00)  SpO2: 99% (01-30-24 @ 11:00)  Wt(kg): --    PHYSICAL EXAM:  Constitutional: NAD, Alert  HEENT: NCAT, MMM  Neck: Supple, No JVD  Respiratory: CTA-b/l  Cardiovascular: RRR s1s2, no m/r/g  Gastrointestinal: BS+, soft, NT/ND  Extremities: No peripheral edema b/l  Neurological: no focal deficits; strength grossly intact  Back: no CVAT b/l  Skin: No rashes, no nevi    LABS:                        10.1   12.13 )-----------( 160      ( 30 Jan 2024 06:27 )             30.7     Na(135)/K(4.2)/Cl(102)/HCO3(23)/BUN(26)/Cr(0.76)Glu(116)/Ca(8.4)/Mg(--)/PO4(--)    01-30 @ 06:27  Na(134)/K(4.4)/Cl(98)/HCO3(25)/BUN(27)/Cr(0.83)Glu(110)/Ca(8.9)/Mg(--)/PO4(--)    01-29 @ 12:12    Urinalysis Basic - ( 30 Jan 2024 06:27 )    Color: x / Appearance: x / SG: x / pH: x  Gluc: 116 mg/dL / Ketone: x  / Bili: x / Urobili: x   Blood: x / Protein: x / Nitrite: x   Leuk Esterase: x / RBC: x / WBC x   Sq Epi: x / Non Sq Epi: x / Bacteria: x            IMAGING:    ASSESSMENT:  73 year-old male with past medical history hypertension, prostate cancer s/p radiation, BPH, end-stage renal disease status post renal transplant 2017 at [place], recent hospitalization for pyelonephritis and LAKIA, now presents with dysuria and suprapubic pain found to have complicated UTI    Renal txp  -BL cr approx 0.7-0.9  -txp in 2017 at [place]    Allograft ARSLAN/hydro  -as seen on doppler US w/elevated velocities >600  -BP controlled, RF preserved  -no intervention per txp nephrology, repeat US in 6mo as OP  -chronic hydro, RF perserved    High risk immunosuppression  -Home Rx Tac 1mg q12h,  q12h  -MMF on hold for UTI    UTI (complicated)   -on ertapenem and diflucan    HTN  -controlled, continue current regimen    volume status: Euvolemic  Electrolytes: WNL    RECOMMEND:  -urine sodium, uric acid  -a/w holding MMF for now   -c/w tacrolimus 1mg q12h, will adjust medication timing as well as trough timing. trough was not true today. needs to be taken within 1h prior of AM dose  -daily FK trough  -repeat allograft doppler in 6mo  -monitor BP and RF  -avoid nephrotoxins  -dose Rx for CrCl >60  -appreciate txp nephrology input    Thank you for involving [facility] in this patient's care.    With warm regards,    Nakul Trejo DO   [facility]  Office: [phone]           Patient is a 73y old  Male who presents with a chief complaint of pain with urination (30 Jan 2024 15:27)      HPI:  73 year-old male with past medical history hypertension, prostate cancer s/p radiation, BPH, end-stage renal disease status post renal transplant 2017 at [place], recent hospitalization for pyelonephritis and LAKIA, now presents with dysuria and suprapubic pain for the past 2 days.  Patient had similar symptoms 2 weeks ago, and was treated for urinary tract infection with 7 days of Cefuroxime (1/17/24) with resolution of symptoms.  Dysuria recurred 3 days after completion of antibiotics and associated with cloudy urine and some suprapubic abdominal pain, but denies fever/chill, nausea, vomiting, hematuria, chest pain, shortness of breath. Currently, feeling much better. (29 Jan 2024 21:13)    States that he is feeling better. was started on diflucan and ertapenem. He actually did have a recent hospitalization with pyelo and LAKIA that was treated with merrem prior as well.     His txp was in 2017 at [place]    Immunosuppresion at home  -tacrolimus 1mg q12h  -MMF 500mg q12h     MMF has been placed on hold.       PAST MEDICAL & SURGICAL HISTORY:  HTN (hypertension)      Renal insufficiency      Prostate cancer      BPH (benign prostatic hyperplasia)      Renal transplant, status post      S/P placement of cardiac pacemaker          MEDICATIONS  (STANDING):  carvedilol 12.5 milliGRAM(s) Oral every 12 hours  enoxaparin Injectable 40 milliGRAM(s) SubCutaneous every 24 hours  ertapenem  IVPB 1000 milliGRAM(s) IV Intermittent every 24 hours  fluconAZOLE   Tablet 200 milliGRAM(s) Oral daily  lisinopril 10 milliGRAM(s) Oral daily  tacrolimus 1 milliGRAM(s) Oral every 12 hours  tamsulosin 0.4 milliGRAM(s) Oral at bedtime      Allergies    No Known Allergies    Intolerances        SOCIAL HISTORY:  Denies ETOh,Smoking,     FAMILY HISTORY:  FH: diabetes mellitus        REVIEW OF SYSTEMS:  +suprapubic tenderness, dysuria, cloudy urine    VITAL:  T(C): , Max: 38.6 (01-29-24 @ 19:59)  T(F): , Max: 101.5 (01-29-24 @ 19:59)  HR: 74 (01-30-24 @ 11:00)  BP: 115/50 (01-30-24 @ 11:00)  BP(mean): --  RR: 16 (01-30-24 @ 11:00)  SpO2: 99% (01-30-24 @ 11:00)  Wt(kg): --    PHYSICAL EXAM:  Constitutional: NAD, Alert  HEENT: NCAT, MMM  Neck: Supple, No JVD  Respiratory: CTA-b/l  Cardiovascular: RRR s1s2, no m/r/g  Gastrointestinal: BS+, soft, NT/ND  Extremities: No peripheral edema b/l  Neurological: no focal deficits; strength grossly intact  Back: no CVAT b/l  Skin: No rashes, no nevi    LABS:                        10.1   12.13 )-----------( 160      ( 30 Jan 2024 06:27 )             30.7     Na(135)/K(4.2)/Cl(102)/HCO3(23)/BUN(26)/Cr(0.76)Glu(116)/Ca(8.4)/Mg(--)/PO4(--)    01-30 @ 06:27  Na(134)/K(4.4)/Cl(98)/HCO3(25)/BUN(27)/Cr(0.83)Glu(110)/Ca(8.9)/Mg(--)/PO4(--)    01-29 @ 12:12    Urinalysis Basic - ( 30 Jan 2024 06:27 )    Color: x / Appearance: x / SG: x / pH: x  Gluc: 116 mg/dL / Ketone: x  / Bili: x / Urobili: x   Blood: x / Protein: x / Nitrite: x   Leuk Esterase: x / RBC: x / WBC x   Sq Epi: x / Non Sq Epi: x / Bacteria: x            IMAGING:    ASSESSMENT:  73 year-old male with past medical history hypertension, prostate cancer s/p radiation, BPH, end-stage renal disease status post renal transplant 2017 at [place], recent hospitalization for pyelonephritis and LAKIA, now presents with dysuria and suprapubic pain found to have complicated UTI    Renal txp  -BL cr approx 0.7-0.9  -txp in 2017 at [place]    Allograft ARSLAN/hydro  -as seen on doppler US w/elevated velocities >600  -BP controlled, RF preserved  -no intervention per txp nephrology, repeat US in 6mo as OP  -chronic hydro, RF preserved    High risk immunosuppression  -Home Rx Tac 1mg q12h,  q12h  -MMF on hold for UTI    UTI (complicated)   -on ertapenem and diflucan    HTN  -controlled, continue current regimen    volume status: Euvolemic  Electrolytes: WNL    RECOMMEND:  -urine sodium, uric acid  -a/w holding MMF for now   -c/w tacrolimus 1mg q12h, will adjust medication timing as well as trough timing. trough was not true today. needs to be taken within 1h prior of AM dose. timing of administration and AM tac levels adjusted  -daily FK trough  -repeat allograft doppler in 6mo  -monitor BP and RF  -avoid nephrotoxins  -dose Rx for CrCl >60  -appreciate txp nephrology input    Thank you for involving [facility] in this patient's care.    With warm regards,    Nakul Trejo DO   [facility]  Office: [phone]

## 2024-01-30 NOTE — CONSULT NOTE ADULT - ASSESSMENT
72 yo Male with PMHx of HTN, prostate cancer s/p radiation, BPH, end-stage renal disease s/p renal transplant 2017 at [place], recent hospitalization for pyelonephritis and LAKIA, now presented on 1/29 with dysuria and suprapubic pain for the past 2 days.  Patient had similar symptoms 2 weeks ago, and was treated for urinary tract infection with 7 days of Cefuroxime (1/17/24) with resolution of symptoms.     Febrile to 101.5F  Leukocytosis 14-->12    Workup:   UA (turid urine): , + bacteria     US doppler R kidney:Elevated peak systolic velocities most notably in the transplant renal artery anastomotic segment measuring up to 600 cm/s with possible minimal/mild tardus parvus waveform abnormalitiesin the arcuate   arteries. 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  Moderate to severe hydronephrosis which appears chronic, however now with layering debris in the intrarenal collecting system and bladder concerning for urinary tract infection.     Previous urine Cx in Dec 2023 + Klebsiella pneumonia, E coli ESBL in June     Antimicrobials:  Ceftraxione X 1  Ertapenem 1/29-->    #UTI, R transplanted kidney pyelonephritis, Moderate to severe R hydronephrosis, transplant renal stenosis   #Fever, leukocytosis     Recommendations:         PT TO BE SEEN. PRELIM NOTE  PENDING RECS. PLEASE WAIT FOR FINAL RECS AFTER DISCUSSION WITH ATTENDING#         72 yo Male with PMHx of HTN, prostate cancer s/p radiation, BPH, end-stage renal disease s/p renal transplant 2017 at [place], recent hospitalization for pyelonephritis and LAKIA, now presented on 1/29 with dysuria and suprapubic pain for the past 2 days.  Patient had similar symptoms 2 weeks ago, and was treated for urinary tract infection with 7 days of Cefuroxime (1/17/24) with resolution of symptoms.     Febrile to 101.5F  Leukocytosis 14-->12    Workup:   UA (turid urine): , + bacteria     US doppler R kidney:Elevated peak systolic velocities most notably in the transplant renal artery anastomotic segment measuring up to 600 cm/s with possible minimal/mild tardus parvus waveform abnormalitiesin the arcuate arteries. 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  Moderate to severe hydronephrosis which appears chronic, however now with layering debris in the intrarenal collecting system and bladder concerning for urinary tract infection.     Previous urine Cx in Dec 2023 + Klebsiella pneumonia, E coli ESBL in June     Antimicrobials:  Ceftriaxone X 1  Ertapenem 1/29-->    #UTI, R transplanted kidney pyelonephritis, Moderate to severe R hydronephrosis, transplant renal stenosis   #Fever, leukocytosis   #Hematuria     Recommendations:   -Agree with Ertapenem for now given hx for ESBL UTI   -F/up urine Cx  -Send Blood Cx X 2 (ordered)  -Send BK and adenovirus PCR in blood and urine  -Check CMV PCR   -Monitor T curve and WBC trend     Discussed with Dr Casie Wolfe MD, PGY5  ID fellow  [employer] Teams Preferred  After 5pm/weekends call [phone]       74 yo Male with PMHx of HTN, prostate cancer s/p radiation, BPH, end-stage renal disease s/p renal transplant 2017 at [place], recent hospitalization for pyelonephritis and LAKIA, now presented on 1/29 with dysuria and suprapubic pain for the past 2 days.  Patient had similar symptoms 2 weeks ago, and was treated for urinary tract infection with 7 days of Cefuroxime (1/17/24) with resolution of symptoms.     Febrile to 101.5F  Leukocytosis 14-->12    Workup:   UA (turid urine): , + bacteria     US doppler R kidney:Elevated peak systolic velocities most notably in the transplant renal artery anastomotic segment measuring up to 600 cm/s with possible minimal/mild tardus parvus waveform abnormalitiesin the arcuate arteries. 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  Moderate to severe hydronephrosis which appears chronic, however now with layering debris in the intrarenal collecting system and bladder concerning for urinary tract infection.     Previous urine Cx in Dec 2023 + Klebsiella pneumonia, E coli ESBL in June     Antimicrobials:  Ceftriaxone X 1  Ertapenem 1/29-->    #UTI, R transplanted kidney pyelonephritis, Moderate to severe R hydronephrosis, transplant renal stenosis   #Fever, leukocytosis     Recommendations:   -Agree with Ertapenem for now given hx for ESBL UTI   -F/up urine Cx  -Send Blood Cx X 2 (ordered)  -Monitor T curve and WBC trend     Discussed with Dr Casie Wolfe MD, PGY5  ID fellow  [employer] Teams Preferred  After 5pm/weekends call [phone]

## 2024-01-30 NOTE — PROGRESS NOTE ADULT - PROBLEM SELECTOR PLAN 1
prior urine culture grew pan-sensitive Klebsiella, treated with Ceftriaxone/Levaquin in 12/2023, and Cefuroxime 1/14, now with recurrent infection  - s/p Ceftriaxone, Diflucan 100mg iv and 1L NS in ED  - started on Ertapenem to cover ESBL and Diflucan 200mg PO for now, f/u urine cx; UA >996 WBC and yeast  - Ultrasound of transplant kidney with chronic mod-severe hydronephrosis, but no renal abscess  - Transplant ID consulted for further eval

## 2024-01-30 NOTE — PATIENT PROFILE ADULT - FALL HARM RISK - UNIVERSAL INTERVENTIONS
Bed in lowest position, wheels locked, appropriate side rails in place/Call bell, personal items and telephone in reach/Instruct patient to call for assistance before getting out of bed or chair/Non-slip footwear when patient is out of bed/[place] to call system/Physically safe environment - no spills, clutter or unnecessary equipment/Purposeful Proactive Rounding/Room/bathroom lighting operational, light cord in reach

## 2024-01-30 NOTE — PROGRESS NOTE ADULT - SUBJECTIVE AND OBJECTIVE BOX
[facility] Division of Hospital Medicine  Leighann Fleming MD  Available via MS Teams    SUBJECTIVE / OVERNIGHT EVENTS: No acute events overnight. Patient overall feeling well this morning. Denies any chest pain or SOB. Has some dysuria but improving overall. No other concerns or complaints at this time.     Review of Systems:   CONSTITUTIONAL: No fever   EYES: No eye pain, visual disturbances, or discharge  ENMT: No difficulty hearing   RESPIRATORY: No SOB. No cough   CARDIOVASCULAR: No chest pain   GASTROINTESTINAL: No abdominal or epigastric pain. No nausea, vomiting, or hematemesis; No diarrhea    GENITOURINARY: improving dysuria   NEUROLOGICAL: No headache   SKIN: No itching    MUSCULOSKELETAL: No joint pain or swelling; No muscle or back pain  PSYCHIATRIC: No depression or anxiety   HEME/LYMPH: No easy bruising or bleeding gums      MEDICATIONS  (STANDING):  carvedilol 12.5 milliGRAM(s) Oral every 12 hours  enoxaparin Injectable 40 milliGRAM(s) SubCutaneous every 24 hours  ertapenem  IVPB 1000 milliGRAM(s) IV Intermittent every 24 hours  fluconAZOLE   Tablet 200 milliGRAM(s) Oral daily  fluconAZOLE IVPB 100 milliGRAM(s) IV Intermittent every 24 hours  lisinopril 10 milliGRAM(s) Oral daily  tacrolimus 1 milliGRAM(s) Oral every 12 hours  tamsulosin 0.4 milliGRAM(s) Oral at bedtime    MEDICATIONS  (PRN):  acetaminophen     Tablet .. 650 milliGRAM(s) Oral every 6 hours PRN Temp greater or equal to 38C (100.4F), Mild Pain (1 - 3)      PHYSICAL EXAM:  Vital Signs Last 24 Hrs  T(C): 36.8 (30 Jan 2024 11:00), Max: 38.6 (29 Jan 2024 19:59)  T(F): 98.2 (30 Jan 2024 11:00), Max: 101.5 (29 Jan 2024 19:59)  HR: 74 (30 Jan 2024 11:00) (74 - 78)  BP: 115/50 (30 Jan 2024 11:00) (115/50 - 141/74)  RR: 16 (30 Jan 2024 11:00) (16 - 18)  SpO2: 99% (30 Jan 2024 11:00) (98% - 99%)    Parameters below as of 30 Jan 2024 11:00  Patient On (Oxygen Delivery Method): room air      CONSTITUTIONAL: NAD, well-developed  EYES: PERRLA; conjunctiva and sclera clear  ENMT: Moist oral mucosa, no pharyngeal injection or exudates   NECK: Supple   RESPIRATORY: Normal respiratory effort; lungs are clear to auscultation bilaterally  CARDIOVASCULAR: Regular rate and rhythm, normal S1 and S2, no murmur   ABDOMEN: Nontender to palpation, normoactive bowel sounds   MUSCULOSKELETAL: no clubbing or cyanosis of digits; no joint swelling or tenderness to palpation  PSYCH: A+O to person, place, and time; affect appropriate  NEUROLOGY: no gross sensory deficits   SKIN: No rashes     LABS:                        10.1   12.13 )-----------( 160      ( 30 Jan 2024 06:27 )             30.7     01-30    135  |  102  |  26<H>  ----------------------------<  116<H>  4.2   |  23  |  0.76    Ca    8.4      30 Jan 2024 06:27    TPro  7.2  /  Alb  3.7  /  TBili  0.7  /  DBili  x   /  AST  13  /  ALT  11  /  AlkPhos  58  01-29      Urinalysis Basic - ( 30 Jan 2024 06:27 )    Color: x / Appearance: x / SG: x / pH: x  Gluc: 116 mg/dL / Ketone: x  / Bili: x / Urobili: x   Blood: x / Protein: x / Nitrite: x   Leuk Esterase: x / RBC: x / WBC x   Sq Epi: x / Non Sq Epi: x / Bacteria: x      RADIOLOGY & ADDITIONAL TESTS:  New Imaging Personally Reviewed Today:  New Electrocardiogram Personally Reviewed Today:  Other Results Reviewed Today:   Prior or Outpatient Records Reviewed Today with Summary:    COORDINATION OF CARE:  Consultant Communication and Details of Discussion (where applicable):

## 2024-01-30 NOTE — CONSULT NOTE ADULT - ASSESSMENT
73 year-old male with PMH prostate cancer s/p radiation, BPH, ESRD s/p renal transplant 2017 at [place], now presents with dysuria and suprapubic pain.    C/f Renal Artery stensis  US: transplant renal artery stenosis. Moderate to severe hydronephrosis which appears chronic, and c/f urinary tract infection.    -Cr at baseline of 0.7  -BP in 120-130's range   -No intervention at this time  -Will recommend repeat US in 6 months or soon if pt develops any symptoms (HTN or elevated Cr)      Alexis Lambert  Nephrology Fellow  Feel free to contact me on TEAMS  After 4 pm please contact the on-call Fellow.

## 2024-01-30 NOTE — CONSULT NOTE ADULT - ATTENDING COMMENTS
Patient seen and examined    Case discussed with Dr Trevon Wolfe    I agree with her history exam and plans as noted above    Patient admitted with possible transplant pyelonephritis  non toxic appearing on exam  denies UTI sxs. at this point but noted fevers/chills prior to admission    Agree with Ertapenem pending UA and culture results  send blood cultures x2 sets    will follow    Chris Jason MD  Can be called via Teams  After 5pm/weekends [phone]

## 2024-01-30 NOTE — CONSULT NOTE ADULT - SUBJECTIVE AND OBJECTIVE BOX
[facility] DIVISION OF KIDNEY DISEASES AND HYPERTENSION -- [phone]  -- INITIAL CONSULT NOTE  --------------------------------------------------------------------------------  HPI:  73 year-old male with PMH prostate cancer s/p radiation, BPH, ESRD s/p renal transplant 2017 at [place], now presents with dysuria and suprapubic pain for the past 2 days.  Patient had similar symptoms 2 weeks ago, and was treated for urinary tract infection with 7 days of Cefuroxime (1/17/24) with resolution of symptoms.  Dysuria recurred 3 days after completion of antibiotics and associated with cloudy urine and some suprapubic abdominal pain  Denies HTN, urinary changes.  Transplant Nephrology consulted for c/f renal artery stenosis.      PAST HISTORY  --------------------------------------------------------------------------------  PAST MEDICAL & SURGICAL HISTORY:  HTN (hypertension)      Renal insufficiency      Prostate cancer      BPH (benign prostatic hyperplasia)      Renal transplant, status post      S/P placement of cardiac pacemaker        FAMILY HISTORY:  FH: diabetes mellitus      PAST SOCIAL HISTORY:    ALLERGIES & MEDICATIONS  --------------------------------------------------------------------------------  Allergies    No Known Allergies    Intolerances      Standing Inpatient Medications  carvedilol 12.5 milliGRAM(s) Oral every 12 hours  enoxaparin Injectable 40 milliGRAM(s) SubCutaneous every 24 hours  ertapenem  IVPB 1000 milliGRAM(s) IV Intermittent every 24 hours  fluconAZOLE   Tablet 200 milliGRAM(s) Oral daily  lisinopril 10 milliGRAM(s) Oral daily  tacrolimus 1 milliGRAM(s) Oral every 12 hours  tamsulosin 0.4 milliGRAM(s) Oral at bedtime    PRN Inpatient Medications  acetaminophen     Tablet .. 650 milliGRAM(s) Oral every 6 hours PRN      REVIEW OF SYSTEMS  --------------------------------------------------------------------------------  Gen: No fevers/chills  Head/Eyes/Ears: No HA  Respiratory: No dyspnea, cough  CV: No chest pain  GI: No abdominal pain, diarrhea  : No dysuria, hematuria  MSK: No  edema  Skin: No rashes  Heme: No easy bruising or bleeding    All other systems were reviewed and are negative, except as noted.    VITALS/PHYSICAL EXAM  --------------------------------------------------------------------------------  T(C): 36.8 (01-30-24 @ 11:00), Max: 38.6 (01-29-24 @ 19:59)  HR: 74 (01-30-24 @ 11:00) (74 - 78)  BP: 115/50 (01-30-24 @ 11:00) (115/50 - 141/74)  RR: 16 (01-30-24 @ 11:00) (16 - 18)  SpO2: 99% (01-30-24 @ 11:00) (98% - 99%)  Wt(kg): --  Height (cm): 160 (01-29-24 @ 10:39)  Weight (kg): 65.3 (01-29-24 @ 10:39)  BMI (kg/m2): 25.5 (01-29-24 @ 10:39)  BSA (m2): 1.68 (01-29-24 @ 10:39)        Physical Exam:  	Gen: NAD  	HEENT: Anicteric  	Pulm: CTA B/L  	CV: S1S2+  	Abd: Soft, +BS    	Ext: No LE edema B/L  	Neuro: Awake  	Skin: Warm and dry    LABS/STUDIES  --------------------------------------------------------------------------------              10.1   12.13 >-----------<  160      [01-30-24 @ 06:27]              30.7     135  |  102  |  26  ----------------------------<  116      [01-30-24 @ 06:27]  4.2   |  23  |  0.76        Ca     8.4     [01-30-24 @ 06:27]    TPro  7.2  /  Alb  3.7  /  TBili  0.7  /  DBili  x   /  AST  13  /  ALT  11  /  AlkPhos  58  [01-29-24 @ 12:12]          Creatinine Trend:  SCr 0.76 [01-30 @ 06:27]  SCr 0.83 [01-29 @ 12:12]    Urinalysis - [01-30-24 @ 06:27]      Color  / Appearance  / SG  / pH       Gluc 116 / Ketone   / Bili  / Urobili        Blood  / Protein  / Leuk Est  / Nitrite       RBC  / WBC  / Hyaline  / Gran  / Sq Epi  / Non Sq Epi  / Bacteria       HCV 0.10, Nonreactive Hepatitis C AB  S/CO Ratio                        Interpretation  < 1.00                                   Non-Reactive  1.00 - 4.99                         Weakly-Reactive  >= 5.00                                Reactive  Non-Reactive: [place] with a non-reactive HCV antibody  result is considered uninfected.  No further action is  needed unless recent infection is suspected.  In these  cases, consider repeat testing later to detect  seroconversion..  Weakly-Reactive: HCV antibody test is abnormal, HCV RNA  Qualitative test will follow.  Reactive: HCV antibody test is abnormal, HCV RNA  Qualitative test will follow.  Note: HCV antibody testing is performed on the [facility] system.      [06-22-20 @ 06:20]      TacrolimusTacrolimus (), Serum: 2.6 ng/mL (01-30 @ 06:27)    Cyclosporine  Sirolimus  MycophenolateMycophenolic Acid, Serum: 0.9 ug/mL (12-23 @ 17:55)    BK PCR  CMV PCR  Parvo PCR  EBV PCR

## 2024-01-30 NOTE — ED ADULT NURSE REASSESSMENT NOTE - NS ED NURSE REASSESS COMMENT FT1
0720 Pt in ER red/rhett rm 39 left. TBA med. No bed yet. A&Ox4. No c/o pain at present. Voided 350cc shayy urine in urinal. Fall risk precautions maintained. IVL intact RACF without sx of infilt. AV fist left arm +Thrill. Hx of kidney transplant 2017. Pt not on dialysis. Skin W&D. Lips and nailbeds pink

## 2024-01-30 NOTE — CHART NOTE - NSCHARTNOTEFT_GEN_A_CORE
Consulted for c/f renal stenosis on ultrasound. Cr and BP wnl.   Pt seen and examined.  Will suggest repeat US in 6 months.      Full consult to follow     Alexis Lambert  Nephrology Fellow  Feel free to contact me on TEAMS  After 4 pm please contact the on-call Fellow.

## 2024-01-30 NOTE — CONSULT NOTE ADULT - SUBJECTIVE AND OBJECTIVE BOX
HPI:    74 yo Male with PMHx of HTN, prostate cancer s/p radiation, BPH, end-stage renal disease s/p renal transplant 2017 at [place], recent hospitalization for pyelonephritis and LAKIA, now presented on 1/29 with dysuria and suprapubic pain for the past 2 days.  Patient had similar symptoms 2 weeks ago, and was treated for urinary tract infection with 7 days of Cefuroxime (1/17/24) with resolution of symptoms.  Dysuria recurred 3 days after completion of antibiotics and associated with cloudy urine and some suprapubic abdominal pain, but denies fever/chill, nausea, vomiting, hematuria, chest pain, shortness of breath. Currently, feeling much better.        REVIEW OF SYSTEMS  [  ] ROS unobtainable because:    [  ] All other systems negative except as noted below    Constitutional:  [ ] fever [ ] chills  [ ] weight loss  [ ]night sweat  [ ]poor appetite/PO intake [ ]fatigue   Skin:  [ ] rash [ ] phlebitis	  Eyes: [ ] icterus [ ] pain  [ ] discharge	  ENMT: [ ] sore throat  [ ] thrush [ ] ulcers [ ] exudates [ ]anosmia  Respiratory: [ ] dyspnea [ ] hemoptysis [ ] cough [ ] sputum	  Cardiovascular:  [ ] chest pain [ ] palpitations [ ] edema	  Gastrointestinal:  [ ] nausea [ ] vomiting [ ] diarrhea [ ] constipation [ ] pain	  Genitourinary:  [ ] dysuria [ ] frequency [ ] hematuria [ ] discharge [ ] flank pain  [ ] incontinence  Musculoskeletal:  [ ] myalgias [ ] arthralgias [ ] arthritis  [ ] back pain  Neurological:  [ ] headache [ ] weakness [ ] seizures  [ ] confusion/altered mental status    prior hospital charts reviewed [V]  primary team notes reviewed [V]  other consultant notes reviewed [V]    PAST MEDICAL & SURGICAL HISTORY:  HTN (hypertension)    Renal insufficiency    Prostate cancer    BPH (benign prostatic hyperplasia)    Renal transplant, status post    S/P placement of cardiac pacemaker    SOCIAL HISTORY:  - Denied smoking/vaping/alcohol/recreational drug use    FAMILY HISTORY:  FH: diabetes mellitus    Allergies  No Known Allergies    ANTIMICROBIALS:  ertapenem  IVPB 1000 every 24 hours  fluconAZOLE   Tablet 200 daily  fluconAZOLE IVPB 100 every 24 hours    ANTIMICROBIALS (past 90 days):  MEDICATIONS  (STANDING):  cefTRIAXone   IVPB   100 mL/Hr IV Intermittent (01-29-24 @ 19:48)    fluconAZOLE IVPB   50 mL/Hr IV Intermittent (01-29-24 @ 20:27)    OTHER MEDS:   MEDICATIONS  (STANDING):  acetaminophen     Tablet .. 650 every 6 hours PRN  carvedilol 12.5 every 12 hours  enoxaparin Injectable 40 every 24 hours  lisinopril 10 daily  tacrolimus 1 every 12 hours  tamsulosin 0.4 at bedtime    VITALS:  Vital Signs Last 24 Hrs  T(F): 97.9 (01-30-24 @ 07:35), Max: 101.5 (01-29-24 @ 19:59)    Vital Signs Last 24 Hrs  HR: 78 (01-30-24 @ 07:35) (74 - 78)  BP: 119/55 (01-30-24 @ 07:35) (119/55 - 141/74)  RR: 18 (01-30-24 @ 07:35)  SpO2: 98% (01-30-24 @ 07:35) (97% - 99%)  Wt(kg): --    EXAM:  Physical Exam:  Constitutional:  well preserved, comfortable  Head/Eyes: no icterus, PERRL, EOMI  ENT:  supple; no thrush  LUNGS:  CTA  CVS:  normal S1, S2, no murmur  Abd:  soft, non-tender; non-distended  Ext:  no edema  Vascular:  IV site no erythema tenderness or discharge  MSK:  joints without swelling  Neuro: AAO X 3, non- focal    Labs:                        10.1   12.13 )-----------( 160      ( 30 Jan 2024 06:27 )             30.7     01-30    135  |  102  |  26<H>  ----------------------------<  116<H>  4.2   |  23  |  0.76    Ca    8.4      30 Jan 2024 06:27    TPro  7.2  /  Alb  3.7  /  TBili  0.7  /  DBili  x   /  AST  13  /  ALT  11  /  AlkPhos  58  01-29    WBC Trend:  WBC Count: 12.13 (01-30-24 @ 06:27)  WBC Count: 14.07 (01-29-24 @ 12:12)    Auto Neutrophil #: 9.98 K/uL (01-30-24 @ 06:27)  Auto Neutrophil #: 11.61 K/uL (01-29-24 @ 12:12)  Band Neutrophils %: 5.0 % (01-29-24 @ 12:12)  Auto Neutrophil #: 7.67 K/uL (12-25-23 @ 07:13)  Auto Neutrophil #: 11.76 K/uL (12-23-23 @ 17:55)    Creatine Trend:  Creatinine: 0.76 (01-30)  Creatinine: 0.83 (01-29)    Liver Biochemical Testing Trend:  Alanine Aminotransferase (ALT/SGPT): 11 (01-29)  Alanine Aminotransferase (ALT/SGPT): 16 (12-25)  Alanine Aminotransferase (ALT/SGPT): 15 (12-23)  Aspartate Aminotransferase (AST/SGOT): 13 (01-29-24 @ 12:12)  Aspartate Aminotransferase (AST/SGOT): 15 (12-25-23 @ 07:11)  Aspartate Aminotransferase (AST/SGOT): 20 (12-23-23 @ 17:55)  Bilirubin Total: 0.7 (01-29)  Bilirubin Total: 0.4 (12-25)  Bilirubin Total: 0.5 (12-23)    Trend LDH    Auto Eosinophil %: 0.0 % (01-30-24 @ 06:27)  Auto Eosinophil %: 0.0 % (01-29-24 @ 12:12)    Urinalysis Basic - ( 30 Jan 2024 06:27 )    Color: x / Appearance: x / SG: x / pH: x  Gluc: 116 mg/dL / Ketone: x  / Bili: x / Urobili: x   Blood: x / Protein: x / Nitrite: x   Leuk Esterase: x / RBC: x / WBC x   Sq Epi: x / Non Sq Epi: x / Bacteria: x    MICROBIOLOGY:    Culture - Blood (collected 27 Dec 2023 16:23)  Source: .Blood Blood  Final Report:    No growth at 5 days    Culture - Blood (collected 27 Dec 2023 16:00)  Source: .Blood Blood  Final Report:    No growth at 5 days    Culture - Urine (collected 23 Dec 2023 19:52)  Source: Clean Catch Clean Catch (Midstream)  Final Report:    >100,000 CFU/ml Klebsiella pneumoniae    <10,000 CFU/ml Normal Urogenital guicho present  Organism: Klebsiella pneumoniae  Organism: Klebsiella pneumoniae    Sensitivities:      Method Type: ERIC      -  Amoxicillin/Clavulanic Acid: S <=8/4      -  Ampicillin: R 16 These ampicillin results predict results for amoxicillin      -  Ampicillin/Sulbactam: S <=4/2      -  Aztreonam: S <=4      -  Cefazolin: S <=2 For uncomplicated UTI with K. pneumoniae, E. coli, or P. mirablis: ERIC <=16 is sensitive and ERIC >=32 is resistant. This also predicts results for oral agents cefaclor, cefdinir, cefpodoxime, cefprozil, cefuroxime axetil, cephalexin and locarbef for uncomplicated UTI. Note that some isolates may be susceptible to these agents while testing resistant to cefazolin.      -  Cefepime: S <=2      -  Cefoxitin: S <=8      -  Ceftriaxone: S <=1      -  Cefuroxime: S <=4      -  Ciprofloxacin: S <=0.25      -  Ertapenem: S <=0.5      -  Gentamicin: S <=2      -  Imipenem: S <=1      -  Levofloxacin: S <=0.5      -  Meropenem: S <=1      -  Nitrofurantoin: S <=32 Should not be used to treat pyelonephritis      -  Piperacillin/Tazobactam: S <=8      -  Tobramycin: S <=2      -  Trimethoprim/Sulfamethoxazole: S <=0.5/9.5    Blood Gas Venous - Lactate: 1.8 (01-29 @ 11:50)    RADIOLOGY:  imaging below personally reviewed    < from: US Trans Kidney w/ Doppler, Right (01.29.24 @ 14:52) >  Elevated peak systolic velocities most notably in the transplant renal   artery anastomotic segment measuring up to 600 cm/s with possible   minimal/mild tardus parvus waveform abnormalitiesin the arcuate   arteries. 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    Moderate to severe hydronephrosis which appears chronic, however now with   layering debris in the intrarenal collecting system and bladder   concerning for urinary tract infection. Early or urinalysis.    < end of copied text >         HPI:    74 yo Male with PMHx of HTN, prostate cancer s/p radiation, BPH, end-stage renal disease s/p renal transplant 2017 at [place], recent hospitalization for pyelonephritis and LAKIA, now presented on 1/29 with dysuria and suprapubic pain for the past 2 days.  Patient had similar symptoms 2 weeks ago, and was treated for urinary tract infection with 7 days of Cefuroxime (1/17/24) with resolution of symptoms.  Dysuria recurred 3 days after completion of antibiotics and associated with cloudy urine and some suprapubic abdominal pain, but denies fever/chill, nausea, vomiting, hematuria, chest pain, shortness of breath. Currently, feeling much better.      Transplant ID consulted for concern for transplanted kidney pyelonephritis.       REVIEW OF SYSTEMS  [  ] ROS unobtainable because:    [X] All other systems negative except as noted below    Constitutional:  [X] fever [ ] chills  [ ] weight loss  [ ]night sweat  [ ]poor appetite/PO intake [ ]fatigue   Skin:  [ ] rash [ ] phlebitis	  Eyes: [ ] icterus [ ] pain  [ ] discharge	  ENMT: [ ] sore throat  [ ] thrush [ ] ulcers [ ] exudates [ ]anosmia  Respiratory: [ ] dyspnea [ ] hemoptysis [ ] cough [ ] sputum	  Cardiovascular:  [ ] chest pain [ ] palpitations [ ] edema	  Gastrointestinal:  [ ] nausea [ ] vomiting [ ] diarrhea [ ] constipation [ ] pain	  Genitourinary:  [ ] dysuria [ ] frequency [ ] hematuria [ ] discharge [ ] flank pain  [ ] incontinence  Musculoskeletal:  [ ] myalgias [ ] arthralgias [ ] arthritis  [ ] back pain  Neurological:  [ ] headache [ ] weakness [ ] seizures  [ ] confusion/altered mental status    prior hospital charts reviewed [V]  primary team notes reviewed [V]  other consultant notes reviewed [V]    PAST MEDICAL & SURGICAL HISTORY:  HTN (hypertension)    Renal insufficiency    Prostate cancer    BPH (benign prostatic hyperplasia)    Renal transplant, status post    S/P placement of cardiac pacemaker    SOCIAL HISTORY:  - Denied smoking/vaping/alcohol/recreational drug use    FAMILY HISTORY:  FH: diabetes mellitus    Allergies  No Known Allergies    ANTIMICROBIALS:  ertapenem  IVPB 1000 every 24 hours  fluconAZOLE   Tablet 200 daily  fluconAZOLE IVPB 100 every 24 hours    ANTIMICROBIALS (past 90 days):  MEDICATIONS  (STANDING):  cefTRIAXone   IVPB   100 mL/Hr IV Intermittent (01-29-24 @ 19:48)    fluconAZOLE IVPB   50 mL/Hr IV Intermittent (01-29-24 @ 20:27)    OTHER MEDS:   MEDICATIONS  (STANDING):  acetaminophen     Tablet .. 650 every 6 hours PRN  carvedilol 12.5 every 12 hours  enoxaparin Injectable 40 every 24 hours  lisinopril 10 daily  tacrolimus 1 every 12 hours  tamsulosin 0.4 at bedtime    VITALS:  Vital Signs Last 24 Hrs  T(F): 97.9 (01-30-24 @ 07:35), Max: 101.5 (01-29-24 @ 19:59)    Vital Signs Last 24 Hrs  HR: 78 (01-30-24 @ 07:35) (74 - 78)  BP: 119/55 (01-30-24 @ 07:35) (119/55 - 141/74)  RR: 18 (01-30-24 @ 07:35)  SpO2: 98% (01-30-24 @ 07:35) (97% - 99%)  Wt(kg): --    EXAM:  Physical Exam:  Constitutional:  well preserved, comfortable  Head/Eyes: no icterus, PERRL, EOMI  ENT:  supple; no thrush  LUNGS:  CTA  CVS:  normal S1, S2, no murmur  Abd:  soft, non-tender; non-distended, no suprapubic or transplant kidney tenderness, surgical wound well healed   Ext:  no edema  Vascular:  IV site no erythema tenderness or discharge  MSK:  joints without swelling  Neuro: AAO X 3, non- focal    Labs:                        10.1   12.13 )-----------( 160      ( 30 Jan 2024 06:27 )             30.7     01-30    135  |  102  |  26<H>  ----------------------------<  116<H>  4.2   |  23  |  0.76    Ca    8.4      30 Jan 2024 06:27    TPro  7.2  /  Alb  3.7  /  TBili  0.7  /  DBili  x   /  AST  13  /  ALT  11  /  AlkPhos  58  01-29    WBC Trend:  WBC Count: 12.13 (01-30-24 @ 06:27)  WBC Count: 14.07 (01-29-24 @ 12:12)    Auto Neutrophil #: 9.98 K/uL (01-30-24 @ 06:27)  Auto Neutrophil #: 11.61 K/uL (01-29-24 @ 12:12)  Band Neutrophils %: 5.0 % (01-29-24 @ 12:12)  Auto Neutrophil #: 7.67 K/uL (12-25-23 @ 07:13)  Auto Neutrophil #: 11.76 K/uL (12-23-23 @ 17:55)    Creatine Trend:  Creatinine: 0.76 (01-30)  Creatinine: 0.83 (01-29)    Liver Biochemical Testing Trend:  Alanine Aminotransferase (ALT/SGPT): 11 (01-29)  Alanine Aminotransferase (ALT/SGPT): 16 (12-25)  Alanine Aminotransferase (ALT/SGPT): 15 (12-23)  Aspartate Aminotransferase (AST/SGOT): 13 (01-29-24 @ 12:12)  Aspartate Aminotransferase (AST/SGOT): 15 (12-25-23 @ 07:11)  Aspartate Aminotransferase (AST/SGOT): 20 (12-23-23 @ 17:55)  Bilirubin Total: 0.7 (01-29)  Bilirubin Total: 0.4 (12-25)  Bilirubin Total: 0.5 (12-23)    Trend LDH    Auto Eosinophil %: 0.0 % (01-30-24 @ 06:27)  Auto Eosinophil %: 0.0 % (01-29-24 @ 12:12)    Urinalysis Basic - ( 30 Jan 2024 06:27 )    Color: x / Appearance: x / SG: x / pH: x  Gluc: 116 mg/dL / Ketone: x  / Bili: x / Urobili: x   Blood: x / Protein: x / Nitrite: x   Leuk Esterase: x / RBC: x / WBC x   Sq Epi: x / Non Sq Epi: x / Bacteria: x    MICROBIOLOGY:    Culture - Blood (collected 27 Dec 2023 16:23)  Source: .Blood Blood  Final Report:    No growth at 5 days    Culture - Blood (collected 27 Dec 2023 16:00)  Source: .Blood Blood  Final Report:    No growth at 5 days    Culture - Urine (collected 23 Dec 2023 19:52)  Source: Clean Catch Clean Catch (Midstream)  Final Report:    >100,000 CFU/ml Klebsiella pneumoniae    <10,000 CFU/ml Normal Urogenital guicho present  Organism: Klebsiella pneumoniae  Organism: Klebsiella pneumoniae    Sensitivities:      Method Type: ERIC      -  Amoxicillin/Clavulanic Acid: S <=8/4      -  Ampicillin: R 16 These ampicillin results predict results for amoxicillin      -  Ampicillin/Sulbactam: S <=4/2      -  Aztreonam: S <=4      -  Cefazolin: S <=2 For uncomplicated UTI with K. pneumoniae, E. coli, or P. mirablis: ERIC <=16 is sensitive and ERIC >=32 is resistant. This also predicts results for oral agents cefaclor, cefdinir, cefpodoxime, cefprozil, cefuroxime axetil, cephalexin and locarbef for uncomplicated UTI. Note that some isolates may be susceptible to these agents while testing resistant to cefazolin.      -  Cefepime: S <=2      -  Cefoxitin: S <=8      -  Ceftriaxone: S <=1      -  Cefuroxime: S <=4      -  Ciprofloxacin: S <=0.25      -  Ertapenem: S <=0.5      -  Gentamicin: S <=2      -  Imipenem: S <=1      -  Levofloxacin: S <=0.5      -  Meropenem: S <=1      -  Nitrofurantoin: S <=32 Should not be used to treat pyelonephritis      -  Piperacillin/Tazobactam: S <=8      -  Tobramycin: S <=2      -  Trimethoprim/Sulfamethoxazole: S <=0.5/9.5    Blood Gas Venous - Lactate: 1.8 (01-29 @ 11:50)    RADIOLOGY:  imaging below personally reviewed    < from: US Trans Kidney w/ Doppler, Right (01.29.24 @ 14:52) >  Elevated peak systolic velocities most notably in the transplant renal   artery anastomotic segment measuring up to 600 cm/s with possible   minimal/mild tardus parvus waveform abnormalitiesin the arcuate   arteries. 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    Moderate to severe hydronephrosis which appears chronic, however now with   layering debris in the intrarenal collecting system and bladder   concerning for urinary tract infection. Early or urinalysis.    < end of copied text >

## 2024-01-30 NOTE — PROGRESS NOTE ADULT - PROBLEM SELECTOR PLAN 2
- will hold Cellcept during active infection  - will continue Tacro 1mg BID, monitor tacro levels daily   - renal US showing signs of ARSLAN   - transplant renal consult for further recs

## 2024-01-31 LAB
-  AMOXICILLIN/CLAVULANIC ACID: SIGNIFICANT CHANGE UP
-  AMPICILLIN/SULBACTAM: SIGNIFICANT CHANGE UP
-  AMPICILLIN: SIGNIFICANT CHANGE UP
-  AZTREONAM: SIGNIFICANT CHANGE UP
-  CEFAZOLIN: SIGNIFICANT CHANGE UP
-  CEFEPIME: SIGNIFICANT CHANGE UP
-  CEFOXITIN: SIGNIFICANT CHANGE UP
-  CEFTRIAXONE: SIGNIFICANT CHANGE UP
-  CEFUROXIME: SIGNIFICANT CHANGE UP
-  CIPROFLOXACIN: SIGNIFICANT CHANGE UP
-  ERTAPENEM: SIGNIFICANT CHANGE UP
-  GENTAMICIN: SIGNIFICANT CHANGE UP
-  IMIPENEM: SIGNIFICANT CHANGE UP
-  LEVOFLOXACIN: SIGNIFICANT CHANGE UP
-  MEROPENEM: SIGNIFICANT CHANGE UP
-  NITROFURANTOIN: SIGNIFICANT CHANGE UP
-  PIPERACILLIN/TAZOBACTAM: SIGNIFICANT CHANGE UP
-  TOBRAMYCIN: SIGNIFICANT CHANGE UP
-  TRIMETHOPRIM/SULFAMETHOXAZOLE: SIGNIFICANT CHANGE UP
ANION GAP SERPL CALC-SCNC: 12 MMOL/L — SIGNIFICANT CHANGE UP (ref 5–17)
BASOPHILS # BLD AUTO: 0.03 K/UL — SIGNIFICANT CHANGE UP (ref 0–0.2)
BASOPHILS NFR BLD AUTO: 0.5 % — SIGNIFICANT CHANGE UP (ref 0–2)
BUN SERPL-MCNC: 25 MG/DL — HIGH (ref 7–23)
CALCIUM SERPL-MCNC: 8.9 MG/DL — SIGNIFICANT CHANGE UP (ref 8.4–10.5)
CHLORIDE SERPL-SCNC: 103 MMOL/L — SIGNIFICANT CHANGE UP (ref 96–108)
CO2 SERPL-SCNC: 24 MMOL/L — SIGNIFICANT CHANGE UP (ref 22–31)
CREAT SERPL-MCNC: 0.66 MG/DL — SIGNIFICANT CHANGE UP (ref 0.5–1.3)
CULTURE RESULTS: ABNORMAL
EGFR: 98 ML/MIN/1.73M2 — SIGNIFICANT CHANGE UP
EOSINOPHIL # BLD AUTO: 0.01 K/UL — SIGNIFICANT CHANGE UP (ref 0–0.5)
EOSINOPHIL NFR BLD AUTO: 0.2 % — SIGNIFICANT CHANGE UP (ref 0–6)
GLUCOSE SERPL-MCNC: 91 MG/DL — SIGNIFICANT CHANGE UP (ref 70–99)
HCT VFR BLD CALC: 29.9 % — LOW (ref 39–50)
HGB BLD-MCNC: 9.4 G/DL — LOW (ref 13–17)
IMM GRANULOCYTES NFR BLD AUTO: 0.3 % — SIGNIFICANT CHANGE UP (ref 0–0.9)
LYMPHOCYTES # BLD AUTO: 1.21 K/UL — SIGNIFICANT CHANGE UP (ref 1–3.3)
LYMPHOCYTES # BLD AUTO: 20.1 % — SIGNIFICANT CHANGE UP (ref 13–44)
MAGNESIUM SERPL-MCNC: 1.8 MG/DL — SIGNIFICANT CHANGE UP (ref 1.6–2.6)
MCHC RBC-ENTMCNC: 31.4 GM/DL — LOW (ref 32–36)
MCHC RBC-ENTMCNC: 31.5 PG — SIGNIFICANT CHANGE UP (ref 27–34)
MCV RBC AUTO: 100.3 FL — HIGH (ref 80–100)
METHOD TYPE: SIGNIFICANT CHANGE UP
MONOCYTES # BLD AUTO: 0.83 K/UL — SIGNIFICANT CHANGE UP (ref 0–0.9)
MONOCYTES NFR BLD AUTO: 13.8 % — SIGNIFICANT CHANGE UP (ref 2–14)
NEUTROPHILS # BLD AUTO: 3.92 K/UL — SIGNIFICANT CHANGE UP (ref 1.8–7.4)
NEUTROPHILS NFR BLD AUTO: 65.1 % — SIGNIFICANT CHANGE UP (ref 43–77)
NRBC # BLD: 0 /100 WBCS — SIGNIFICANT CHANGE UP (ref 0–0)
ORGANISM # SPEC MICROSCOPIC CNT: ABNORMAL
ORGANISM # SPEC MICROSCOPIC CNT: ABNORMAL
PHOSPHATE SERPL-MCNC: 3 MG/DL — SIGNIFICANT CHANGE UP (ref 2.5–4.5)
PLATELET # BLD AUTO: 178 K/UL — SIGNIFICANT CHANGE UP (ref 150–400)
POTASSIUM SERPL-MCNC: 4.2 MMOL/L — SIGNIFICANT CHANGE UP (ref 3.5–5.3)
POTASSIUM SERPL-SCNC: 4.2 MMOL/L — SIGNIFICANT CHANGE UP (ref 3.5–5.3)
RBC # BLD: 2.98 M/UL — LOW (ref 4.2–5.8)
RBC # FLD: 11.9 % — SIGNIFICANT CHANGE UP (ref 10.3–14.5)
SODIUM SERPL-SCNC: 139 MMOL/L — SIGNIFICANT CHANGE UP (ref 135–145)
SPECIMEN SOURCE: SIGNIFICANT CHANGE UP
TACROLIMUS SERPL-MCNC: 11.1 NG/ML — SIGNIFICANT CHANGE UP
WBC # BLD: 6.02 K/UL — SIGNIFICANT CHANGE UP (ref 3.8–10.5)
WBC # FLD AUTO: 6.02 K/UL — SIGNIFICANT CHANGE UP (ref 3.8–10.5)

## 2024-01-31 PROCEDURE — 99232 SBSQ HOSP IP/OBS MODERATE 35: CPT | Mod: GC

## 2024-01-31 RX ORDER — CEFTRIAXONE 500 MG/1
1000 INJECTION, POWDER, FOR SOLUTION INTRAMUSCULAR; INTRAVENOUS EVERY 24 HOURS
Refills: 0 | Status: DISCONTINUED | OUTPATIENT
Start: 2024-01-31 | End: 2024-02-01

## 2024-01-31 RX ORDER — TACROLIMUS 5 MG/1
1 CAPSULE ORAL
Refills: 0 | Status: DISCONTINUED | OUTPATIENT
Start: 2024-01-31 | End: 2024-02-01

## 2024-01-31 RX ORDER — CHLORHEXIDINE GLUCONATE 213 G/1000ML
1 SOLUTION TOPICAL DAILY
Refills: 0 | Status: DISCONTINUED | OUTPATIENT
Start: 2024-01-31 | End: 2024-02-01

## 2024-01-31 RX ADMIN — LISINOPRIL 10 MILLIGRAM(S): 2.5 TABLET ORAL at 07:01

## 2024-01-31 RX ADMIN — FLUCONAZOLE 200 MILLIGRAM(S): 150 TABLET ORAL at 12:41

## 2024-01-31 RX ADMIN — CHLORHEXIDINE GLUCONATE 1 APPLICATION(S): 213 SOLUTION TOPICAL at 18:03

## 2024-01-31 RX ADMIN — ERTAPENEM SODIUM 120 MILLIGRAM(S): 1 INJECTION, POWDER, LYOPHILIZED, FOR SOLUTION INTRAMUSCULAR; INTRAVENOUS at 14:12

## 2024-01-31 RX ADMIN — TACROLIMUS 1 MILLIGRAM(S): 5 CAPSULE ORAL at 07:02

## 2024-01-31 RX ADMIN — CARVEDILOL PHOSPHATE 12.5 MILLIGRAM(S): 80 CAPSULE, EXTENDED RELEASE ORAL at 07:02

## 2024-01-31 RX ADMIN — CEFTRIAXONE 100 MILLIGRAM(S): 500 INJECTION, POWDER, FOR SOLUTION INTRAMUSCULAR; INTRAVENOUS at 18:02

## 2024-01-31 RX ADMIN — CARVEDILOL PHOSPHATE 12.5 MILLIGRAM(S): 80 CAPSULE, EXTENDED RELEASE ORAL at 18:02

## 2024-01-31 RX ADMIN — TAMSULOSIN HYDROCHLORIDE 0.4 MILLIGRAM(S): 0.4 CAPSULE ORAL at 21:37

## 2024-01-31 RX ADMIN — TACROLIMUS 1 MILLIGRAM(S): 5 CAPSULE ORAL at 20:35

## 2024-01-31 RX ADMIN — ENOXAPARIN SODIUM 40 MILLIGRAM(S): 100 INJECTION SUBCUTANEOUS at 18:02

## 2024-01-31 NOTE — PROGRESS NOTE ADULT - ASSESSMENT
74 yo Male with PMHx of HTN, prostate cancer s/p radiation, BPH, end-stage renal disease s/p renal transplant 2017 at [place], recent hospitalization for pyelonephritis and LAKIA, now presented on 1/29 with dysuria and suprapubic pain for the past 2 days.  Patient had similar symptoms 2 weeks ago, and was treated for urinary tract infection with 7 days of Cefuroxime (1/17/24) with resolution of symptoms.     Febrile to 101.5F  Leukocytosis 14-->12    Workup:   UA (turid urine): , + bacteria     US doppler R kidney:Elevated peak systolic velocities most notably in the transplant renal artery anastomotic segment measuring up to 600 cm/s with possible minimal/mild tardus parvus waveform abnormalitiesin the arcuate arteries. 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  Moderate to severe hydronephrosis which appears chronic, however now with layering debris in the intrarenal collecting system and bladder concerning for urinary tract infection.     Previous urine Cx in Dec 2023 + Klebsiella pneumonia, E coli ESBL in June     Antimicrobials:  Ceftriaxone X 1  Ertapenem 1/29-->    #UTI, R transplanted kidney pyelonephritis, Moderate to severe R hydronephrosis, transplant renal stenosis   #Fever, leukocytosis     Recommendations:   -Start Ceftriaxone 1 g IV daily   -Discontinue Ertapenem   -Discontinue Fluconazole   -Agree with US prostate   -F/up final Blood   -Monitor T curve and WBC trend     Discussed with Dr Casie Wolfe MD, PGY5  ID fellow  [employer] Teams Preferred  After 5pm/weekends call [phone]     74 yo Male with PMHx of HTN, prostate cancer s/p radiation, BPH, end-stage renal disease s/p renal transplant 2017 at [place], recent hospitalization for pyelonephritis and LAKIA, now presented on 1/29 with dysuria and suprapubic pain for the past 2 days.  Patient had similar symptoms 2 weeks ago, and was treated for urinary tract infection with 7 days of Cefuroxime (1/17/24) with resolution of symptoms.     Febrile to 101.5F  Leukocytosis 14-->12    Workup:   UA (turid urine): , + bacteria     US doppler R kidney:Elevated peak systolic velocities most notably in the transplant renal artery anastomotic segment measuring up to 600 cm/s with possible minimal/mild tardus parvus waveform abnormalitiesin the arcuate arteries. 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  Moderate to severe hydronephrosis which appears chronic, however now with layering debris in the intrarenal collecting system and bladder concerning for urinary tract infection.     Previous urine Cx in Dec 2023 + Klebsiella pneumonia, E coli ESBL in June     Antimicrobials:  Ceftriaxone X 1  Ertapenem 1/29-->    #UTI, R transplanted kidney pyelonephritis, Moderate to severe R hydronephrosis, transplant renal stenosis   #Fever, leukocytosis     Recommendations:   -Ceftriaxone 1 g IV daily   -Discontinue Ertapenem   -Discontinue Fluconazole   -Agree with US prostate   -F/up final Blood   -Monitor T curve and WBC trend     Discussed with Dr Casie Wolfe MD, PGY5  ID fellow  [employer] Teams Preferred  After 5pm/weekends call [phone]

## 2024-01-31 NOTE — PROGRESS NOTE ADULT - SUBJECTIVE AND OBJECTIVE BOX
Follow Up:      Interval History/ROS:Patient is a 73y old  Male who presents with a chief complaint of pain with urination (31 Jan 2024 14:09)  Seen at bedside, afebrile, feeling better, no new complaints     REVIEW OF SYSTEMS  [  ] ROS unobtainable because:    [ x ] All other systems negative except as noted below    Constitutional:  [ ] fever [ ] chills  [ ] weight loss  [ ]night sweat  [ ]poor appetite/PO intake [ ]fatigue   Skin:  [ ] rash [ ] phlebitis	  Eyes: [ ] icterus [ ] pain  [ ] discharge	  ENMT: [ ] sore throat  [ ] thrush [ ] ulcers [ ] exudates [ ]anosmia  Respiratory: [ ] dyspnea [ ] hemoptysis [ ] cough [ ] sputum	  Cardiovascular:  [ ] chest pain [ ] palpitations [ ] edema	  Gastrointestinal:  [ ] nausea [ ] vomiting [ ] diarrhea [ ] constipation [ ] pain	  Genitourinary:  [ ] dysuria [ ] frequency [ ] hematuria [ ] discharge [ ] flank pain  [ ] incontinence  Musculoskeletal:  [ ] myalgias [ ] arthralgias [ ] arthritis  [ ] back pain  Neurological:  [ ] headache [ ] weakness [ ] seizures  [ ] confusion/altered mental status    Allergies  No Known Allergies    ANTIMICROBIALS:    cefTRIAXone   IVPB 1000 every 24 hours    OTHER MEDS: MEDICATIONS  (STANDING):  acetaminophen     Tablet .. 650 every 6 hours PRN  carvedilol 12.5 every 12 hours  enoxaparin Injectable 40 every 24 hours  lisinopril 10 daily  tacrolimus 1 <User Schedule>  tamsulosin 0.4 at bedtime    Vital Signs Last 24 Hrs  T(F): 98 (01-31-24 @ 12:00), Max: 101.5 (01-29-24 @ 19:59)    Vital Signs Last 24 Hrs  HR: 66 (01-31-24 @ 12:00) (66 - 73)  BP: 142/57 (01-31-24 @ 12:00) (120/60 - 151/59)  RR: 18 (01-31-24 @ 12:00)  SpO2: 100% (01-31-24 @ 12:00) (98% - 100%)  Wt(kg): --    EXAM:    GA: NAD  HEENT: oral cavity no lesion  CV: nl S1/S2, no RMG  Lungs: CTAB, no distress  Abd: BS+, soft, nontender, no rebounding pain, surgical site well healed   Ext: no edema  Neuro: No focal deficits  Skin: Intact  IV: no phlebitis    Labs:                        9.4    6.02  )-----------( 178      ( 31 Jan 2024 09:27 )             29.9     01-31    139  |  103  |  25<H>  ----------------------------<  91  4.2   |  24  |  0.66    Ca    8.9      31 Jan 2024 09:27  Phos  3.0     01-31  Mg     1.8     01-31    WBC Trend:  WBC Count: 6.02 (01-31-24 @ 09:27)  WBC Count: 12.13 (01-30-24 @ 06:27)  WBC Count: 14.07 (01-29-24 @ 12:12)    Creatine Trend:  Creatinine: 0.66 (01-31)  Creatinine: 0.76 (01-30)  Creatinine: 0.83 (01-29)    Liver Biochemical Testing Trend:  Alanine Aminotransferase (ALT/SGPT): 11 (01-29)  Alanine Aminotransferase (ALT/SGPT): 16 (12-25)  Alanine Aminotransferase (ALT/SGPT): 15 (12-23)  Aspartate Aminotransferase (AST/SGOT): 13 (01-29-24 @ 12:12)  Aspartate Aminotransferase (AST/SGOT): 15 (12-25-23 @ 07:11)  Aspartate Aminotransferase (AST/SGOT): 20 (12-23-23 @ 17:55)  Bilirubin Total: 0.7 (01-29)  Bilirubin Total: 0.4 (12-25)  Bilirubin Total: 0.5 (12-23)    Trend LDH    Urinalysis Basic - ( 31 Jan 2024 09:27 )    Color: x / Appearance: x / SG: x / pH: x  Gluc: 91 mg/dL / Ketone: x  / Bili: x / Urobili: x   Blood: x / Protein: x / Nitrite: x   Leuk Esterase: x / RBC: x / WBC x   Sq Epi: x / Non Sq Epi: x / Bacteria: x    MICROBIOLOGY:    Culture - Urine (collected 29 Jan 2024 18:46)  Source: Clean Catch Clean Catch (Midstream)  Preliminary Report:    >100,000 CFU/ml Klebsiella pneumoniae    Culture - Urine (collected 29 Jan 2024 15:22)  Source: Clean Catch Clean Catch (Midstream)  Final Report:    >100,000 CFU/ml Klebsiella pneumoniae  Organism: Klebsiella pneumoniae  Organism: Klebsiella pneumoniae    Sensitivities:      -  Levofloxacin: S <=0.5      -  Tobramycin: S <=2      -  Nitrofurantoin: S <=32 Should not be used to treat pyelonephritis      -  Aztreonam: S <=4      -  Gentamicin: S <=2      -  Cefazolin: S <=2 For uncomplicated UTI with K. pneumoniae, E. coli, or P. mirablis: ERIC <=16 is sensitive and ERIC >=32 is resistant. This also predicts results for oral agents cefaclor, cefdinir, cefpodoxime, cefprozil, cefuroxime axetil, cephalexin and locarbef for uncomplicated UTI. Note that some isolates may be susceptible to these agents while testing resistant to cefazolin.      -  Cefepime: S <=2      -  Piperacillin/Tazobactam: S <=8      -  Ciprofloxacin: S <=0.25      -  Imipenem: S <=1      -  Ceftriaxone: S <=1      -  Ampicillin: R >16 These ampicillin results predict results for amoxicillin      Method Type: ERIC      -  Meropenem: S <=1      -  Ampicillin/Sulbactam: S <=4/2      -  Cefoxitin: S <=8      -  Cefuroxime: S <=4      -  Amoxicillin/Clavulanic Acid: S <=8/4      -  Trimethoprim/Sulfamethoxazole: S <=0.5/9.5      -  Ertapenem: S <=0.5    Culture - Blood (collected 27 Dec 2023 16:23)  Source: .Blood Blood  Final Report:    No growth at 5 days    Culture - Blood (collected 27 Dec 2023 16:00)  Source: .Blood Blood  Final Report:    No growth at 5 days    Culture - Urine (collected 23 Dec 2023 19:52)  Source: Clean Catch Clean Catch (Midstream)  Final Report:    >100,000 CFU/ml Klebsiella pneumoniae    <10,000 CFU/ml Normal Urogenital guicho present  Organism: Klebsiella pneumoniae  Organism: Klebsiella pneumoniae    Sensitivities:      -  Levofloxacin: S <=0.5      -  Tobramycin: S <=2      -  Nitrofurantoin: S <=32 Should not be used to treat pyelonephritis      -  Aztreonam: S <=4      -  Gentamicin: S <=2      -  Cefazolin: S <=2 For uncomplicated UTI with K. pneumoniae, E. coli, or P. mirablis: ERIC <=16 is sensitive and ERIC >=32 is resistant. This also predicts results for oral agents cefaclor, cefdinir, cefpodoxime, cefprozil, cefuroxime axetil, cephalexin and locarbef for uncomplicated UTI. Note that some isolates may be susceptible to these agents while testing resistant to cefazolin.      -  Cefepime: S <=2      -  Piperacillin/Tazobactam: S <=8      -  Ciprofloxacin: S <=0.25      -  Imipenem: S <=1      -  Ceftriaxone: S <=1      -  Ampicillin: R 16 These ampicillin results predict results for amoxicillin      Method Type: ERIC      -  Meropenem: S <=1      -  Ampicillin/Sulbactam: S <=4/2      -  Cefoxitin: S <=8      -  Cefuroxime: S <=4      -  Amoxicillin/Clavulanic Acid: S <=8/4      -  Trimethoprim/Sulfamethoxazole: S <=0.5/9.5      -  Ertapenem: S <=0.5    Blood Gas Venous - Lactate: 1.8 (01-29 @ 11:50)    RADIOLOGY:  imaging below personally reviewed

## 2024-01-31 NOTE — PROGRESS NOTE ADULT - SUBJECTIVE AND OBJECTIVE BOX
Patient is a 73y old  Male who presents with a chief complaint of pain with urination (30 Jan 2024 15:27)    Nephrology- NSN    Subjective:  Doing well today, no new complaints. feels great.     PAST MEDICAL & SURGICAL HISTORY:  HTN (hypertension)      Renal insufficiency      Prostate cancer      BPH (benign prostatic hyperplasia)      Renal transplant, status post      S/P placement of cardiac pacemaker          MEDICATIONS  (STANDING):  carvedilol 12.5 milliGRAM(s) Oral every 12 hours  enoxaparin Injectable 40 milliGRAM(s) SubCutaneous every 24 hours  ertapenem  IVPB 1000 milliGRAM(s) IV Intermittent every 24 hours  fluconAZOLE   Tablet 200 milliGRAM(s) Oral daily  lisinopril 10 milliGRAM(s) Oral daily  tacrolimus 1 milliGRAM(s) Oral every 12 hours  tamsulosin 0.4 milliGRAM(s) Oral at bedtime      Allergies    No Known Allergies    Intolerances        SOCIAL HISTORY:  Denies ETOh,Smoking,     FAMILY HISTORY:  FH: diabetes mellitus        REVIEW OF SYSTEMS:  negative unless stated otherwise in HPI    VITAL:  T(C): , Max: 38.6 (01-29-24 @ 19:59)  T(F): , Max: 101.5 (01-29-24 @ 19:59)  HR: 74 (01-30-24 @ 11:00)  BP: 115/50 (01-30-24 @ 11:00)  BP(mean): --  RR: 16 (01-30-24 @ 11:00)  SpO2: 99% (01-30-24 @ 11:00)  Wt(kg): --    PHYSICAL EXAM:  Constitutional: NAD, Alert  HEENT: NCAT, MMM  Neck: Supple, No JVD  Respiratory: CTA-b/l  Cardiovascular: RRR s1s2, no m/r/g  Gastrointestinal: BS+, soft, NT/ND  Extremities: No peripheral edema b/l  Neurological: no focal deficits; strength grossly intact  Back: no CVAT b/l  Skin: No rashes, no nevi    LABS:                        10.1   12.13 )-----------( 160      ( 30 Jan 2024 06:27 )             30.7     Na(135)/K(4.2)/Cl(102)/HCO3(23)/BUN(26)/Cr(0.76)Glu(116)/Ca(8.4)/Mg(--)/PO4(--)    01-30 @ 06:27  Na(134)/K(4.4)/Cl(98)/HCO3(25)/BUN(27)/Cr(0.83)Glu(110)/Ca(8.9)/Mg(--)/PO4(--)    01-29 @ 12:12    Urinalysis Basic - ( 30 Jan 2024 06:27 )    Color: x / Appearance: x / SG: x / pH: x  Gluc: 116 mg/dL / Ketone: x  / Bili: x / Urobili: x   Blood: x / Protein: x / Nitrite: x   Leuk Esterase: x / RBC: x / WBC x   Sq Epi: x / Non Sq Epi: x / Bacteria: x            IMAGING:    ASSESSMENT:  73 year-old male with past medical history hypertension, prostate cancer s/p radiation, BPH, end-stage renal disease status post renal transplant 2017 at [place], recent hospitalization for pyelonephritis and [place], now presents with dysuria and suprapubic pain found to have complicated UTI    Renal txp  -BL cr approx 0.7-0.9  -txp in 2017 at [place]    Allograft ARSLAN/hydro  -as seen on doppler US w/elevated velocities >600  -BP controlled, RF preserved  -no intervention per txp nephrology, repeat US in 6mo as OP  -chronic hydro, RF preserved    High risk immunosuppression  -Home Rx Tac 1mg q12h,  q12h  -MMF on hold for UTI    UTI (complicated)   -on ertapenem and diflucan    HTN  -controlled, continue current regimen    volume status: Euvolemic  Electrolytes: WNL    RECOMMEND:  -a/w holding MMF for now   -c/w tacrolimus 1mg q12h, will adjust medication timing as well as trough timing. trough was not true today. needs to be taken within 1h prior of AM dose. timing of administration and AM tac levels adjusted  -daily FK trough> f/u from today  -c/w ertapenem and diflucan  -repeat allograft doppler in 6mo  -monitor BP and RF  -avoid nephrotoxins  -dose Rx for CrCl >60  -appreciate txp nephrology input    Thank you for involving [facility] in this patient's care.    With warm regards,    Nakul Trejo DO   [facility]  Office: [phone]

## 2024-01-31 NOTE — PROGRESS NOTE ADULT - SUBJECTIVE AND OBJECTIVE BOX
Patient is a 73y old  Male who presents with a chief complaint of pain with urination (31 Jan 2024 12:09)      SUBJECTIVE / OVERNIGHT EVENTS: Patient seen and examined at bedside. Reports no acute complaints. Patient would like to contact outpatient urologist to see if there is a concern that his prostate CA is related to his UTI and if there is any inpatient workup to be done.    MEDICATIONS  (STANDING):  carvedilol 12.5 milliGRAM(s) Oral every 12 hours  enoxaparin Injectable 40 milliGRAM(s) SubCutaneous every 24 hours  ertapenem  IVPB 1000 milliGRAM(s) IV Intermittent every 24 hours  fluconAZOLE   Tablet 200 milliGRAM(s) Oral daily  lisinopril 10 milliGRAM(s) Oral daily  tacrolimus 1 milliGRAM(s) Oral every 12 hours  tamsulosin 0.4 milliGRAM(s) Oral at bedtime    MEDICATIONS  (PRN):  acetaminophen     Tablet .. 650 milliGRAM(s) Oral every 6 hours PRN Temp greater or equal to 38C (100.4F), Mild Pain (1 - 3)      CAPILLARY BLOOD GLUCOSE        I&O's Summary      Vital Signs Last 24 Hrs  T(C): 36.7 (31 Jan 2024 12:00), Max: 37.6 (30 Jan 2024 18:00)  T(F): 98 (31 Jan 2024 12:00), Max: 99.6 (30 Jan 2024 18:00)  HR: 66 (31 Jan 2024 12:00) (66 - 74)  BP: 142/57 (31 Jan 2024 12:00) (120/60 - 151/59)  BP(mean): --  RR: 18 (31 Jan 2024 12:00) (17 - 18)  SpO2: 100% (31 Jan 2024 12:00) (98% - 100%)    Parameters below as of 31 Jan 2024 12:00  Patient On (Oxygen Delivery Method): room air        PHYSICAL EXAM:    HEAD: Atraumatic, Normocephalic  EYES: EOMI, PERRLA, conjunctiva and sclera clear  CHEST/LUNG: Clear to auscultation bilaterally; No wheezes or crackles  HEART: Normal S1/S2; Regular rate and rhythm; No murmurs, rubs, or gallops  ABDOMEN: Soft, Nontender, Nondistended;   EXTREMITIES: 2+ Peripheral Pulses; No clubbing, cyanosis, or edema  PSYCH: A&Ox3  NEUROLOGY: no focal neurologic deficit  SKIN: No rashes or lesions    LABS:                        9.4    6.02  )-----------( 178      ( 31 Jan 2024 09:27 )             29.9      01-31    139  |  103  |  25<H>  ----------------------------<  91  4.2   |  24  |  0.66    Ca    8.9      31 Jan 2024 09:27  Phos  3.0     01-31  Mg     1.8     01-31            Urinalysis Basic - ( 31 Jan 2024 09:27 )    Color: x / Appearance: x / SG: x / pH: x  Gluc: 91 mg/dL / Ketone: x  / Bili: x / Urobili: x   Blood: x / Protein: x / Nitrite: x   Leuk Esterase: x / RBC: x / WBC x   Sq Epi: x / Non Sq Epi: x / Bacteria: x        RADIOLOGY & ADDITIONAL TESTS:    Imaging Personally Reviewed:    Consultant(s) Notes Reviewed:      Care Discussed with Consultants/Other Providers:

## 2024-02-01 ENCOUNTER — TRANSCRIPTION ENCOUNTER (OUTPATIENT)
Age: 74
End: 2024-02-01

## 2024-02-01 VITALS
RESPIRATION RATE: 18 BRPM | SYSTOLIC BLOOD PRESSURE: 130 MMHG | HEART RATE: 70 BPM | OXYGEN SATURATION: 97 % | DIASTOLIC BLOOD PRESSURE: 58 MMHG | TEMPERATURE: 98 F

## 2024-02-01 LAB
-  AMOXICILLIN/CLAVULANIC ACID: SIGNIFICANT CHANGE UP
-  AMPICILLIN/SULBACTAM: SIGNIFICANT CHANGE UP
-  AMPICILLIN: SIGNIFICANT CHANGE UP
-  AZTREONAM: SIGNIFICANT CHANGE UP
-  CEFAZOLIN: SIGNIFICANT CHANGE UP
-  CEFEPIME: SIGNIFICANT CHANGE UP
-  CEFOXITIN: SIGNIFICANT CHANGE UP
-  CEFTRIAXONE: SIGNIFICANT CHANGE UP
-  CEFUROXIME: SIGNIFICANT CHANGE UP
-  CIPROFLOXACIN: SIGNIFICANT CHANGE UP
-  ERTAPENEM: SIGNIFICANT CHANGE UP
-  GENTAMICIN: SIGNIFICANT CHANGE UP
-  IMIPENEM: SIGNIFICANT CHANGE UP
-  LEVOFLOXACIN: SIGNIFICANT CHANGE UP
-  MEROPENEM: SIGNIFICANT CHANGE UP
-  NITROFURANTOIN: SIGNIFICANT CHANGE UP
-  PIPERACILLIN/TAZOBACTAM: SIGNIFICANT CHANGE UP
-  TOBRAMYCIN: SIGNIFICANT CHANGE UP
-  TRIMETHOPRIM/SULFAMETHOXAZOLE: SIGNIFICANT CHANGE UP
ALBUMIN SERPL ELPH-MCNC: 3.3 G/DL — SIGNIFICANT CHANGE UP (ref 3.3–5)
ALP SERPL-CCNC: 56 U/L — SIGNIFICANT CHANGE UP (ref 40–120)
ALT FLD-CCNC: 8 U/L — LOW (ref 10–45)
ANION GAP SERPL CALC-SCNC: 9 MMOL/L — SIGNIFICANT CHANGE UP (ref 5–17)
AST SERPL-CCNC: 13 U/L — SIGNIFICANT CHANGE UP (ref 10–40)
BASOPHILS # BLD AUTO: 0.03 K/UL — SIGNIFICANT CHANGE UP (ref 0–0.2)
BASOPHILS NFR BLD AUTO: 0.7 % — SIGNIFICANT CHANGE UP (ref 0–2)
BILIRUB SERPL-MCNC: 0.3 MG/DL — SIGNIFICANT CHANGE UP (ref 0.2–1.2)
BUN SERPL-MCNC: 22 MG/DL — SIGNIFICANT CHANGE UP (ref 7–23)
CALCIUM SERPL-MCNC: 8.8 MG/DL — SIGNIFICANT CHANGE UP (ref 8.4–10.5)
CHLORIDE SERPL-SCNC: 101 MMOL/L — SIGNIFICANT CHANGE UP (ref 96–108)
CO2 SERPL-SCNC: 24 MMOL/L — SIGNIFICANT CHANGE UP (ref 22–31)
CREAT SERPL-MCNC: 0.68 MG/DL — SIGNIFICANT CHANGE UP (ref 0.5–1.3)
CULTURE RESULTS: ABNORMAL
EGFR: 97 ML/MIN/1.73M2 — SIGNIFICANT CHANGE UP
EOSINOPHIL # BLD AUTO: 0.02 K/UL — SIGNIFICANT CHANGE UP (ref 0–0.5)
EOSINOPHIL NFR BLD AUTO: 0.5 % — SIGNIFICANT CHANGE UP (ref 0–6)
GLUCOSE SERPL-MCNC: 98 MG/DL — SIGNIFICANT CHANGE UP (ref 70–99)
HCT VFR BLD CALC: 31.2 % — LOW (ref 39–50)
HGB BLD-MCNC: 10.1 G/DL — LOW (ref 13–17)
IMM GRANULOCYTES NFR BLD AUTO: 0.2 % — SIGNIFICANT CHANGE UP (ref 0–0.9)
LYMPHOCYTES # BLD AUTO: 1.14 K/UL — SIGNIFICANT CHANGE UP (ref 1–3.3)
LYMPHOCYTES # BLD AUTO: 26.5 % — SIGNIFICANT CHANGE UP (ref 13–44)
MAGNESIUM SERPL-MCNC: 1.8 MG/DL — SIGNIFICANT CHANGE UP (ref 1.6–2.6)
MCHC RBC-ENTMCNC: 31.9 PG — SIGNIFICANT CHANGE UP (ref 27–34)
MCHC RBC-ENTMCNC: 32.4 GM/DL — SIGNIFICANT CHANGE UP (ref 32–36)
MCV RBC AUTO: 98.4 FL — SIGNIFICANT CHANGE UP (ref 80–100)
METHOD TYPE: SIGNIFICANT CHANGE UP
MONOCYTES # BLD AUTO: 0.62 K/UL — SIGNIFICANT CHANGE UP (ref 0–0.9)
MONOCYTES NFR BLD AUTO: 14.4 % — HIGH (ref 2–14)
MRSA PCR RESULT.: SIGNIFICANT CHANGE UP
NEUTROPHILS # BLD AUTO: 2.48 K/UL — SIGNIFICANT CHANGE UP (ref 1.8–7.4)
NEUTROPHILS NFR BLD AUTO: 57.7 % — SIGNIFICANT CHANGE UP (ref 43–77)
NRBC # BLD: 0 /100 WBCS — SIGNIFICANT CHANGE UP (ref 0–0)
ORGANISM # SPEC MICROSCOPIC CNT: ABNORMAL
ORGANISM # SPEC MICROSCOPIC CNT: ABNORMAL
PHOSPHATE SERPL-MCNC: 3.7 MG/DL — SIGNIFICANT CHANGE UP (ref 2.5–4.5)
PLATELET # BLD AUTO: 185 K/UL — SIGNIFICANT CHANGE UP (ref 150–400)
POTASSIUM SERPL-MCNC: 4.5 MMOL/L — SIGNIFICANT CHANGE UP (ref 3.5–5.3)
POTASSIUM SERPL-SCNC: 4.5 MMOL/L — SIGNIFICANT CHANGE UP (ref 3.5–5.3)
PROT SERPL-MCNC: 7 G/DL — SIGNIFICANT CHANGE UP (ref 6–8.3)
PSA FLD-MCNC: 1.71 NG/ML — SIGNIFICANT CHANGE UP (ref 0–4)
RBC # BLD: 3.17 M/UL — LOW (ref 4.2–5.8)
RBC # FLD: 11.8 % — SIGNIFICANT CHANGE UP (ref 10.3–14.5)
S AUREUS DNA NOSE QL NAA+PROBE: SIGNIFICANT CHANGE UP
SODIUM SERPL-SCNC: 134 MMOL/L — LOW (ref 135–145)
SPECIMEN SOURCE: SIGNIFICANT CHANGE UP
TACROLIMUS SERPL-MCNC: 16.6 NG/ML — SIGNIFICANT CHANGE UP
WBC # BLD: 4.3 K/UL — SIGNIFICANT CHANGE UP (ref 3.8–10.5)
WBC # FLD AUTO: 4.3 K/UL — SIGNIFICANT CHANGE UP (ref 3.8–10.5)

## 2024-02-01 PROCEDURE — 99239 HOSP IP/OBS DSCHRG MGMT >30: CPT

## 2024-02-01 PROCEDURE — 76872 US TRANSRECTAL: CPT

## 2024-02-01 PROCEDURE — 87086 URINE CULTURE/COLONY COUNT: CPT

## 2024-02-01 PROCEDURE — 87040 BLOOD CULTURE FOR BACTERIA: CPT

## 2024-02-01 PROCEDURE — 80197 ASSAY OF TACROLIMUS: CPT

## 2024-02-01 PROCEDURE — 82330 ASSAY OF CALCIUM: CPT

## 2024-02-01 PROCEDURE — 82435 ASSAY OF BLOOD CHLORIDE: CPT

## 2024-02-01 PROCEDURE — 96375 TX/PRO/DX INJ NEW DRUG ADDON: CPT

## 2024-02-01 PROCEDURE — 87641 MR-STAPH DNA AMP PROBE: CPT

## 2024-02-01 PROCEDURE — 84100 ASSAY OF PHOSPHORUS: CPT

## 2024-02-01 PROCEDURE — 85018 HEMOGLOBIN: CPT

## 2024-02-01 PROCEDURE — 83735 ASSAY OF MAGNESIUM: CPT

## 2024-02-01 PROCEDURE — 96365 THER/PROPH/DIAG IV INF INIT: CPT

## 2024-02-01 PROCEDURE — 82803 BLOOD GASES ANY COMBINATION: CPT

## 2024-02-01 PROCEDURE — 99233 SBSQ HOSP IP/OBS HIGH 50: CPT | Mod: GC

## 2024-02-01 PROCEDURE — 85025 COMPLETE CBC W/AUTO DIFF WBC: CPT

## 2024-02-01 PROCEDURE — 87077 CULTURE AEROBIC IDENTIFY: CPT

## 2024-02-01 PROCEDURE — 76872 US TRANSRECTAL: CPT | Mod: 26

## 2024-02-01 PROCEDURE — 83605 ASSAY OF LACTIC ACID: CPT

## 2024-02-01 PROCEDURE — 84132 ASSAY OF SERUM POTASSIUM: CPT

## 2024-02-01 PROCEDURE — 99285 EMERGENCY DEPT VISIT HI MDM: CPT

## 2024-02-01 PROCEDURE — 85014 HEMATOCRIT: CPT

## 2024-02-01 PROCEDURE — 87640 STAPH A DNA AMP PROBE: CPT

## 2024-02-01 PROCEDURE — 80053 COMPREHEN METABOLIC PANEL: CPT

## 2024-02-01 PROCEDURE — 76776 US EXAM K TRANSPL W/DOPPLER: CPT

## 2024-02-01 PROCEDURE — 87186 SC STD MICRODIL/AGAR DIL: CPT

## 2024-02-01 PROCEDURE — 80048 BASIC METABOLIC PNL TOTAL CA: CPT

## 2024-02-01 PROCEDURE — G0103: CPT

## 2024-02-01 PROCEDURE — 36415 COLL VENOUS BLD VENIPUNCTURE: CPT

## 2024-02-01 PROCEDURE — 84295 ASSAY OF SERUM SODIUM: CPT

## 2024-02-01 PROCEDURE — 82947 ASSAY GLUCOSE BLOOD QUANT: CPT

## 2024-02-01 PROCEDURE — 81001 URINALYSIS AUTO W/SCOPE: CPT

## 2024-02-01 RX ORDER — CIPROFLOXACIN LACTATE 400MG/40ML
1 VIAL (ML) INTRAVENOUS
Qty: 20 | Refills: 0
Start: 2024-02-01 | End: 2024-02-10

## 2024-02-01 RX ORDER — CIPROFLOXACIN LACTATE 400MG/40ML
1 VIAL (ML) INTRAVENOUS
Qty: 28 | Refills: 0
Start: 2024-02-01 | End: 2024-02-14

## 2024-02-01 RX ORDER — CIPROFLOXACIN LACTATE 400MG/40ML
1 VIAL (ML) INTRAVENOUS
Qty: 14 | Refills: 0
Start: 2024-02-01 | End: 2024-02-07

## 2024-02-01 RX ORDER — MAGNESIUM SULFATE 500 MG/ML
1 VIAL (ML) INJECTION ONCE
Refills: 0 | Status: COMPLETED | OUTPATIENT
Start: 2024-02-01 | End: 2024-02-01

## 2024-02-01 RX ADMIN — CHLORHEXIDINE GLUCONATE 1 APPLICATION(S): 213 SOLUTION TOPICAL at 11:04

## 2024-02-01 RX ADMIN — Medication 1 ENEMA: at 12:28

## 2024-02-01 RX ADMIN — LISINOPRIL 10 MILLIGRAM(S): 2.5 TABLET ORAL at 05:55

## 2024-02-01 RX ADMIN — CARVEDILOL PHOSPHATE 12.5 MILLIGRAM(S): 80 CAPSULE, EXTENDED RELEASE ORAL at 05:55

## 2024-02-01 RX ADMIN — CEFTRIAXONE 100 MILLIGRAM(S): 500 INJECTION, POWDER, FOR SOLUTION INTRAMUSCULAR; INTRAVENOUS at 17:03

## 2024-02-01 RX ADMIN — TACROLIMUS 1 MILLIGRAM(S): 5 CAPSULE ORAL at 08:02

## 2024-02-01 RX ADMIN — ENOXAPARIN SODIUM 40 MILLIGRAM(S): 100 INJECTION SUBCUTANEOUS at 17:04

## 2024-02-01 RX ADMIN — Medication 100 GRAM(S): at 11:04

## 2024-02-01 RX ADMIN — CARVEDILOL PHOSPHATE 12.5 MILLIGRAM(S): 80 CAPSULE, EXTENDED RELEASE ORAL at 17:04

## 2024-02-01 NOTE — DISCHARGE NOTE PROVIDER - NSDCCPCAREPLAN_GEN_ALL_CORE_FT
PRINCIPAL DISCHARGE DIAGNOSIS  Diagnosis: Complicated urinary tract infection  Assessment and Plan of Treatment: You were admitted for complicated urinary tract infection. You were started on intravenous antibiotics which were then deescalated to an antibiotic that the bacteria in your urinary was sensitive to. Your urine cultures grew Klebsiella, which is the same type of bacteria that caused prior urinary tract infections.   During admission a prostate ultrasound was performed which showed.  We talked to your urologist and he reported you recently had an MRI and he will continue follow up on his office. He did not recommended further testing during admission.   You remained stable and were discahrged home with ***  Please follow up with your urolgist as outpatient.     PRINCIPAL DISCHARGE DIAGNOSIS  Diagnosis: Complicated urinary tract infection  Assessment and Plan of Treatment: You were admitted for complicated urinary tract infection. You were started on intravenous antibiotics which were then deescalated to an antibiotic that the bacteria in your urinary was sensitive to. Your urine cultures grew Klebsiella, which is the same type of bacteria that caused prior urinary tract infections.   During admission a prostate ultrasound was performed which showed.  We talked to your urologist and he reported you recently had an MRI and he will continue follow up on his office. He did not recommended further testing during admission.   You remained stable and were discharged home with ***  Please follow up with your urologist as outpatient.  Please follow up with your nephrologist in 6 months for an ultrasound to monitor your renal artery stenosis.     PRINCIPAL DISCHARGE DIAGNOSIS  Diagnosis: Complicated urinary tract infection  Assessment and Plan of Treatment: You were admitted for complicated urinary tract infection. You were started on intravenous antibiotics which were then deescalated to an antibiotic that the bacteria in your urinary was sensitive to. Your urine cultures grew Klebsiella, which is the same type of bacteria that caused prior urinary tract infections.   During admission a prostate ultrasound was performed which showed.  We talked to your urologist and he reported you recently had an MRI and he will continue follow up on his office. He did not recommended further testing during admission.   You remained stable and were discharged home with Ciprofloxacin 500mg oral twice a day for 14 days.   Please follow up with your urologist as outpatient.  Please follow up with your nephrologist in 6 months for an ultrasound to monitor your renal artery stenosis.     PRINCIPAL DISCHARGE DIAGNOSIS  Diagnosis: Complicated urinary tract infection  Assessment and Plan of Treatment: You were admitted for complicated urinary tract infection. You were started on intravenous antibiotics which were then deescalated to an antibiotic that the bacteria in your urinary was sensitive to. Your urine cultures grew Klebsiella, which is the same type of bacteria that caused prior urinary tract infections.   During admission a prostate ultrasound was performed which showed.  We talked to your urologist and he reported you recently had an MRI and he will continue follow up on his office. He did not recommended further testing during admission.   You remained stable and were discharged home with Ciprofloxacin 500mg oral twice a day for 12 days.   Please follow up with your urologist as outpatient.  Please follow up with your nephrologist in 6 months for an ultrasound to monitor your renal artery stenosis.

## 2024-02-01 NOTE — PROGRESS NOTE ADULT - PROBLEM SELECTOR PLAN 2
- will hold Cellcept during active infection  - will continue Tacro 1mg BID, monitor tacro levels daily   - renal US showing signs of ARSLAN   - transplant renal consult for further recs - will hold Cellcept during active infection  - will continue Tacro 1mg BID, monitor tacro levels daily   - tacro levels not being drawn accurately. would see increase in SCr if true concern for toxicity. Pt cleared to go home without need to recheck tacro level tomorrow AM  - renal US showing signs of ARSLAN   - transplant renal consult for further recs

## 2024-02-01 NOTE — DISCHARGE NOTE PROVIDER - NPI NUMBER (FOR SYSADMIN USE ONLY) :
[[id number]] no abdominal pain, no bloating, no constipation, no diarrhea, no nausea and no vomiting.

## 2024-02-01 NOTE — PROGRESS NOTE ADULT - PROBLEM SELECTOR PROBLEM 1
Complicated urinary tract infection

## 2024-02-01 NOTE — PROGRESS NOTE ADULT - PROBLEM SELECTOR PLAN 3
- will continue Coreg and Lisinopril with holding parameters

## 2024-02-01 NOTE — PROGRESS NOTE ADULT - SUBJECTIVE AND OBJECTIVE BOX
Follow Up:      Interval History/ROS:Patient is a 73y old  Male who presents with a chief complaint of pain with urination (01 Feb 2024 12:03)  ID following for UTI.  Seen at bedside, feeling better, no new complaints.  Prostate US no evidence of prostate abscess      REVIEW OF SYSTEMS  [  ] ROS unobtainable because:    [ x ] All other systems negative except as noted below    Constitutional:  [ ] fever [ ] chills  [ ] weight loss  [ ]night sweat  [ ]poor appetite/PO intake [ ]fatigue   Skin:  [ ] rash [ ] phlebitis	  Eyes: [ ] icterus [ ] pain  [ ] discharge	  ENMT: [ ] sore throat  [ ] thrush [ ] ulcers [ ] exudates [ ]anosmia  Respiratory: [ ] dyspnea [ ] hemoptysis [ ] cough [ ] sputum	  Cardiovascular:  [ ] chest pain [ ] palpitations [ ] edema	  Gastrointestinal:  [ ] nausea [ ] vomiting [ ] diarrhea [ ] constipation [ ] pain	  Genitourinary:  [ ] dysuria [ ] frequency [ ] hematuria [ ] discharge [ ] flank pain  [ ] incontinence  Musculoskeletal:  [ ] myalgias [ ] arthralgias [ ] arthritis  [ ] back pain  Neurological:  [ ] headache [ ] weakness [ ] seizures  [ ] confusion/altered mental status    Allergies  No Known Allergies    ANTIMICROBIALS:    cefTRIAXone   IVPB 1000 every 24 hours    OTHER MEDS: MEDICATIONS  (STANDING):  acetaminophen     Tablet .. 650 every 6 hours PRN  carvedilol 12.5 every 12 hours  enoxaparin Injectable 40 every 24 hours  lisinopril 10 daily  tacrolimus 1 <User Schedule>  tamsulosin 0.4 at bedtime    Vital Signs Last 24 Hrs  T(F): 97.8 (02-01-24 @ 14:03), Max: 101.5 (01-29-24 @ 19:59)    Vital Signs Last 24 Hrs  HR: 70 (02-01-24 @ 14:03) (66 - 73)  BP: 130/58 (02-01-24 @ 14:03) (130/58 - 150/66)  RR: 18 (02-01-24 @ 14:03)  SpO2: 97% (02-01-24 @ 14:03) (97% - 100%)  Wt(kg): --    EXAM:    GA: NAD  HEENT: oral cavity no lesion  CV: nl S1/S2, no RMG  Lungs: CTAB, no distress  Abd: BS+, soft, nontender, no rebounding pain, surgical site well healed  Ext: no edema  Neuro: No focal deficits  Skin: Intact  IV: no phlebitis    Labs:                        10.1   4.30  )-----------( 185      ( 01 Feb 2024 07:00 )             31.2     02-01    134<L>  |  101  |  22  ----------------------------<  98  4.5   |  24  |  0.68    Ca    8.8      01 Feb 2024 06:57  Phos  3.7     02-01  Mg     1.8     02-01    TPro  7.0  /  Alb  3.3  /  TBili  0.3  /  DBili  x   /  AST  13  /  ALT  8<L>  /  AlkPhos  56  02-01    WBC Trend:  WBC Count: 4.30 (02-01-24 @ 07:00)  WBC Count: 6.02 (01-31-24 @ 09:27)  WBC Count: 12.13 (01-30-24 @ 06:27)  WBC Count: 14.07 (01-29-24 @ 12:12)    Creatine Trend:  Creatinine: 0.68 (02-01)  Creatinine: 0.66 (01-31)  Creatinine: 0.76 (01-30)  Creatinine: 0.83 (01-29)    Liver Biochemical Testing Trend:  Alanine Aminotransferase (ALT/SGPT): 8 *L* (02-01)  Alanine Aminotransferase (ALT/SGPT): 11 (01-29)  Alanine Aminotransferase (ALT/SGPT): 16 (12-25)  Alanine Aminotransferase (ALT/SGPT): 15 (12-23)  Aspartate Aminotransferase (AST/SGOT): 13 (02-01-24 @ 06:57)  Aspartate Aminotransferase (AST/SGOT): 13 (01-29-24 @ 12:12)  Aspartate Aminotransferase (AST/SGOT): 15 (12-25-23 @ 07:11)  Aspartate Aminotransferase (AST/SGOT): 20 (12-23-23 @ 17:55)  Bilirubin Total: 0.3 (02-01)  Bilirubin Total: 0.7 (01-29)  Bilirubin Total: 0.4 (12-25)  Bilirubin Total: 0.5 (12-23)    Trend LDH    Urinalysis Basic - ( 01 Feb 2024 06:57 )    Color: x / Appearance: x / SG: x / pH: x  Gluc: 98 mg/dL / Ketone: x  / Bili: x / Urobili: x   Blood: x / Protein: x / Nitrite: x   Leuk Esterase: x / RBC: x / WBC x   Sq Epi: x / Non Sq Epi: x / Bacteria: x    MICROBIOLOGY:      Culture - Blood (collected 31 Jan 2024 06:45)  Source: .Blood Blood-Venous  Preliminary Report:    No growth at 24 hours    Culture - Blood (collected 31 Jan 2024 01:50)  Source: .Blood Blood-Venous  Preliminary Report:    No growth at 24 hours    Culture - Urine (collected 29 Jan 2024 18:46)  Source: Clean Catch Clean Catch (Midstream)  Final Report:    >100,000 CFU/ml Klebsiella pneumoniae  Organism: Klebsiella pneumoniae  Organism: Klebsiella pneumoniae    Sensitivities:      -  Levofloxacin: S <=0.5      -  Tobramycin: S <=2      -  Nitrofurantoin: S <=32 Should not be used to treat pyelonephritis      -  Aztreonam: S <=4      -  Gentamicin: S <=2      -  Cefepime: S <=2      -  Cefazolin: S <=2 For uncomplicated UTI with K. pneumoniae, E. coli, or P. mirablis: ERIC <=16 is sensitive and ERIC >=32 is resistant. This also predicts results for oral agents cefaclor, cefdinir, cefpodoxime, cefprozil, cefuroxime axetil, cephalexin and locarbef for uncomplicated UTI. Note that some isolates may be susceptible to these agents while testing resistant to cefazolin.      -  Piperacillin/Tazobactam: S <=8      -  Ciprofloxacin: S <=0.25      -  Imipenem: S <=1      -  Ceftriaxone: S <=1      -  Ampicillin: R >16 These ampicillin results predict results for amoxicillin      Method Type: ERIC      -  Meropenem: S <=1      -  Ampicillin/Sulbactam: S <=4/2      -  Cefoxitin: S <=8      -  Cefuroxime: S <=4      -  Amoxicillin/Clavulanic Acid: S <=8/4      -  Trimethoprim/Sulfamethoxazole: S <=0.5/9.5      -  Ertapenem: S <=0.5    Culture - Urine (collected 29 Jan 2024 15:22)  Source: Clean Catch Clean Catch (Midstream)  Final Report:    >100,000 CFU/ml Klebsiella pneumoniae  Organism: Klebsiella pneumoniae  Organism: Klebsiella pneumoniae    Sensitivities:      -  Levofloxacin: S <=0.5      -  Tobramycin: S <=2      -  Nitrofurantoin: S <=32 Should not be used to treat pyelonephritis      -  Aztreonam: S <=4      -  Gentamicin: S <=2      -  Cefazolin: S <=2 For uncomplicated UTI with K. pneumoniae, E. coli, or P. mirablis: ERIC <=16 is sensitive and ERIC >=32 is resistant. This also predicts results for oral agents cefaclor, cefdinir, cefpodoxime, cefprozil, cefuroxime axetil, cephalexin and locarbef for uncomplicated UTI. Note that some isolates may be susceptible to these agents while testing resistant to cefazolin.      -  Cefepime: S <=2      -  Piperacillin/Tazobactam: S <=8      -  Ciprofloxacin: S <=0.25      -  Imipenem: S <=1      -  Ceftriaxone: S <=1      -  Ampicillin: R >16 These ampicillin results predict results for amoxicillin      Method Type: ERIC      -  Meropenem: S <=1      -  Ampicillin/Sulbactam: S <=4/2      -  Cefoxitin: S <=8      -  Cefuroxime: S <=4      -  Amoxicillin/Clavulanic Acid: S <=8/4      -  Trimethoprim/Sulfamethoxazole: S <=0.5/9.5      -  Ertapenem: S <=0.5    Culture - Blood (collected 27 Dec 2023 16:23)  Source: .Blood Blood  Final Report:    No growth at 5 days    Culture - Blood (collected 27 Dec 2023 16:00)  Source: .Blood Blood  Final Report:    No growth at 5 days    Culture - Urine (collected 23 Dec 2023 19:52)  Source: Clean Catch Clean Catch (Midstream)  Final Report:    >100,000 CFU/ml Klebsiella pneumoniae    <10,000 CFU/ml Normal Urogenital guicho present  Organism: Klebsiella pneumoniae  Organism: Klebsiella pneumoniae    Sensitivities:      -  Levofloxacin: S <=0.5      -  Tobramycin: S <=2      -  Nitrofurantoin: S <=32 Should not be used to treat pyelonephritis      -  Aztreonam: S <=4      -  Gentamicin: S <=2      -  Cefazolin: S <=2 For uncomplicated UTI with K. pneumoniae, E. coli, or P. mirablis: ERIC <=16 is sensitive and ERIC >=32 is resistant. This also predicts results for oral agents cefaclor, cefdinir, cefpodoxime, cefprozil, cefuroxime axetil, cephalexin and locarbef for uncomplicated UTI. Note that some isolates may be susceptible to these agents while testing resistant to cefazolin.      -  Cefepime: S <=2      -  Piperacillin/Tazobactam: S <=8      -  Ciprofloxacin: S <=0.25      -  Imipenem: S <=1      -  Ceftriaxone: S <=1      -  Ampicillin: R 16 These ampicillin results predict results for amoxicillin      Method Type: ERIC      -  Meropenem: S <=1      -  Ampicillin/Sulbactam: S <=4/2      -  Cefoxitin: S <=8      -  Cefuroxime: S <=4      -  Amoxicillin/Clavulanic Acid: S <=8/4      -  Trimethoprim/Sulfamethoxazole: S <=0.5/9.5      -  Ertapenem: S <=0.    RADIOLOGY:  imaging below personally reviewed

## 2024-02-01 NOTE — PROGRESS NOTE ADULT - SUBJECTIVE AND OBJECTIVE BOX
Patient is a 73y old  Male who presents with a chief complaint of pain with urination (31 Jan 2024 17:53)      SUBJECTIVE / OVERNIGHT EVENTS: No acute overnight events. Patient requested primary team speak with outpatient urologist for prostate CA recurrence. Outpatient urologist shares same concerns as patient and patient has already had prostate MR done with pending outpatient oncologist appt. Will obtain prostate US to r/o abscess.     MEDICATIONS  (STANDING):  carvedilol 12.5 milliGRAM(s) Oral every 12 hours  cefTRIAXone   IVPB 1000 milliGRAM(s) IV Intermittent every 24 hours  chlorhexidine 4% Liquid 1 Application(s) Topical daily  enoxaparin Injectable 40 milliGRAM(s) SubCutaneous every 24 hours  lisinopril 10 milliGRAM(s) Oral daily  tacrolimus 1 milliGRAM(s) Oral <User Schedule>  tamsulosin 0.4 milliGRAM(s) Oral at bedtime    MEDICATIONS  (PRN):  acetaminophen     Tablet .. 650 milliGRAM(s) Oral every 6 hours PRN Temp greater or equal to 38C (100.4F), Mild Pain (1 - 3)      CAPILLARY BLOOD GLUCOSE        I&O's Summary      Vital Signs Last 24 Hrs  T(C): 36.8 (01 Feb 2024 05:30), Max: 36.8 (01 Feb 2024 05:30)  T(F): 98.3 (01 Feb 2024 05:30), Max: 98.3 (01 Feb 2024 05:30)  HR: 67 (01 Feb 2024 05:30) (66 - 73)  BP: 150/66 (01 Feb 2024 05:30) (142/57 - 150/66)  BP(mean): --  RR: 18 (01 Feb 2024 05:30) (18 - 18)  SpO2: 97% (01 Feb 2024 05:30) (97% - 100%)    Parameters below as of 01 Feb 2024 05:30  Patient On (Oxygen Delivery Method): room air        PHYSICAL EXAM:    HEAD: Atraumatic, Normocephalic  EYES: EOMI, PERRLA, conjunctiva and sclera clear  NECK: Supple, No JVD  CHEST/LUNG: Clear to auscultation bilaterally; No wheezes or crackles  HEART: Normal S1/S2; Regular rate and rhythm; No murmurs, rubs, or gallops  ABDOMEN: Soft, Nontender, Nondistended; Bowel sounds present  EXTREMITIES: 2+ Peripheral Pulses; No clubbing, cyanosis, or edema  PSYCH: A&Ox3  NEUROLOGY: no focal neurologic deficit  SKIN: No rashes or lesions    LABS:                        9.4    6.02  )-----------( 178      ( 31 Jan 2024 09:27 )             29.9      01-31    139  |  103  |  25<H>  ----------------------------<  91  4.2   |  24  |  0.66    Ca    8.9      31 Jan 2024 09:27  Phos  3.0     01-31  Mg     1.8     01-31            Urinalysis Basic - ( 31 Jan 2024 09:27 )    Color: x / Appearance: x / SG: x / pH: x  Gluc: 91 mg/dL / Ketone: x  / Bili: x / Urobili: x   Blood: x / Protein: x / Nitrite: x   Leuk Esterase: x / RBC: x / WBC x   Sq Epi: x / Non Sq Epi: x / Bacteria: x        RADIOLOGY & ADDITIONAL TESTS:    Imaging Personally Reviewed:    Consultant(s) Notes Reviewed:      Care Discussed with Consultants/Other Providers:   Patient is a 73y old  Male who presents with a chief complaint of pain with urination (31 Jan 2024 17:53)      SUBJECTIVE / OVERNIGHT EVENTS: No acute overnight events. Patient requested primary team speak with outpatient urologist for prostate CA recurrence. Outpatient urologist shares same concerns as patient and patient has already had prostate MR done with pending outpatient oncologist appt. Will obtain prostate US to r/o abscess. Patient seen and examined at the bedside and has no acute complaints.     MEDICATIONS  (STANDING):  carvedilol 12.5 milliGRAM(s) Oral every 12 hours  cefTRIAXone   IVPB 1000 milliGRAM(s) IV Intermittent every 24 hours  chlorhexidine 4% Liquid 1 Application(s) Topical daily  enoxaparin Injectable 40 milliGRAM(s) SubCutaneous every 24 hours  lisinopril 10 milliGRAM(s) Oral daily  tacrolimus 1 milliGRAM(s) Oral <User Schedule>  tamsulosin 0.4 milliGRAM(s) Oral at bedtime    MEDICATIONS  (PRN):  acetaminophen     Tablet .. 650 milliGRAM(s) Oral every 6 hours PRN Temp greater or equal to 38C (100.4F), Mild Pain (1 - 3)      CAPILLARY BLOOD GLUCOSE        I&O's Summary      Vital Signs Last 24 Hrs  T(C): 36.8 (01 Feb 2024 05:30), Max: 36.8 (01 Feb 2024 05:30)  T(F): 98.3 (01 Feb 2024 05:30), Max: 98.3 (01 Feb 2024 05:30)  HR: 67 (01 Feb 2024 05:30) (66 - 73)  BP: 150/66 (01 Feb 2024 05:30) (142/57 - 150/66)  BP(mean): --  RR: 18 (01 Feb 2024 05:30) (18 - 18)  SpO2: 97% (01 Feb 2024 05:30) (97% - 100%)    Parameters below as of 01 Feb 2024 05:30  Patient On (Oxygen Delivery Method): room air        PHYSICAL EXAM:    HEAD: Atraumatic, Normocephalic  EYES: EOMI, PERRLA, conjunctiva and sclera clear  CHEST/LUNG: Clear to auscultation bilaterally; No wheezes or crackles  HEART: Normal S1/S2; Regular rate and rhythm; No murmurs, rubs, or gallops  ABDOMEN: Soft, Nontender, Nondistended;  EXTREMITIES: 2+ Peripheral Pulses; No clubbing, cyanosis, or edema  PSYCH: A&Ox3  NEUROLOGY: no focal neurologic deficit  SKIN: No rashes or lesions    LABS:                        9.4    6.02  )-----------( 178      ( 31 Jan 2024 09:27 )             29.9      01-31    139  |  103  |  25<H>  ----------------------------<  91  4.2   |  24  |  0.66    Ca    8.9      31 Jan 2024 09:27  Phos  3.0     01-31  Mg     1.8     01-31            Urinalysis Basic - ( 31 Jan 2024 09:27 )    Color: x / Appearance: x / SG: x / pH: x  Gluc: 91 mg/dL / Ketone: x  / Bili: x / Urobili: x   Blood: x / Protein: x / Nitrite: x   Leuk Esterase: x / RBC: x / WBC x   Sq Epi: x / Non Sq Epi: x / Bacteria: x        RADIOLOGY & ADDITIONAL TESTS:    Imaging Personally Reviewed:    Consultant(s) Notes Reviewed:      Care Discussed with Consultants/Other Providers:

## 2024-02-01 NOTE — PROGRESS NOTE ADULT - ATTENDING COMMENTS
Patient discussed with Dr Trevon Wolfe  Plan to change to oral ciprofloxacin as ultrasound di not show evidence of prostatic abscess  will need to complete ten days of therapy for a complicated UTI    follow up in the office in a week- 2 weeks    Chris Jason MD  Can be called via Teams  After 5pm/weekends [phone]
Patient seen and examined    Case discussed with Dr Trevon Wolfe    I agree with her interval history exam and plans as noted above    Klebsiella growing in urine culture  sensitive strain  sensitive to IV Ceftriaxone  obtain ultrasound of prostate to r/o underlying abscess    Chris Jason MD  Can be called via Teams  After 5pm/weekends [phone]
Pan-sensitive Klebsiella- transition to Ceftriaxone after d/w ID.
D/c home today w f/u. Dc time 35 mins.

## 2024-02-01 NOTE — PROGRESS NOTE ADULT - SUBJECTIVE AND OBJECTIVE BOX
Patient is a 73y old  Male who presents with a chief complaint of pain with urination (30 Jan 2024 15:27)    Nephrology- NSN    Subjective:  Doing well today, no new complaints. feels great. getting prostate US. now on CTX    PAST MEDICAL & SURGICAL HISTORY:  HTN (hypertension)      Renal insufficiency      Prostate cancer      BPH (benign prostatic hyperplasia)      Renal transplant, status post      S/P placement of cardiac pacemaker          MEDICATIONS  (STANDING):  carvedilol 12.5 milliGRAM(s) Oral every 12 hours  enoxaparin Injectable 40 milliGRAM(s) SubCutaneous every 24 hours  ertapenem  IVPB 1000 milliGRAM(s) IV Intermittent every 24 hours  fluconAZOLE   Tablet 200 milliGRAM(s) Oral daily  lisinopril 10 milliGRAM(s) Oral daily  tacrolimus 1 milliGRAM(s) Oral every 12 hours  tamsulosin 0.4 milliGRAM(s) Oral at bedtime      Allergies    No Known Allergies    Intolerances        SOCIAL HISTORY:  Denies ETOh,Smoking,     FAMILY HISTORY:  FH: diabetes mellitus        REVIEW OF SYSTEMS:  negative unless stated otherwise in HPI    VITAL:  T(C): , Max: 38.6 (01-29-24 @ 19:59)  T(F): , Max: 101.5 (01-29-24 @ 19:59)  HR: 74 (01-30-24 @ 11:00)  BP: 115/50 (01-30-24 @ 11:00)  BP(mean): --  RR: 16 (01-30-24 @ 11:00)  SpO2: 99% (01-30-24 @ 11:00)  Wt(kg): --    PHYSICAL EXAM:  Constitutional: NAD, Alert  HEENT: NCAT, MMM  Neck: Supple, No JVD  Respiratory: CTA-b/l  Cardiovascular: RRR s1s2, no m/r/g  Gastrointestinal: BS+, soft, NT/ND  Extremities: No peripheral edema b/l  Neurological: no focal deficits; strength grossly intact  Back: no CVAT b/l  Skin: No rashes, no nevi    LABS:                        10.1   12.13 )-----------( 160      ( 30 Jan 2024 06:27 )             30.7     Na(135)/K(4.2)/Cl(102)/HCO3(23)/BUN(26)/Cr(0.76)Glu(116)/Ca(8.4)/Mg(--)/PO4(--)    01-30 @ 06:27  Na(134)/K(4.4)/Cl(98)/HCO3(25)/BUN(27)/Cr(0.83)Glu(110)/Ca(8.9)/Mg(--)/PO4(--)    01-29 @ 12:12    Urinalysis Basic - ( 30 Jan 2024 06:27 )    Color: x / Appearance: x / SG: x / pH: x  Gluc: 116 mg/dL / Ketone: x  / Bili: x / Urobili: x   Blood: x / Protein: x / Nitrite: x   Leuk Esterase: x / RBC: x / WBC x   Sq Epi: x / Non Sq Epi: x / Bacteria: x            IMAGING:    ASSESSMENT:  73 year-old male with past medical history hypertension, prostate cancer s/p radiation, BPH, end-stage renal disease status post renal transplant 2017 at [place], recent hospitalization for pyelonephritis and LAKIA, now presents with dysuria and suprapubic pain found to have complicated UTI    Renal txp  -BL cr approx 0.7-0.9  -txp in 2017 at [place]    Allograft ARSLAN/hydro  -as seen on doppler US w/elevated velocities >600  -BP controlled, RF preserved  -no intervention per txp nephrology, repeat US in 6mo as OP  -chronic hydro, RF preserved    High risk immunosuppression  -Home Rx Tac 1mg q12h,  q12h  -MMF on hold for UTI    UTI (complicated)   -on ertapenem and diflucan    HTN  -controlled, continue current regimen    volume status: Euvolemic  Electrolytes: WNL    RECOMMEND:  -a/w holding MMF for now, can likely resume on DC  -c/w tacrolimus 1mg q12h. please draw tacrolimus trough within 1h of AM administration. last trough was NOT true. (drawn @ 7am, dose given 8:30pm night prior)   -daily FK trough> f/u from today  -c/w CTX  -repeat allograft doppler in 6mo  -monitor BP and RF  -avoid nephrotoxins  -dose Rx for CrCl >60  -appreciate txp nephrology input    Thank you for involving [facility] in this patient's care.    With warm regards,    Nakul Trejo DO   [facility]  Office: [phone]

## 2024-02-01 NOTE — DISCHARGE NOTE PROVIDER - NSDCQMSTROKE_NEU_ALL_CORE
Health Maintenance Due   Topic Date Due    Hemoglobin A1c (Diabetic Prevention Screening)  Never done    Colorectal Cancer Screening  Never done    Influenza Vaccine (1) 09/01/2023     Immunizations - reviewed and updated   Care Everywhere - triggered   Care Teams - updated   Outreach - Called to schedule appointment with PCP, no answer. LAQUITA on VM.  Patient established care with Leila Allen NP in 2021 and has only seen her. No past appointments with PCP.        No

## 2024-02-01 NOTE — PROGRESS NOTE ADULT - PROBLEM SELECTOR PLAN 5
Lovenox sc      DISPO: pending urine cx; ID and renal recs Lovenox sc      DISPO: pending prostate US

## 2024-02-01 NOTE — DISCHARGE NOTE PROVIDER - NSDCMRMEDTOKEN_GEN_ALL_CORE_FT
carvedilol 12.5 mg oral tablet: 1 tab(s) orally 2 times a day  lisinopril 10 mg oral tablet: 1 tab(s) orally once a day  mycophenolate mofetil 500 mg oral tablet: 1 tab(s) orally 2 times a day  tacrolimus 0.5 mg oral capsule: 2 cap(s) orally every 12 hours  tamsulosin 0.4 mg oral capsule: 1 cap(s) orally once a day   carvedilol 12.5 mg oral tablet: 1 tab(s) orally 2 times a day  ciprofloxacin 500 mg oral tablet: 1 tab(s) orally 2 times a day  lisinopril 10 mg oral tablet: 1 tab(s) orally once a day  mycophenolate mofetil 500 mg oral tablet: 1 tab(s) orally 2 times a day  tacrolimus 0.5 mg oral capsule: 2 cap(s) orally every 12 hours  tamsulosin 0.4 mg oral capsule: 1 cap(s) orally once a day

## 2024-02-01 NOTE — PROGRESS NOTE ADULT - PROBLEM SELECTOR PLAN 1
prior urine culture grew pan-sensitive Klebsiella, treated with Ceftriaxone/Levaquin in 12/2023, and Cefuroxime 1/14, now with recurrent infection  - s/p Ceftriaxone, Diflucan 100mg iv and 1L NS in ED  - started on Ertapenem to cover ESBL and Diflucan 200mg PO for now, f/u urine cx; UA >996 WBC and yeast  - switched from Ertapenem to Ceftriaxone (02/01-)  - Ultrasound of transplant kidney with chronic mod-severe hydronephrosis, but no renal abscess  - Transplant ID consulted for further eval prior urine culture grew pan-sensitive Klebsiella, treated with Ceftriaxone/Levaquin in 12/2023, and Cefuroxime 1/14, now with recurrent infection  - s/p Ceftriaxone, Diflucan 100mg iv and 1L NS in ED  - started on Ertapenem to cover ESBL and Diflucan 200mg PO for now, f/u urine cx; UA >996 WBC and yeast  - switched from Ertapenem to Ceftriaxone (02/01-)  - Ultrasound of transplant kidney with chronic mod-severe hydronephrosis, but no renal abscess  - Will obtain prostate US to r/o abscess   - Transplant ID consulted for further eval prior urine culture grew pan-sensitive Klebsiella, treated with Ceftriaxone/Levaquin in 12/2023, and Cefuroxime 1/14, now with recurrent infection  - s/p Ceftriaxone, Diflucan 100mg iv and 1L NS in ED  - started on Ertapenem to cover ESBL and Diflucan 200mg PO for now, f/u urine cx; UA >996 WBC and yeast  - switched from Ertapenem to Ceftriaxone (02/01-)  - d/c Diflucan  - Ultrasound of transplant kidney with chronic mod-severe hydronephrosis, but no renal abscess  - Will obtain prostate US to r/o abscess   - Transplant ID consulted for further eval

## 2024-02-01 NOTE — PROGRESS NOTE ADULT - REASON FOR ADMISSION
pain with urination

## 2024-02-01 NOTE — DISCHARGE NOTE PROVIDER - CARE PROVIDER_API CALL
Donny Cooney  Urology  [address] [place] [id number]  Phone: [phone]  Fax: [phone]  Follow Up Time:

## 2024-02-01 NOTE — DISCHARGE NOTE PROVIDER - HOSPITAL COURSE
73 year-old male with past medical history hypertension, prostate cancer s/p radiation, BPH, end-stage renal disease status post renal transplant 2017 at [place], recent hospitalization for pyelonephritis and LAKIA, now presents with dysuria and suprapubic pain for the past 2 days.  Patient had similar symptoms 2 weeks ago, and was treated for urinary tract infection with 7 days of Cefuroxime (1/17/24) with resolution of symptoms.  Dysuria recurred 3 days after completion of antibiotics and associated with cloudy urine and some suprapubic abdominal pain, but denies fever/chill, nausea, vomiting, hematuria, chest pain, shortness of breath. Currently, feeling much better. (29 Jan 2024 21:13)    Hospital Course:   During admission patient started on Ertapenem as pt had hx of ESBL UTI in the past. Urine culture sensitive for pan sensitive Klebsiella so he was switched to CTX. Outpatient urologist was contacted and he reported patient had a prostate MRI recently that was normal. Prostate ultrasound was performed to r/o abscess. Patient tolerated the procedure well, abscess was ruled out. Patient remained hemodynamically stable and was discharged home w/ PO ***** for a total of ***.  He has outpatient follow up with Uroloy and Oncology.      Important Medication Changes and Reason:      Active or Pending Issues Requiring Follow-up:    Advanced Directives:   [x] Full code  [ ] DNR  [ ] Hospice    Discharge Diagnoses:  Complicated UTI         73 year-old male with past medical history hypertension, prostate cancer s/p radiation, BPH, end-stage renal disease status post renal transplant 2017 at [place], recent hospitalization for pyelonephritis and LAKIA, now presents with dysuria and suprapubic pain for the past 2 days.  Patient had similar symptoms 2 weeks ago, and was treated for urinary tract infection with 7 days of Cefuroxime (1/17/24) with resolution of symptoms.  Dysuria recurred 3 days after completion of antibiotics and associated with cloudy urine and some suprapubic abdominal pain, but denies fever/chill, nausea, vomiting, hematuria, chest pain, shortness of breath. Currently, feeling much better. (29 Jan 2024 21:13)    Hospital Course:   During admission patient started on Ertapenem as pt had hx of ESBL UTI in the past. Urine culture sensitive for pan sensitive Klebsiella so he was switched to CTX. Outpatient urologist was contacted and he reported patient had a prostate MRI recently that was normal. Prostate ultrasound was performed to r/o abscess. Patient tolerated the procedure well, abscess was ruled out. Patient remained hemodynamically stable and was discharged home w/ PO ***** for a total of ***.  He has outpatient follow up with Urology and Oncology.      Important Medication Changes and Reason:      Active or Pending Issues Requiring Follow-up:    Advanced Directives:   [x] Full code  [ ] DNR  [ ] Hospice    Discharge Diagnoses:  Complicated UTI         73 year-old male with past medical history hypertension, prostate cancer s/p radiation, BPH, end-stage renal disease status post renal transplant 2017 at [place], recent hospitalization for pyelonephritis and LAKIA, now presents with dysuria and suprapubic pain for the past 2 days.  Patient had similar symptoms 2 weeks ago, and was treated for urinary tract infection with 7 days of Cefuroxime (1/17/24) with resolution of symptoms.  Dysuria recurred 3 days after completion of antibiotics and associated with cloudy urine and some suprapubic abdominal pain, but denies fever/chill, nausea, vomiting, hematuria, chest pain, shortness of breath. Currently, feeling much better. (29 Jan 2024 21:13)    Hospital Course:   During admission patient started on Ertapenem as pt had hx of ESBL UTI in the past. Urine culture sensitive for pan sensitive Klebsiella so he was switched to CTX. Outpatient urologist was contacted and he reported patient had a prostate MRI recently that was normal. Prostate ultrasound was performed to r/o abscess. Patient tolerated the procedure well, abscess was ruled out. Patient remained hemodynamically stable and was discharged home w/ PO cipro for a total of 7 days.  He has outpatient follow up with Urology and Oncology.      Important Medication Changes and Reason:      Active or Pending Issues Requiring Follow-up:    Advanced Directives:   [x] Full code  [ ] DNR  [ ] Hospice    Discharge Diagnoses:  Complicated UTI         73 year-old male with past medical history hypertension, prostate cancer s/p radiation, BPH, end-stage renal disease status post renal transplant 2017 at [place], recent hospitalization for pyelonephritis and LAKIA, now presents with dysuria and suprapubic pain for the past 2 days.  Patient had similar symptoms 2 weeks ago, and was treated for urinary tract infection with 7 days of Cefuroxime (1/17/24) with resolution of symptoms.  Dysuria recurred 3 days after completion of antibiotics and associated with cloudy urine and some suprapubic abdominal pain, but denies fever/chill, nausea, vomiting, hematuria, chest pain, shortness of breath. Currently, feeling much better. (29 Jan 2024 21:13)    Hospital Course:   During admission patient started on Ertapenem as pt had hx of ESBL UTI in the past. Urine culture sensitive for pan sensitive Klebsiella so he was switched to CTX. Outpatient urologist was contacted and he reported patient had a prostate MRI recently that was normal. Prostate ultrasound was performed to r/o abscess. Patient tolerated the procedure well, abscess was ruled out. Patient remained hemodynamically stable and was discharged home w/ PO cipro for a total of 7 days.  He has outpatient follow up with Urology and Oncology.      Important Medication Changes and Reason: Ciprofloxacin 500mg PO BID x 14 days      Active or Pending Issues Requiring Follow-up:  Nephrology and Urology follow up    Advanced Directives:   [x] Full code  [ ] DNR  [ ] Hospice    Discharge Diagnoses:  Complicated UTI         73 year-old male with past medical history hypertension, prostate cancer s/p radiation, BPH, end-stage renal disease status post renal transplant 2017 at [place], recent hospitalization for pyelonephritis and LAKIA, now presents with dysuria and suprapubic pain for the past 2 days.  Patient had similar symptoms 2 weeks ago, and was treated for urinary tract infection with 7 days of Cefuroxime (1/17/24) with resolution of symptoms.  Dysuria recurred 3 days after completion of antibiotics and associated with cloudy urine and some suprapubic abdominal pain.     Hospital Course:   During admission patient started on Ertapenem as pt had hx of ESBL UTI in the past. Urine culture sensitive for pan sensitive Klebsiella so he was switched to CTX. Outpatient urologist was contacted and he reported patient had a prostate MRI recently that was normal. Prostate ultrasound was performed to r/o abscess. Patient tolerated the procedure well, abscess was ruled out. Patient remained hemodynamically stable and was discharged home w/ PO cipro for a total of 7 days- seen by transplant ID.    He has outpatient follow up with Urology and Oncology.      Important Medication Changes and Reason: Ciprofloxacin 500mg PO BID x 14 days      Active or Pending Issues Requiring Follow-up:  Nephrology and Urology follow up    Advanced Directives:   [x] Full code  [ ] DNR  [ ] Hospice    Discharge Diagnoses:  Complicated UTI

## 2024-02-01 NOTE — DISCHARGE NOTE NURSING/CASE MANAGEMENT/SOCIAL WORK - PATIENT PORTAL LINK FT
You can access the FollowMyHealth Patient Portal offered by [facility] by registering at the following website: [email or web address]. By joining [facility]’s FollowMyHealth portal, you will also be able to view your health information using other applications (apps) compatible with our system.

## 2024-02-01 NOTE — DISCHARGE NOTE PROVIDER - NSDCCPTREATMENT_GEN_ALL_CORE_FT
PRINCIPAL PROCEDURE  Procedure: Prostate ultrasound  Findings and Treatment: FINDINGS: The prostate measures 3.1 cm in width by 2.8 cm in height by   1.9 cm in AP dimension, for calculated prostatic volume of 8.5 mL.   Dystrophic calcifications are present within the gland. No cystic or   complex fluid collection is seen to suggest the presence of an abscess.   The seminal vesicles appear unremarkable. No periprostatic adenopathy or   fluid collection is seen.  IMPRESSION: No sonographic evidence of prostate abscess.

## 2024-02-01 NOTE — DISCHARGE NOTE NURSING/CASE MANAGEMENT/SOCIAL WORK - NSDCPEFALRISK_GEN_ALL_CORE
For information on Fall & Injury Prevention, visit: [email or web address] OR  [email or web address] OR  https://www.cdc.gov/steadi/patient.html

## 2024-02-01 NOTE — PROGRESS NOTE ADULT - ASSESSMENT
74 yo Male with PMHx of HTN, prostate cancer s/p radiation, BPH, end-stage renal disease s/p renal transplant 2017 at [place], recent hospitalization for pyelonephritis and LAKIA, now presented on 1/29 with dysuria and suprapubic pain for the past 2 days.  Patient had similar symptoms 2 weeks ago, and was treated for urinary tract infection with 7 days of Cefuroxime (1/17/24) with resolution of symptoms.     Febrile to 101.5F  Leukocytosis 14-->12    Workup:   UA (turid urine): , + bacteria     US doppler R kidney:Elevated peak systolic velocities most notably in the transplant renal artery anastomotic segment measuring up to 600 cm/s with possible minimal/mild tardus parvus waveform abnormalitiesin the arcuate arteries. 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  Moderate to severe hydronephrosis which appears chronic, however now with layering debris in the intrarenal collecting system and bladder concerning for urinary tract infection.     Previous urine Cx in Dec 2023 + Klebsiella pneumonia, E coli ESBL in June     Antimicrobials:  Ceftriaxone X 1  Ertapenem 1/29-->    #UTI, R transplanted kidney pyelonephritis, Moderate to severe R hydronephrosis, transplant renal stenosis   #Fever, leukocytosis     Recommendations:   -Continue Ceftriaxone 1 g IV daily while inpatient   -When stable for discharge, can switch to Ciprofloxacin 500 mg q 12hrs to complete a total of 14 course of antibiotics (including the days of IV antibiotics)  -US prostate with no evidence of abscess  -F/up final Blood   -Monitor T curve and WBC trend     Discussed with Dr Casie Wolfe MD, PGY5  ID fellow  [employer] Teams Preferred  After 5pm/weekends call [phone]

## 2024-02-02 RX ORDER — CIPROFLOXACIN LACTATE 400MG/40ML
1 VIAL (ML) INTRAVENOUS
Qty: 24 | Refills: 0
Start: 2024-02-02 | End: 2024-02-13

## 2024-02-08 PROBLEM — N40.0 BENIGN PROSTATIC HYPERPLASIA WITHOUT LOWER URINARY TRACT SYMPTOMS: Chronic | Status: ACTIVE | Noted: 2024-01-29

## 2024-03-21 ENCOUNTER — LABORATORY RESULT (OUTPATIENT)
Age: 74
End: 2024-03-21

## 2024-03-21 ENCOUNTER — APPOINTMENT (OUTPATIENT)
Dept: INFECTIOUS DISEASE | Facility: CLINIC | Age: 74
End: 2024-03-21
Payer: MEDICARE

## 2024-03-21 VITALS
DIASTOLIC BLOOD PRESSURE: 57 MMHG | BODY MASS INDEX: 23.13 KG/M2 | OXYGEN SATURATION: 98 % | SYSTOLIC BLOOD PRESSURE: 130 MMHG | TEMPERATURE: 98 F | HEART RATE: 76 BPM | WEIGHT: 139 LBS

## 2024-03-21 DIAGNOSIS — I42.9 CARDIOMYOPATHY, UNSPECIFIED: ICD-10-CM

## 2024-03-21 DIAGNOSIS — Z23 ENCOUNTER FOR IMMUNIZATION: ICD-10-CM

## 2024-03-21 PROCEDURE — 99214 OFFICE O/P EST MOD 30 MIN: CPT

## 2024-03-21 PROCEDURE — 99204 OFFICE O/P NEW MOD 45 MIN: CPT

## 2024-03-21 RX ORDER — CEFUROXIME AXETIL 500 MG/1
500 TABLET ORAL TWICE DAILY
Qty: 20 | Refills: 0 | Status: ACTIVE | COMMUNITY
Start: 2024-03-21 | End: 1900-01-01

## 2024-03-21 NOTE — PHYSICAL EXAM
[General Appearance - Alert] : alert [General Appearance - In No Acute Distress] : in no acute distress [Sclera] : the sclera and conjunctiva were normal [PERRL With Normal Accommodation] : pupils were equal in size, round, reactive to light [Extraocular Movements] : extraocular movements were intact [Oropharynx] : the oropharynx was normal with no thrush [Outer Ear] : the ears and nose were normal in appearance [Neck Cervical Mass (___cm)] : no neck mass was observed [Jugular Venous Distention Increased] : there was no jugular-venous distention [Neck Appearance] : the appearance of the neck was normal [Thyroid Diffuse Enlargement] : the thyroid was not enlarged [Heart Rate And Rhythm] : heart rate was normal and rhythm regular [Heart Sounds Gallop] : no gallops [Heart Sounds] : normal S1 and S2 [Murmurs] : no murmurs [Heart Sounds Pericardial Friction Rub] : no pericardial rub [Edema] : there was no peripheral edema [Full Pulse] : the pedal pulses are present [Bowel Sounds] : normal bowel sounds [Abdomen Soft] : soft [Abdomen Tenderness] : non-tender [Costovertebral Angle Tenderness] : no CVA tenderness [Abdomen Mass (___ Cm)] : no abdominal mass palpated [No Palpable Adenopathy] : no palpable adenopathy [Nail Clubbing] : no clubbing  or cyanosis of the fingernails [Musculoskeletal - Swelling] : no joint swelling [Skin Color & Pigmentation] : normal skin color and pigmentation [Motor Tone] : muscle strength and tone were normal [Oriented To Time, Place, And Person] : oriented to person, place, and time [Affect] : the affect was normal [] : no respiratory distress [Auscultation Breath Sounds / Voice Sounds] : lungs were clear to auscultation bilaterally

## 2024-03-22 LAB
ALBUMIN SERPL ELPH-MCNC: 3.7 G/DL
ALP BLD-CCNC: 61 U/L
ALT SERPL-CCNC: 7 U/L
ANION GAP SERPL CALC-SCNC: 13 MMOL/L
APPEARANCE: ABNORMAL
AST SERPL-CCNC: 9 U/L
BILIRUB SERPL-MCNC: 0.8 MG/DL
BILIRUBIN URINE: NEGATIVE
BLOOD URINE: ABNORMAL
BUN SERPL-MCNC: 38 MG/DL
CALCIUM SERPL-MCNC: 9.1 MG/DL
CHLORIDE SERPL-SCNC: 96 MMOL/L
CO2 SERPL-SCNC: 23 MMOL/L
COLOR: NORMAL
CREAT SERPL-MCNC: 1.03 MG/DL
EGFR: 76 ML/MIN/1.73M2
ESTIMATED AVERAGE GLUCOSE: 108 MG/DL
GLUCOSE QUALITATIVE U: NEGATIVE MG/DL
GLUCOSE SERPL-MCNC: 122 MG/DL
HBA1C MFR BLD HPLC: 5.4 %
HCT VFR BLD CALC: 31.2 %
HGB BLD-MCNC: 9.8 G/DL
KETONES URINE: NEGATIVE MG/DL
LEUKOCYTE ESTERASE URINE: ABNORMAL
MCHC RBC-ENTMCNC: 31.4 GM/DL
MCHC RBC-ENTMCNC: 31.6 PG
MCV RBC AUTO: 100.6 FL
NITRITE URINE: POSITIVE
PH URINE: 6
PLATELET # BLD AUTO: 197 K/UL
POTASSIUM SERPL-SCNC: 5.4 MMOL/L
PROT SERPL-MCNC: 7.2 G/DL
PROTEIN URINE: 100 MG/DL
RBC # BLD: 3.1 M/UL
RBC # FLD: 13.1 %
SODIUM SERPL-SCNC: 131 MMOL/L
SPECIFIC GRAVITY URINE: 1.01
T PALLIDUM AB SER QL IA: NEGATIVE
UROBILINOGEN URINE: 0.2 MG/DL
WBC # FLD AUTO: 11.43 K/UL

## 2024-03-22 NOTE — HISTORY OF PRESENT ILLNESS
[FreeTextEntry1] : 74 yo man with a history of renal transplant at [place] in 201  past medical history of HTN, prostate cancer post Xrt, history of recurrent pyelonephritis/LAKIA with a bout of ESBL E.coli UTI in 6/2020 and then admitted for pyelonephritis in December 2023 and again late 1/2024 with a sensitive Klebsiella pyelonephritis treated with Ceftriaxone followed by Ciprofloxacin.   Past medical history also notable for cardiomyopathy and pacemaker placement  Of note his ultrasound showed renal artery stenosis and hydronephrosis He was seen by Transplant Nephrology who recommends a follow up ultrasound in six months  He is being seen in the office to establish care

## 2024-03-22 NOTE — ASSESSMENT
[FreeTextEntry1] : 72 yo man with a history of a renal transplant and recent developement of recurrent UTIS/transplant pyelonephritis in the setting of transplant renal artery stenosis and hydronephrosis  Currently feeling well  Will: cehck labs with CBC  CMP UA and culture Will refer to Transplant Nephrology here for follow up as well as Urology to see if patient would benefit from transplant stent placement Given prescription for ceftin but may need to adjust based upon urine culture/UA results

## 2024-03-24 LAB
C TRACH RRNA SPEC QL NAA+PROBE: NOT DETECTED
CMV DNA SPEC QL NAA+PROBE: NOT DETECTED IU/ML
CMVPCR LOG: NOT DETECTED LOG10IU/ML
HCV RNA SERPL NAA+PROBE-LOG IU: NOT DETECTED LOGIU/ML
HEPB DNA PCR INT: NOT DETECTED
HEPB DNA PCR LOG: NOT DETECTED LOGIU/ML
HEPC RNA INTERP: NOT DETECTED
N GONORRHOEA RRNA SPEC QL NAA+PROBE: NOT DETECTED
SOURCE AMPLIFICATION: NORMAL

## 2024-03-25 LAB — T CRUZI AB SER-ACNC: 0.2 IV

## 2024-03-28 LAB
M TB IFN-G BLD-IMP: ABNORMAL
QUANTIFERON TB PLUS MITOGEN MINUS NIL: 0.09 IU/ML
QUANTIFERON TB PLUS NIL: 0.04 IU/ML
QUANTIFERON TB PLUS TB1 MINUS NIL: 0 IU/ML
QUANTIFERON TB PLUS TB2 MINUS NIL: 0.01 IU/ML

## 2024-04-04 ENCOUNTER — INPATIENT (INPATIENT)
Facility: HOSPITAL | Age: 74
LOS: 3 days | Discharge: HOME HEALTH | DRG: 690 | End: 2024-04-08
Attending: INTERNAL MEDICINE | Admitting: STUDENT IN AN ORGANIZED HEALTH CARE EDUCATION/TRAINING PROGRAM
Payer: MEDICARE

## 2024-04-04 VITALS
DIASTOLIC BLOOD PRESSURE: 62 MMHG | HEART RATE: 74 BPM | HEIGHT: 63 IN | OXYGEN SATURATION: 98 % | SYSTOLIC BLOOD PRESSURE: 128 MMHG | WEIGHT: 141.98 LBS | TEMPERATURE: 98 F | RESPIRATION RATE: 18 BRPM

## 2024-04-04 DIAGNOSIS — N39.0 URINARY TRACT INFECTION, SITE NOT SPECIFIED: ICD-10-CM

## 2024-04-04 DIAGNOSIS — R09.89 OTHER SPECIFIED SYMPTOMS AND SIGNS INVOLVING THE CIRCULATORY AND RESPIRATORY SYSTEMS: ICD-10-CM

## 2024-04-04 DIAGNOSIS — Z94.0 KIDNEY TRANSPLANT STATUS: Chronic | ICD-10-CM

## 2024-04-04 DIAGNOSIS — N18.6 END STAGE RENAL DISEASE: ICD-10-CM

## 2024-04-04 DIAGNOSIS — R33.9 RETENTION OF URINE, UNSPECIFIED: ICD-10-CM

## 2024-04-04 DIAGNOSIS — I10 ESSENTIAL (PRIMARY) HYPERTENSION: ICD-10-CM

## 2024-04-04 DIAGNOSIS — Z86.79 PERSONAL HISTORY OF OTHER DISEASES OF THE CIRCULATORY SYSTEM: ICD-10-CM

## 2024-04-04 DIAGNOSIS — Z95.0 PRESENCE OF CARDIAC PACEMAKER: Chronic | ICD-10-CM

## 2024-04-04 DIAGNOSIS — Z29.9 ENCOUNTER FOR PROPHYLACTIC MEASURES, UNSPECIFIED: ICD-10-CM

## 2024-04-04 LAB
ALBUMIN SERPL ELPH-MCNC: 3.4 G/DL — SIGNIFICANT CHANGE UP (ref 3.3–5)
ALP SERPL-CCNC: 75 U/L — SIGNIFICANT CHANGE UP (ref 40–120)
ALT FLD-CCNC: 5 U/L — LOW (ref 10–45)
ANION GAP SERPL CALC-SCNC: 13 MMOL/L — SIGNIFICANT CHANGE UP (ref 5–17)
APPEARANCE UR: ABNORMAL
APTT BLD: 27.7 SEC — SIGNIFICANT CHANGE UP (ref 24.5–35.6)
AST SERPL-CCNC: 11 U/L — SIGNIFICANT CHANGE UP (ref 10–40)
BACTERIA # UR AUTO: ABNORMAL /HPF
BASE EXCESS BLDV CALC-SCNC: -1.9 MMOL/L — SIGNIFICANT CHANGE UP (ref -2–3)
BASOPHILS # BLD AUTO: 0.03 K/UL — SIGNIFICANT CHANGE UP (ref 0–0.2)
BASOPHILS NFR BLD AUTO: 0.3 % — SIGNIFICANT CHANGE UP (ref 0–2)
BILIRUB SERPL-MCNC: 0.5 MG/DL — SIGNIFICANT CHANGE UP (ref 0.2–1.2)
BILIRUB UR-MCNC: NEGATIVE — SIGNIFICANT CHANGE UP
BUN SERPL-MCNC: 33 MG/DL — HIGH (ref 7–23)
CA-I SERPL-SCNC: 1.22 MMOL/L — SIGNIFICANT CHANGE UP (ref 1.15–1.33)
CALCIUM SERPL-MCNC: 8.7 MG/DL — SIGNIFICANT CHANGE UP (ref 8.4–10.5)
CAST: 9 /LPF — HIGH (ref 0–4)
CHLORIDE BLDV-SCNC: 102 MMOL/L — SIGNIFICANT CHANGE UP (ref 96–108)
CHLORIDE SERPL-SCNC: 100 MMOL/L — SIGNIFICANT CHANGE UP (ref 96–108)
CO2 BLDV-SCNC: 25 MMOL/L — SIGNIFICANT CHANGE UP (ref 22–26)
CO2 SERPL-SCNC: 20 MMOL/L — LOW (ref 22–31)
COLOR SPEC: YELLOW — SIGNIFICANT CHANGE UP
CREAT SERPL-MCNC: 0.8 MG/DL — SIGNIFICANT CHANGE UP (ref 0.5–1.3)
DIFF PNL FLD: ABNORMAL
EGFR: 92 ML/MIN/1.73M2 — SIGNIFICANT CHANGE UP
EOSINOPHIL # BLD AUTO: 0 K/UL — SIGNIFICANT CHANGE UP (ref 0–0.5)
EOSINOPHIL NFR BLD AUTO: 0 % — SIGNIFICANT CHANGE UP (ref 0–6)
FLUAV AG NPH QL: SIGNIFICANT CHANGE UP
FLUBV AG NPH QL: SIGNIFICANT CHANGE UP
GAS PNL BLDV: 131 MMOL/L — LOW (ref 136–145)
GAS PNL BLDV: SIGNIFICANT CHANGE UP
GLUCOSE BLDV-MCNC: 120 MG/DL — HIGH (ref 70–99)
GLUCOSE SERPL-MCNC: 129 MG/DL — HIGH (ref 70–99)
GLUCOSE UR QL: NEGATIVE MG/DL — SIGNIFICANT CHANGE UP
HCO3 BLDV-SCNC: 24 MMOL/L — SIGNIFICANT CHANGE UP (ref 22–29)
HCT VFR BLD CALC: 28.3 % — LOW (ref 39–50)
HCT VFR BLDA CALC: 27 % — LOW (ref 39–51)
HGB BLD CALC-MCNC: 9.1 G/DL — LOW (ref 12.6–17.4)
HGB BLD-MCNC: 8.9 G/DL — LOW (ref 13–17)
IMM GRANULOCYTES NFR BLD AUTO: 0.4 % — SIGNIFICANT CHANGE UP (ref 0–0.9)
INR BLD: 1.33 RATIO — HIGH (ref 0.85–1.18)
KETONES UR-MCNC: NEGATIVE MG/DL — SIGNIFICANT CHANGE UP
LACTATE BLDV-MCNC: 1.5 MMOL/L — SIGNIFICANT CHANGE UP (ref 0.5–2)
LEUKOCYTE ESTERASE UR-ACNC: ABNORMAL
LYMPHOCYTES # BLD AUTO: 1.15 K/UL — SIGNIFICANT CHANGE UP (ref 1–3.3)
LYMPHOCYTES # BLD AUTO: 11.7 % — LOW (ref 13–44)
MCHC RBC-ENTMCNC: 30.9 PG — SIGNIFICANT CHANGE UP (ref 27–34)
MCHC RBC-ENTMCNC: 31.4 GM/DL — LOW (ref 32–36)
MCV RBC AUTO: 98.3 FL — SIGNIFICANT CHANGE UP (ref 80–100)
MONOCYTES # BLD AUTO: 1.04 K/UL — HIGH (ref 0–0.9)
MONOCYTES NFR BLD AUTO: 10.6 % — SIGNIFICANT CHANGE UP (ref 2–14)
NEUTROPHILS # BLD AUTO: 7.58 K/UL — HIGH (ref 1.8–7.4)
NEUTROPHILS NFR BLD AUTO: 77 % — SIGNIFICANT CHANGE UP (ref 43–77)
NITRITE UR-MCNC: POSITIVE
NRBC # BLD: 0 /100 WBCS — SIGNIFICANT CHANGE UP (ref 0–0)
PCO2 BLDV: 43 MMHG — SIGNIFICANT CHANGE UP (ref 42–55)
PH BLDV: 7.35 — SIGNIFICANT CHANGE UP (ref 7.32–7.43)
PH UR: 6 — SIGNIFICANT CHANGE UP (ref 5–8)
PLATELET # BLD AUTO: 204 K/UL — SIGNIFICANT CHANGE UP (ref 150–400)
PO2 BLDV: 43 MMHG — SIGNIFICANT CHANGE UP (ref 25–45)
POTASSIUM BLDV-SCNC: 4.4 MMOL/L — SIGNIFICANT CHANGE UP (ref 3.5–5.1)
POTASSIUM SERPL-MCNC: 4.4 MMOL/L — SIGNIFICANT CHANGE UP (ref 3.5–5.3)
POTASSIUM SERPL-SCNC: 4.4 MMOL/L — SIGNIFICANT CHANGE UP (ref 3.5–5.3)
PROT SERPL-MCNC: 7.4 G/DL — SIGNIFICANT CHANGE UP (ref 6–8.3)
PROT UR-MCNC: 30 MG/DL
PROTHROM AB SERPL-ACNC: 13.8 SEC — HIGH (ref 9.5–13)
RBC # BLD: 2.88 M/UL — LOW (ref 4.2–5.8)
RBC # FLD: 13 % — SIGNIFICANT CHANGE UP (ref 10.3–14.5)
RBC CASTS # UR COMP ASSIST: 13 /HPF — HIGH (ref 0–4)
REVIEW: SIGNIFICANT CHANGE UP
RSV RNA NPH QL NAA+NON-PROBE: SIGNIFICANT CHANGE UP
SAO2 % BLDV: 68 % — SIGNIFICANT CHANGE UP (ref 67–88)
SARS-COV-2 RNA SPEC QL NAA+PROBE: SIGNIFICANT CHANGE UP
SODIUM SERPL-SCNC: 133 MMOL/L — LOW (ref 135–145)
SP GR SPEC: 1.01 — SIGNIFICANT CHANGE UP (ref 1–1.03)
SQUAMOUS # UR AUTO: 1 /HPF — SIGNIFICANT CHANGE UP (ref 0–5)
UROBILINOGEN FLD QL: 0.2 MG/DL — SIGNIFICANT CHANGE UP (ref 0.2–1)
WBC # BLD: 9.84 K/UL — SIGNIFICANT CHANGE UP (ref 3.8–10.5)
WBC # FLD AUTO: 9.84 K/UL — SIGNIFICANT CHANGE UP (ref 3.8–10.5)
WBC UR QL: 209 /HPF — HIGH (ref 0–5)

## 2024-04-04 PROCEDURE — 93010 ELECTROCARDIOGRAM REPORT: CPT | Mod: 1L

## 2024-04-04 PROCEDURE — 99223 1ST HOSP IP/OBS HIGH 75: CPT

## 2024-04-04 PROCEDURE — 99285 EMERGENCY DEPT VISIT HI MDM: CPT | Mod: FS

## 2024-04-04 PROCEDURE — 76776 US EXAM K TRANSPL W/DOPPLER: CPT | Mod: 26,RT

## 2024-04-04 PROCEDURE — 99223 1ST HOSP IP/OBS HIGH 75: CPT | Mod: GC

## 2024-04-04 RX ORDER — SODIUM CHLORIDE 9 MG/ML
500 INJECTION INTRAMUSCULAR; INTRAVENOUS; SUBCUTANEOUS ONCE
Refills: 0 | Status: COMPLETED | OUTPATIENT
Start: 2024-04-04 | End: 2024-04-04

## 2024-04-04 RX ORDER — TAMSULOSIN HYDROCHLORIDE 0.4 MG/1
1 CAPSULE ORAL
Qty: 0 | Refills: 0 | DISCHARGE

## 2024-04-04 RX ORDER — LISINOPRIL 2.5 MG/1
1 TABLET ORAL
Refills: 0 | DISCHARGE

## 2024-04-04 RX ORDER — TAMSULOSIN HYDROCHLORIDE 0.4 MG/1
0.4 CAPSULE ORAL DAILY
Refills: 0 | Status: DISCONTINUED | OUTPATIENT
Start: 2024-04-04 | End: 2024-04-08

## 2024-04-04 RX ORDER — LISINOPRIL 2.5 MG/1
10 TABLET ORAL DAILY
Refills: 0 | Status: DISCONTINUED | OUTPATIENT
Start: 2024-04-04 | End: 2024-04-08

## 2024-04-04 RX ORDER — LANOLIN ALCOHOL/MO/W.PET/CERES
3 CREAM (GRAM) TOPICAL AT BEDTIME
Refills: 0 | Status: DISCONTINUED | OUTPATIENT
Start: 2024-04-04 | End: 2024-04-08

## 2024-04-04 RX ORDER — PIPERACILLIN AND TAZOBACTAM 4; .5 G/20ML; G/20ML
3.38 INJECTION, POWDER, LYOPHILIZED, FOR SOLUTION INTRAVENOUS ONCE
Refills: 0 | Status: COMPLETED | OUTPATIENT
Start: 2024-04-04 | End: 2024-04-04

## 2024-04-04 RX ORDER — CARVEDILOL PHOSPHATE 80 MG/1
1 CAPSULE, EXTENDED RELEASE ORAL
Qty: 0 | Refills: 0 | DISCHARGE

## 2024-04-04 RX ORDER — CARVEDILOL PHOSPHATE 80 MG/1
12.5 CAPSULE, EXTENDED RELEASE ORAL EVERY 12 HOURS
Refills: 0 | Status: DISCONTINUED | OUTPATIENT
Start: 2024-04-04 | End: 2024-04-08

## 2024-04-04 RX ORDER — TACROLIMUS 5 MG/1
2 CAPSULE ORAL
Refills: 0 | DISCHARGE

## 2024-04-04 RX ORDER — TACROLIMUS 5 MG/1
0.5 CAPSULE ORAL AT BEDTIME
Refills: 0 | Status: DISCONTINUED | OUTPATIENT
Start: 2024-04-04 | End: 2024-04-08

## 2024-04-04 RX ORDER — PIPERACILLIN AND TAZOBACTAM 4; .5 G/20ML; G/20ML
3.38 INJECTION, POWDER, LYOPHILIZED, FOR SOLUTION INTRAVENOUS EVERY 8 HOURS
Refills: 0 | Status: COMPLETED | OUTPATIENT
Start: 2024-04-05 | End: 2024-04-07

## 2024-04-04 RX ORDER — TACROLIMUS 5 MG/1
1 CAPSULE ORAL
Refills: 0 | DISCHARGE

## 2024-04-04 RX ORDER — TACROLIMUS 5 MG/1
1 CAPSULE ORAL DAILY
Refills: 0 | Status: DISCONTINUED | OUTPATIENT
Start: 2024-04-04 | End: 2024-04-08

## 2024-04-04 RX ORDER — ACETAMINOPHEN 500 MG
975 TABLET ORAL ONCE
Refills: 0 | Status: COMPLETED | OUTPATIENT
Start: 2024-04-04 | End: 2024-04-04

## 2024-04-04 RX ADMIN — SODIUM CHLORIDE 500 MILLILITER(S): 9 INJECTION INTRAMUSCULAR; INTRAVENOUS; SUBCUTANEOUS at 21:03

## 2024-04-04 RX ADMIN — Medication 3 MILLIGRAM(S): at 23:54

## 2024-04-04 RX ADMIN — PIPERACILLIN AND TAZOBACTAM 200 GRAM(S): 4; .5 INJECTION, POWDER, LYOPHILIZED, FOR SOLUTION INTRAVENOUS at 20:47

## 2024-04-04 RX ADMIN — Medication 975 MILLIGRAM(S): at 20:47

## 2024-04-04 RX ADMIN — TACROLIMUS 0.5 MILLIGRAM(S): 5 CAPSULE ORAL at 23:54

## 2024-04-04 NOTE — H&P ADULT - TIME BILLING
Need to interview and examine patient, discuss care with family, review ID recommendations, provide counseling, coordinate care, place orders, document, personally review imaging, ekg and review prior medical records

## 2024-04-04 NOTE — H&P ADULT - NSHPLABSRESULTS_GEN_ALL_CORE
I have reviewed the labs, imaging and ekg. EKG with AV-paced rhythm HR 77 QTc 494 non-specific ST segment findings, Kidney dopplers with signs of hydro

## 2024-04-04 NOTE — PATIENT PROFILE ADULT - FALL HARM RISK - RISK INTERVENTIONS

## 2024-04-04 NOTE — H&P ADULT - PROBLEM SELECTOR PLAN 1
Detail Level: Detailed Depth Of Biopsy: dermis Was A Bandage Applied: Yes Size Of Lesion In Cm: 0.5 X Size Of Lesion In Cm: 0 Biopsy Type: H and E Biopsy Method: Dermablade Anesthesia Type: 1% lidocaine with epinephrine Hemostasis: Electrocautery Wound Care: No ointment Dressing: bandage Destruction After The Procedure: No Type Of Destruction Used: Curettage Curettage Text: The wound bed was treated with curettage after the biopsy was performed. Cryotherapy Text: The wound bed was treated with cryotherapy after the biopsy was performed. Electrodesiccation Text: The wound bed was treated with electrodesiccation after the biopsy was performed. Electrodesiccation And Curettage Text: The wound bed was treated with electrodesiccation and curettage after the biopsy was performed. Silver Nitrate Text: The wound bed was treated with silver nitrate after the biopsy was performed. Lab: [id number] Lab Facility: [id number] Consent: Written consent was obtained and risks were reviewed including but not limited to scarring, infection, bleeding, scabbing, incomplete removal, nerve damage and allergy to anesthesia. Post-Care Instructions: I reviewed with the patient in detail post-care instructions. Patient is to keep the biopsy site dry overnight, and then apply bacitracin twice daily until healed. Patient may apply hydrogen peroxide soaks to remove any crusting. Notification Instructions: Patient will be notified of biopsy results. However, patient instructed to call the office if not contacted within 2 weeks. Billing Type: Third-Party Bill Information: Selecting Yes will display possible errors in your note based on the variables you have selected. This validation is only offered as a suggestion for you. PLEASE NOTE THAT THE VALIDATION TEXT WILL BE REMOVED WHEN YOU FINALIZE YOUR NOTE. IF YOU WANT TO FAX A PRELIMINARY NOTE YOU WILL NEED TO TOGGLE THIS TO 'NO' IF YOU DO NOT WANT IT IN YOUR FAXED NOTE. UA positive, recent UCx with sensitive Klebsiella. Appreciate ID recommendations  -Cont. IV Zosyn  -F/u UCx, BCxs  -F/u ID recommendations  -Urology consult in AM

## 2024-04-04 NOTE — ED PROVIDER NOTE - OBJECTIVE STATEMENT
74 y/o M hypertension, prostate cancer s/p radiation, BPH, end-stage renal disease status post renal transplant 2017 at [place], recurrent UTIs,  prior admissions for pyelonephritis and LAKIA, Has history of ESBL UTI sensitive to ertapenem, however last admission February 2024 for urinary complaints urine culture showed pansensitive Klebsiella completed course of ceftriaxone transition to oral ciprofloxacin presents to ED complaining of persistent intermittent urinary complaints since December, most recently having worsening dysuria over the past several days.  Patient reports he has been on several outpatient course of antibiotics most recently completed a course of unknown antibiotics prescribed by outpatient infectious disease Dr. Jason 2 wks ago, Reports being told he may need urologic surgery outpatient due to frequent UTI.  Patient reports having chronic neck pain from show has chronic mild left lower extremity edema all of which is at his usual baseline as per patient, just reporting dysuria and urinary frequency, has been wearing a diaper due to urinary dribbling, denies any flank pain, fevers, chills chills, other abdominal pain, nausea, vomiting, change in stools, trauma.

## 2024-04-04 NOTE — ED PROVIDER NOTE - ADMIT DISPOSITION PRESENT ON ADMISSION SEPSIS Q1 - RE-EVALUATED PATIENT FLUID AND VITAL SIGNS
Statement Selected
Fluid bolus in progress
Graft Donor Site Bandage (Optional-Leave Blank If You Don't Want In Note): Steri-strips and a pressure bandage were applied to the donor site.

## 2024-04-04 NOTE — H&P ADULT - ASSESSMENT
73M w/ hx of HTN, prostate CA s/p radiation, ESRD  s/p renal transplant in 2017 at [facility], cardiomyopathy s/p PPM, BPH, HTN p/w worsening urinary symptoms found to have UTI and likely urinary retention

## 2024-04-04 NOTE — ED PROVIDER NOTE - PROGRESS NOTE DETAILS
d/w CDU  Dom to place ID consult, I also messaged Dr. Jason (ID attending) on [employer] Teams to inform them that patient presented to ED for persistent urinary sxs. -Watson Stevens PA-C Endorsed to Dr LAURA Short MD, Facep Patient retaining approximately 170 cc, reports unable to have satisfying void, is just having small dribbling voids of urine diaper and has been unable to collect a urine sample due to small and frequent dribbling voids.  RN attempted x 3 to place catheter and was unable to do so to obtain urine sample.  I wrote patient for Block given concern for retention as cause of frequent UTI.  I called and discussed with urology GERSON Bernal regarding patient, will come to ED to assist with placing Block catheter. -Watson Stevens PA-C I spoke to transplant nephrology ACP, however they report they only cover up to 1 year status post renal transplant, advised me to call the nephrology fellow on-call.  I called nephrology fellow Dr. Johnson for consultation, reports that team will see the patient in the morning, agree with plan for tacrolimus level and duplex which were previously ordered, advised to hold on mycophenolate and I placed a provide RN note to this effect.   Zosyn ordered as per  transplant ID, urology was able to place catheter and obtain urine sample.  I called and spoke to patient's PCP Farzad Wilson earlier and patient's clinical course reports he has no strong preference to patient is admitted to.  As per review of prior charts patient was admitted to the hospitalist in the past, will admit patient to the academic hospitalist now.

## 2024-04-04 NOTE — H&P ADULT - NSHPPHYSICALEXAM_GEN_ALL_CORE
Vital Signs Last 24 Hrs  T(C): 37.5 (04-04-24 @ 20:15), Max: 37.8 (04-04-24 @ 18:12)  T(F): 99.5 (04-04-24 @ 20:15), Max: 100.1 (04-04-24 @ 18:12)  HR: 74 (04-04-24 @ 20:15) (74 - 89)  BP: 174/69 (04-04-24 @ 20:15) (128/62 - 175/73)  BP(mean): --  RR: 20 (04-04-24 @ 20:15) (18 - 20)  SpO2: 97% (04-04-24 @ 20:15) (96% - 99%)

## 2024-04-04 NOTE — ED ADULT NURSE NOTE - NSFALLRISKINTERV_ED_ALL_ED

## 2024-04-04 NOTE — H&P ADULT - PROBLEM SELECTOR PLAN 2
hx of Prostate CA and likely BPH as well. Was told he would need urologic procedure as outpatient. Pt and daughter have difficulty recalling urologist name. Doppler showing Moderate to severe hydronephrosis with layering debris in a lower pole calyx. Urology assisted with amaro placement in ER  -Amaro placed in ER  -Cont. Flomax QHS

## 2024-04-04 NOTE — ED PROVIDER NOTE - NSICDXPASTMEDICALHX_GEN_ALL_CORE_FT
PAST MEDICAL HISTORY:  BPH (benign prostatic hyperplasia)     HTN (hypertension)     Prostate cancer     Renal insufficiency

## 2024-04-04 NOTE — H&P ADULT - PROBLEM SELECTOR PLAN 3
S/p transplant 2017. As per ER note, nephrology was consulted and recommended holding mycophenolate  -Cont. Tacrolimus 1mg AM and 0.5mg PM  -Holding Mycophenolate for now  -F/u Nephrology recommendations  -F/u Tacrolimus trough  -F/u nephrology regarding interpretation of kidney doppler

## 2024-04-04 NOTE — ED ADULT NURSE REASSESSMENT NOTE - NS ED NURSE REASSESS COMMENT FT1
Verbal order for amaro to be placed be ED GERSON Bourgeois. Attempted three tries ton place amaro with 2 RN at bedside. ED MD Sargent made aware pt needs urology to place amaro.

## 2024-04-04 NOTE — ED ADULT NURSE NOTE - OBJECTIVE STATEMENT
Pt 73 year old male, A/O x4. Pt came in due to urinary retention since December. PMH- kidney transplant 2017, BPH, prostate cancer, renal insufficiency, HTN. As per pt, has on going problem of urinary retention. Pt states he currently wear diapers because he has urinary incontinence. Upon assessment, pt well appearing, playful, speaking in full coherent sentences. Skin- warm, dry, intact. Abd. soft, nontender, distended. Left AV fistula with auscultated thrill. 253 cc urine scanned with bladder scanner- ED MD Short made aware. Denies chest pain, sob, fever, chills, n/v/d, numbness/ tingling, constipation.

## 2024-04-04 NOTE — H&P ADULT - HISTORY OF PRESENT ILLNESS
73M w/ hx of HTN, prostate CA s/p radiation, ESRD  s/p renal transplant in 2017 at [facility], cardiomyopathy s/p PPM, BPH, HTN p/w worsening urinary symptoms. Pt  persistent intermittent urinary symptoms since December. Endorses worsening dysuria, dribbling and urinary incontinence requiring diaper. Has had increased dysuria and difficulty urinating for past several days so came to hospital. Had course of antibiotics last month outpatient with transplant ID for recurrent UTIs. Pt with hx of sensitive Klebsiella 2 months ago. Distant hx of ESBL e. coli reportedly. Denies fevers, chills, weakness, acute flank pain. Endorses chronic lower back pain. Was told he might need urologic procedure outpatient but has not been performed. Pt and daughter have difficulty recalling urologist's name.    In ER: Was difficulty amaro placement, urology had to be called. Given IV Zosyn, , Tylenol 975mg

## 2024-04-04 NOTE — ED PROVIDER NOTE - ATTENDING APP SHARED VISIT CONTRIBUTION OF CARE
PMD Liban ID, , Farzad Wilson  73y PMH HTN, prostate cancer s/p radiation, BPH, end-stage renal disease s/p renal transplant 2017 previously admitted for Pyelo/LAKIA, HTN, BPH, Prostate ca. Pt comes to ed c/o PMD Liban ID, , Farzad Wilson  73y PMH HTN, prostate cancer s/p radiation, BPH, end-stage renal disease s/p renal transplant 2017 previously admitted for Pyelo/LAKIA, HTN, BPH, Prostate ca. Pt comes to ed c/o UTI. Pt states has had urinary infection off/on for past few months. Responds to abx but returns after completed. Pt was recently on abx and completed course few days later pt reports recurrent frequncy incontinence dysuria. No fc/cp/sob/ PE WDWN male awake alert normocephalic atraumatic neck supple chest clear anterior & posterior cv no rubs, gallops or murmurs abd soft +bs no mass guarding neuro no focal defects  Regan Short MD, Facep

## 2024-04-04 NOTE — ED ADULT NURSE NOTE - NSICDXFAMILYHX_GEN_ALL_CORE_FT
RD/tear warning symptoms reviewed. Call immediately if increase in floaters, flashes, veil, blur. FAMILY HISTORY:  FH: diabetes mellitus

## 2024-04-04 NOTE — ED PROVIDER NOTE - CLINICAL SUMMARY MEDICAL DECISION MAKING FREE TEXT BOX
72 y/o m PMH  end-stage renal disease status post renal transplant 2017 at [place], HTN, prostate cancer s/p radiation, BPH, recurrent UTIs,  prior admissions for pyelonephritis and LAKIA, Pt has had multiple UTI past serveal months and symptoms recurr with withdrawl of abx. Now w recurrent symptoms past several days. Pt advised admission for further tx by ID if recurrs. Plan check,labs,xr,urine and cs ID Probable admit  Regan Short MD, Facep

## 2024-04-04 NOTE — ED PROVIDER NOTE - PHYSICAL EXAMINATION
GEN: Pt in NAD, answering questions appropriately.  PSYCH: Affect appropriate.  EYES: Sclera white w/o injection.   ENT: Head NCAT. MMM. Neck supple FROM.  RESP: CTA b/l, no wheezes, rales, or rhonchi.   CARDIAC: RRR, clear distinct S1, S2, no appreciable murmurs.  ABD: Abdomen soft, non-tender. No CVAT b/l.  VASC: 2+ radial and dorsalis pedis pulses b/l.  SKIN: No rashes on the trunk.

## 2024-04-04 NOTE — H&P ADULT - NEGATIVE CARDIOVASCULAR SYMPTOMS
[Incontinence] : incontinence [Joint Pain] : joint pain [Arthralgias] : arthralgias [Nl] : Musculoskeletal [FreeTextEntry1] : referred to Dr. Bernal no chest pain/no palpitations/no dyspnea on exertion

## 2024-04-04 NOTE — CONSULT NOTE ADULT - SUBJECTIVE AND OBJECTIVE BOX
Patient is a 73y old  Male who presents with a chief complaint of     74 yo Male with PMHx of HTN, prostate cancer s/p radiation, BPH, end-stage renal disease s/p renal transplant 2017 at [place], multiple admission for UTI most recent hospitalization 1/29-2/1  for pyelonephritis, now presenting for Rt flank pain, dysuria. He was treated for Klebsiella UTI, with ceftriaxone and Ciprofloxacin in Feb and was started on Ceftin on 3/22 to complete 10 days.    Ucx 3/21: klebsiella Sensistive       prior hospital charts reviewed [ x ]  primary team notes reviewed [x  ]  other consultant notes reviewed [ x ]    PAST MEDICAL & SURGICAL HISTORY:  HTN (hypertension)      Renal insufficiency      Prostate cancer      BPH (benign prostatic hyperplasia)      Renal transplant, status post      S/P placement of cardiac pacemaker          Allergies  No Known Allergies    ANTIMICROBIALS (past 90 days)  MEDICATIONS  (STANDING):          MEDICATIONS  (STANDING):    SOCIAL HISTORY:       FAMILY HISTORY:  FH: diabetes mellitus      REVIEW OF SYSTEMS  [  ] ROS unobtainable because:    [  ] All other systems negative except as noted below:	    Constitutional:  [ ] fever [ ] chills  [ ] weight loss  [ ] weakness  Skin:  [ ] rash [ ] phlebitis	  Eyes: [ ] icterus [ ] pain  [ ] discharge	  ENMT: [ ] sore throat  [ ] thrush [ ] ulcers [ ] exudates  Respiratory: [ ] dyspnea [ ] hemoptysis [ ] cough [ ] sputum	  Cardiovascular:  [ ] chest pain [ ] palpitations [ ] edema	  Gastrointestinal:  [ ] nausea [ ] vomiting [ ] diarrhea [ ] constipation [ ] pain	  Genitourinary:  [ ] dysuria [ ] frequency [ ] hematuria [ ] discharge [ ] flank pain  [ ] incontinence  Musculoskeletal:  [ ] myalgias [ ] arthralgias [ ] arthritis  [ ] back pain  Neurological:  [ ] headache [ ] seizures  [ ] confusion/altered mental status  Psychiatric:  [ ] anxiety [ ] depression	  Hematology/Lymphatics:  [ ] lymphadenopathy  Endocrine:  [ ] adrenal [ ] thyroid  Allergic/Immunologic:	 [ ] transplant [ ] seasonal    Vital Signs Last 24 Hrs  T(F): 99.3 (04-04-24 @ 13:30), Max: 99.3 (04-04-24 @ 13:30)  Vital Signs Last 24 Hrs  HR: 74 (04-04-24 @ 13:30) (74 - 74)  BP: 146/62 (04-04-24 @ 13:30) (128/62 - 146/62)  RR: 18 (04-04-24 @ 13:30)  SpO2: 99% (04-04-24 @ 13:30) (98% - 99%)  Wt(kg): --    PHYSICAL EXAM:  Constitutional: non-toxic, no distress  HEAD/EYES: anicteric, no conjunctival injection  ENT:  supple, no thrush  Cardiovascular:   normal S1, S2, no murmur, no edema  Respiratory:  clear BS bilaterally, no wheezes, no rales  GI:  soft, non-tender, normal bowel sounds  :  no amaro, no CVA tenderness  Musculoskeletal:  no synovitis, normal ROM  Neurologic: awake and alert, normal strength, no focal findings  Skin:  no rash, no erythema, no phlebitis  Heme/Onc: no lymphadenopathy   Psychiatric:  awake, alert, appropriate mood                            8.9    9.84  )-----------( 204      ( 04 Apr 2024 13:58 )             28.3   04-04    133<L>  |  100  |  33<H>  ----------------------------<  129<H>  4.4   |  20<L>  |  0.80    Ca    8.7      04 Apr 2024 13:58    TPro  7.4  /  Alb  3.4  /  TBili  0.5  /  DBili  x   /  AST  11  /  ALT  5<L>  /  AlkPhos  75  04-04    Urinalysis Basic - ( 04 Apr 2024 13:58 )    Color: x / Appearance: x / SG: x / pH: x  Gluc: 129 mg/dL / Ketone: x  / Bili: x / Urobili: x   Blood: x / Protein: x / Nitrite: x   Leuk Esterase: x / RBC: x / WBC x   Sq Epi: x / Non Sq Epi: x / Bacteria: x    MICROBIOLOGY:              RADIOLOGY:  imaging below personally reviewed and agree with findings Patient is a 73y old  Male who presents with a chief complaint of     72 yo Male with PMHx of HTN, prostate cancer s/p radiation, BPH, end-stage renal disease s/p renal transplant 2017 at [place], multiple admission for UTI most recent hospitalization 1/29-2/1  for pyelonephritis, now presenting for Rt flank pain, dysuria. He was treated for Klebsiella UTI, with ceftriaxone and Ciprofloxacin in Feb and was started on Ceftin on 3/22 to complete 10 days. As soon as he finished the course, his symptoms came back. He has persistent pelvic discomfort, dysuria, dribbling. No report of chills, fevers, ROS otherwise negative.    ED: afebrile, WBC 9.8. Retaining, Nursing staff unable to place Block and Urology is being called.    Ucx 3/21: klebsiella     US renal 1/29 with Moderate to severe hydronephrosis    prior hospital charts reviewed [ x ]  primary team notes reviewed [x  ]  other consultant notes reviewed [ x ]    PAST MEDICAL & SURGICAL HISTORY:  HTN (hypertension)      Renal insufficiency      Prostate cancer      BPH (benign prostatic hyperplasia)      Renal transplant, status post      S/P placement of cardiac pacemaker          Allergies  No Known Allergies    ANTIMICROBIALS (past 90 days)  MEDICATIONS  (STANDING):          MEDICATIONS  (STANDING):    SOCIAL HISTORY:  Lives at home, no tobacco , drug, alcohol use    FAMILY HISTORY:  FH: diabetes mellitus      REVIEW OF SYSTEMS  [  ] ROS unobtainable because:    [  x] All other systems negative except as noted below:	    Constitutional:  [ ] fever [ ] chills  [ ] weight loss  [ ] weakness  Skin:  [ ] rash [ ] phlebitis	  Eyes: [ ] icterus [ ] pain  [ ] discharge	  ENMT: [ ] sore throat  [ ] thrush [ ] ulcers [ ] exudates  Respiratory: [ ] dyspnea [ ] hemoptysis [ ] cough [ ] sputum	  Cardiovascular:  [ ] chest pain [ ] palpitations [ ] edema	  Gastrointestinal:  [ ] nausea [ ] vomiting [ ] diarrhea [ ] constipation [ ] pain	  Genitourinary:  [x ] dysuria [x ] frequency [ ] hematuria [ ] discharge [ ] flank pain  [ ] incontinence  Musculoskeletal:  [ ] myalgias [ ] arthralgias [ ] arthritis  [ ] back pain  Neurological:  [ ] headache [ ] seizures  [ ] confusion/altered mental status  Psychiatric:  [ ] anxiety [ ] depression	  Hematology/Lymphatics:  [ ] lymphadenopathy  Endocrine:  [ ] adrenal [ ] thyroid  Allergic/Immunologic:	 [ ] transplant [ ] seasonal    Vital Signs Last 24 Hrs  T(F): 99.3 (04-04-24 @ 13:30), Max: 99.3 (04-04-24 @ 13:30)  Vital Signs Last 24 Hrs  HR: 74 (04-04-24 @ 13:30) (74 - 74)  BP: 146/62 (04-04-24 @ 13:30) (128/62 - 146/62)  RR: 18 (04-04-24 @ 13:30)  SpO2: 99% (04-04-24 @ 13:30) (98% - 99%)  Wt(kg): --    PHYSICAL EXAM:    General: Patient in NAD  HEENT: NCAT, EOMI, PERRL, no oral lesions  CV: S1+S2, no m/r/g appreciated   Lungs: No respiratory distress, CTAB  Abd: Soft, nontender, no guarding, no rebound tenderness, + bowel sounds   : No suprapubic tenderness  Neuro: Alert and oriented to time, place and person. Moves all extremities against gravity.  Ext: No cyanosis, no edema  Skin: No rash, no phlebitis                              8.9    9.84  )-----------( 204      ( 04 Apr 2024 13:58 )             28.3   04-04    133<L>  |  100  |  33<H>  ----------------------------<  129<H>  4.4   |  20<L>  |  0.80    Ca    8.7      04 Apr 2024 13:58    TPro  7.4  /  Alb  3.4  /  TBili  0.5  /  DBili  x   /  AST  11  /  ALT  5<L>  /  AlkPhos  75  04-04    Urinalysis Basic - ( 04 Apr 2024 13:58 )    Color: x / Appearance: x / SG: x / pH: x  Gluc: 129 mg/dL / Ketone: x  / Bili: x / Urobili: x   Blood: x / Protein: x / Nitrite: x   Leuk Esterase: x / RBC: x / WBC x   Sq Epi: x / Non Sq Epi: x / Bacteria: x    MICROBIOLOGY:              RADIOLOGY:  imaging below personally reviewed and agree with findings    < from: US Trans Kidney w/ Doppler, Right (01.29.24 @ 14:52) >    ACC: [id number] EXAM:  US KIDNEY TRANSPLANT W DOPP RT   ORDERED BY:    HAYDEN LEROY     PROCEDURE DATE:  01/29/2024          INTERPRETATION:  CLINICAL INFORMATION: Dysuria. History of renal   transplant 6 years ago.    COMPARISON: Renal [id number]/24/2023. CT abdomen pelvis 12/23/2023. No   prior renal Doppler available.    TECHNIQUE: Grayscale, Color and spectral Doppler evaluation of a RIGHT   renal transplant.    FINDINGS:    Renal Transplant: 15.4 cm. No renal mass or calculi. Moderate to severe   hydronephrosis with debris in the renal pelvis and lower pole calyces.   Diffuse urothelial thickening of the visualized proximal collecting   system.  Urinary bladder: Layering debris within the bladder. Small right-sided   anterior diverticulum again noted.    Color and spectral Doppler reveals homogeneous flow throughout the   transplant.    Peak iliac artery velocity is 219 cm/sec pre-anastomosis, 368 cm/sec at   the anastomosis, and 227 cm/sec post anastomosis.    Transplant RenalArtery:  Peak systolic velocity is 600 cm/sec anastomosis, 408 cm/sec proximal,   368 cm/sec mid, 197 cm/sec distal and 152 cm/sec hilum.  Resistive Indices Range: 0.80-0.93, possible minimal/mild tardus parvus   waveform abnormality in the arcuate arteries.    Transplant Renal Vein: Patent.    IMPRESSION:    Elevated peak systolic velocities most notably in the transplant renal   artery anastomotic segment measuring up to 600 cm/s with possible   minimal/mild tardus parvus waveform abnormalitiesin the arcuate   arteries. 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    Moderate to severe hydronephrosis which appears chronic, however now with   layering debris in the intrarenal collecting system and bladder   concerning for urinary tract infection. Early or urinalysis.    --- End of Report ---    < end of copied text >

## 2024-04-05 DIAGNOSIS — I10 ESSENTIAL (PRIMARY) HYPERTENSION: ICD-10-CM

## 2024-04-05 DIAGNOSIS — R33.9 RETENTION OF URINE, UNSPECIFIED: ICD-10-CM

## 2024-04-05 DIAGNOSIS — Z29.9 ENCOUNTER FOR PROPHYLACTIC MEASURES, UNSPECIFIED: ICD-10-CM

## 2024-04-05 DIAGNOSIS — Z86.79 PERSONAL HISTORY OF OTHER DISEASES OF THE CIRCULATORY SYSTEM: ICD-10-CM

## 2024-04-05 DIAGNOSIS — N18.6 END STAGE RENAL DISEASE: ICD-10-CM

## 2024-04-05 DIAGNOSIS — N39.0 URINARY TRACT INFECTION, SITE NOT SPECIFIED: ICD-10-CM

## 2024-04-05 LAB
ALBUMIN SERPL ELPH-MCNC: 3.1 G/DL — LOW (ref 3.3–5)
ALP SERPL-CCNC: 51 U/L — SIGNIFICANT CHANGE UP (ref 40–120)
ALT FLD-CCNC: 8 U/L — LOW (ref 10–45)
ANION GAP SERPL CALC-SCNC: 12 MMOL/L — SIGNIFICANT CHANGE UP (ref 5–17)
AST SERPL-CCNC: 10 U/L — SIGNIFICANT CHANGE UP (ref 10–40)
BASOPHILS # BLD AUTO: 0.03 K/UL — SIGNIFICANT CHANGE UP (ref 0–0.2)
BASOPHILS NFR BLD AUTO: 0.3 % — SIGNIFICANT CHANGE UP (ref 0–2)
BILIRUB SERPL-MCNC: 0.5 MG/DL — SIGNIFICANT CHANGE UP (ref 0.2–1.2)
BUN SERPL-MCNC: 27 MG/DL — HIGH (ref 7–23)
CALCIUM SERPL-MCNC: 8.4 MG/DL — SIGNIFICANT CHANGE UP (ref 8.4–10.5)
CHLORIDE SERPL-SCNC: 106 MMOL/L — SIGNIFICANT CHANGE UP (ref 96–108)
CO2 SERPL-SCNC: 21 MMOL/L — LOW (ref 22–31)
CREAT SERPL-MCNC: 0.8 MG/DL — SIGNIFICANT CHANGE UP (ref 0.5–1.3)
EGFR: 92 ML/MIN/1.73M2 — SIGNIFICANT CHANGE UP
EOSINOPHIL # BLD AUTO: 0.01 K/UL — SIGNIFICANT CHANGE UP (ref 0–0.5)
EOSINOPHIL NFR BLD AUTO: 0.1 % — SIGNIFICANT CHANGE UP (ref 0–6)
GLUCOSE SERPL-MCNC: 125 MG/DL — HIGH (ref 70–99)
HCT VFR BLD CALC: 28 % — LOW (ref 39–50)
HGB BLD-MCNC: 8.8 G/DL — LOW (ref 13–17)
IMM GRANULOCYTES NFR BLD AUTO: 0.4 % — SIGNIFICANT CHANGE UP (ref 0–0.9)
LYMPHOCYTES # BLD AUTO: 0.81 K/UL — LOW (ref 1–3.3)
LYMPHOCYTES # BLD AUTO: 8.9 % — LOW (ref 13–44)
MAGNESIUM SERPL-MCNC: 1.8 MG/DL — SIGNIFICANT CHANGE UP (ref 1.6–2.6)
MCHC RBC-ENTMCNC: 30.6 PG — SIGNIFICANT CHANGE UP (ref 27–34)
MCHC RBC-ENTMCNC: 31.4 GM/DL — LOW (ref 32–36)
MCV RBC AUTO: 97.2 FL — SIGNIFICANT CHANGE UP (ref 80–100)
MONOCYTES # BLD AUTO: 1.12 K/UL — HIGH (ref 0–0.9)
MONOCYTES NFR BLD AUTO: 12.3 % — SIGNIFICANT CHANGE UP (ref 2–14)
NEUTROPHILS # BLD AUTO: 7.12 K/UL — SIGNIFICANT CHANGE UP (ref 1.8–7.4)
NEUTROPHILS NFR BLD AUTO: 78 % — HIGH (ref 43–77)
NRBC # BLD: 0 /100 WBCS — SIGNIFICANT CHANGE UP (ref 0–0)
PLATELET # BLD AUTO: 200 K/UL — SIGNIFICANT CHANGE UP (ref 150–400)
POTASSIUM SERPL-MCNC: 4.3 MMOL/L — SIGNIFICANT CHANGE UP (ref 3.5–5.3)
POTASSIUM SERPL-SCNC: 4.3 MMOL/L — SIGNIFICANT CHANGE UP (ref 3.5–5.3)
PROT SERPL-MCNC: 6.7 G/DL — SIGNIFICANT CHANGE UP (ref 6–8.3)
RBC # BLD: 2.88 M/UL — LOW (ref 4.2–5.8)
RBC # FLD: 12.8 % — SIGNIFICANT CHANGE UP (ref 10.3–14.5)
SODIUM SERPL-SCNC: 139 MMOL/L — SIGNIFICANT CHANGE UP (ref 135–145)
TACROLIMUS SERPL-MCNC: 12.5 NG/ML — SIGNIFICANT CHANGE UP
TACROLIMUS SERPL-MCNC: 9.4 NG/ML — SIGNIFICANT CHANGE UP
WBC # BLD: 9.13 K/UL — SIGNIFICANT CHANGE UP (ref 3.8–10.5)
WBC # FLD AUTO: 9.13 K/UL — SIGNIFICANT CHANGE UP (ref 3.8–10.5)

## 2024-04-05 PROCEDURE — 99232 SBSQ HOSP IP/OBS MODERATE 35: CPT | Mod: GC

## 2024-04-05 PROCEDURE — 99233 SBSQ HOSP IP/OBS HIGH 50: CPT | Mod: GC

## 2024-04-05 PROCEDURE — 51703 INSERT BLADDER CATH COMPLEX: CPT

## 2024-04-05 PROCEDURE — 99222 1ST HOSP IP/OBS MODERATE 55: CPT | Mod: GC

## 2024-04-05 PROCEDURE — 99222 1ST HOSP IP/OBS MODERATE 55: CPT | Mod: FS,25

## 2024-04-05 RX ORDER — HEPARIN SODIUM 5000 [USP'U]/ML
5000 INJECTION INTRAVENOUS; SUBCUTANEOUS EVERY 8 HOURS
Refills: 0 | Status: DISCONTINUED | OUTPATIENT
Start: 2024-04-05 | End: 2024-04-08

## 2024-04-05 RX ORDER — ACETAMINOPHEN 500 MG
650 TABLET ORAL EVERY 6 HOURS
Refills: 0 | Status: DISCONTINUED | OUTPATIENT
Start: 2024-04-05 | End: 2024-04-08

## 2024-04-05 RX ORDER — CHLORHEXIDINE GLUCONATE 213 G/1000ML
1 SOLUTION TOPICAL DAILY
Refills: 0 | Status: DISCONTINUED | OUTPATIENT
Start: 2024-04-05 | End: 2024-04-08

## 2024-04-05 RX ADMIN — PIPERACILLIN AND TAZOBACTAM 25 GRAM(S): 4; .5 INJECTION, POWDER, LYOPHILIZED, FOR SOLUTION INTRAVENOUS at 19:12

## 2024-04-05 RX ADMIN — HEPARIN SODIUM 5000 UNIT(S): 5000 INJECTION INTRAVENOUS; SUBCUTANEOUS at 15:20

## 2024-04-05 RX ADMIN — HEPARIN SODIUM 5000 UNIT(S): 5000 INJECTION INTRAVENOUS; SUBCUTANEOUS at 21:24

## 2024-04-05 RX ADMIN — TACROLIMUS 1 MILLIGRAM(S): 5 CAPSULE ORAL at 11:48

## 2024-04-05 RX ADMIN — PIPERACILLIN AND TAZOBACTAM 25 GRAM(S): 4; .5 INJECTION, POWDER, LYOPHILIZED, FOR SOLUTION INTRAVENOUS at 04:59

## 2024-04-05 RX ADMIN — LISINOPRIL 10 MILLIGRAM(S): 2.5 TABLET ORAL at 06:11

## 2024-04-05 RX ADMIN — TAMSULOSIN HYDROCHLORIDE 0.4 MILLIGRAM(S): 0.4 CAPSULE ORAL at 11:48

## 2024-04-05 RX ADMIN — PIPERACILLIN AND TAZOBACTAM 25 GRAM(S): 4; .5 INJECTION, POWDER, LYOPHILIZED, FOR SOLUTION INTRAVENOUS at 10:53

## 2024-04-05 RX ADMIN — TACROLIMUS 0.5 MILLIGRAM(S): 5 CAPSULE ORAL at 21:23

## 2024-04-05 RX ADMIN — CARVEDILOL PHOSPHATE 12.5 MILLIGRAM(S): 80 CAPSULE, EXTENDED RELEASE ORAL at 06:11

## 2024-04-05 RX ADMIN — CARVEDILOL PHOSPHATE 12.5 MILLIGRAM(S): 80 CAPSULE, EXTENDED RELEASE ORAL at 18:35

## 2024-04-05 NOTE — PROGRESS NOTE ADULT - SUBJECTIVE AND OBJECTIVE BOX
Follow Up:  UTI    Interval History/ROS: afebrile, Tmax 100 overnight. Block was placed,  overall feels better, no new symptoms.    Allergies  No Known Allergies        ANTIMICROBIALS:  piperacillin/tazobactam IVPB.. 3.375 every 8 hours      OTHER MEDS:  MEDICATIONS  (STANDING):  carvedilol 12.5 every 12 hours  heparin   Injectable 5000 every 8 hours  lisinopril 10 daily  melatonin 3 at bedtime PRN  tacrolimus 1 daily  tacrolimus 0.5 at bedtime  tamsulosin 0.4 daily      Vital Signs Last 24 Hrs  T(C): 37.4 (05 Apr 2024 12:02), Max: 37.8 (04 Apr 2024 18:12)  T(F): 99.3 (05 Apr 2024 12:02), Max: 100.1 (04 Apr 2024 18:12)  HR: 99 (05 Apr 2024 12:02) (72 - 99)  BP: 139/57 (05 Apr 2024 12:02) (128/46 - 175/73)  BP(mean): 73 (04 Apr 2024 23:34) (73 - 96)  RR: 18 (05 Apr 2024 12:02) (16 - 20)  SpO2: 70% (05 Apr 2024 12:02) (70% - 98%)    Parameters below as of 05 Apr 2024 12:02  Patient On (Oxygen Delivery Method): room air        PHYSICAL EXAM:    General: Patient in NAD  HEENT: NCAT, EOMI, PERRL, no oral lesions  CV: S1+S2, no m/r/g appreciated   Lungs: No respiratory distress, CTAB  Abd: Soft, nontender, no guarding, no rebound tenderness, + bowel sounds   : No suprapubic tenderness, Block in place  Neuro: Alert and oriented to time, place and person. Moves all extremities against gravity.  Ext: No cyanosis, no edema  Skin: No rash, no phlebitis                                8.8    9.13  )-----------( 200      ( 05 Apr 2024 06:40 )             28.0       04-05    139  |  106  |  27<H>  ----------------------------<  125<H>  4.3   |  21<L>  |  0.80    Ca    8.4      05 Apr 2024 06:44  Mg     1.8     04-05    TPro  6.7  /  Alb  3.1<L>  /  TBili  0.5  /  DBili  x   /  AST  10  /  ALT  8<L>  /  AlkPhos  51  04-05      Urinalysis Basic - ( 05 Apr 2024 06:44 )    Color: x / Appearance: x / SG: x / pH: x  Gluc: 125 mg/dL / Ketone: x  / Bili: x / Urobili: x   Blood: x / Protein: x / Nitrite: x   Leuk Esterase: x / RBC: x / WBC x   Sq Epi: x / Non Sq Epi: x / Bacteria: x        MICROBIOLOGY:  v                  RADIOLOGY:    < from: US Trans Kidney w/ Doppler, Right (04.04.24 @ 20:27) >  ACC: [id number] EXAM:  US KIDNEY TRANSPLANT W DOPP RT   ORDERED BY:   ASHER RAMOS     PROCEDURE DATE:  04/04/2024          INTERPRETATION:  CLINICAL INFORMATION: Evaluate renal transplant   performed approximately 6 years ago. Pain with urination.    COMPARISON: Transplant renal ultrasound 1/29/2024    TECHNIQUE: Grayscale, Color and spectral Doppler evaluation of a RIGHT   renal transplant.    FINDINGS:    Renal Transplant: 16.7 cm cm. centrally MODERATE to severe hydronephrosis   with debris in a lower pole calyx. Urothelial thickening within the   visualized collecting system.    Urinary bladder: Collapsed around Block catheter.    Color and spectral Doppler reveals homogeneous flow throughout the   transplant.    Peak iliac artery velocity is 210 cm/sec pre-anastomosis, 363 cm/sec at   the anastomosis, and 189 cm/sec post anastomosis.    Transplant Renal Artery:  Peak systolic velocity is 557 cm/sec anastomosis, 449 cm/sec proximal,   318 cm/sec mid, 204 cm/sec distal and 204 cm/sec hilum.  Resistive Indices Range: 0.71-0.82, improved since prior    Transplant Renal Vein: Patent.    IMPRESSION:    As seen on 1/29/2024 comparison, there are elevated peak systolic   velocities most notably at the transplant renal artery anastomosis (557   cm/s, previously 600 cm/s) consistent with stenosis.    Resistive indices range from 0.71, 0.82, improved from prior.    Moderate to severe hydronephrosis with layering debris in a lower pole   calyx. Correlate with urinalysis.        --- End of Report ---    < end of copied text >

## 2024-04-05 NOTE — PROCEDURE NOTE - NSURITECHNIQUE_GU_A_CORE
Proper hand hygiene was performed/Sterile gloves were worn for the duration of the procedure/A sterile drape was used to cover all adjacent areas/The site was cleaned with soap/water and sterile solution (betadine)/The catheter was appropriately lubricated/The catheter was secured with a securement device (e.g. StatLock)/The urinary drainage system is closed at the end of the procedure/All applicable medical record documentation is completed
negative...

## 2024-04-05 NOTE — CONSULT NOTE ADULT - SUBJECTIVE AND OBJECTIVE BOX
Patient is a 73y old  Male who presents with a chief complaint of Dysuria (05 Apr 2024 13:41)      HPI:  73M w/ hx of HTN, prostate CA s/p radiation, ESRD  s/p renal transplant in 2017 at [facility], cardiomyopathy s/p PPM, BPH, HTN p/w worsening urinary symptoms. Pt  persistent intermittent urinary symptoms since December. Endorses worsening dysuria, dribbling and urinary incontinence requiring diaper. Has had increased dysuria and difficulty urinating for past several days so came to hospital. Had course of antibiotics last month outpatient with transplant ID for recurrent UTIs. Pt with hx of sensitive Klebsiella 2 months ago. Distant hx of ESBL e. coli reportedly. Denies fevers, chills, weakness, acute flank pain. Endorses chronic lower back pain. Was told he might need urologic procedure outpatient but has not been performed. Pt and daughter have difficulty recalling urologist's name.    In ER: Was difficulty amaro placement, urology had to be called. Given IV Zosyn, , Tylenol 975mg (04 Apr 2024 20:41)    now s/p amaro. feels better. urology on board. on MMF on hold currently for complicated UTI. Tac 1/0.5mg. txp doppler showing elevated velocities but improved from >600 with txp mod-severe hydro and preserved RF.     PAST MEDICAL & SURGICAL HISTORY:  HTN (hypertension)      Renal insufficiency      Prostate cancer      BPH (benign prostatic hyperplasia)      Renal transplant, status post      S/P placement of cardiac pacemaker          MEDICATIONS  (STANDING):  carvedilol 12.5 milliGRAM(s) Oral every 12 hours  heparin   Injectable 5000 Unit(s) SubCutaneous every 8 hours  lisinopril 10 milliGRAM(s) Oral daily  piperacillin/tazobactam IVPB.. 3.375 Gram(s) IV Intermittent every 8 hours  tacrolimus 1 milliGRAM(s) Oral daily  tacrolimus 0.5 milliGRAM(s) Oral at bedtime  tamsulosin 0.4 milliGRAM(s) Oral daily      Allergies    No Known Allergies    Intolerances        SOCIAL HISTORY:  Denies ETOh,Smoking,     FAMILY HISTORY:  FH: diabetes mellitus        REVIEW OF SYSTEMS:  CONSTITUTIONAL: No weakness, fevers or chills  EYES/ENT: No visual changes;  No vertigo or throat pain   NECK: No pain or stiffness  RESPIRATORY: No cough, wheezing, hemoptysis; No shortness of breath  CARDIOVASCULAR: No chest pain or palpitations  GASTROINTESTINAL: No abdominal or epigastric pain. No nausea, vomiting, or hematemesis; No diarrhea or constipation. No melena or hematochezia.  GENITOURINARY:  +dysuria, frequency  NEUROLOGICAL: No numbness or weakness  SKIN: No itching, burning, rashes, or lesions   All other review of systems is negative unless indicated above.    VITAL:  T(C): , Max: 37.8 (04-04-24 @ 18:12)  T(F): , Max: 100.1 (04-04-24 @ 18:12)  HR: 99 (04-05-24 @ 12:02)  BP: 139/57 (04-05-24 @ 12:02)  BP(mean): 73 (04-04-24 @ 23:34)  RR: 18 (04-05-24 @ 12:02)  SpO2: 70% (04-05-24 @ 12:02)  Wt(kg): --    PHYSICAL EXAM:  Constitutional: NAD, Alert  HEENT: NCAT, MMM  Neck: Supple, No JVD  Respiratory: CTA-b/l  Cardiovascular: RRR s1s2, no m/r/g  Gastrointestinal: BS+, soft, NT/ND  Extremities: No peripheral edema b/l  Neurological: no focal deficits; strength grossly intact  Back: no CVAT b/l  Skin: No rashes, no nevi    LABS:                        8.8    9.13  )-----------( 200      ( 05 Apr 2024 06:40 )             28.0     Na(139)/K(4.3)/Cl(106)/HCO3(21)/BUN(27)/Cr(0.80)Glu(125)/Ca(8.4)/Mg(1.8)/PO4(--)    04-05 @ 06:44  Na(133)/K(4.4)/Cl(100)/HCO3(20)/BUN(33)/Cr(0.80)Glu(129)/Ca(8.7)/Mg(--)/PO4(--)    04-04 @ 13:58    Urinalysis Basic - ( 05 Apr 2024 06:44 )    Color: x / Appearance: x / SG: x / pH: x  Gluc: 125 mg/dL / Ketone: x  / Bili: x / Urobili: x   Blood: x / Protein: x / Nitrite: x   Leuk Esterase: x / RBC: x / WBC x   Sq Epi: x / Non Sq Epi: x / Bacteria: x            IMAGING:    ASSESSMENT:  73M w/ hx of HTN, prostate CA s/p radiation, ESRD  s/p renal transplant in 2017 at [facility], cardiomyopathy s/p PPM, BPH, HTN p/w worsening urinary symptoms.     Renal txp  -2017 @ [place]  -Allograft baseline approx 0.8  -chronically elevated allograft velocities with preserved RF, BP looks fine  -chronic txp hydro with preserved RF    High risk immunosuppression  - BID (held right now)  -Tac 1/0.5mg    HTN  -lisinopril, coreg, tamsulosin    Recurrent UTI  -with urethral stricture  -s/p amaro    RECOMMEND:  -a/w holding MMF for now  -c/w tacrolimus 1/0.5mg, titrations per txp nephrology  -BP acceptable for now, continue current regimen  -c/w IV abx  -Urology on board  -txp neph on board  -monitor I's and O's  -MAP >65  -Dose Rx for CrCl >60mL/min    Thank you for involving [facility] in this patient's care.    With warm regards,    Nakul Trejo DO   [facility]  Office: [phone]

## 2024-04-05 NOTE — PROGRESS NOTE ADULT - PROBLEM SELECTOR PLAN 1
prior urine culture grew pan-sensitive Klebsiella, treated with Ceftriaxone/Levaquin in 12/2023, and Cefuroxime 1/14, now with recurrent infection  - s/p Ceftriaxone, Diflucan 100mg iv and 1L NS in ED  - started on Ertapenem to cover ESBL and Diflucan 200mg PO for now, f/u urine cx; UA >996 WBC and yeast  - switched from Ertapenem to Ceftriaxone (02/01-)  - d/c Diflucan  - Ultrasound of transplant kidney with chronic mod-severe hydronephrosis, but no renal abscess  - Will obtain prostate US to r/o abscess   - Transplant ID consulted for further eval UA positive, recent UCx with sensitive Klebsiella. Appreciate ID recommendations  -Cont. IV Zosyn  -F/u UCx, BCxs  -F/u ID recommendations  -Urology consulted; appreciate recs

## 2024-04-05 NOTE — CONSULT NOTE ADULT - ASSESSMENT
73M w/ hx of HTN, prostate CA s/p radiation, ESRD  s/p renal transplant in 2017 at [facility], cardiomyopathy s/p PPM, BPH, HTN p/w worsening urinary symptoms.  Pt is on tacro 1mg in the am and 0.5mg pm,  BID and prednisone 5 as per pt.     Scr at the time of admission was 0.80. UA +ve and was placed on IV zosyn.     UTI:  Hold MMF   C/w tacro as above - follow up with Tacro trough 30 min before the am dose.   C/w abx as per Transplant ID. - follow up with the culture results.  Obtain accurate med recc and restart Prednisone if he is on.   Pt can be placed on Hippurex after the antibiotics are completed for prevention of UTi.  Follow up with the urology team.

## 2024-04-05 NOTE — CONSULT NOTE ADULT - ASSESSMENT
73M with PMHx of HTN, prostate CA s/p radiation, ESRD s/p renal transplant in 2017 at [facility], cardiomyopathy s/p PPM, BPH, HTN admitted with recurrent UTI, has chronic hydronephrosis of transplant kidney     -Follow up urine culure  -ID for abx management   -Keep amaro while inpatient   -Continue outpatient       ***FINAL PLAN PENDING DISCUSSION WITH ATTENDING      73M with PMHx of HTN, prostate CA s/p radiation, ESRD s/p renal transplant in 2017 at [facility], cardiomyopathy s/p PPM, BPH, HTN admitted with recurrent UTI, has chronic hydronephrosis of transplant kidney     -Follow up urine culure  -ID for abx management   -Keep amaro for 1 week  -Outpatient follow up with Dr. Cooney       ***FINAL PLAN PENDING DISCUSSION WITH ATTENDING      73M with PMHx of HTN, prostate CA s/p radiation (prior to txp), ESRD s/p renal transplant in 2017 at [facility], cardiomyopathy s/p PPM, BPH, HTN admitted with recurrent UTI, has chronic hydronephrosis of transplant kidney.     -Follow up urine culure  -ID for abx management   -begin methenamine BID after   -Keep amaro for 1 week  -Outpatient follow up with Dr. Cooney            73M with PMHx of HTN, prostate CA s/p radiation (prior to txp), ESRD s/p renal transplant in 2017 at [facility], cardiomyopathy s/p PPM, BPH, HTN admitted with recurrent UTI, has chronic hydronephrosis of transplant kidney.     Plan:  -Recurrent UTIs likely due to outlet obstruction 2/2 to possible urethral stricture after radiation therapy   -Follow up urine culture  -Fu transplant ID for abx management   -Begin methenamine BID after abx treatment    -Keep amaro for 1 week  -Monitor for fevers  -Outpatient follow up with Dr. Cooney     Case discussed with Dr. Zamora  [facility]  [address] [place], [place] [address] [phone]

## 2024-04-05 NOTE — PROGRESS NOTE ADULT - SUBJECTIVE AND OBJECTIVE BOX
Bailey Isbell, PGY1    Patient is a 73y old  Male who presents with a chief complaint of Dysuria (04 Apr 2024 20:41)      SUBJECTIVE / OVERNIGHT EVENTS: NAEO. Pt denies chest pain, SOB, N/V, fever/chills, or changes in bowel movements.    MEDICATIONS  (STANDING):  carvedilol 12.5 milliGRAM(s) Oral every 12 hours  lisinopril 10 milliGRAM(s) Oral daily  piperacillin/tazobactam IVPB.. 3.375 Gram(s) IV Intermittent every 8 hours  tacrolimus 1 milliGRAM(s) Oral daily  tacrolimus 0.5 milliGRAM(s) Oral at bedtime  tamsulosin 0.4 milliGRAM(s) Oral daily    MEDICATIONS  (PRN):  melatonin 3 milliGRAM(s) Oral at bedtime PRN Insomnia      CAPILLARY BLOOD GLUCOSE        I&O's Summary      Vital Signs Last 24 Hrs  T(C): 37.6 (05 Apr 2024 05:34), Max: 37.8 (04 Apr 2024 18:12)  T(F): 99.7 (05 Apr 2024 05:34), Max: 100.1 (04 Apr 2024 18:12)  HR: 81 (05 Apr 2024 05:34) (72 - 89)  BP: 160/64 (05 Apr 2024 05:34) (128/46 - 175/73)  BP(mean): 73 (04 Apr 2024 23:34) (73 - 96)  RR: 16 (05 Apr 2024 05:34) (16 - 20)  SpO2: 98% (05 Apr 2024 05:34) (94% - 99%)    Parameters below as of 05 Apr 2024 05:34  Patient On (Oxygen Delivery Method): room air        PHYSICAL EXAM:  GENERAL: NAD, well-developed, well-nourished  HEAD: Atraumatic, Normocephalic  EYES: Conjunctiva and sclera clear  CHEST/LUNG: Clear to auscultation bilaterally; No wheezes or crackles  HEART: Normal S1/S2; Regular rate and rhythm; No murmurs, rubs, or gallops  ABDOMEN: Soft, Nontender, Nondistended; Bowel sounds present  EXTREMITIES: No clubbing, cyanosis, or edema  PSYCH: A&Ox3      LABS:                        8.8    9.13  )-----------( 200      ( 05 Apr 2024 06:40 )             28.0      04-05    139  |  106  |  27<H>  ----------------------------<  125<H>  4.3   |  21<L>  |  0.80    Ca    8.4      05 Apr 2024 06:44  Mg     1.8     04-05    TPro  6.7  /  Alb  3.1<L>  /  TBili  0.5  /  DBili  x   /  AST  10  /  ALT  8<L>  /  AlkPhos  51  04-05    PT/INR - ( 04 Apr 2024 13:58 )   PT: 13.8 sec;   INR: 1.33 ratio         PTT - ( 04 Apr 2024 13:58 )  PTT:27.7 sec      Urinalysis Basic - ( 05 Apr 2024 06:44 )    Color: x / Appearance: x / SG: x / pH: x  Gluc: 125 mg/dL / Ketone: x  / Bili: x / Urobili: x   Blood: x / Protein: x / Nitrite: x   Leuk Esterase: x / RBC: x / WBC x   Sq Epi: x / Non Sq Epi: x / Bacteria: x        RADIOLOGY & ADDITIONAL TESTS:

## 2024-04-05 NOTE — CONSULT NOTE ADULT - ATTENDING COMMENTS
On call attending. Pt seen and examined. Agree with above and edited as appropriate. Pt with UTI with admissions in Jan and Feb with same bacteria. Likely with urethral stricture now s/p dilation with amaro placement. pt also with chronic R hydro but in setting of freely refluxing anastamosis and normal renal function therefore obstruction unlikely. Recommend amaro and abx. Begin methenamine after completion.
Pt seen and examined with fellow on 4/5/24  Pt admitted with recurrent UTI's  Cont antibiotics, consider hiprex prophylaxis after treatment  evaluation for bladder outlet obstruction
Patient seen and examined  Case discussed with Dr Hernandez    I agree with his history exam and plans as noted above    Patient known to me from prior admission for UTI/pyelonephritis. Has a history of renal transplant, prostate cancer, BPH, renal artery stenosis, recurrent UTIs.  Admitted with flank pain and fever    Would:  send blood cultures x2 sets  send Urinalysis and culture  BK virus in ruine and blood  adenovirus  cmv viral load  image with CT of abdomen or pelvis  Ask transplant Nephrology to see patient  as well as Urology  reduce immunosuppression in the setting of active infection  Agree with IV zosyn as patient has not had a history of MDRO organisms    Chris Jason MD  Can be called via Teams  After 5pm/weekends [phone]

## 2024-04-05 NOTE — PHYSICAL THERAPY INITIAL EVALUATION ADULT - PERTINENT HX OF CURRENT PROBLEM, REHAB EVAL
73M w/ hx of HTN, prostate CA s/p radiation, ESRD  s/p renal transplant in 2017 at [facility], cardiomyopathy s/p PPM, BPH, HTN p/w worsening urinary symptoms found to have UTI and likely urinary retention. Dx:  Urinary tract infection. Kidney US: As seen on 1/29/2024 comparison, there are elevated peak systolic velocities most notably at the transplant renal artery anastomosis (557 cm/s, previously 600 cm/s) consistent with stenosis. Resistive indices range from 0.71, 0.82, improved from prior. Moderate to severe hydronephrosis with layering debris in a lower pole calyx. Correlate with urinalysis, Urinary retention -hx of Prostate CA and likely BPH as well - Block placed in ER, ESRD (end stage renal disease), H/O cardiomyopathy, Essential hypertension.

## 2024-04-05 NOTE — CONSULT NOTE ADULT - SUBJECTIVE AND OBJECTIVE BOX
HPI: 73M with PMHx of HTN, prostate CA s/p radiation, ESRD s/p renal transplant in 2017 at [facility], cardiomyopathy s/p PPM, BPH, HTN admitted with UTI. Pt reports chronic dysuria since December. He feels well while on abx but as soon as competed course symptoms return. He admits to dribbling and urinary incontinence requiring diaper, does not feel like he empties his bladder fully. Had Klebsiella UTI in January 2024 and December 2023. Denies fevers, chills, flank pain or suprapubic pain. Pt follows closely with his urologist Dr. Cooney, last seen a month ago.      18f coude amaro was placed in ED last night by urology after unsuccessful attempts by ED staff likely due to urethral stricture.     PAST MEDICAL & SURGICAL HISTORY:  HTN (hypertension)    Renal insufficiency    Prostate cancer    BPH (benign prostatic hyperplasia)    Renal transplant, status post    S/P placement of cardiac pacemaker    FAMILY HISTORY:  FH: diabetes mellitus    No known  malignancy     Social History:  Denies smoking or ETOH. Live with wife and daughter (04 Apr 2024 20:41)  Denies alcohol and drug abuse, nonsmoker     MEDICATIONS  (STANDING):  carvedilol 12.5 milliGRAM(s) Oral every 12 hours  heparin   Injectable 5000 Unit(s) SubCutaneous every 8 hours  lisinopril 10 milliGRAM(s) Oral daily  piperacillin/tazobactam IVPB.. 3.375 Gram(s) IV Intermittent every 8 hours  tacrolimus 1 milliGRAM(s) Oral daily  tacrolimus 0.5 milliGRAM(s) Oral at bedtime  tamsulosin 0.4 milliGRAM(s) Oral daily    MEDICATIONS  (PRN):  melatonin 3 milliGRAM(s) Oral at bedtime PRN Insomnia    Allergies    No Known Allergies    Intolerances    REVIEW OF SYSTEMS: Pertinent positives and negatives as stated in HPI, otherwise negative    Vital signs  T(C): 37.6 (04-05-24 @ 05:34), Max: 37.8 (04-04-24 @ 18:12)  HR: 81 (04-05-24 @ 05:34)  BP: 160/64 (04-05-24 @ 05:34)  SpO2: 98% (04-05-24 @ 05:34)  Wt(kg): --    Physical Exam  Gen: NAD  HEENT: normocephalic, atraumatic, no scleral icterus  Pulm: No respiratory distress, no subcostal retractions, no accessory muscle use   CV:  no JVD  Abd: Soft, NT, ND, no rebound tenderness or guarding  : Uncircumcised no lesions.  No discharge or blood at urethral meatus.  Testes descended bilaterally.  Testes and epididymis nontender bilaterally.  amaro draining clear yellow urine   MSK:  No CVAT, Moving all extremities, full ROM in all extremities, No edema present  NEURO: A&Ox3, no focal neurological deficits, CN 2-12 grossly intact  SKIN: warm, dry, no rash.    LABS:  CBC                       8.8    9.13  )-----------( 200      ( 05 Apr 2024 06:40 )             28.0     BMP   04-05    139  |  106  |  27<H>  ----------------------------<  125<H>  4.3   |  21<L>  |  0.80    Ca    8.4      05 Apr 2024 06:44  Mg     1.8     04-05    TPro  6.7  /  Alb  3.1<L>  /  TBili  0.5  /  DBili  x   /  AST  10  /  ALT  8<L>  /  AlkPhos  51  04-05    PT/INR - ( 04 Apr 2024 13:58 )   PT: 13.8 sec;   INR: 1.33 ratio       PTT - ( 04 Apr 2024 13:58 )  PTT:27.7 sec    Urinalysis Basic - ( 05 Apr 2024 06:44 )    Color: x / Appearance: x / SG: x / pH: x  Gluc: 125 mg/dL / Ketone: x  / Bili: x / Urobili: x   Blood: x / Protein: x / Nitrite: x   Leuk Esterase: x / RBC: x / WBC x   Sq Epi: x / Non Sq Epi: x / Bacteria: x    Urine Cx: pending        ulu< from: US Trans Kidney w/ Doppler, Right (04.04.24 @ 20:27) >  IMPRESSION:    As seen on 1/29/2024 comparison, there are elevated peak systolic   velocities most notably at the transplant renal artery anastomosis (557   cm/s, previously 600 cm/s) consistent with stenosis.    Resistive indices range from 0.71, 0.82, improved from prior.    Moderate to severe hydronephrosis with layering debris in a lower pole   calyx. Correlate with urinalysis.      < end of copied text >

## 2024-04-05 NOTE — PHYSICAL THERAPY INITIAL EVALUATION ADULT - ADDITIONAL COMMENTS
Pt states he lives with his wife and daughter in a pvt house with 4 steps to enter w/rail and has first floor setup. Pt uses a cane to ambulate, pt also owns RW.

## 2024-04-05 NOTE — PROGRESS NOTE ADULT - PROBLEM SELECTOR PLAN 2
- will hold Cellcept during active infection  - will continue Tacro 1mg BID, monitor tacro levels daily   - tacro levels not being drawn accurately. would see increase in SCr if true concern for toxicity. Pt cleared to go home without need to recheck tacro level tomorrow AM  - renal US showing signs of ARSLAN   - transplant renal consult for further recs hx of Prostate CA and likely BPH as well. Was told he would need urologic procedure as outpatient. Pt and daughter have difficulty recalling urologist name. Doppler showing Moderate to severe hydronephrosis with layering debris in a lower pole calyx. Urology assisted with amaro placement in ER  -Amaro placed in ER  -Cont. Flomax QHS

## 2024-04-05 NOTE — CONSULT NOTE ADULT - SUBJECTIVE AND OBJECTIVE BOX
[facility] DIVISION OF KIDNEY DISEASES AND HYPERTENSION -- INITIAL CONSULT NOTE  --------------------------------------------------------------------------------  HPI:  73M w/ hx of HTN, prostate CA s/p radiation, ESRD  s/p renal transplant in 2017 at [facility], cardiomyopathy s/p PPM, BPH, HTN p/w worsening urinary symptoms. Pt  persistent intermittent urinary symptoms since December. Endorses worsening dysuria, dribbling and urinary incontinence requiring diaper. Has had increased dysuria and difficulty urinating for past several days so came to hospital. Had course of antibiotics last month outpatient with transplant ID for recurrent UTIs. Pt with hx of UTI with Klebsiella 2 months ago. Distant hx of ESBL e. coli reportedly. Denies fevers, chills, weakness, acute flank pain. Endorses chronic lower back pain.  Transplant nephrology was consulted for the management of the post transplant care. On medications regularly. Follows dr. Jiang as out pt.       PAST HISTORY  --------------------------------------------------------------------------------  PAST MEDICAL & SURGICAL HISTORY:  HTN (hypertension)      Renal insufficiency      Prostate cancer      BPH (benign prostatic hyperplasia)      Renal transplant, status post      S/P placement of cardiac pacemaker        FAMILY HISTORY:  FH: diabetes mellitus      Social History:  Denies smoking or ETOH. Live with wife and daughter (04 Apr 2024 20:41)        ALLERGIES & MEDICATIONS  --------------------------------------------------------------------------------  Allergies    No Known Allergies    Intolerances      Standing Inpatient Medications  carvedilol 12.5 milliGRAM(s) Oral every 12 hours  chlorhexidine 4% Liquid 1 Application(s) Topical daily  heparin   Injectable 5000 Unit(s) SubCutaneous every 8 hours  lisinopril 10 milliGRAM(s) Oral daily  piperacillin/tazobactam IVPB.. 3.375 Gram(s) IV Intermittent every 8 hours  tacrolimus 1 milliGRAM(s) Oral daily  tacrolimus 0.5 milliGRAM(s) Oral at bedtime  tamsulosin 0.4 milliGRAM(s) Oral daily    PRN Inpatient Medications  acetaminophen     Tablet .. 650 milliGRAM(s) Oral every 6 hours PRN  melatonin 3 milliGRAM(s) Oral at bedtime PRN      REVIEW OF SYSTEMS  --------------------------------------------------------------------------------  Gen: No weight changes, fatigue, fevers/chills, weakness  Skin: No rashes  Head/Eyes/Ears/Mouth: No headache; Normal hearing; Normal vision w/o blurriness; No sinus pain/discomfort, sore throat  Respiratory: No dyspnea, cough, wheezing, hemoptysis  CV: No chest pain, PND, orthopnea  GI: No abdominal pain, diarrhea, constipation, nausea, vomiting, melena, hematochezia  :  increased frequency, dysuria, hematuria, nocturia  MSK: No joint pain/swelling; no back pain; no edema  Neuro: No dizziness/lightheadedness, weakness, seizures, numbness, tingling  Heme: No easy bruising or bleeding  Endo: No heat/cold intolerance  Psych: No significant nervousness, anxiety, stress, depression        VITALS/PHYSICAL EXAM  --------------------------------------------------------------------------------  T(C): 37.4 (04-05-24 @ 12:02), Max: 37.8 (04-04-24 @ 21:34)  HR: 81 (04-05-24 @ 18:26) (72 - 99)  BP: 127/62 (04-05-24 @ 18:26) (119/73 - 174/69)  RR: 18 (04-05-24 @ 18:26) (16 - 20)  SpO2: 98% (04-05-24 @ 18:26) (70% - 98%)  Wt(kg): --  Height (cm): 160 (04-04-24 @ 23:34)  Weight (kg): 64.4 (04-04-24 @ 12:28)  BMI (kg/m2): 25.2 (04-04-24 @ 23:34)  BSA (m2): 1.67 (04-04-24 @ 23:34)      04-05-24 @ 07:01  -  04-05-24 @ 19:23  --------------------------------------------------------  IN: 0 mL / OUT: 450 mL / NET: -450 mL        Physical Exam:  Gen: Not in distress  HEENT:Supple neck, No JVD, PERRLA,  Pulm:CTA B/L  CV:S1S2+   Abd: Non- tender, no distention, Bowel sounds +  Transplant: non tender, no bruit  : No suprapubic tenderness  Extremities: No edema.  Skin: warm  Neuro: AAOx 3 No apparent FNDs      LABS/STUDIES  --------------------------------------------------------------------------------              8.8    9.13  >-----------<  200      [04-05-24 @ 06:40]              28.0     139  |  106  |  27  ----------------------------<  125      [04-05-24 @ 06:44]  4.3   |  21  |  0.80        Ca     8.4     [04-05-24 @ 06:44]      Mg     1.8     [04-05-24 @ 06:44]    TPro  6.7  /  Alb  3.1  /  TBili  0.5  /  DBili  x   /  AST  10  /  ALT  8   /  AlkPhos  51  [04-05-24 @ 06:44]    PT/INR: PT 13.8 , INR 1.33       [04-04-24 @ 13:58]  PTT: 27.7       [04-04-24 @ 13:58]      Creatinine Trend:  SCr 0.80 [04-05 @ 06:44]  SCr 0.80 [04-04 @ 13:58]    Urinalysis - [04-05-24 @ 06:44]      Color  / Appearance  / SG  / pH       Gluc 125 / Ketone   / Bili  / Urobili        Blood  / Protein  / Leuk Est  / Nitrite       RBC  / WBC  / Hyaline  / Gran  / Sq Epi  / Non Sq Epi  / Bacteria         HCV 0.10, Nonreactive Hepatitis C AB  S/CO Ratio                        Interpretation  < 1.00                                   Non-Reactive  1.00 - 4.99                         Weakly-Reactive  >= 5.00                                Reactive  Non-Reactive: Aperson with a non-reactive HCV antibody  result is considered uninfected.  No further action is  needed unless recent infection is suspected.  In these  cases, consider repeat testing later to detect  seroconversion..  Weakly-Reactive: HCV antibody test is abnormal, HCV RNA  Qualitative test will follow.  Reactive: HCV antibody test is abnormal, HCV RNA  Qualitative test will follow.  Note: HCV antibody testing is performed on the Abbott   system.      [06-22-20 @ 06:20]      TacrolimusTacrolimus (), Serum: 9.4 ng/mL (04-05 @ 06:40)  Tacrolimus (), Serum: 12.5 ng/mL (04-04 @ 13:58)    Cyclosporine  Sirolimus  MycophenolateMycophenolic Acid, Serum: 0.9 ug/mL (12-23 @ 17:55)    BK PCR  CMV PCR  Parvo PCR  EBV PCR

## 2024-04-05 NOTE — PROCEDURE NOTE - ADDITIONAL PROCEDURE DETAILS
Late entry note, amaro was placed on 4/4/24    Urology called to place difficult amaro after unsuccessful attempts due to a proximal urethral stricture.  Area was prepped in sterile fashion, lidocaine urojet instilled into urethra, and 18F coude catheter placed. 10cc of sterile water inflated into the balloon and amaro was secured with stat lock. Patient tolerated procedure well. Amaro plan per primary team.

## 2024-04-05 NOTE — PROGRESS NOTE ADULT - ASSESSMENT
74 yo Male with PMHx of HTN, prostate cancer s/p radiation, BPH, end-stage renal disease s/p renal transplant 2017 at [place], multiple admission for UTI most recent hospitalization 1/29-2/1  for pyelonephritis, now presenting for Rt flank pain, dysuria. He was treated for Klebsiella UTI, with ceftriaxone and Ciprofloxacin in Feb and was started on Ceftin on 3/22 to complete 10 days. As soon as he finished the course, his symptoms came back. He has persistent pelvic discomfort, dysuria, dribbling. No report of chills, fevers, ROS otherwise negative.    ED: afebrile, WBC 9.8. Retaining, Nursing staff unable to place Block and Urology is being called.    Ucx 3/21: klebsiella     4/4  WBC, 13 RBC; Ucx pending  Bcx 4/4; NGTD    Renal US  As seen on 1/29/2024 comparison, there are elevated peak systolic   velocities most notably at the transplant renal artery anastomosis (557   cm/s, previously 600 cm/s) consistent with stenosis. Resistive indices range from 0.71, 0.82, improved from prior.  Moderate to severe hydronephrosis with layering debris in a lower pole   calyx. Correlate with urinalysis.        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    Jesus Manuel Hernandez MD, PGY-4  ID Fellow  [employer] Teams Preferred  After 5pm/weekends call [phone]   74 yo Male with PMHx of HTN, prostate cancer s/p radiation, BPH, end-stage renal disease s/p renal transplant 2017 at [place], multiple admission for UTI most recent hospitalization 1/29-2/1  for pyelonephritis, now presenting for Rt flank pain, dysuria. He was treated for Klebsiella UTI, with ceftriaxone and Ciprofloxacin in Feb and was started on Ceftin on 3/22 to complete 10 days. As soon as he finished the course, his symptoms came back. He has persistent pelvic discomfort, dysuria, dribbling. No report of chills, fevers, ROS otherwise negative.    ED: afebrile, WBC 9.8. Retaining, Nursing staff unable to place Block and Urology is being called.    Ucx 3/21: klebsiella     4/4  WBC, 13 RBC; Ucx pending  Bcx 4/4; NGTD    Renal US  As seen on 1/29/2024 comparison, there are elevated peak systolic   velocities most notably at the transplant renal artery anastomosis (557   cm/s, previously 600 cm/s) consistent with stenosis. Resistive indices range from 0.71, 0.82, improved from prior.  Moderate to severe hydronephrosis with layering debris in a lower pole   calyx. Correlate with urinalysis.        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

## 2024-04-05 NOTE — PROGRESS NOTE ADULT - PROBLEM SELECTOR PLAN 3
- will continue Coreg and Lisinopril with holding parameters S/p transplant 2017. As per ER note, nephrology was consulted and recommended holding mycophenolate  - Renal doppler demonstrating Moderate to severe hydronephrosis with layering debris in a lower pole   calyx.   -Cont. Tacrolimus 1mg AM and 0.5mg PM  -Holding Mycophenolate for now  -F/u Nephrology recommendations  -F/u Tacrolimus trough

## 2024-04-06 LAB
ALBUMIN SERPL ELPH-MCNC: 2.7 G/DL — LOW (ref 3.3–5)
ALP SERPL-CCNC: 49 U/L — SIGNIFICANT CHANGE UP (ref 40–120)
ALT FLD-CCNC: 6 U/L — LOW (ref 10–45)
ANION GAP SERPL CALC-SCNC: 10 MMOL/L — SIGNIFICANT CHANGE UP (ref 5–17)
AST SERPL-CCNC: 7 U/L — LOW (ref 10–40)
BASOPHILS # BLD AUTO: 0.03 K/UL — SIGNIFICANT CHANGE UP (ref 0–0.2)
BASOPHILS NFR BLD AUTO: 0.5 % — SIGNIFICANT CHANGE UP (ref 0–2)
BILIRUB SERPL-MCNC: 0.4 MG/DL — SIGNIFICANT CHANGE UP (ref 0.2–1.2)
BUN SERPL-MCNC: 19 MG/DL — SIGNIFICANT CHANGE UP (ref 7–23)
CALCIUM SERPL-MCNC: 8.4 MG/DL — SIGNIFICANT CHANGE UP (ref 8.4–10.5)
CHLORIDE SERPL-SCNC: 104 MMOL/L — SIGNIFICANT CHANGE UP (ref 96–108)
CO2 SERPL-SCNC: 21 MMOL/L — LOW (ref 22–31)
CREAT SERPL-MCNC: 0.77 MG/DL — SIGNIFICANT CHANGE UP (ref 0.5–1.3)
EGFR: 93 ML/MIN/1.73M2 — SIGNIFICANT CHANGE UP
EOSINOPHIL # BLD AUTO: 0.01 K/UL — SIGNIFICANT CHANGE UP (ref 0–0.5)
EOSINOPHIL NFR BLD AUTO: 0.2 % — SIGNIFICANT CHANGE UP (ref 0–6)
GLUCOSE SERPL-MCNC: 151 MG/DL — HIGH (ref 70–99)
HCT VFR BLD CALC: 26.3 % — LOW (ref 39–50)
HGB BLD-MCNC: 8.3 G/DL — LOW (ref 13–17)
IMM GRANULOCYTES NFR BLD AUTO: 0.3 % — SIGNIFICANT CHANGE UP (ref 0–0.9)
LYMPHOCYTES # BLD AUTO: 1.44 K/UL — SIGNIFICANT CHANGE UP (ref 1–3.3)
LYMPHOCYTES # BLD AUTO: 22 % — SIGNIFICANT CHANGE UP (ref 13–44)
MAGNESIUM SERPL-MCNC: 1.7 MG/DL — SIGNIFICANT CHANGE UP (ref 1.6–2.6)
MCHC RBC-ENTMCNC: 31 PG — SIGNIFICANT CHANGE UP (ref 27–34)
MCHC RBC-ENTMCNC: 31.6 GM/DL — LOW (ref 32–36)
MCV RBC AUTO: 98.1 FL — SIGNIFICANT CHANGE UP (ref 80–100)
MONOCYTES # BLD AUTO: 0.88 K/UL — SIGNIFICANT CHANGE UP (ref 0–0.9)
MONOCYTES NFR BLD AUTO: 13.5 % — SIGNIFICANT CHANGE UP (ref 2–14)
MRSA PCR RESULT.: SIGNIFICANT CHANGE UP
NEUTROPHILS # BLD AUTO: 4.16 K/UL — SIGNIFICANT CHANGE UP (ref 1.8–7.4)
NEUTROPHILS NFR BLD AUTO: 63.5 % — SIGNIFICANT CHANGE UP (ref 43–77)
NRBC # BLD: 0 /100 WBCS — SIGNIFICANT CHANGE UP (ref 0–0)
PHOSPHATE SERPL-MCNC: 3.2 MG/DL — SIGNIFICANT CHANGE UP (ref 2.5–4.5)
PLATELET # BLD AUTO: 162 K/UL — SIGNIFICANT CHANGE UP (ref 150–400)
POTASSIUM SERPL-MCNC: 4 MMOL/L — SIGNIFICANT CHANGE UP (ref 3.5–5.3)
POTASSIUM SERPL-SCNC: 4 MMOL/L — SIGNIFICANT CHANGE UP (ref 3.5–5.3)
PROT SERPL-MCNC: 6.4 G/DL — SIGNIFICANT CHANGE UP (ref 6–8.3)
RBC # BLD: 2.68 M/UL — LOW (ref 4.2–5.8)
RBC # FLD: 12.9 % — SIGNIFICANT CHANGE UP (ref 10.3–14.5)
S AUREUS DNA NOSE QL NAA+PROBE: SIGNIFICANT CHANGE UP
SODIUM SERPL-SCNC: 135 MMOL/L — SIGNIFICANT CHANGE UP (ref 135–145)
TACROLIMUS SERPL-MCNC: 9 NG/ML — SIGNIFICANT CHANGE UP
WBC # BLD: 6.54 K/UL — SIGNIFICANT CHANGE UP (ref 3.8–10.5)
WBC # FLD AUTO: 6.54 K/UL — SIGNIFICANT CHANGE UP (ref 3.8–10.5)

## 2024-04-06 PROCEDURE — 99233 SBSQ HOSP IP/OBS HIGH 50: CPT

## 2024-04-06 RX ADMIN — TAMSULOSIN HYDROCHLORIDE 0.4 MILLIGRAM(S): 0.4 CAPSULE ORAL at 11:42

## 2024-04-06 RX ADMIN — LISINOPRIL 10 MILLIGRAM(S): 2.5 TABLET ORAL at 05:40

## 2024-04-06 RX ADMIN — TACROLIMUS 1 MILLIGRAM(S): 5 CAPSULE ORAL at 11:42

## 2024-04-06 RX ADMIN — TACROLIMUS 0.5 MILLIGRAM(S): 5 CAPSULE ORAL at 21:53

## 2024-04-06 RX ADMIN — PIPERACILLIN AND TAZOBACTAM 25 GRAM(S): 4; .5 INJECTION, POWDER, LYOPHILIZED, FOR SOLUTION INTRAVENOUS at 11:42

## 2024-04-06 RX ADMIN — CARVEDILOL PHOSPHATE 12.5 MILLIGRAM(S): 80 CAPSULE, EXTENDED RELEASE ORAL at 05:40

## 2024-04-06 RX ADMIN — CARVEDILOL PHOSPHATE 12.5 MILLIGRAM(S): 80 CAPSULE, EXTENDED RELEASE ORAL at 18:22

## 2024-04-06 RX ADMIN — HEPARIN SODIUM 5000 UNIT(S): 5000 INJECTION INTRAVENOUS; SUBCUTANEOUS at 05:40

## 2024-04-06 RX ADMIN — PIPERACILLIN AND TAZOBACTAM 25 GRAM(S): 4; .5 INJECTION, POWDER, LYOPHILIZED, FOR SOLUTION INTRAVENOUS at 18:48

## 2024-04-06 RX ADMIN — CHLORHEXIDINE GLUCONATE 1 APPLICATION(S): 213 SOLUTION TOPICAL at 11:44

## 2024-04-06 RX ADMIN — HEPARIN SODIUM 5000 UNIT(S): 5000 INJECTION INTRAVENOUS; SUBCUTANEOUS at 15:02

## 2024-04-06 RX ADMIN — PIPERACILLIN AND TAZOBACTAM 25 GRAM(S): 4; .5 INJECTION, POWDER, LYOPHILIZED, FOR SOLUTION INTRAVENOUS at 03:30

## 2024-04-06 RX ADMIN — HEPARIN SODIUM 5000 UNIT(S): 5000 INJECTION INTRAVENOUS; SUBCUTANEOUS at 21:53

## 2024-04-06 NOTE — PROGRESS NOTE ADULT - PROBLEM SELECTOR PLAN 1
UA positive, recent UCx with sensitive Klebsiella. Appreciate ID recommendations  -Cont. IV Zosyn  -F/u UCx, BCxs  -F/u ID recommendations  -Urology consulted; appreciate recs

## 2024-04-06 NOTE — PROGRESS NOTE ADULT - ASSESSMENT
oob to chair  on room air  afebrile  denies any acute complaints    acetaminophen     Tablet .. 650 milliGRAM(s) Oral every 6 hours PRN  carvedilol 12.5 milliGRAM(s) Oral every 12 hours  chlorhexidine 4% Liquid 1 Application(s) Topical daily  heparin   Injectable 5000 Unit(s) SubCutaneous every 8 hours  lisinopril 10 milliGRAM(s) Oral daily  melatonin 3 milliGRAM(s) Oral at bedtime PRN  piperacillin/tazobactam IVPB.. 3.375 Gram(s) IV Intermittent every 8 hours  tacrolimus 1 milliGRAM(s) Oral daily  tacrolimus 0.5 milliGRAM(s) Oral at bedtime  tamsulosin 0.4 milliGRAM(s) Oral daily      VITAL:  T(C): , Max: 37.6 (04-05-24 @ 20:37)  T(F): , Max: 99.6 (04-05-24 @ 20:37)  HR: 68 (04-06-24 @ 10:58)  BP: 113/50 (04-06-24 @ 10:58)  BP(mean): --  RR: 16 (04-06-24 @ 10:58)  SpO2: 96% (04-06-24 @ 10:58)  Wt(kg): --    04-05-24 @ 07:01  -  04-06-24 @ 07:00  --------------------------------------------------------  IN: 200 mL / OUT: 450 mL / NET: -250 mL    04-06-24 @ 07:01  -  04-06-24 @ 11:23  --------------------------------------------------------  IN: 360 mL / OUT: 0 mL / NET: 360 mL        PHYSICAL EXAM:  Constitutional: NAD, Alert  HEENT: NCAT, MMM  Neck: Supple, No JVD  Respiratory: CTA-b/l  Cardiovascular: RRR s1s2, no m/r/g  Gastrointestinal: BS+, soft, NT/ND  Extremities: No peripheral edema b/l  Neurological: no focal deficits; strength grossly intact  Back: no CVAT b/l  Skin: No rashes, no nevi       LABS:                          8.3    6.54  )-----------( 162      ( 06 Apr 2024 07:25 )             26.3     Na(135)/K(4.0)/Cl(104)/HCO3(21)/BUN(19)/Cr(0.77)Glu(151)/Ca(8.4)/Mg(1.7)/PO4(3.2)    04-06 @ 07:25  Na(139)/K(4.3)/Cl(106)/HCO3(21)/BUN(27)/Cr(0.80)Glu(125)/Ca(8.4)/Mg(1.8)/PO4(--)    04-05 @ 06:44  Na(133)/K(4.4)/Cl(100)/HCO3(20)/BUN(33)/Cr(0.80)Glu(129)/Ca(8.7)/Mg(--)/PO4(--)    04-04 @ 13:58    Urinalysis Basic - ( 06 Apr 2024 07:25 )    Color: x / Appearance: x / SG: x / pH: x  Gluc: 151 mg/dL / Ketone: x  / Bili: x / Urobili: x   Blood: x / Protein: x / Nitrite: x   Leuk Esterase: x / RBC: x / WBC x   Sq Epi: x / Non Sq Epi: x / Bacteria: x            ASSESSMENT/PLAN  ASSESSMENT:  73M w/ hx of HTN, prostate CA s/p radiation, ESRD  s/p renal transplant in 2017 at [facility], cardiomyopathy s/p PPM, BPH, HTN p/w worsening urinary symptoms.     Renal txp  -2017 @ [place]  -Allograft baseline approx 0.8  -chronically elevated allograft velocities with preserved RF, BP controlled at present  -chronic txp hydro with preserved RF    High risk immunosuppression  - BID (held right now)  -Tac 1/0.5mg    HTN  -lisinopril, coreg, tamsulosin    Recurrent UTI  -with urethral stricture  -s/p amaro    RECOMMEND:  -a/w holding MMF for now  -c/w tacrolimus 1/0.5mg, titrations per txp nephrology  -BP acceptable for now, continue current regimen  -c/w IV abx  -Urology on board  -txp neph on board  -monitor I's and O's  -MAP >65  -Dose Rx for CrCl >60mL/min    Felipe Rascon NP-BC  [employer]  [phone]

## 2024-04-06 NOTE — PROGRESS NOTE ADULT - SUBJECTIVE AND OBJECTIVE BOX
Baileyjuancho Montenegrolizzy, PGY1    Patient is a 73y old  Male who presents with a chief complaint of Dysuria (05 Apr 2024 19:22)      SUBJECTIVE / OVERNIGHT EVENTS: NAEO. Pt denies chest pain, SOB, N/V, fever/chills, or changes in bowel movements.    MEDICATIONS  (STANDING):  carvedilol 12.5 milliGRAM(s) Oral every 12 hours  chlorhexidine 4% Liquid 1 Application(s) Topical daily  heparin   Injectable 5000 Unit(s) SubCutaneous every 8 hours  lisinopril 10 milliGRAM(s) Oral daily  piperacillin/tazobactam IVPB.. 3.375 Gram(s) IV Intermittent every 8 hours  tacrolimus 0.5 milliGRAM(s) Oral at bedtime  tacrolimus 1 milliGRAM(s) Oral daily  tamsulosin 0.4 milliGRAM(s) Oral daily    MEDICATIONS  (PRN):  acetaminophen     Tablet .. 650 milliGRAM(s) Oral every 6 hours PRN Temp greater or equal to 38C (100.4F), Mild Pain (1 - 3)  melatonin 3 milliGRAM(s) Oral at bedtime PRN Insomnia      CAPILLARY BLOOD GLUCOSE        I&O's Summary    05 Apr 2024 07:01  -  06 Apr 2024 07:00  --------------------------------------------------------  IN: 200 mL / OUT: 450 mL / NET: -250 mL        Vital Signs Last 24 Hrs  T(C): 36.8 (06 Apr 2024 04:28), Max: 37.6 (05 Apr 2024 20:37)  T(F): 98.3 (06 Apr 2024 04:28), Max: 99.6 (05 Apr 2024 20:37)  HR: 67 (06 Apr 2024 04:28) (67 - 99)  BP: 153/54 (06 Apr 2024 04:28) (119/73 - 153/54)  BP(mean): --  RR: 18 (06 Apr 2024 04:28) (18 - 18)  SpO2: 98% (06 Apr 2024 04:28) (70% - 99%)    Parameters below as of 06 Apr 2024 04:28  Patient On (Oxygen Delivery Method): room air        PHYSICAL EXAM:  GENERAL: NAD, well-developed, well-nourished  HEAD: Atraumatic, Normocephalic  EYES: Conjunctiva and sclera clear  CHEST/LUNG: Clear to auscultation bilaterally; No wheezes or crackles  HEART: Normal S1/S2; Regular rate and rhythm; No murmurs, rubs, or gallops  ABDOMEN: Soft, Nontender, Nondistended; Bowel sounds present  EXTREMITIES: No clubbing, cyanosis, or edema  PSYCH: A&Ox3      LABS:                        8.3    6.54  )-----------( 162      ( 06 Apr 2024 07:25 )             26.3      04-05    139  |  106  |  27<H>  ----------------------------<  125<H>  4.3   |  21<L>  |  0.80    Ca    8.4      05 Apr 2024 06:44  Mg     1.8     04-05    TPro  6.7  /  Alb  3.1<L>  /  TBili  0.5  /  DBili  x   /  AST  10  /  ALT  8<L>  /  AlkPhos  51  04-05    PT/INR - ( 04 Apr 2024 13:58 )   PT: 13.8 sec;   INR: 1.33 ratio         PTT - ( 04 Apr 2024 13:58 )  PTT:27.7 sec      Urinalysis Basic - ( 05 Apr 2024 06:44 )    Color: x / Appearance: x / SG: x / pH: x  Gluc: 125 mg/dL / Ketone: x  / Bili: x / Urobili: x   Blood: x / Protein: x / Nitrite: x   Leuk Esterase: x / RBC: x / WBC x   Sq Epi: x / Non Sq Epi: x / Bacteria: x        RADIOLOGY & ADDITIONAL TESTS:     Baileyjuancho Montenegrolizzy, PGY1    Patient is a 73y old  Male who presents with a chief complaint of Dysuria (05 Apr 2024 19:22)      SUBJECTIVE / OVERNIGHT EVENTS: NAEO. Pt denies chest pain, SOB, N/V, fever/chills, or changes in bowel movements.    MEDICATIONS  (STANDING):  carvedilol 12.5 milliGRAM(s) Oral every 12 hours  chlorhexidine 4% Liquid 1 Application(s) Topical daily  heparin   Injectable 5000 Unit(s) SubCutaneous every 8 hours  lisinopril 10 milliGRAM(s) Oral daily  piperacillin/tazobactam IVPB.. 3.375 Gram(s) IV Intermittent every 8 hours  tacrolimus 0.5 milliGRAM(s) Oral at bedtime  tacrolimus 1 milliGRAM(s) Oral daily  tamsulosin 0.4 milliGRAM(s) Oral daily    MEDICATIONS  (PRN):  acetaminophen     Tablet .. 650 milliGRAM(s) Oral every 6 hours PRN Temp greater or equal to 38C (100.4F), Mild Pain (1 - 3)  melatonin 3 milliGRAM(s) Oral at bedtime PRN Insomnia      CAPILLARY BLOOD GLUCOSE        I&O's Summary    05 Apr 2024 07:01  -  06 Apr 2024 07:00  --------------------------------------------------------  IN: 200 mL / OUT: 450 mL / NET: -250 mL        Vital Signs Last 24 Hrs  T(C): 36.8 (06 Apr 2024 04:28), Max: 37.6 (05 Apr 2024 20:37)  T(F): 98.3 (06 Apr 2024 04:28), Max: 99.6 (05 Apr 2024 20:37)  HR: 67 (06 Apr 2024 04:28) (67 - 99)  BP: 153/54 (06 Apr 2024 04:28) (119/73 - 153/54)  BP(mean): --  RR: 18 (06 Apr 2024 04:28) (18 - 18)  SpO2: 98% (06 Apr 2024 04:28) (70% - 99%)    Parameters below as of 06 Apr 2024 04:28  Patient On (Oxygen Delivery Method): room air        PHYSICAL EXAM:  GENERAL: NAD, well-developed, well-nourished  HEAD: Atraumatic, Normocephalic  EYES: Conjunctiva and sclera clear  CHEST/LUNG: Clear to auscultation bilaterally; No wheezes or crackles  HEART: Normal S1/S2; Regular rate and rhythm; No murmurs, rubs, or gallops  ABDOMEN: Soft, Nontender, Nondistended + amaro   EXTREMITIES: No clubbing, cyanosis, or edema  PSYCH: A&Ox3      LABS:                        8.3    6.54  )-----------( 162      ( 06 Apr 2024 07:25 )             26.3      04-05    139  |  106  |  27<H>  ----------------------------<  125<H>  4.3   |  21<L>  |  0.80    Ca    8.4      05 Apr 2024 06:44  Mg     1.8     04-05    TPro  6.7  /  Alb  3.1<L>  /  TBili  0.5  /  DBili  x   /  AST  10  /  ALT  8<L>  /  AlkPhos  51  04-05    PT/INR - ( 04 Apr 2024 13:58 )   PT: 13.8 sec;   INR: 1.33 ratio         PTT - ( 04 Apr 2024 13:58 )  PTT:27.7 sec      Urinalysis Basic - ( 05 Apr 2024 06:44 )    Color: x / Appearance: x / SG: x / pH: x  Gluc: 125 mg/dL / Ketone: x  / Bili: x / Urobili: x   Blood: x / Protein: x / Nitrite: x   Leuk Esterase: x / RBC: x / WBC x   Sq Epi: x / Non Sq Epi: x / Bacteria: x        RADIOLOGY & ADDITIONAL TESTS:

## 2024-04-06 NOTE — PROGRESS NOTE ADULT - PROBLEM SELECTOR PLAN 3
S/p transplant 2017. As per ER note, nephrology was consulted and recommended holding mycophenolate  - Renal doppler demonstrating Moderate to severe hydronephrosis with layering debris in a lower pole   calyx.   -Cont. Tacrolimus 1mg AM and 0.5mg PM  -Holding Mycophenolate for now  -F/u Nephrology recommendations  -F/u Tacrolimus trough

## 2024-04-07 LAB
-  AMOXICILLIN/CLAVULANIC ACID: SIGNIFICANT CHANGE UP
-  AMPICILLIN/SULBACTAM: SIGNIFICANT CHANGE UP
-  AMPICILLIN: SIGNIFICANT CHANGE UP
-  AZTREONAM: SIGNIFICANT CHANGE UP
-  CEFAZOLIN: SIGNIFICANT CHANGE UP
-  CEFEPIME: SIGNIFICANT CHANGE UP
-  CEFOXITIN: SIGNIFICANT CHANGE UP
-  CEFTRIAXONE: SIGNIFICANT CHANGE UP
-  CEFUROXIME: SIGNIFICANT CHANGE UP
-  CIPROFLOXACIN: SIGNIFICANT CHANGE UP
-  ERTAPENEM: SIGNIFICANT CHANGE UP
-  GENTAMICIN: SIGNIFICANT CHANGE UP
-  IMIPENEM: SIGNIFICANT CHANGE UP
-  LEVOFLOXACIN: SIGNIFICANT CHANGE UP
-  MEROPENEM: SIGNIFICANT CHANGE UP
-  NITROFURANTOIN: SIGNIFICANT CHANGE UP
-  PIPERACILLIN/TAZOBACTAM: SIGNIFICANT CHANGE UP
-  TOBRAMYCIN: SIGNIFICANT CHANGE UP
-  TRIMETHOPRIM/SULFAMETHOXAZOLE: SIGNIFICANT CHANGE UP
ALBUMIN SERPL ELPH-MCNC: 3 G/DL — LOW (ref 3.3–5)
ALP SERPL-CCNC: 45 U/L — SIGNIFICANT CHANGE UP (ref 40–120)
ALT FLD-CCNC: 6 U/L — LOW (ref 10–45)
ANION GAP SERPL CALC-SCNC: 10 MMOL/L — SIGNIFICANT CHANGE UP (ref 5–17)
AST SERPL-CCNC: 12 U/L — SIGNIFICANT CHANGE UP (ref 10–40)
BASOPHILS # BLD AUTO: 0.05 K/UL — SIGNIFICANT CHANGE UP (ref 0–0.2)
BASOPHILS NFR BLD AUTO: 1.1 % — SIGNIFICANT CHANGE UP (ref 0–2)
BILIRUB SERPL-MCNC: 0.3 MG/DL — SIGNIFICANT CHANGE UP (ref 0.2–1.2)
BUN SERPL-MCNC: 18 MG/DL — SIGNIFICANT CHANGE UP (ref 7–23)
CALCIUM SERPL-MCNC: 8.4 MG/DL — SIGNIFICANT CHANGE UP (ref 8.4–10.5)
CHLORIDE SERPL-SCNC: 105 MMOL/L — SIGNIFICANT CHANGE UP (ref 96–108)
CO2 SERPL-SCNC: 22 MMOL/L — SIGNIFICANT CHANGE UP (ref 22–31)
CREAT SERPL-MCNC: 0.78 MG/DL — SIGNIFICANT CHANGE UP (ref 0.5–1.3)
CULTURE RESULTS: ABNORMAL
EGFR: 93 ML/MIN/1.73M2 — SIGNIFICANT CHANGE UP
EOSINOPHIL # BLD AUTO: 0.02 K/UL — SIGNIFICANT CHANGE UP (ref 0–0.5)
EOSINOPHIL NFR BLD AUTO: 0.4 % — SIGNIFICANT CHANGE UP (ref 0–6)
GLUCOSE SERPL-MCNC: 109 MG/DL — HIGH (ref 70–99)
HCT VFR BLD CALC: 27.3 % — LOW (ref 39–50)
HGB BLD-MCNC: 8.9 G/DL — LOW (ref 13–17)
IMM GRANULOCYTES NFR BLD AUTO: 0.2 % — SIGNIFICANT CHANGE UP (ref 0–0.9)
LYMPHOCYTES # BLD AUTO: 1.16 K/UL — SIGNIFICANT CHANGE UP (ref 1–3.3)
LYMPHOCYTES # BLD AUTO: 24.4 % — SIGNIFICANT CHANGE UP (ref 13–44)
MAGNESIUM SERPL-MCNC: 1.7 MG/DL — SIGNIFICANT CHANGE UP (ref 1.6–2.6)
MCHC RBC-ENTMCNC: 31.6 PG — SIGNIFICANT CHANGE UP (ref 27–34)
MCHC RBC-ENTMCNC: 32.6 GM/DL — SIGNIFICANT CHANGE UP (ref 32–36)
MCV RBC AUTO: 96.8 FL — SIGNIFICANT CHANGE UP (ref 80–100)
METHOD TYPE: SIGNIFICANT CHANGE UP
MONOCYTES # BLD AUTO: 0.65 K/UL — SIGNIFICANT CHANGE UP (ref 0–0.9)
MONOCYTES NFR BLD AUTO: 13.7 % — SIGNIFICANT CHANGE UP (ref 2–14)
NEUTROPHILS # BLD AUTO: 2.86 K/UL — SIGNIFICANT CHANGE UP (ref 1.8–7.4)
NEUTROPHILS NFR BLD AUTO: 60.2 % — SIGNIFICANT CHANGE UP (ref 43–77)
NRBC # BLD: 0 /100 WBCS — SIGNIFICANT CHANGE UP (ref 0–0)
ORGANISM # SPEC MICROSCOPIC CNT: ABNORMAL
ORGANISM # SPEC MICROSCOPIC CNT: ABNORMAL
PHOSPHATE SERPL-MCNC: 3.3 MG/DL — SIGNIFICANT CHANGE UP (ref 2.5–4.5)
PLATELET # BLD AUTO: 166 K/UL — SIGNIFICANT CHANGE UP (ref 150–400)
POTASSIUM SERPL-MCNC: 4.4 MMOL/L — SIGNIFICANT CHANGE UP (ref 3.5–5.3)
POTASSIUM SERPL-SCNC: 4.4 MMOL/L — SIGNIFICANT CHANGE UP (ref 3.5–5.3)
PROT SERPL-MCNC: 6.5 G/DL — SIGNIFICANT CHANGE UP (ref 6–8.3)
RBC # BLD: 2.82 M/UL — LOW (ref 4.2–5.8)
RBC # FLD: 12.7 % — SIGNIFICANT CHANGE UP (ref 10.3–14.5)
SODIUM SERPL-SCNC: 137 MMOL/L — SIGNIFICANT CHANGE UP (ref 135–145)
SPECIMEN SOURCE: SIGNIFICANT CHANGE UP
TACROLIMUS SERPL-MCNC: 8.9 NG/ML — SIGNIFICANT CHANGE UP
WBC # BLD: 4.75 K/UL — SIGNIFICANT CHANGE UP (ref 3.8–10.5)
WBC # FLD AUTO: 4.75 K/UL — SIGNIFICANT CHANGE UP (ref 3.8–10.5)

## 2024-04-07 PROCEDURE — 99232 SBSQ HOSP IP/OBS MODERATE 35: CPT | Mod: GC

## 2024-04-07 PROCEDURE — 99232 SBSQ HOSP IP/OBS MODERATE 35: CPT

## 2024-04-07 RX ORDER — CEFTRIAXONE 500 MG/1
1000 INJECTION, POWDER, FOR SOLUTION INTRAMUSCULAR; INTRAVENOUS EVERY 24 HOURS
Refills: 0 | Status: DISCONTINUED | OUTPATIENT
Start: 2024-04-08 | End: 2024-04-08

## 2024-04-07 RX ORDER — CEFTRIAXONE 500 MG/1
2000 INJECTION, POWDER, FOR SOLUTION INTRAMUSCULAR; INTRAVENOUS ONCE
Refills: 0 | Status: COMPLETED | OUTPATIENT
Start: 2024-04-07 | End: 2024-04-07

## 2024-04-07 RX ORDER — CEFTRIAXONE 500 MG/1
INJECTION, POWDER, FOR SOLUTION INTRAMUSCULAR; INTRAVENOUS
Refills: 0 | Status: DISCONTINUED | OUTPATIENT
Start: 2024-04-07 | End: 2024-04-08

## 2024-04-07 RX ADMIN — CHLORHEXIDINE GLUCONATE 1 APPLICATION(S): 213 SOLUTION TOPICAL at 11:50

## 2024-04-07 RX ADMIN — HEPARIN SODIUM 5000 UNIT(S): 5000 INJECTION INTRAVENOUS; SUBCUTANEOUS at 14:52

## 2024-04-07 RX ADMIN — HEPARIN SODIUM 5000 UNIT(S): 5000 INJECTION INTRAVENOUS; SUBCUTANEOUS at 06:22

## 2024-04-07 RX ADMIN — CARVEDILOL PHOSPHATE 12.5 MILLIGRAM(S): 80 CAPSULE, EXTENDED RELEASE ORAL at 18:02

## 2024-04-07 RX ADMIN — PIPERACILLIN AND TAZOBACTAM 25 GRAM(S): 4; .5 INJECTION, POWDER, LYOPHILIZED, FOR SOLUTION INTRAVENOUS at 18:13

## 2024-04-07 RX ADMIN — PIPERACILLIN AND TAZOBACTAM 25 GRAM(S): 4; .5 INJECTION, POWDER, LYOPHILIZED, FOR SOLUTION INTRAVENOUS at 10:32

## 2024-04-07 RX ADMIN — PIPERACILLIN AND TAZOBACTAM 25 GRAM(S): 4; .5 INJECTION, POWDER, LYOPHILIZED, FOR SOLUTION INTRAVENOUS at 03:07

## 2024-04-07 RX ADMIN — TACROLIMUS 0.5 MILLIGRAM(S): 5 CAPSULE ORAL at 21:40

## 2024-04-07 RX ADMIN — HEPARIN SODIUM 5000 UNIT(S): 5000 INJECTION INTRAVENOUS; SUBCUTANEOUS at 21:39

## 2024-04-07 RX ADMIN — TACROLIMUS 1 MILLIGRAM(S): 5 CAPSULE ORAL at 11:50

## 2024-04-07 RX ADMIN — CARVEDILOL PHOSPHATE 12.5 MILLIGRAM(S): 80 CAPSULE, EXTENDED RELEASE ORAL at 06:22

## 2024-04-07 RX ADMIN — LISINOPRIL 10 MILLIGRAM(S): 2.5 TABLET ORAL at 06:22

## 2024-04-07 RX ADMIN — TAMSULOSIN HYDROCHLORIDE 0.4 MILLIGRAM(S): 0.4 CAPSULE ORAL at 11:50

## 2024-04-07 RX ADMIN — CEFTRIAXONE 100 MILLIGRAM(S): 500 INJECTION, POWDER, FOR SOLUTION INTRAMUSCULAR; INTRAVENOUS at 22:49

## 2024-04-07 NOTE — PROGRESS NOTE ADULT - SUBJECTIVE AND OBJECTIVE BOX
Olivia Soria MD   PGY3 Internal Medicine       SUBJECTIVE / OVERNIGHT EVENTS:      No acute events overnight. Patient seen and evaluated at bedside. No fever/chills.  Denies SOB at rest, chest pain, palpitations, abdominal pain, nausea/vomiting.    MEDICATIONS  (STANDING):  carvedilol 12.5 milliGRAM(s) Oral every 12 hours  chlorhexidine 4% Liquid 1 Application(s) Topical daily  heparin   Injectable 5000 Unit(s) SubCutaneous every 8 hours  lisinopril 10 milliGRAM(s) Oral daily  piperacillin/tazobactam IVPB.. 3.375 Gram(s) IV Intermittent every 8 hours  tacrolimus 1 milliGRAM(s) Oral daily  tacrolimus 0.5 milliGRAM(s) Oral at bedtime  tamsulosin 0.4 milliGRAM(s) Oral daily    MEDICATIONS  (PRN):  acetaminophen     Tablet .. 650 milliGRAM(s) Oral every 6 hours PRN Temp greater or equal to 38C (100.4F), Mild Pain (1 - 3)  melatonin 3 milliGRAM(s) Oral at bedtime PRN Insomnia      CAPILLARY BLOOD GLUCOSE        I&O's Summary    06 Apr 2024 07:01  -  07 Apr 2024 07:00  --------------------------------------------------------  IN: 1240 mL / OUT: 1375 mL / NET: -135 mL        PHYSICAL EXAM:  Vital Signs Last 24 Hrs  T(C): 36.7 (07 Apr 2024 05:14), Max: 37.3 (06 Apr 2024 18:18)  T(F): 98.1 (07 Apr 2024 05:14), Max: 99.2 (06 Apr 2024 18:18)  HR: 68 (07 Apr 2024 05:14) (68 - 70)  BP: 112/74 (07 Apr 2024 05:14) (112/74 - 129/56)  BP(mean): --  RR: 18 (07 Apr 2024 05:14) (16 - 18)  SpO2: 96% (07 Apr 2024 05:14) (96% - 98%)    Parameters below as of 07 Apr 2024 05:14  Patient On (Oxygen Delivery Method): room air        ____________________  PHYSICAL EXAM:  GENERAL: NAD, well-developed, well-nourished  HEAD: Atraumatic, Normocephalic  EYES: Conjunctiva and sclera clear  CHEST/LUNG: Clear to auscultation bilaterally; No wheezes or crackles  HEART: Normal S1/S2; Regular rate and rhythm; No murmurs, rubs, or gallops  ABDOMEN: Soft, Nontender, Nondistended + amaro   EXTREMITIES: No clubbing, cyanosis, or edema  PSYCH: A&Ox3        LABS:                        8.9    4.75  )-----------( 166      ( 07 Apr 2024 06:44 )             27.3     04-07    137  |  105  |  18  ----------------------------<  109<H>  4.4   |  22  |  0.78    Ca    8.4      07 Apr 2024 06:44  Phos  3.3     04-07  Mg     1.7     04-07    TPro  6.5  /  Alb  3.0<L>  /  TBili  0.3  /  DBili  x   /  AST  12  /  ALT  6<L>  /  AlkPhos  45  04-07          Urinalysis Basic - ( 07 Apr 2024 06:44 )    Color: x / Appearance: x / SG: x / pH: x  Gluc: 109 mg/dL / Ketone: x  / Bili: x / Urobili: x   Blood: x / Protein: x / Nitrite: x   Leuk Esterase: x / RBC: x / WBC x   Sq Epi: x / Non Sq Epi: x / Bacteria: x        Culture - Urine (collected 04 Apr 2024 18:38)  Source: Clean Catch Clean Catch (Midstream)  Preliminary Report (06 Apr 2024 19:43):    >100,000 CFU/ml Klebsiella pneumoniae    Culture - Blood (collected 04 Apr 2024 13:32)  Source: .Blood Blood-Peripheral  Preliminary Report (06 Apr 2024 23:01):    No growth at 48 Hours    Culture - Blood (collected 04 Apr 2024 13:15)  Source: .Blood Blood-Peripheral  Preliminary Report (06 Apr 2024 23:01):    No growth at 48 Hours            *******************************************************************************  *******************************************************************************  *******************************************************************************  *******************************************************************************  *******************************************************************************  CAPILLARY BLOOD GLUCOSE          carvedilol   12.5 milliGRAM(s) Oral (04-06 @ 18:22)   12.5 milliGRAM(s) Oral (04-07 @ 06:22)    chlorhexidine 4% Liquid   1 Application(s) Topical (04-06 @ 11:44)    heparin   Injectable   5000 Unit(s) SubCutaneous (04-06 @ 15:02)   5000 Unit(s) SubCutaneous (04-06 @ 21:53)   5000 Unit(s) SubCutaneous (04-07 @ 06:22)    lisinopril   10 milliGRAM(s) Oral (04-07 @ 06:22)    piperacillin/tazobactam IVPB..   25 mL/Hr IV Intermittent (04-06 @ 11:42)   25 mL/Hr IV Intermittent (04-06 @ 18:48)   25 mL/Hr IV Intermittent (04-07 @ 03:07)    tacrolimus   0.5 milliGRAM(s) Oral (04-06 @ 21:53)    tacrolimus   1 milliGRAM(s) Oral (04-06 @ 11:42)    tamsulosin   0.4 milliGRAM(s) Oral (04-06 @ 11:42)      Vital Signs Last 24 Hrs  T(C): 36.7 (07 Apr 2024 05:14), Max: 37.3 (06 Apr 2024 18:18)  T(F): 98.1 (07 Apr 2024 05:14), Max: 99.2 (06 Apr 2024 18:18)  HR: 68 (07 Apr 2024 05:14) (68 - 70)  BP: 112/74 (07 Apr 2024 05:14) (112/74 - 129/56)  BP(mean): --  RR: 18 (07 Apr 2024 05:14) (16 - 18)  SpO2: 96% (07 Apr 2024 05:14) (96% - 98%)    Parameters below as of 07 Apr 2024 05:14  Patient On (Oxygen Delivery Method): room air        04-06-24 @ 07:01  -  04-07-24 @ 07:00  --------------------------------------------------------  IN:    IV PiggyBack: 300 mL    Oral Fluid: 940 mL  Total IN: 1240 mL    OUT:    Indwelling Catheter - Urethral (mL): 1375 mL  Total OUT: 1375 mL    Total NET: -135 mL

## 2024-04-07 NOTE — PROGRESS NOTE ADULT - ASSESSMENT
ASSESSMENT/PLAN  ASSESSMENT:  73M w/ hx of HTN, prostate CA s/p radiation, ESRD  s/p renal transplant in 2017 at [facility], cardiomyopathy s/p PPM, BPH, HTN p/w worsening urinary symptoms.     Renal txp  -2017 @ [place]  -Allograft baseline approx 0.8  -chronically elevated allograft velocities with preserved RF, BP controlled at present  -chronic txp hydro with preserved RF    High risk immunosuppression  - BID (held right now)  -Tac 1/0.5mg    HTN  -lisinopril, coreg, tamsulosin    Recurrent UTI  -with urethral stricture  -s/p amaro    RECOMMEND:  -Continue holding MMF for now  -c/w tacrolimus 1/0.5mg, titrations per txp nephrology  -Continue Coreg 12.5mg BID, Lisinopril 10 mg daily  -IV abx per ID  -Amaro management per urology, may need outpatient follow-up  -txp neph on board  -monitor I's and O's  -MAP >65  -Dose Rx for CrCl >60mL/min    Yenny Jordan NP  [facility]  [phone]

## 2024-04-07 NOTE — PROGRESS NOTE ADULT - SUBJECTIVE AND OBJECTIVE BOX
Seen and examined OOB to chair. Denies fever, chills, cough, sob.    acetaminophen     Tablet .. 650 milliGRAM(s) Oral every 6 hours PRN  carvedilol 12.5 milliGRAM(s) Oral every 12 hours  chlorhexidine 4% Liquid 1 Application(s) Topical daily  heparin   Injectable 5000 Unit(s) SubCutaneous every 8 hours  lisinopril 10 milliGRAM(s) Oral daily  melatonin 3 milliGRAM(s) Oral at bedtime PRN  piperacillin/tazobactam IVPB.. 3.375 Gram(s) IV Intermittent every 8 hours  tacrolimus 1 milliGRAM(s) Oral daily  tacrolimus 0.5 milliGRAM(s) Oral at bedtime  tamsulosin 0.4 milliGRAM(s) Oral daily      VITAL:  T(C): , Max: 37.6 (04-05-24 @ 20:37)  T(F): , Max: 99.6 (04-05-24 @ 20:37)  HR: 68 (04-06-24 @ 10:58)  BP: 113/50 (04-06-24 @ 10:58)  BP(mean): --  RR: 16 (04-06-24 @ 10:58)  SpO2: 96% (04-06-24 @ 10:58)  Wt(kg): --    04-05-24 @ 07:01  -  04-06-24 @ 07:00  --------------------------------------------------------  IN: 200 mL / OUT: 450 mL / NET: -250 mL    04-06-24 @ 07:01  -  04-06-24 @ 11:23  --------------------------------------------------------  IN: 360 mL / OUT: 0 mL / NET: 360 mL        PHYSICAL EXAM:  Constitutional: NAD, Alert  HEENT: NCAT, MMM  Neck: Supple, No JVD  Respiratory: CTA-b/l  Cardiovascular: RRR s1s2, no m/r/g  Gastrointestinal: BS+, soft, NT/ND  Extremities: No peripheral edema b/l  Neurological: no focal deficits; strength grossly intact  Back: no CVAT b/l  Skin: No rashes, no nevi  : + Block, draining clear yellow urine       LABS:                          8.3    6.54  )-----------( 162      ( 06 Apr 2024 07:25 )             26.3     Na(135)/K(4.0)/Cl(104)/HCO3(21)/BUN(19)/Cr(0.77)Glu(151)/Ca(8.4)/Mg(1.7)/PO4(3.2)    04-06 @ 07:25  Na(139)/K(4.3)/Cl(106)/HCO3(21)/BUN(27)/Cr(0.80)Glu(125)/Ca(8.4)/Mg(1.8)/PO4(--)    04-05 @ 06:44  Na(133)/K(4.4)/Cl(100)/HCO3(20)/BUN(33)/Cr(0.80)Glu(129)/Ca(8.7)/Mg(--)/PO4(--)    04-04 @ 13:58    Urinalysis Basic - ( 06 Apr 2024 07:25 )    Color: x / Appearance: x / SG: x / pH: x  Gluc: 151 mg/dL / Ketone: x  / Bili: x / Urobili: x   Blood: x / Protein: x / Nitrite: x   Leuk Esterase: x / RBC: x / WBC x   Sq Epi: x / Non Sq Epi: x / Bacteria: x

## 2024-04-07 NOTE — PROGRESS NOTE ADULT - SUBJECTIVE AND OBJECTIVE BOX
INFECTIOUS DISEASES FOLLOW UP-- Nela Jason  [phone]    This is a follow up note for this  73yMale with  Urinary tract infection  post renal transplant        ROS:  CONSTITUTIONAL:  No fever, good appetite  CARDIOVASCULAR:  No chest pain or palpitations  RESPIRATORY:  No dyspnea  GASTROINTESTINAL:  No nausea, vomiting, diarrhea, or abdominal pain  GENITOURINARY:  No dysuria  NEUROLOGIC:  No headache,     Allergies    No Known Allergies    Intolerances        ANTIBIOTICS/RELEVANT:  antimicrobials  piperacillin/tazobactam IVPB.. 3.375 Gram(s) IV Intermittent every 8 hours    immunologic:  tacrolimus 1 milliGRAM(s) Oral daily  tacrolimus 0.5 milliGRAM(s) Oral at bedtime    OTHER:  acetaminophen     Tablet .. 650 milliGRAM(s) Oral every 6 hours PRN  carvedilol 12.5 milliGRAM(s) Oral every 12 hours  chlorhexidine 4% Liquid 1 Application(s) Topical daily  heparin   Injectable 5000 Unit(s) SubCutaneous every 8 hours  lisinopril 10 milliGRAM(s) Oral daily  melatonin 3 milliGRAM(s) Oral at bedtime PRN  tamsulosin 0.4 milliGRAM(s) Oral daily      Objective:  Vital Signs Last 24 Hrs  T(C): 36.9 (07 Apr 2024 12:00), Max: 37.3 (06 Apr 2024 18:18)  T(F): 98.5 (07 Apr 2024 12:00), Max: 99.2 (06 Apr 2024 18:18)  HR: 68 (07 Apr 2024 12:00) (68 - 70)  BP: 129/48 (07 Apr 2024 12:00) (112/74 - 129/56)  BP(mean): --  RR: 18 (07 Apr 2024 12:00) (16 - 18)  SpO2: 100% (07 Apr 2024 12:00) (96% - 100%)    Parameters below as of 07 Apr 2024 12:00  Patient On (Oxygen Delivery Method): room air        PHYSICAL EXAM:  Constitutional:no acute distress  Eyes:STEPHANY, EOMI  Ear/Nose/Throat: no oral lesions, 	  Respiratory: clear BL  Cardiovascular: S1S2  Gastrointestinal:soft, (+) BS, no tenderness  Extremities:no e/e/c  No Lymphadenopathy  IV sites not inflammed.    LABS:                        8.9    4.75  )-----------( 166      ( 07 Apr 2024 06:44 )             27.3     04-07    137  |  105  |  18  ----------------------------<  109<H>  4.4   |  22  |  0.78    Ca    8.4      07 Apr 2024 06:44  Phos  3.3     04-07  Mg     1.7     04-07    TPro  6.5  /  Alb  3.0<L>  /  TBili  0.3  /  DBili  x   /  AST  12  /  ALT  6<L>  /  AlkPhos  45  04-07      Urinalysis Basic - ( 07 Apr 2024 06:44 )    Color: x / Appearance: x / SG: x / pH: x  Gluc: 109 mg/dL / Ketone: x  / Bili: x / Urobili: x   Blood: x / Protein: x / Nitrite: x   Leuk Esterase: x / RBC: x / WBC x   Sq Epi: x / Non Sq Epi: x / Bacteria: x        MICROBIOLOGY:            RECENT CULTURES:  04-04 @ 18:38  Clean Catch Clean Catch (Midstream)  --  --  --    >100,000 CFU/ml Klebsiella pneumoniae  --  04-04 @ 13:32  .Blood Blood-Peripheral  --  --  --    No growth at 48 Hours  --  04-04 @ 13:15  .Blood Blood-Peripheral  --  --  --    No growth at 48 Hours  --      RADIOLOGY & ADDITIONAL STUDIES:    < from: US Prostate, Transrectal (02.01.24 @ 15:33) >  FINDINGS: The prostate measures 3.1 cm in width by 2.8 cm in height by   1.9 cm in AP dimension, for calculated prostatic volume of 8.5 mL.   Dystrophic calcifications are present within the gland. No cystic or   complex fluid collection is seen to suggest the presence of an abscess.   The seminal vesicles appear unremarkable. No periprostatic adenopathy or   fluid collection is seen.    IMPRESSION: No sonographic evidence of prostate abscess.    < end of copied text >  < from: US Trans Kidney w/ Doppler, Right (04.04.24 @ 20:27) >  IMPRESSION:    As seen on 1/29/2024 comparison, there are elevated peak systolic   velocities most notably at the transplant renal artery anastomosis (557   cm/s, previously 600 cm/s) consistent with stenosis.    Resistive indices range from 0.71, 0.82, improved from prior.    Moderate to severe hydronephrosis with layering debris in a lower pole   calyx. Correlate with urinalysis.      < end of copied text >

## 2024-04-07 NOTE — PROGRESS NOTE ADULT - ASSESSMENT
72 yo Male with PMHx of HTN, prostate cancer s/p radiation, BPH, end-stage renal disease s/p renal transplant 2017 at [facility], multiple admission for UTI most recent hospitalization 1/29-2/1  for pyelonephritis, now presenting for Rt flank pain, dysuria. He was treated for Klebsiella UTI, with ceftriaxone and Ciprofloxacin in Feb and was started on Ceftin on 3/22 to complete 10 days. As soon as he finished the course, his symptoms came back. He has persistent pelvic discomfort, dysuria, dribbling. No report of chills, fevers, ROS otherwise negative.    ED: afebrile, WBC 9.8. Retaining, Nursing staff unable to place Block and Urology is being called.    Ucx 3/21: klebsiella     4/4  WBC, 13 RBC; Ucx pending  Bcx 4/4; NGTD    Renal US  As seen on 1/29/2024 comparison, there are elevated peak systolic   velocities most notably at the transplant renal artery anastomosis (557   cm/s, previously 600 cm/s) consistent with stenosis. Resistive indices range from 0.71, 0.82, improved from prior.  Moderate to severe hydronephrosis with layering debris in a lower pole   calyx. Correlate with urinalysis.        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

## 2024-04-08 ENCOUNTER — TRANSCRIPTION ENCOUNTER (OUTPATIENT)
Age: 74
End: 2024-04-08

## 2024-04-08 VITALS
DIASTOLIC BLOOD PRESSURE: 77 MMHG | RESPIRATION RATE: 18 BRPM | HEART RATE: 71 BPM | TEMPERATURE: 98 F | OXYGEN SATURATION: 99 % | SYSTOLIC BLOOD PRESSURE: 146 MMHG

## 2024-04-08 LAB
ALBUMIN SERPL ELPH-MCNC: 2.9 G/DL — LOW (ref 3.3–5)
ALP SERPL-CCNC: 50 U/L — SIGNIFICANT CHANGE UP (ref 40–120)
ALT FLD-CCNC: 11 U/L — SIGNIFICANT CHANGE UP (ref 10–45)
ANION GAP SERPL CALC-SCNC: 12 MMOL/L — SIGNIFICANT CHANGE UP (ref 5–17)
AST SERPL-CCNC: 15 U/L — SIGNIFICANT CHANGE UP (ref 10–40)
BASOPHILS # BLD AUTO: 0.04 K/UL — SIGNIFICANT CHANGE UP (ref 0–0.2)
BASOPHILS NFR BLD AUTO: 0.9 % — SIGNIFICANT CHANGE UP (ref 0–2)
BILIRUB SERPL-MCNC: 0.2 MG/DL — SIGNIFICANT CHANGE UP (ref 0.2–1.2)
BKV DNA SPEC QL NAA+PROBE: SIGNIFICANT CHANGE UP
BUN SERPL-MCNC: 18 MG/DL — SIGNIFICANT CHANGE UP (ref 7–23)
CALCIUM SERPL-MCNC: 8.5 MG/DL — SIGNIFICANT CHANGE UP (ref 8.4–10.5)
CHLORIDE SERPL-SCNC: 105 MMOL/L — SIGNIFICANT CHANGE UP (ref 96–108)
CMV DNA CSF QL NAA+PROBE: SIGNIFICANT CHANGE UP IU/ML
CMV DNA SPEC NAA+PROBE-LOG#: SIGNIFICANT CHANGE UP LOG10IU/ML
CO2 SERPL-SCNC: 21 MMOL/L — LOW (ref 22–31)
CREAT SERPL-MCNC: 0.68 MG/DL — SIGNIFICANT CHANGE UP (ref 0.5–1.3)
EGFR: 97 ML/MIN/1.73M2 — SIGNIFICANT CHANGE UP
EOSINOPHIL # BLD AUTO: 0.01 K/UL — SIGNIFICANT CHANGE UP (ref 0–0.5)
EOSINOPHIL NFR BLD AUTO: 0.2 % — SIGNIFICANT CHANGE UP (ref 0–6)
GLUCOSE SERPL-MCNC: 100 MG/DL — HIGH (ref 70–99)
HADV DNA FLD NAA+PROBE-LOG#: SIGNIFICANT CHANGE UP COPIES/ML
HCT VFR BLD CALC: 28.9 % — LOW (ref 39–50)
HGB BLD-MCNC: 9.4 G/DL — LOW (ref 13–17)
IMM GRANULOCYTES NFR BLD AUTO: 0.4 % — SIGNIFICANT CHANGE UP (ref 0–0.9)
LYMPHOCYTES # BLD AUTO: 1.15 K/UL — SIGNIFICANT CHANGE UP (ref 1–3.3)
LYMPHOCYTES # BLD AUTO: 25.6 % — SIGNIFICANT CHANGE UP (ref 13–44)
MAGNESIUM SERPL-MCNC: 1.6 MG/DL — SIGNIFICANT CHANGE UP (ref 1.6–2.6)
MCHC RBC-ENTMCNC: 31 PG — SIGNIFICANT CHANGE UP (ref 27–34)
MCHC RBC-ENTMCNC: 32.5 GM/DL — SIGNIFICANT CHANGE UP (ref 32–36)
MCV RBC AUTO: 95.4 FL — SIGNIFICANT CHANGE UP (ref 80–100)
MONOCYTES # BLD AUTO: 0.54 K/UL — SIGNIFICANT CHANGE UP (ref 0–0.9)
MONOCYTES NFR BLD AUTO: 12 % — SIGNIFICANT CHANGE UP (ref 2–14)
NEUTROPHILS # BLD AUTO: 2.74 K/UL — SIGNIFICANT CHANGE UP (ref 1.8–7.4)
NEUTROPHILS NFR BLD AUTO: 60.9 % — SIGNIFICANT CHANGE UP (ref 43–77)
NRBC # BLD: 0 /100 WBCS — SIGNIFICANT CHANGE UP (ref 0–0)
PHOSPHATE SERPL-MCNC: 3 MG/DL — SIGNIFICANT CHANGE UP (ref 2.5–4.5)
PLATELET # BLD AUTO: 192 K/UL — SIGNIFICANT CHANGE UP (ref 150–400)
POTASSIUM SERPL-MCNC: 4.3 MMOL/L — SIGNIFICANT CHANGE UP (ref 3.5–5.3)
POTASSIUM SERPL-SCNC: 4.3 MMOL/L — SIGNIFICANT CHANGE UP (ref 3.5–5.3)
PROT SERPL-MCNC: 6.9 G/DL — SIGNIFICANT CHANGE UP (ref 6–8.3)
RBC # BLD: 3.03 M/UL — LOW (ref 4.2–5.8)
RBC # FLD: 12.5 % — SIGNIFICANT CHANGE UP (ref 10.3–14.5)
SODIUM SERPL-SCNC: 138 MMOL/L — SIGNIFICANT CHANGE UP (ref 135–145)
TACROLIMUS SERPL-MCNC: 9.9 NG/ML — SIGNIFICANT CHANGE UP
WBC # BLD: 4.5 K/UL — SIGNIFICANT CHANGE UP (ref 3.8–10.5)
WBC # FLD AUTO: 4.5 K/UL — SIGNIFICANT CHANGE UP (ref 3.8–10.5)

## 2024-04-08 PROCEDURE — 83735 ASSAY OF MAGNESIUM: CPT

## 2024-04-08 PROCEDURE — 85014 HEMATOCRIT: CPT

## 2024-04-08 PROCEDURE — 99285 EMERGENCY DEPT VISIT HI MDM: CPT | Mod: 25

## 2024-04-08 PROCEDURE — 81001 URINALYSIS AUTO W/SCOPE: CPT

## 2024-04-08 PROCEDURE — 87040 BLOOD CULTURE FOR BACTERIA: CPT

## 2024-04-08 PROCEDURE — 87637 SARSCOV2&INF A&B&RSV AMP PRB: CPT

## 2024-04-08 PROCEDURE — 93005 ELECTROCARDIOGRAM TRACING: CPT

## 2024-04-08 PROCEDURE — 85018 HEMOGLOBIN: CPT

## 2024-04-08 PROCEDURE — 82435 ASSAY OF BLOOD CHLORIDE: CPT

## 2024-04-08 PROCEDURE — 87186 SC STD MICRODIL/AGAR DIL: CPT

## 2024-04-08 PROCEDURE — 84100 ASSAY OF PHOSPHORUS: CPT

## 2024-04-08 PROCEDURE — 83605 ASSAY OF LACTIC ACID: CPT

## 2024-04-08 PROCEDURE — 82330 ASSAY OF CALCIUM: CPT

## 2024-04-08 PROCEDURE — 99232 SBSQ HOSP IP/OBS MODERATE 35: CPT | Mod: GC

## 2024-04-08 PROCEDURE — 99239 HOSP IP/OBS DSCHRG MGMT >30: CPT

## 2024-04-08 PROCEDURE — 85730 THROMBOPLASTIN TIME PARTIAL: CPT

## 2024-04-08 PROCEDURE — 87799 DETECT AGENT NOS DNA QUANT: CPT

## 2024-04-08 PROCEDURE — 82947 ASSAY GLUCOSE BLOOD QUANT: CPT

## 2024-04-08 PROCEDURE — 85610 PROTHROMBIN TIME: CPT

## 2024-04-08 PROCEDURE — 87641 MR-STAPH DNA AMP PROBE: CPT

## 2024-04-08 PROCEDURE — 84295 ASSAY OF SERUM SODIUM: CPT

## 2024-04-08 PROCEDURE — 87640 STAPH A DNA AMP PROBE: CPT

## 2024-04-08 PROCEDURE — 99233 SBSQ HOSP IP/OBS HIGH 50: CPT

## 2024-04-08 PROCEDURE — 87086 URINE CULTURE/COLONY COUNT: CPT

## 2024-04-08 PROCEDURE — 82803 BLOOD GASES ANY COMBINATION: CPT

## 2024-04-08 PROCEDURE — 80053 COMPREHEN METABOLIC PANEL: CPT

## 2024-04-08 PROCEDURE — 84132 ASSAY OF SERUM POTASSIUM: CPT

## 2024-04-08 PROCEDURE — 80197 ASSAY OF TACROLIMUS: CPT

## 2024-04-08 PROCEDURE — 76776 US EXAM K TRANSPL W/DOPPLER: CPT

## 2024-04-08 PROCEDURE — 97161 PT EVAL LOW COMPLEX 20 MIN: CPT

## 2024-04-08 PROCEDURE — 87077 CULTURE AEROBIC IDENTIFY: CPT

## 2024-04-08 PROCEDURE — 85025 COMPLETE CBC W/AUTO DIFF WBC: CPT

## 2024-04-08 RX ORDER — MYCOPHENOLATE MOFETIL 250 MG/1
1 CAPSULE ORAL
Qty: 0 | Refills: 0 | DISCHARGE

## 2024-04-08 RX ORDER — LEVOFLOXACIN 5 MG/ML
1 INJECTION, SOLUTION INTRAVENOUS
Qty: 28 | Refills: 0
Start: 2024-04-08 | End: 2024-05-05

## 2024-04-08 RX ORDER — ASCORBIC ACID 60 MG
1 TABLET,CHEWABLE ORAL
Qty: 10 | Refills: 0
Start: 2024-04-08 | End: 2024-04-17

## 2024-04-08 RX ADMIN — LISINOPRIL 10 MILLIGRAM(S): 2.5 TABLET ORAL at 05:22

## 2024-04-08 RX ADMIN — TACROLIMUS 1 MILLIGRAM(S): 5 CAPSULE ORAL at 12:32

## 2024-04-08 RX ADMIN — CHLORHEXIDINE GLUCONATE 1 APPLICATION(S): 213 SOLUTION TOPICAL at 12:15

## 2024-04-08 RX ADMIN — CARVEDILOL PHOSPHATE 12.5 MILLIGRAM(S): 80 CAPSULE, EXTENDED RELEASE ORAL at 18:36

## 2024-04-08 RX ADMIN — HEPARIN SODIUM 5000 UNIT(S): 5000 INJECTION INTRAVENOUS; SUBCUTANEOUS at 14:15

## 2024-04-08 RX ADMIN — CARVEDILOL PHOSPHATE 12.5 MILLIGRAM(S): 80 CAPSULE, EXTENDED RELEASE ORAL at 05:22

## 2024-04-08 RX ADMIN — TAMSULOSIN HYDROCHLORIDE 0.4 MILLIGRAM(S): 0.4 CAPSULE ORAL at 12:33

## 2024-04-08 RX ADMIN — HEPARIN SODIUM 5000 UNIT(S): 5000 INJECTION INTRAVENOUS; SUBCUTANEOUS at 05:24

## 2024-04-08 NOTE — DISCHARGE NOTE PROVIDER - NSDCMRMEDTOKEN_GEN_ALL_CORE_FT
carvedilol 12.5 mg oral tablet: 1 tab(s) orally 2 times a day  lisinopril 10 mg oral tablet: 1 tab(s) orally once a day  mycophenolate mofetil 500 mg oral tablet: 1 tab(s) orally 2 times a day  tacrolimus 0.5 mg oral capsule: 2 cap(s) orally once a day  tacrolimus 0.5 mg oral capsule: 1 cap(s) orally once a day (at bedtime)  tamsulosin 0.4 mg oral capsule: 1 cap(s) orally once a day   carvedilol 12.5 mg oral tablet: 1 tab(s) orally 2 times a day  lisinopril 10 mg oral tablet: 1 tab(s) orally once a day  tacrolimus 0.5 mg oral capsule: 2 cap(s) orally once a day  tacrolimus 0.5 mg oral capsule: 1 cap(s) orally once a day (at bedtime)  tamsulosin 0.4 mg oral capsule: 1 cap(s) orally once a day   carvedilol 12.5 mg oral tablet: 1 tab(s) orally 2 times a day  levoFLOXacin 500 mg oral tablet: 1 tab(s) orally once a day  lisinopril 10 mg oral tablet: 1 tab(s) orally once a day  methenamine hippurate 1 g oral tablet: 1 tab(s) orally 2 times a day Start taking two times a day (on 5/7/2024) AFTER completion of antibiotic course with levofloxacin  tacrolimus 0.5 mg oral capsule: 2 cap(s) orally once a day  tacrolimus 0.5 mg oral capsule: 1 cap(s) orally once a day (at bedtime)  tamsulosin 0.4 mg oral capsule: 1 cap(s) orally once a day   ascorbic acid 500 mg oral capsule: 1 cap(s) orally 2 times a day  carvedilol 12.5 mg oral tablet: 1 tab(s) orally 2 times a day  levoFLOXacin 500 mg oral tablet: 1 tab(s) orally once a day  lisinopril 10 mg oral tablet: 1 tab(s) orally once a day  methenamine hippurate 1 g oral tablet: 1 tab(s) orally 2 times a day Start taking two times a day (on 5/7/2024) AFTER completion of antibiotic course with levofloxacin  tacrolimus 0.5 mg oral capsule: 2 cap(s) orally once a day  tacrolimus 0.5 mg oral capsule: 1 cap(s) orally once a day (at bedtime)  tamsulosin 0.4 mg oral capsule: 1 cap(s) orally once a day   ascorbic acid 500 mg oral capsule: 1 cap(s) orally 2 times a day Start taking twice a day AFTER completion of antibiotic course  carvedilol 12.5 mg oral tablet: 1 tab(s) orally 2 times a day  levoFLOXacin 500 mg oral tablet: 1 tab(s) orally once a day  lisinopril 10 mg oral tablet: 1 tab(s) orally once a day  methenamine hippurate 1 g oral tablet: 1 tab(s) orally 2 times a day Start taking two times a day (on 5/7/2024) AFTER completion of antibiotic course with levofloxacin  tacrolimus 0.5 mg oral capsule: 2 cap(s) orally once a day  tacrolimus 0.5 mg oral capsule: 1 cap(s) orally once a day (at bedtime)  tamsulosin 0.4 mg oral capsule: 1 cap(s) orally once a day   ascorbic acid 500 mg oral capsule: 1 cap(s) orally 2 times a day Start taking two times a day (on 5/6/2024) AFTER completion of antibiotic course with levofloxacin  carvedilol 12.5 mg oral tablet: 1 tab(s) orally 2 times a day  levoFLOXacin 500 mg oral tablet: 1 tab(s) orally once a day  lisinopril 10 mg oral tablet: 1 tab(s) orally once a day  methenamine hippurate 1 g oral tablet: 1 tab(s) orally 2 times a day Start taking two times a day (on 5/6/2024) AFTER completion of antibiotic course with levofloxacin  tacrolimus 0.5 mg oral capsule: 2 cap(s) orally once a day  tacrolimus 0.5 mg oral capsule: 1 cap(s) orally once a day (at bedtime)  tamsulosin 0.4 mg oral capsule: 1 cap(s) orally once a day

## 2024-04-08 NOTE — DISCHARGE NOTE PROVIDER - PROVIDER TOKENS
PROVIDER:[TOKEN:[[id number]],FOLLOWUP:[1 week],ESTABLISHEDPATIENT:[T]],PROVIDER:[TOKEN:[[id number]],FOLLOWUP:[1-3 days],ESTABLISHEDPATIENT:[T]],PROVIDER:[TOKEN:[[id number]],FOLLOWUP:[1 week],ESTABLISHEDPATIENT:[T]]

## 2024-04-08 NOTE — DISCHARGE NOTE NURSING/CASE MANAGEMENT/SOCIAL WORK - NSDCFUADDAPPT_GEN_ALL_CORE_FT
APPTS ARE READY TO BE MADE: [X ] YES    Best Family or Patient Contact (if needed):    Additional Information about above appointments (if needed):    1: Urology -- TOV, methenamine after abx course complete, Vit C, management of urethral stricture  2: Nephrology -- restarting mycophenolate and titrating tacrolimus if needed  3: Infectious disease -- abx course of levo 500mg daily for 4 weeks -- f/u    Other comments or requests:

## 2024-04-08 NOTE — PROGRESS NOTE ADULT - SUBJECTIVE AND OBJECTIVE BOX
Bailey Isbell, PGY1    Patient is a 73y old  Male who presents with a chief complaint of Dysuria (07 Apr 2024 14:45)      SUBJECTIVE / OVERNIGHT EVENTS: NAEO. Pt denies chest pain, SOB, N/V, fever/chills, or changes in bowel movements.    MEDICATIONS  (STANDING):  carvedilol 12.5 milliGRAM(s) Oral every 12 hours  cefTRIAXone   IVPB      cefTRIAXone   IVPB 1000 milliGRAM(s) IV Intermittent every 24 hours  chlorhexidine 4% Liquid 1 Application(s) Topical daily  heparin   Injectable 5000 Unit(s) SubCutaneous every 8 hours  lisinopril 10 milliGRAM(s) Oral daily  tacrolimus 0.5 milliGRAM(s) Oral at bedtime  tacrolimus 1 milliGRAM(s) Oral daily  tamsulosin 0.4 milliGRAM(s) Oral daily    MEDICATIONS  (PRN):  acetaminophen     Tablet .. 650 milliGRAM(s) Oral every 6 hours PRN Temp greater or equal to 38C (100.4F), Mild Pain (1 - 3)  melatonin 3 milliGRAM(s) Oral at bedtime PRN Insomnia      CAPILLARY BLOOD GLUCOSE        I&O's Summary    07 Apr 2024 07:01  -  08 Apr 2024 07:00  --------------------------------------------------------  IN: 450 mL / OUT: 1700 mL / NET: -1250 mL        Vital Signs Last 24 Hrs  T(C): 36.7 (08 Apr 2024 05:00), Max: 37 (07 Apr 2024 21:48)  T(F): 98.1 (08 Apr 2024 05:00), Max: 98.6 (07 Apr 2024 21:48)  HR: 67 (08 Apr 2024 05:00) (56 - 68)  BP: 174/75 (08 Apr 2024 05:00) (129/48 - 174/75)  BP(mean): --  RR: 18 (08 Apr 2024 05:00) (18 - 18)  SpO2: 97% (08 Apr 2024 05:00) (94% - 100%)    Parameters below as of 08 Apr 2024 05:00  Patient On (Oxygen Delivery Method): room air        PHYSICAL EXAM:  GENERAL: NAD, well-developed, well-nourished  HEAD: Atraumatic, Normocephalic  EYES: Conjunctiva and sclera clear  CHEST/LUNG: Clear to auscultation bilaterally; No wheezes or crackles  HEART: Normal S1/S2; Regular rate and rhythm; No murmurs, rubs, or gallops  ABDOMEN: Soft, Nontender, Nondistended; Bowel sounds present  EXTREMITIES: No clubbing, cyanosis, or edema  PSYCH: A&Ox3      LABS:                        9.4    4.50  )-----------( 192      ( 08 Apr 2024 06:47 )             28.9      04-07    137  |  105  |  18  ----------------------------<  109<H>  4.4   |  22  |  0.78    Ca    8.4      07 Apr 2024 06:44  Phos  3.3     04-07  Mg     1.7     04-07    TPro  6.5  /  Alb  3.0<L>  /  TBili  0.3  /  DBili  x   /  AST  12  /  ALT  6<L>  /  AlkPhos  45  04-07          Urinalysis Basic - ( 07 Apr 2024 06:44 )    Color: x / Appearance: x / SG: x / pH: x  Gluc: 109 mg/dL / Ketone: x  / Bili: x / Urobili: x   Blood: x / Protein: x / Nitrite: x   Leuk Esterase: x / RBC: x / WBC x   Sq Epi: x / Non Sq Epi: x / Bacteria: x        RADIOLOGY & ADDITIONAL TESTS:

## 2024-04-08 NOTE — PROGRESS NOTE ADULT - ASSESSMENT
ASSESSMENT/PLAN  ASSESSMENT:  73M w/ hx of HTN, prostate CA s/p radiation, ESRD  s/p renal transplant in 2017 at [facility], cardiomyopathy s/p PPM, BPH, HTN p/w worsening urinary symptoms.     Renal txp  -2017 @ [place]  -Allograft baseline approx 0.8  -chronically elevated allograft velocities with preserved RF, BP controlled at present  -chronic txp hydro with preserved RF    High risk immunosuppression  - BID (held right now)  -Tac 1/0.5mg    HTN  -lisinopril, coreg, tamsulosin    Recurrent UTI  -with urethral stricture  -s/p amaro    RECOMMEND:  -Continue holding MMF for now> to f/u with txp neph in 1 week  -c/w tacrolimus 1/0.5mg, titrations per txp nephrology> to f/u with txp neph in 1 week  -Continue Coreg 12.5mg BID, Lisinopril 10 mg daily. BP looks better in room.   -IV abx per ID  -Amaro management per urology, may need outpatient follow-up  -txp neph on board  -monitor I's and O's  -MAP >65  -Dose Rx for CrCl >60mL/min    Nakul Trejo DO   [employer]  [phone]

## 2024-04-08 NOTE — PROGRESS NOTE ADULT - ASSESSMENT
74 yo Male with PMHx of HTN, prostate cancer s/p radiation, BPH, end-stage renal disease s/p renal transplant 2017 at [place], multiple admission for UTI most recent hospitalization 1/29-2/1  for pyelonephritis, now presenting for Rt flank pain, dysuria. He was treated for Klebsiella UTI, with ceftriaxone and Ciprofloxacin in Feb and was started on Ceftin on 3/22 to complete 10 days. As soon as he finished the course, his symptoms came back. He has persistent pelvic discomfort, dysuria, dribbling. No report of chills, fevers, ROS otherwise negative.    ED: afebrile, WBC 9.8. Retaining, Nursing staff unable to place Block and Urology is being called.    Ucx 3/21: klebsiella     4/4  WBC, 13 RBC; Ucx pending  Bcx 4/4; NGTD    Renal US  As seen on 1/29/2024 comparison, there are elevated peak systolic   velocities most notably at the transplant renal artery anastomosis (557   cm/s, previously 600 cm/s) consistent with stenosis. Resistive indices range from 0.71, 0.82, improved from prior.  Moderate to severe hydronephrosis with layering debris in a lower pole   calyx. Correlate with urinalysis.        prior imaging  12/2023  PET   1. Increased radiotracer activity in left side of prostate gland,extending from the apex to the base, and into the bilateral seminal   vesicles, is compatible with recurrent PSMA-positive disease. The intensity of radiotracer activity suggests high PSMA expression.  2. Indeterminate foci of increased radiotracer activity in left costovertebral joint, approximately at the level of the fifth rib, and in the left ischium/inferior pubic ramus.  3. Right renal transplant with moderate hydronephrosis. A renal ultrasound on 6/23/2020, showed mild to moderate hydronephrosis of right renal transplant. A follow-up renal ultrasound is recommended for further evaluation.    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  # history of ESBL Eocli in 2020    - Continue ceftriaxone- anticipate prolonged course of antibiotics with oral transition at the time of discharge  (fluoroquinolone ,augmentin) probably will benefit from coverage of prostatitis given recurrences and prior imaging  - Agree with urology evaluation- may require stent placement to alleviate hydronephrosis and slo regarding BPH/prior PET results      Additional studies:  - follow CMV PCR, adenovirus PCR, BK virus PCR in serum          Thank you for involving us in the care of this patient  Transplant ID will continue to follow  Please call or page with additional questions  Pager; #[phone]  Teams: from 8 am to 5 pm  Joycelyn Hua MD   72 yo Male with PMHx of HTN, prostate cancer s/p radiation, BPH, end-stage renal disease s/p renal transplant 2017 at [place], multiple admission for UTI most recent hospitalization 1/29-2/1  for pyelonephritis, now presenting for Rt flank pain, dysuria. He was treated for Klebsiella UTI, with ceftriaxone and Ciprofloxacin in Feb and was started on Ceftin on 3/22 to complete 10 days. As soon as he finished the course, his symptoms came back. He has persistent pelvic discomfort, dysuria, dribbling. No report of chills, fevers, ROS otherwise negative.    ED: afebrile, WBC 9.8. Retaining, Nursing staff unable to place Block and Urology is being called.    Ucx 3/21: klebsiella     4/4  WBC, 13 RBC; Ucx pending  Bcx 4/4; NGTD    Renal US  As seen on 1/29/2024 comparison, there are elevated peak systolic   velocities most notably at the transplant renal artery anastomosis (557   cm/s, previously 600 cm/s) consistent with stenosis. Resistive indices range from 0.71, 0.82, improved from prior.  Moderate to severe hydronephrosis with layering debris in a lower pole   calyx. Correlate with urinalysis.        prior imaging  12/2023  PET   1. Increased radiotracer activity in left side of prostate gland,extending from the apex to the base, and into the bilateral seminal   vesicles, is compatible with recurrent PSMA-positive disease. The intensity of radiotracer activity suggests high PSMA expression.  2. Indeterminate foci of increased radiotracer activity in left costovertebral joint, approximately at the level of the fifth rib, and in the left ischium/inferior pubic ramus.  3. Right renal transplant with moderate hydronephrosis. A renal ultrasound on 6/23/2020, showed mild to moderate hydronephrosis of right renal transplant. A follow-up renal ultrasound is recommended for further evaluation.    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  # history of ESBL Eocli in 2020    - Continue ceftriaxone- anticipate prolonged course of antibiotics with oral transition at the time of discharge  (fluoroquinolone ,augmentin) probably will benefit from coverage of prostatitis given recurrences and prior imaging- anticipate 4 total weeks of antibiotic (counting days of iv) and discharge on levoquin 500 mg daily  - Agree with urology evaluation- may require stent placement to alleviate hydronephrosis and will need discussion of BPH management considering recurrecnes  -- will need follow up with Dr Jason, likely with prophylaxis after completion of levoquin while additional workup pending      Additional studies:  - follow CMV PCR, adenovirus PCR, BK virus PCR in serum          Thank you for involving us in the care of this patient  Transplant ID will continue to follow  Please call or page with additional questions  Pager; #[phone]  Teams: from 8 am to 5 pm  Joycelyn Hua MD

## 2024-04-08 NOTE — DISCHARGE NOTE NURSING/CASE MANAGEMENT/SOCIAL WORK - PATIENT PORTAL LINK FT
You can access the FollowMyHealth Patient Portal offered by [facility] by registering at the following website: [email or web address]. By joining [employer]’s FollowMyHealth portal, you will also be able to view your health information using other applications (apps) compatible with our system.

## 2024-04-08 NOTE — DISCHARGE NOTE PROVIDER - CARE PROVIDER_API CALL
Nela Jason  Infectious Disease  [address] [place] [address]  Phone: [phone]  Fax: [phone]  Established Patient  Follow Up Time: 1 week    Donny Cooney  Urology  [address] [place] [address]  Phone: [phone]  Fax: [phone]  Established Patient  Follow Up Time: 1-3 days    Williams Ramirez  Nephrology  [address] [place] [address] [address]  [place] [address]  Phone: [phone]  Fax: [phone]  Established Patient  Follow Up Time: 1 week

## 2024-04-08 NOTE — PROGRESS NOTE ADULT - SUBJECTIVE AND OBJECTIVE BOX
Transplant Infectious diseases follow up    This is a follow up note for this  73yMale with  Urinary tract infection post renal transplant c/b hydronephrosis, also known urethral stricture and BPH        Vital Signs Last 24 Hrs  T(C): 36.7 (08 Apr 2024 05:00), Max: 37 (07 Apr 2024 21:48)  T(F): 98.1 (08 Apr 2024 05:00), Max: 98.6 (07 Apr 2024 21:48)  HR: 67 (08 Apr 2024 05:00) (56 - 68)  BP: 174/75 (08 Apr 2024 05:00) (129/48 - 174/75)  BP(mean): --  RR: 18 (08 Apr 2024 05:00) (18 - 18)  SpO2: 97% (08 Apr 2024 05:00) (94% - 100%)    Parameters below as of 08 Apr 2024 05:00  Patient On (Oxygen Delivery Method): room air        PHYSICAL EXAM:  Constitutional:no acute distress  Eyes:STEPHANY, EOMI  Ear/Nose/Throat: no oral lesions, 	  Respiratory: clear BL  Cardiovascular: S1S2  Gastrointestinal:soft, (+) BS, no tenderness  Extremities:no e/e/c  No Lymphadenopathy  IV sites not inflammed.            ____________________________________________________  ROS  GENERAL: denies chills, , night sweats, weight loss.   PSYCH: denies depression, anxiety, suicidal ideation, hallucination, and delusions  SKIN: no rash or lesions; no color changes, no abnormal nevi,no  dryness, and nojaundice    EYES: denies visual changes, floaters, pain, inflammation, blurred vision, and discharge  ENT: denies tinnitus, vertigo, epistaxis, oral lesion, and decreased acuity  PULM: denies, hemoptysis, pleurisy  CVS: denies angina, palpitations,+ orthopnea, no syncope, or heart murmur  GI: denies constipation, diarrhea, melena, abdominal pain, nausea.   : denies dysuria, frequency, discharge, incontinence, stones or macroscopic hematuria  MS: no arthralgias, no erythema or swelling, no myalgias, noedema, or lower back pain.   CNS: denies numbness, dizziness, seizure, or tremor  ENDO: denies heat/cold intolerance, polyuria, polydipsia, malaise.    HEME: denies bruising, bleeding, lymphadenopathy, anemia, and calf pain    Allergies  No Known Allergies    __________________________________________________  MEDS:  MEDICATIONS  (STANDING):  acetaminophen     Tablet .. 650 every 6 hours PRN  carvedilol 12.5 every 12 hours  heparin   Injectable 5000 every 8 hours  lisinopril 10 daily  melatonin 3 at bedtime PRN  tacrolimus 0.5 at bedtime  tacrolimus 1 daily  tamsulosin 0.4 daily    _________________________________________________  ANTIMICOBIALS  cefTRIAXone   IVPB    cefTRIAXone   IVPB 1000 every 24 hours  tacrolimus 0.5 at bedtime  tacrolimus 1 daily      GENERAL LABS              x                    138  | 21   | 18           x     >-----------< x       ------------------------< 100                   x                    4.3  | 105  | 0.68                                         Ca 8.5   Mg 1.6   Ph 3.0        Urinalysis Basic - ( 08 Apr 2024 07:37 )    Color: x / Appearance: x / SG: x / pH: x  Gluc: 100 mg/dL / Ketone: x  / Bili: x / Urobili: x   Blood: x / Protein: x / Nitrite: x   Leuk Esterase: x / RBC: x / WBC x   Sq Epi: x / Non Sq Epi: x / Bacteria: x        _________________________________________________  MICROBIOLOGY  -----------    Culture - Urine (collected 04 Apr 2024 18:38)  Source: Clean Catch Clean Catch (Midstream)  Final Report (07 Apr 2024 16:38):    >100,000 CFU/ml Klebsiella pneumoniae  Organism: Klebsiella pneumoniae (07 Apr 2024 16:38)  Organism: Klebsiella pneumoniae (07 Apr 2024 16:38)      Method Type: ERIC      -  Amoxicillin/Clavulanic Acid: S <=8/4      -  Ampicillin: R 16 These ampicillin results predict results for amoxicillin      -  Ampicillin/Sulbactam: S <=4/2      -  Aztreonam: S <=4      -  Cefazolin: S <=2 For uncomplicated UTI with K. pneumoniae, E. coli, or P. mirablis: ERIC <=16 is sensitive and ERIC >=32 is resistant. This also predicts results for oral agents cefaclor, cefdinir, cefpodoxime, cefprozil, cefuroxime axetil, cephalexin and locarbef for uncomplicated UTI. Note that some isolates may be susceptible to these agents while testing resistant to cefazolin.      -  Cefepime: S <=2      -  Cefoxitin: S <=8      -  Ceftriaxone: S <=1      -  Cefuroxime: S <=4      -  Ciprofloxacin: S <=0.25      -  Ertapenem: S <=0.5      -  Gentamicin: S <=2      -  Imipenem: S <=1      -  Levofloxacin: S <=0.5      -  Meropenem: S <=1      -  Nitrofurantoin: S <=32 Should not be used to treat pyelonephritis      -  Piperacillin/Tazobactam: S <=8      -  Tobramycin: S <=2      -  Trimethoprim/Sulfamethoxazole: S <=0.5/9.5    Culture - Blood (collected 04 Apr 2024 13:32)  Source: .Blood Blood-Peripheral  Preliminary Report (07 Apr 2024 23:01):    No growth at 72 Hours    Culture - Blood (collected 04 Apr 2024 13:15)  Source: .Blood Blood-Peripheral  Preliminary Report (07 Apr 2024 23:01):    No growth at 72 Hours

## 2024-04-08 NOTE — PROGRESS NOTE ADULT - ATTENDING COMMENTS
Error
Patient seen and examined and discussed with DR Hernandez    I agree with his interval history exam and plans as noted above    follow up cultures blood and urine  continue Zosyn pending cultures    Chris Jason MD  Can be called via Teams  After 5pm/weekends [phone]
74yo M pmh HTN, prostate cancer s/p radiation, BPH, end-stage renal disease status post renal transplant 2017, s/p cardiac pace maker, frequent UTIs presents 4/4 with recurrent dysuria and suprapubic pain admitted with recurrent complicated urinary tract infection in immunocompromised host s/p amaro placement for chronic hydro by urology on empiric zosyn per transplant ID anticipate dc tomorrow.    1. UTI:  US Trans Kidney w/ Doppler, Right: As seen on 1/29/2024 comparison, there are elevated peak systolic velocities most notably at the transplant renal artery anastomosis (557 cm/s, previously 600 cm/s) consistent with stenosis. Resistive indices range from 0.71, 0.82, improved from prior. Moderate to severe hydronephrosis with layering debris in a lower pole calyx. Correlate with urinalysis.  UA positive, recent UCx with sensitive Klebsiella.  Appreciate transplant ID recs:   - Continue Zosyn 3.375 gm Q8hrs  - follow up urine and blood cultures  - Check CMV PCR, adenovirus PCR, BK virus PCR    2. Urine retention: appreciate urology consult: UTI with admissions in Jan and Feb with same bacteria. Likely with urethral stricture now s/p dilation with amaro placement. pt also with chronic R hydro but in setting of freely refluxing anastamosis and normal renal function therefore obstruction unlikely.   -keep amaro x 1 week  -abx as above  -methenamine after completion of abx  -Cont. Flomax QHS.    3. ESRD s/p kidney transplant 2017:  -appreciate renal recs:   -holding MMF for now in setting of acute infection  -c/w tacrolimus 1/0.5mg, titrations per txp nephrology  -monitor I's and O's  -MAP >65  -Dose Rx for CrCl >60mL/min    4. H/O cardiomyopathy:  - Unclear baseline EF. S/p PPM  -Cont. Coreg BID  -Cont. Lisinopril.  -no overload on exam, can get TTE as outpatient    5. PPX: sqh, PT eval recommends home PT    6. Dispo: pending abx plan by renal and transplant ID  discussed with patient  contact: TEAMS .
74yo M pmh HTN, prostate cancer s/p radiation, BPH, end-stage renal disease status post renal transplant 2017, s/p cardiac pace maker, frequent UTIs presents 4/4 with recurrent dysuria and suprapubic pain admitted with recurrent complicated urinary tract infection in immunocompromised host s/p amaro placement for chronic hydro by urology on empiric zosyn per transplant ID anticipate dc tomorrow.    1. UTI:  US Trans Kidney w/ Doppler, Right: As seen on 1/29/2024 comparison, there are elevated peak systolic velocities most notably at the transplant renal artery anastomosis (557 cm/s, previously 600 cm/s) consistent with stenosis. Resistive indices range from 0.71, 0.82, improved from prior. Moderate to severe hydronephrosis with layering debris in a lower pole calyx. Correlate with urinalysis.  UA positive, recent UCx with sensitive Klebsiella.  Appreciate transplant ID recs:   - Change to levaquin PO per ID  - Check CMV PCR, adenovirus PCR, BK virus PCR    2. Urine retention: appreciate urology consult: UTI with admissions in Jan and Feb with same bacteria. Likely with urethral stricture now s/p dilation with amaro placement. pt also with chronic R hydro but in setting of freely refluxing anastamosis and normal renal function therefore obstruction unlikely.   -keep amaro x 1 week  -abx as above  -methenamine after completion of abx  -Cont. Flomax QHS.  -urology opt follow up    3. ESRD s/p kidney transplant 2017:  -appreciate renal recs:   -holding MMF for now in setting of acute infection  -c/w tacrolimus 1/0.5mg, titrations per txp nephrology  -monitor I's and O's  -MAP >65  -Dose Rx for CrCl >60mL/min    4. H/O cardiomyopathy:  - Unclear baseline EF. S/p PPM  -Cont. Coreg BID  -Cont. Lisinopril.  -no overload on exam, can get TTE as outpatient    dc planning home dc time 55 min
73 year-old male with past medical history hypertension, prostate cancer s/p radiation, BPH, end-stage renal disease status post renal transplant 2017, s/p cardiac pace maker presents with recurrent dysuria and suprapubic pain admitted with recurrent complicated urinary tract infection in immunocompromise host.     Plan:   -C/w with IV Zosyn in setting of UTI, renal transplant patient  -C/w with Tacrolimus, Transplant renal following   - Aim to DC patient with PO abx  if stable     VTE PPx: HSQ   Diet: DASH   Activity: as tolerated     Rest of management as per resident.
74yo M pmh HTN, prostate cancer s/p radiation, BPH, end-stage renal disease status post renal transplant 2017, s/p cardiac pace maker, frequent UTIs presents 4/4 with recurrent dysuria and suprapubic pain admitted with recurrent complicated urinary tract infection in immunocompromised host.    1. UTI:  US Trans Kidney w/ Doppler, Right: As seen on 1/29/2024 comparison, there are elevated peak systolic velocities most notably at the transplant renal artery anastomosis (557 cm/s, previously 600 cm/s) consistent with stenosis. Resistive indices range from 0.71, 0.82, improved from prior. Moderate to severe hydronephrosis with layering debris in a lower pole calyx. Correlate with urinalysis.  UA positive, recent UCx with sensitive Klebsiella.  Appreciate transplant ID recs:   - Continue Zosyn 3.375 gm Q8hrs  - follow up urine and blood cultures  - Will need urology evaluation; will likely need for stent for persistent hydro  - Check CMV PCR, adenovirus PCR, BK virus PCR    2. Urine retention: appreciate urology consult: UTI with admissions in Jan and Feb with same bacteria. Likely with urethral stricture now s/p dilation with amaro placement. pt also with chronic R hydro but in setting of freely refluxing anastamosis and normal renal function therefore obstruction unlikely.   -keep amaro x 1 week  -abx as above  -methenamine after completion of abx  -Cont. Flomax QHS.    3. ESRD s/p kidney transplant 2017:  -appreciate renal recs:   -holding MMF for now in setting of acute infection  -c/w tacrolimus 1/0.5mg, titrations per txp nephrology  -monitor I's and O's  -MAP >65  -Dose Rx for CrCl >60mL/min    4. H/O cardiomyopathy:  - Unclear baseline EF. S/p PPM  -Cont. Coreg BID  -Cont. Lisinopril.  -no overload on exam, can get TTE as outpatient    5. PPX: sqh, PT eval (uses cane at home)    6. Dispo: pending culture results  discussed with patient and his brother at bedside  contact: TEAMS

## 2024-04-08 NOTE — PROGRESS NOTE ADULT - ASSESSMENT
73M w/ hx of HTN, prostate CA s/p radiation, ESRD  s/p renal transplant in 2017 at [facility], cardiomyopathy s/p PPM, BPH, HTN p/w worsening urinary symptoms.  Pt is on tacro 1mg in the am and 0.5mg pm,  BID and prednisone 5 as per pt.     Scr at the time of admission was 0.80. UA +ve and was placed on IV zosyn.     UTI:  Hold MMF for now.   Pt has elevated tacro level. Doubt if its true trough.   - If patient is being dced today, c/w same tacro dose and follow up as out pt within 1 week with transplant nephro clinic. If he is not discharged decrease the tacro to 0.5 BID and follow up with tacro trough 30 min prior to the morning dose.   C/w abx as per Transplant ID. - Cultures - Klebsiella pneumoniae - Plan to change to oral Abx.   Obtain accurate med recc and restart Prednisone if he is on.   Pt can be placed on Hiprex after the antibiotics are completed for prevention of UTI   Follow up with the urology team.

## 2024-04-08 NOTE — DISCHARGE NOTE PROVIDER - NSDCCPTREATMENT_GEN_ALL_CORE_FT
PRINCIPAL PROCEDURE  Procedure: US kidney transplant  Findings and Treatment:   < end of copied text >  As seen on 1/29/2024 comparison, there are elevated peak systolic   velocities most notably at the transplant renal artery anastomosis (557   cm/s, previously 600 cm/s) consistent with stenosis.  Resistive indices range from 0.71, 0.82, improved from prior.  Moderate to severe hydronephrosis with layering debris in a lower pole   calyx. Correlate with urinalysis.< from: US Trans Kidney w/ Doppler, Right (04.04.24 @ 20:27) >

## 2024-04-08 NOTE — DISCHARGE NOTE PROVIDER - NSDCCPCAREPLAN_GEN_ALL_CORE_FT
PRINCIPAL DISCHARGE DIAGNOSIS  Diagnosis: Acute UTI  Assessment and Plan of Treatment: You came to the hospital with symptoms of burning on urination and pain in the lower region of your abdomen. This is most likely due to a urinary tract infection which you have had previously. We tested your urine and your urine was positive for a bacteria called Klebsiella. The infectious disease doctors saw you and recommended an IV antibiotic called Zosyn. When you were ready for discharge, the infectious disease team recommended you be discharged with a four week course of an oral antibiotic called levofloxacin. Please take the levofloxacin as prescribed and follow up with Dr. Jason (your infectious disease doctor) in clinic. The urology team recommended that after you have completed the antibiotic course of levofloxacin, you should start taking a medication called methenamine (Hiprex) which is used to help prevent reccurent urinary tract infections. The urology team recommended you have a amaro catheter placed in your bladder to help drain your bladder and relieve the pressure from your kidneys. Please follow up with your urologist in the clinic within a few days to have the amaro taken out and for further management.   .  Please come back to the hospital if you have worsening burning on urination, pain in your abdomen, fever/chills, nausea/vomiting, difficulty urinating.     PRINCIPAL DISCHARGE DIAGNOSIS  Diagnosis: Acute UTI  Assessment and Plan of Treatment: You came to the hospital with symptoms of burning on urination and pain in the lower region of your abdomen. This is most likely due to a urinary tract infection which you have had previously. We tested your urine and your urine was positive for a bacteria called Klebsiella. The infectious disease doctors saw you and recommended an IV antibiotic called Zosyn. When you were ready for discharge, the infectious disease team recommended you be discharged with a four week course of an oral antibiotic called levofloxacin. Please take the levofloxacin as prescribed and follow up with Dr. Jason (your infectious disease doctor) in clinic. The urology team recommended that after you have completed the antibiotic course of levofloxacin, you should start taking a medication called methenamine (Hiprex) which is used to help prevent reccurent urinary tract infections. The urology team recommended you have a amaro catheter placed in your bladder to help drain your bladder and relieve the pressure from your kidneys. Please follow up with your urologist in the clinic within a few days to have the amaro taken out and for further management.   .  Instructions:  - Please take levofloxacin 500mg once a day for 4 weeks  - After the 4 weeks of levofloxacin, please start taking methenamine (Hiprex) two times a day to help prevent future urinary tract infections  - Please see your urologist on 4/11 to have your amaro catheter taken out  .  Please come back to the hospital if you have worsening burning on urination, pain in your abdomen, fever/chills, nausea/vomiting, difficulty urinating.      SECONDARY DISCHARGE DIAGNOSES  Diagnosis: History of renal transplant  Assessment and Plan of Treatment: The kidney doctors saw you while you were in the hospital and recommended you stop taking your mycophenolate because this medication suppresses your immune system. You came in with a urinary tract infection, and during times of infection, you need your immune system to be working at its best. Please do not take your mycophenolate when you go home and follow up with your nephrologist to see when you can restart taking the mycophenolate.  .  Please come back to the hospital if you have worsening burning on urination, pain in your abdomen, fever/chills, nausea/vomiting, difficulty urinating.       PRINCIPAL DISCHARGE DIAGNOSIS  Diagnosis: Acute UTI  Assessment and Plan of Treatment: You came to the hospital with symptoms of burning on urination and pain in the lower region of your abdomen. This is most likely due to a urinary tract infection which you have had previously. We tested your urine and your urine was positive for a bacteria called Klebsiella. The infectious disease doctors saw you and recommended an IV antibiotic called Zosyn. When you were ready for discharge, the infectious disease team recommended you be discharged with a four week course of an oral antibiotic called levofloxacin. Please take the levofloxacin as prescribed and follow up with Dr. Jason (your infectious disease doctor) in clinic. The urology team recommended that after you have completed the antibiotic course of levofloxacin, you should start taking a medication called methenamine (Hiprex) which is used to help prevent reccurent urinary tract infections. The urology team recommended you have a amaro catheter placed in your bladder to help drain your bladder and relieve the pressure from your kidneys. Please follow up with your urologist in the clinic within a few days to have the amaro taken out and for further management.   .  Instructions:  - Please take levofloxacin 500mg once a day for 4 weeks  - After the 4 weeks of levofloxacin, please start taking methenamine (Hiprex) two times a day and Vitamin C two times a day to help prevent future urinary tract infections  - Please see your urologist on 4/11 to have your amaro catheter taken out  .  Please come back to the hospital if you have worsening burning on urination, pain in your abdomen, fever/chills, nausea/vomiting, difficulty urinating.      SECONDARY DISCHARGE DIAGNOSES  Diagnosis: History of renal transplant  Assessment and Plan of Treatment: The kidney doctors saw you while you were in the hospital and recommended you stop taking your mycophenolate because this medication suppresses your immune system. You came in with a urinary tract infection, and during times of infection, you need your immune system to be working at its best. Please do not take your mycophenolate when you go home and follow up with your nephrologist to see when you can restart taking the mycophenolate.  .  Please come back to the hospital if you have worsening burning on urination, pain in your abdomen, fever/chills, nausea/vomiting, difficulty urinating.       PRINCIPAL DISCHARGE DIAGNOSIS  Diagnosis: Acute UTI  Assessment and Plan of Treatment: You came to the hospital with symptoms of burning on urination and pain in the lower region of your abdomen. This is most likely due to a urinary tract infection which you have had previously. We tested your urine and your urine was positive for a bacteria called Klebsiella. The infectious disease doctors saw you and recommended an IV antibiotic called Zosyn. When you were ready for discharge, the infectious disease team recommended you be discharged with a four week course of an oral antibiotic called levofloxacin. Please take the levofloxacin as prescribed and follow up with Dr. Jason (your infectious disease doctor) in clinic. The urology team recommended that after you have completed the antibiotic course of levofloxacin, you should start taking a medication called methenamine (Hiprex) which is used to help prevent reccurent urinary tract infections. The urology team also recommended you take Vitamin C along with methenamine. The urology team recommended you have a amaro catheter placed in your bladder to help drain your bladder and relieve the pressure from your kidneys. Please follow up with your urologist in the clinic within a few days to have the amaro taken out and for further management.   .  Instructions:  - Please take levofloxacin 500mg once a day for 4 weeks  - After the 4 weeks of levofloxacin, please start taking methenamine (Hiprex) two times a day and Vitamin C two times a day to help prevent future urinary tract infections  - Please see your urologist on 4/11 to have your amaro catheter taken out  .  Please come back to the hospital if you have worsening burning on urination, pain in your abdomen, fever/chills, nausea/vomiting, difficulty urinating.      SECONDARY DISCHARGE DIAGNOSES  Diagnosis: History of renal transplant  Assessment and Plan of Treatment: The kidney doctors saw you while you were in the hospital and recommended you stop taking your mycophenolate because this medication suppresses your immune system. You came in with a urinary tract infection, and during times of infection, you need your immune system to be working at its best. Please do not take your mycophenolate when you go home and follow up with your nephrologist to see when you can restart taking the mycophenolate.  .  Please come back to the hospital if you have worsening burning on urination, pain in your abdomen, fever/chills, nausea/vomiting, difficulty urinating.

## 2024-04-08 NOTE — DISCHARGE NOTE PROVIDER - CARE PROVIDERS DIRECT ADDRESSES
[email or web address]

## 2024-04-08 NOTE — DISCHARGE NOTE PROVIDER - NSDCFUADDAPPT_GEN_ALL_CORE_FT
APPTS ARE READY TO BE MADE: [X ] YES    Best Family or Patient Contact (if needed):    Additional Information about above appointments (if needed):    1: Urology -- TOV, methenamine after abx course complete, Vit C, management of urethral stricture  2: Nephrology -- restarting mycophenolate and titrating tacrolimus if needed  3: Infectious disease -- abx course of levo 500mg daily for 4 weeks -- f/u    Other comments or requests:    APPTS ARE READY TO BE MADE: [X ] YES    Best Family or Patient Contact (if needed):    Additional Information about above appointments (if needed):    1: Urology -- TOV, methenamine after abx course complete, Vit C, management of urethral stricture  2: Nephrology -- restarting mycophenolate and titrating tacrolimus if needed  3: Infectious disease -- abx course of levo 500mg daily for 4 weeks -- f/u        Other comments or requests:     Patient informed us they already have secured a follow up appointment which is not visible on [facility].

## 2024-04-08 NOTE — PROGRESS NOTE ADULT - TIME BILLING
face-to-face encounter, review of extensive medical records in this and prior charts, laboratory findings, radiographic and microbiology results; documentation as noted above and discussion of diagnostic impressions and plan with the patient and team
chart reviewing, history taking, physical exam, assessment and documentation, including speaking to specialist/SW/CM regarding the management.

## 2024-04-08 NOTE — PROGRESS NOTE ADULT - SUBJECTIVE AND OBJECTIVE BOX
Seen and examined OOB to chair. Feels great. wants to go home.     acetaminophen     Tablet .. 650 milliGRAM(s) Oral every 6 hours PRN  carvedilol 12.5 milliGRAM(s) Oral every 12 hours  chlorhexidine 4% Liquid 1 Application(s) Topical daily  heparin   Injectable 5000 Unit(s) SubCutaneous every 8 hours  lisinopril 10 milliGRAM(s) Oral daily  melatonin 3 milliGRAM(s) Oral at bedtime PRN  piperacillin/tazobactam IVPB.. 3.375 Gram(s) IV Intermittent every 8 hours  tacrolimus 1 milliGRAM(s) Oral daily  tacrolimus 0.5 milliGRAM(s) Oral at bedtime  tamsulosin 0.4 milliGRAM(s) Oral daily      VITAL:  T(C): , Max: 37.6 (04-05-24 @ 20:37)  T(F): , Max: 99.6 (04-05-24 @ 20:37)  HR: 68 (04-06-24 @ 10:58)  BP: 113/50 (04-06-24 @ 10:58)  BP(mean): --  RR: 16 (04-06-24 @ 10:58)  SpO2: 96% (04-06-24 @ 10:58)  Wt(kg): --    04-05-24 @ 07:01  -  04-06-24 @ 07:00  --------------------------------------------------------  IN: 200 mL / OUT: 450 mL / NET: -250 mL    04-06-24 @ 07:01  -  04-06-24 @ 11:23  --------------------------------------------------------  IN: 360 mL / OUT: 0 mL / NET: 360 mL        PHYSICAL EXAM:  Constitutional: NAD, Alert  HEENT: NCAT, MMM  Neck: Supple, No JVD  Respiratory: CTA-b/l  Cardiovascular: RRR s1s2, no m/r/g  Gastrointestinal: BS+, soft, NT/ND  Extremities: No peripheral edema b/l  Neurological: no focal deficits; strength grossly intact  Back: no CVAT b/l  Skin: No rashes, no nevi  : + Block, draining clear yellow urine       LABS:                          8.3    6.54  )-----------( 162      ( 06 Apr 2024 07:25 )             26.3     Na(135)/K(4.0)/Cl(104)/HCO3(21)/BUN(19)/Cr(0.77)Glu(151)/Ca(8.4)/Mg(1.7)/PO4(3.2)    04-06 @ 07:25  Na(139)/K(4.3)/Cl(106)/HCO3(21)/BUN(27)/Cr(0.80)Glu(125)/Ca(8.4)/Mg(1.8)/PO4(--)    04-05 @ 06:44  Na(133)/K(4.4)/Cl(100)/HCO3(20)/BUN(33)/Cr(0.80)Glu(129)/Ca(8.7)/Mg(--)/PO4(--)    04-04 @ 13:58    Urinalysis Basic - ( 06 Apr 2024 07:25 )    Color: x / Appearance: x / SG: x / pH: x  Gluc: 151 mg/dL / Ketone: x  / Bili: x / Urobili: x   Blood: x / Protein: x / Nitrite: x   Leuk Esterase: x / RBC: x / WBC x   Sq Epi: x / Non Sq Epi: x / Bacteria: x

## 2024-04-08 NOTE — PROGRESS NOTE ADULT - PROVIDER SPECIALTY LIST ADULT
Nephrology
Nephrology
Transplant ID
Transplant Nephrology
Nephrology
Transplant ID
Transplant ID
Internal Medicine

## 2024-04-08 NOTE — DISCHARGE NOTE PROVIDER - HOSPITAL COURSE
HPI:  73M w/ hx of HTN, prostate CA s/p radiation, ESRD  s/p renal transplant in 2017 at [facility], cardiomyopathy s/p PPM, BPH, HTN p/w worsening urinary symptoms. Pt  persistent intermittent urinary symptoms since December. Endorses worsening dysuria, dribbling and urinary incontinence requiring diaper. Has had increased dysuria and difficulty urinating for past several days so came to hospital. Had course of antibiotics last month outpatient with transplant ID for recurrent UTIs. Pt with hx of sensitive Klebsiella 2 months ago. Distant hx of ESBL e. coli reportedly. Denies fevers, chills, weakness, acute flank pain. Endorses chronic lower back pain. Was told he might need urologic procedure outpatient but has not been performed. Pt and daughter have difficulty recalling urologist's name.    In ER: Was difficulty amaro placement, urology had to be called. Given IV Zosyn, , Tylenol 975mg (04 Apr 2024 20:41)    Hospital Course:  On admission, pt was found to have chronic hydronephrosis of R kidney with preserved RF. Amaro was placed by urology (4/4) and was recommended to remain for 1 week. Pt was continued on IV zosyn until urine cx came back for Klebsiella sensitive to CTX and so pt was transitioned to IV CTX. Pt was discharged on levo 500mg daily for four weeks per transplant ID recs for prostitis coverage. During admission, pt was evaluated by urology who recommended no acute intervention inpt. Urology recommended starting methenamine BID after abx course completion and outpt follow up w/ Dr. Cooney for TOV as outpt. Pt was also evaluated by nephrology who recommended holding pt's mycophenolate in the setting of acute infection. Pt will also need to f/u w/ Dr. Jason per transplant ID recs. Urethral stricture -- catheter for a week. Vitamin C and methenamine     Important Medication Changes and Reason:    Active or Pending Issues Requiring Follow-up:    Advanced Directives:   [ ] Full code  [ ] DNR  [ ] Hospice    Discharge Diagnoses:         HPI:  73M w/ hx of HTN, prostate CA s/p radiation, ESRD  s/p renal transplant in 2017 at [facility], cardiomyopathy s/p PPM, BPH, HTN p/w worsening urinary symptoms. Pt  persistent intermittent urinary symptoms since December. Endorses worsening dysuria, dribbling and urinary incontinence requiring diaper. Has had increased dysuria and difficulty urinating for past several days so came to hospital. Had course of antibiotics last month outpatient with transplant ID for recurrent UTIs. Pt with hx of sensitive Klebsiella 2 months ago. Distant hx of ESBL e. coli reportedly. Denies fevers, chills, weakness, acute flank pain. Endorses chronic lower back pain. Was told he might need urologic procedure outpatient but has not been performed. Pt and daughter have difficulty recalling urologist's name.    In ER: Was difficulty amaro placement, urology had to be called. Given IV Zosyn, , Tylenol 975mg (04 Apr 2024 20:41)    Hospital Course:  On admission, pt was found to have chronic hydronephrosis of R kidney with preserved RF. Urology feels pt's hydronephrosis is most likely in the setting of urethral stricture. Amaro was placed by urology (4/4) and was recommended to remain for 1 week. Pt was continued on IV zosyn until urine cx came back for Klebsiella sensitive and so pt was transitioned to IV CTX. Pt was discharged on levo 500mg daily for four weeks per transplant ID recs for prostatitis coverage. During admission, pt was evaluated by urology who recommended no acute intervention inpt. Urology recommended starting methenamine BID after abx course completion and outpt follow up w/ Dr. Cooney for TOV as outpt. Pt was also evaluated by nephrology who recommended holding pt's mycophenolate in the setting of acute infection. Pt will also need to f/u w/ Dr. Jason per transplant ID recs.     Important Medication Changes and Reason:  - hold mycophenolate in the setting of acute infection  - start levofloxacin 500mg daily for 4 weeks for UTI  - start methenamine AFTER completion of antibiotic course  - start Vitamin C    Active or Pending Issues Requiring Follow-up:  - amaro catheter to be removed on 4/11 for outpt TOV (1 week after placement on 4/4)  - further management of urethral stricture to be addressed by urology  - TTE outpt per primary care           HPI:  73M w/ hx of HTN, prostate CA s/p radiation, ESRD  s/p renal transplant in 2017 at [facility], cardiomyopathy s/p PPM, BPH, HTN p/w worsening urinary symptoms. Pt  persistent intermittent urinary symptoms since December. Endorses worsening dysuria, dribbling and urinary incontinence requiring diaper. Has had increased dysuria and difficulty urinating for past several days so came to hospital. Had course of antibiotics last month outpatient with transplant ID for recurrent UTIs. Pt with hx of sensitive Klebsiella 2 months ago. Distant hx of ESBL e. coli reportedly. Denies fevers, chills, weakness, acute flank pain. Endorses chronic lower back pain. Was told he might need urologic procedure outpatient but has not been performed. Pt and daughter have difficulty recalling urologist's name.    In ER: Was difficulty amaro placement, urology had to be called. Given IV Zosyn, , Tylenol 975mg (04 Apr 2024 20:41)    Hospital Course:  On admission, pt was found to have chronic hydronephrosis of R kidney with preserved RF. Urology feels pt's hydronephrosis is most likely in the setting of urethral stricture. Amaro was placed by urology (4/4) and was recommended to remain for 1 week. Pt was continued on IV zosyn until urine cx came back for Klebsiella sensitive and so pt was transitioned to IV CTX. Pt was discharged on levofloxacin 500mg daily for four weeks per transplant ID recs for prostatitis coverage. During admission, pt was evaluated by urology and they recommended no acute intervention while inpt. Urology recommended starting methenamine BID after abx course completion and outpt follow up w/ Dr. Cooney for TOV as outpt. Pt was also evaluated by nephrology who recommended holding pt's mycophenolate in the setting of acute infection. Pt will also need to f/u w/ Dr. Jason for ID f/u.    Important Medication Changes and Reason:  - hold mycophenolate in the setting of acute infection  - start levofloxacin 500mg daily for 4 weeks for UTI  - start methenamine AFTER completion of antibiotic course  - start Vitamin C    Active or Pending Issues Requiring Follow-up:  - amaro catheter to be removed on 4/11 for outpt TOV (1 week after placement on 4/4)  - further management of urethral stricture to be addressed by urology  - TTE outpt per primary care           HPI:  73M w/ hx of HTN, prostate CA s/p radiation, ESRD  s/p renal transplant in 2017 at [facility], cardiomyopathy s/p PPM, BPH, HTN p/w worsening urinary symptoms. Pt  persistent intermittent urinary symptoms since December. Endorses worsening dysuria, dribbling and urinary incontinence requiring diaper. Has had increased dysuria and difficulty urinating for past several days so came to hospital. Had course of antibiotics last month outpatient with transplant ID for recurrent UTIs. Pt with hx of sensitive Klebsiella 2 months ago. Distant hx of ESBL e. coli reportedly. Denies fevers, chills, weakness, acute flank pain. Endorses chronic lower back pain. Was told he might need urologic procedure outpatient but has not been performed. Pt and daughter have difficulty recalling urologist's name.    In ER: Was difficulty amaro placement, urology had to be called. Given IV Zosyn, , Tylenol 975mg (04 Apr 2024 20:41)    Hospital Course:  On admission, pt was found to have chronic hydronephrosis of R kidney with preserved RF. Urology feels pt's hydronephrosis is most likely in the setting of urethral stricture. Amaro was placed by urology (4/4) and was recommended to remain for 1 week. Pt was continued on IV zosyn until urine cx was positive for Klebsiella sensitive and so pt was transitioned to IV CTX. Pt was also evaluated by nephrology who recommended holding pt's mycophenolate in the setting of acute infection. Pt was discharged on levofloxacin 500mg daily for four weeks per transplant ID recs for prostatitis coverage. During admission, pt was evaluated by urology and they recommended no acute intervention while inpt. Urology recommended starting methenamine 1g BID and Vitamin C 500mg BID after abx course completion. Pt will also need to follow up w/ Dr. Cooney for TOV as outpt. Pt will also need to f/u w/ Dr. Jason for ID f/u. PT evaluated pt and recommended home PT.    Important Medication Changes and Reason:  - hold mycophenolate in the setting of acute infection  - start levofloxacin 500mg daily for 4 weeks for UTI  - start methenamine BID and Vitamin C BID AFTER completion of antibiotic course  - start Vitamin C    Active or Pending Issues Requiring Follow-up:  - amaro catheter to be removed on 4/11 for outpt TOV (1 week after placement on 4/4)  - further management of urethral stricture to be addressed by urology  - TTE outpt per primary care           HPI:  73M w/ hx of HTN, prostate CA s/p radiation, ESRD  s/p renal transplant in 2017 at [facility], cardiomyopathy s/p PPM, BPH, HTN p/w worsening urinary symptoms. Pt  persistent intermittent urinary symptoms since December. Endorses worsening dysuria, dribbling and urinary incontinence requiring diaper. Has had increased dysuria and difficulty urinating for past several days so came to hospital. Had course of antibiotics last month outpatient with transplant ID for recurrent UTIs. Pt with hx of sensitive Klebsiella 2 months ago. Distant hx of ESBL e. coli reportedly. Denies fevers, chills, weakness, acute flank pain. Endorses chronic lower back pain. Was told he might need urologic procedure outpatient but has not been performed. Pt and daughter have difficulty recalling urologist's name.    In ER: Was difficulty amaro placement, urology had to be called. Given IV Zosyn, , Tylenol 975mg (04 Apr 2024 20:41)    Hospital Course:  On admission, renal US w/ doppler showed chronic hydronephrosis of R kidney with preserved RF. Urology feels pt's hydronephrosis is most likely in the setting of urethral stricture. Amaro was placed by urology (4/4) and was recommended to remain for 1 week. Pt was also evaluated by nephrology who recommended holding pt's mycophenolate in the setting of acute infection. Pt was continued on IV zosyn until urine cx was shown to be Klebsiella sensitive to CTX and so pt was transitioned to IV CTX. Pt was discharged on levofloxacin 500mg daily for four weeks per transplant ID recs for prostatitis coverage. During admission, pt was evaluated by urology and they recommended no acute intervention while inpt. Urology recommended starting methenamine 1g BID and Vitamin C 500mg BID after abx course completion. Pt will also need to follow up w/ Dr. Cooney for TOV as outpt. Pt will also need to f/u w/ Dr. Jason for ID f/u. PT evaluated pt and recommended home PT.    Important Medication Changes and Reason:  - hold mycophenolate in the setting of acute infection  - start levofloxacin 500mg daily for 4 weeks for UTI  - start methenamine BID and Vitamin C BID AFTER completion of antibiotic course  - start Vitamin C    Active or Pending Issues Requiring Follow-up:  - amaro catheter to be removed on 4/11 for outpt TOV (1 week after placement on 4/4)  - further management of urethral stricture to be addressed by urology  - TTE outpt per primary care

## 2024-04-08 NOTE — PROGRESS NOTE ADULT - SUBJECTIVE AND OBJECTIVE BOX
[facility] DIVISION OF KIDNEY DISEASES AND HYPERTENSION -- FOLLOW UP NOTE  --------------------------------------------------------------------------------  Chief Complaint:    24 hour events/subjective:  Pt was seen and examined at the bedside. No acute overnight events. No fresh complaints. Pt feels better. Brother was also present in the room.   Pt denies SOB/ Constipation/ Diarrhea/ Nausea/ Vomiting/ abdominal pain/ chest pain/ tingling/ numbness.         PAST HISTORY  --------------------------------------------------------------------------------  No significant changes to PMH, PSH, FHx, SHx, unless otherwise noted    ALLERGIES & MEDICATIONS  --------------------------------------------------------------------------------  Allergies    No Known Allergies    Intolerances      Standing Inpatient Medications  carvedilol 12.5 milliGRAM(s) Oral every 12 hours  cefTRIAXone   IVPB      cefTRIAXone   IVPB 1000 milliGRAM(s) IV Intermittent every 24 hours  chlorhexidine 4% Liquid 1 Application(s) Topical daily  heparin   Injectable 5000 Unit(s) SubCutaneous every 8 hours  lisinopril 10 milliGRAM(s) Oral daily  tacrolimus 1 milliGRAM(s) Oral daily  tacrolimus 0.5 milliGRAM(s) Oral at bedtime  tamsulosin 0.4 milliGRAM(s) Oral daily    PRN Inpatient Medications  acetaminophen     Tablet .. 650 milliGRAM(s) Oral every 6 hours PRN  melatonin 3 milliGRAM(s) Oral at bedtime PRN      REVIEW OF SYSTEMS  --------------------------------------------------------------------------------  as above.     VITALS/PHYSICAL EXAM  --------------------------------------------------------------------------------  T(C): 36.7 (04-08-24 @ 05:00), Max: 37 (04-07-24 @ 21:48)  HR: 67 (04-08-24 @ 05:00) (56 - 67)  BP: 174/75 (04-08-24 @ 05:00) (146/68 - 174/75)  RR: 18 (04-08-24 @ 05:00) (18 - 18)  SpO2: 97% (04-08-24 @ 05:00) (94% - 97%)  Wt(kg): --        04-07-24 @ 07:01  -  04-08-24 @ 07:00  --------------------------------------------------------  IN: 450 mL / OUT: 1700 mL / NET: -1250 mL    04-08-24 @ 07:01  -  04-08-24 @ 12:55  --------------------------------------------------------  IN: 220 mL / OUT: 0 mL / NET: 220 mL      Physical Exam:  Gen: Not in distress  HEENT:Supple neck, No JVD, PERRLA,  Pulm:CTA B/L  CV:S1S2+   Abd: Non- tender, no distention, Bowel sounds +  Transplant: non tender, no bruit  : No suprapubic tenderness  Extremities: No edema.  Skin: warm  Neuro: AAOx 3         LABS/STUDIES  --------------------------------------------------------------------------------              9.4    4.50  >-----------<  192      [04-08-24 @ 06:47]              28.9     138  |  105  |  18  ----------------------------<  100      [04-08-24 @ 07:37]  4.3   |  21  |  0.68        Ca     8.5     [04-08-24 @ 07:37]      Mg     1.6     [04-08-24 @ 07:37]      Phos  3.0     [04-08-24 @ 07:37]    TPro  6.9  /  Alb  2.9  /  TBili  0.2  /  DBili  x   /  AST  15  /  ALT  11  /  AlkPhos  50  [04-08-24 @ 07:37]          Creatinine Trend:  SCr 0.68 [04-08 @ 07:37]  SCr 0.78 [04-07 @ 06:44]  SCr 0.77 [04-06 @ 07:25]  SCr 0.80 [04-05 @ 06:44]  SCr 0.80 [04-04 @ 13:58]    Tacrolimus (), Serum: 9.9 ng/mL (04-08 @ 06:47)  Tacrolimus (), Serum: 8.9 ng/mL (04-07 @ 11:18)  Tacrolimus (), Serum: 9.0 ng/mL (04-06 @ 07:25)  Tacrolimus (), Serum: 9.4 ng/mL (04-05 @ 06:40)            Urinalysis - [04-08-24 @ 07:37]      Color  / Appearance  / SG  / pH       Gluc 100 / Ketone   / Bili  / Urobili        Blood  / Protein  / Leuk Est  / Nitrite       RBC  / WBC  / Hyaline  / Gran  / Sq Epi  / Non Sq Epi  / Bacteria

## 2024-04-12 ENCOUNTER — NON-APPOINTMENT (OUTPATIENT)
Age: 74
End: 2024-04-12

## 2024-04-17 ENCOUNTER — APPOINTMENT (OUTPATIENT)
Dept: INFECTIOUS DISEASE | Facility: CLINIC | Age: 74
End: 2024-04-17
Payer: MEDICARE

## 2024-04-17 VITALS
DIASTOLIC BLOOD PRESSURE: 63 MMHG | HEART RATE: 73 BPM | WEIGHT: 136 LBS | OXYGEN SATURATION: 96 % | SYSTOLIC BLOOD PRESSURE: 129 MMHG | BODY MASS INDEX: 22.66 KG/M2 | HEIGHT: 65 IN | TEMPERATURE: 97.8 F

## 2024-04-17 DIAGNOSIS — T86.19 OTHER COMPLICATION OF KIDNEY TRANSPLANT: ICD-10-CM

## 2024-04-17 DIAGNOSIS — N12 OTHER COMPLICATION OF KIDNEY TRANSPLANT: ICD-10-CM

## 2024-04-17 DIAGNOSIS — Z00.00 ENCOUNTER FOR GENERAL ADULT MEDICAL EXAMINATION W/OUT ABNORMAL FINDINGS: ICD-10-CM

## 2024-04-17 DIAGNOSIS — Z94.0 KIDNEY TRANSPLANT STATUS: ICD-10-CM

## 2024-04-17 PROCEDURE — 99214 OFFICE O/P EST MOD 30 MIN: CPT

## 2024-04-17 NOTE — ASSESSMENT
[FreeTextEntry1] : 73M with Renal transplant at [place] in 2017, HTN, prostate cancer s/p radiation therapy, Cardiomyopathy s/p PPM, history of recurrent pyelonephritis (ESBL Ecoli in 2020, Pansensitive Kleb in 1/2024) presents for follow up.  #Recurrent UTI, Transplant Pyelonephritis #Renal Transplant 2017 --- s/p Ceftin for 10 days for UTI --- currently on Levaquin for 4 weeks for prostatitis coverage (end-date 5/5/2024) --- s/p amaro removal last Thursday with Urology Dr Donny Cooney (private) PLAN --- continue Levaquin --- follow up with Dr. Donny Cooney Urology, will try to get in contact to get information of plan regarding strictures --- referral with [facility] group for second opinion   #Vaccination --- Flu: reports vaccinated in 11/2023 --- Hepatitis A unsure --- Hepatitis B unsure --- Tdap unsure --- Prevnar 20: reports vaccinated, but unsure of date PLAN --- obtain vaccination records from PCP --- Recommend covid booster  RTC in 2 months

## 2024-04-17 NOTE — HISTORY OF PRESENT ILLNESS
[FreeTextEntry1] : 73M with Renal transplant at [place] in 2017, HTN, prostate cancer s/p radiation therapy, Cardiomyopathy s/p PPM, history of recurrent pyelonephritis (ESBL Ecoli in 2020, Pansensitive Kleb in 1/2024) presents for follow up.  Last seen 3/21/2024 with work up:  WBC, 4SQE UCx Pansensitive Kleb pneumo  Quantgold indeterminate  Hep B, C negative Tcruzi negative CMV negative GC/Chlamydia negative Syphilis negative  Patient reports feeling well Denies any cough, abdominal pain, n/v, diarrhea, dysuria  Denies any fever or chills  Of note his ultrasound showed renal artery stenosis and hydronephrosis He was seen by Transplant Nephrology who recommends a follow up ultrasound in six months

## 2024-04-17 NOTE — PHYSICAL EXAM
[General Appearance - Alert] : alert [General Appearance - In No Acute Distress] : in no acute distress [Sclera] : the sclera and conjunctiva were normal [PERRL With Normal Accommodation] : pupils were equal in size, round, reactive to light [Extraocular Movements] : extraocular movements were intact [Neck Appearance] : the appearance of the neck was normal [Neck Cervical Mass (___cm)] : no neck mass was observed [Jugular Venous Distention Increased] : there was no jugular-venous distention [Thyroid Diffuse Enlargement] : the thyroid was not enlarged [Auscultation Breath Sounds / Voice Sounds] : lungs were clear to auscultation bilaterally [Heart Rate And Rhythm] : heart rate was normal and rhythm regular [Heart Sounds] : normal S1 and S2 [Heart Sounds Gallop] : no gallops [Murmurs] : no murmurs [Heart Sounds Pericardial Friction Rub] : no pericardial rub [Bowel Sounds] : normal bowel sounds [Abdomen Soft] : soft [Abdomen Tenderness] : non-tender [Abdomen Mass (___ Cm)] : no abdominal mass palpated [Costovertebral Angle Tenderness] : no CVA tenderness [Musculoskeletal - Swelling] : no joint swelling [Nail Clubbing] : no clubbing  or cyanosis of the fingernails [Motor Tone] : muscle strength and tone were normal [Full Pulse] : the pedal pulses are present [Edema] : there was no peripheral edema [No Palpable Adenopathy] : no palpable adenopathy [Skin Color & Pigmentation] : normal skin color and pigmentation [] : no rash [Oriented To Time, Place, And Person] : oriented to person, place, and time [Affect] : the affect was normal

## 2024-04-17 NOTE — END OF VISIT
[] : Fellow [FreeTextEntry3] : Patient seen and examined with Dr Lopez Patient was seen during his recent admission for transplant related pyelonephritis with treatment ongoing for prostatitis with levaquin until 5/524. 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

## 2024-05-06 RX ORDER — ASCORBIC ACID 60 MG
1 TABLET,CHEWABLE ORAL
Qty: 60 | Refills: 0
Start: 2024-05-06 | End: 2024-06-04

## 2024-05-06 RX ORDER — METHENAMINE MANDELATE 1 G
1 TABLET ORAL
Qty: 60 | Refills: 0
Start: 2024-05-06 | End: 2024-06-04

## 2024-05-07 RX ORDER — METHENAMINE MANDELATE 1 G
1 TABLET ORAL
Qty: 60 | Refills: 0
Start: 2024-05-07 | End: 2024-06-05

## 2024-05-07 RX ORDER — ASCORBIC ACID 60 MG
1 TABLET,CHEWABLE ORAL
Qty: 60 | Refills: 0
Start: 2024-05-07 | End: 2024-06-05

## 2025-02-11 NOTE — ED ADULT NURSE NOTE - ED STAT RN HANDOFF DETAILS 2
72
hand off given to oncoming RN Venus Baugh. Pt continues to await bed upstairs. A&Ox4. No c/o. Voiding well. Fall risk precautions maintained
